# Patient Record
Sex: MALE | Race: OTHER | NOT HISPANIC OR LATINO
[De-identification: names, ages, dates, MRNs, and addresses within clinical notes are randomized per-mention and may not be internally consistent; named-entity substitution may affect disease eponyms.]

---

## 2017-01-06 ENCOUNTER — MEDICATION RENEWAL (OUTPATIENT)
Age: 77
End: 2017-01-06

## 2017-01-19 ENCOUNTER — MESSAGE (OUTPATIENT)
Age: 77
End: 2017-01-19

## 2017-01-24 ENCOUNTER — MESSAGE (OUTPATIENT)
Age: 77
End: 2017-01-24

## 2017-02-27 ENCOUNTER — OUTPATIENT (OUTPATIENT)
Dept: OUTPATIENT SERVICES | Facility: HOSPITAL | Age: 77
LOS: 1 days | End: 2017-02-27
Payer: COMMERCIAL

## 2017-02-27 ENCOUNTER — APPOINTMENT (OUTPATIENT)
Dept: UROLOGY | Facility: CLINIC | Age: 77
End: 2017-02-27

## 2017-02-27 DIAGNOSIS — R35.0 FREQUENCY OF MICTURITION: ICD-10-CM

## 2017-02-27 PROCEDURE — 96402 CHEMO HORMON ANTINEOPL SQ/IM: CPT

## 2017-02-27 RX ORDER — LEUPROLIDE ACETATE 45 MG
45 KIT INTRAMUSCULAR
Qty: 1 | Refills: 0 | Status: COMPLETED | OUTPATIENT
Start: 2017-02-27

## 2017-02-27 RX ORDER — LEUPROLIDE ACETATE 45 MG
45 KIT INTRAMUSCULAR
Qty: 1 | Refills: 0 | Status: COMPLETED | OUTPATIENT
Start: 2017-02-27 | End: 2017-02-27

## 2017-02-27 RX ADMIN — LEUPROLIDE ACETATE 0 MG: KIT at 00:00

## 2017-03-01 LAB
ALBUMIN SERPL ELPH-MCNC: 4.3 G/DL
ALP BLD-CCNC: 74 U/L
ALT SERPL-CCNC: 26 U/L
ANION GAP SERPL CALC-SCNC: 14 MMOL/L
AST SERPL-CCNC: 30 U/L
BASOPHILS # BLD AUTO: 0.03 K/UL
BASOPHILS NFR BLD AUTO: 0.4 %
BILIRUB SERPL-MCNC: 0.4 MG/DL
BUN SERPL-MCNC: 33 MG/DL
CALCIUM SERPL-MCNC: 10.4 MG/DL
CHLORIDE SERPL-SCNC: 103 MMOL/L
CO2 SERPL-SCNC: 24 MMOL/L
CREAT SERPL-MCNC: 1.27 MG/DL
EOSINOPHIL # BLD AUTO: 0.92 K/UL
EOSINOPHIL NFR BLD AUTO: 11.7 %
GLUCOSE SERPL-MCNC: 89 MG/DL
HCT VFR BLD CALC: 44.4 %
HGB BLD-MCNC: 14.1 G/DL
IMM GRANULOCYTES NFR BLD AUTO: 0.3 %
LYMPHOCYTES # BLD AUTO: 2.57 K/UL
LYMPHOCYTES NFR BLD AUTO: 32.8 %
MAN DIFF?: NORMAL
MCHC RBC-ENTMCNC: 28.3 PG
MCHC RBC-ENTMCNC: 31.8 GM/DL
MCV RBC AUTO: 89.2 FL
MONOCYTES # BLD AUTO: 0.39 K/UL
MONOCYTES NFR BLD AUTO: 5 %
NEUTROPHILS # BLD AUTO: 3.9 K/UL
NEUTROPHILS NFR BLD AUTO: 49.8 %
PLATELET # BLD AUTO: 218 K/UL
POTASSIUM SERPL-SCNC: 5.3 MMOL/L
PROT SERPL-MCNC: 8.2 G/DL
PSA SERPL-MCNC: <0.01 NG/ML
RBC # BLD: 4.98 M/UL
RBC # FLD: 13.9 %
SODIUM SERPL-SCNC: 141 MMOL/L
TESTOST SERPL-MCNC: <2.5 NG/DL
WBC # FLD AUTO: 7.83 K/UL

## 2017-03-07 DIAGNOSIS — E11.9 TYPE 2 DIABETES MELLITUS WITHOUT COMPLICATIONS: ICD-10-CM

## 2017-03-07 DIAGNOSIS — C61 MALIGNANT NEOPLASM OF PROSTATE: ICD-10-CM

## 2017-08-28 ENCOUNTER — OUTPATIENT (OUTPATIENT)
Dept: OUTPATIENT SERVICES | Facility: HOSPITAL | Age: 77
LOS: 1 days | End: 2017-08-28
Payer: COMMERCIAL

## 2017-08-28 ENCOUNTER — APPOINTMENT (OUTPATIENT)
Dept: UROLOGY | Facility: CLINIC | Age: 77
End: 2017-08-28
Payer: COMMERCIAL

## 2017-08-28 VITALS
HEART RATE: 66 BPM | RESPIRATION RATE: 16 BRPM | SYSTOLIC BLOOD PRESSURE: 198 MMHG | BODY MASS INDEX: 23.57 KG/M2 | TEMPERATURE: 97.8 F | HEIGHT: 63 IN | WEIGHT: 133 LBS | DIASTOLIC BLOOD PRESSURE: 83 MMHG

## 2017-08-28 DIAGNOSIS — R35.0 FREQUENCY OF MICTURITION: ICD-10-CM

## 2017-08-28 DIAGNOSIS — C61 MALIGNANT NEOPLASM OF PROSTATE: ICD-10-CM

## 2017-08-28 DIAGNOSIS — K59.00 CONSTIPATION, UNSPECIFIED: ICD-10-CM

## 2017-08-28 DIAGNOSIS — R31.29 OTHER MICROSCOPIC HEMATURIA: ICD-10-CM

## 2017-08-28 DIAGNOSIS — E11.9 TYPE 2 DIABETES MELLITUS WITHOUT COMPLICATIONS: ICD-10-CM

## 2017-08-28 PROCEDURE — 99214 OFFICE O/P EST MOD 30 MIN: CPT

## 2017-08-28 PROCEDURE — 96402 CHEMO HORMON ANTINEOPL SQ/IM: CPT

## 2017-08-28 RX ORDER — LEUPROLIDE ACETATE 45 MG
45 KIT INTRAMUSCULAR
Qty: 1 | Refills: 0 | Status: COMPLETED | OUTPATIENT
Start: 2017-08-28

## 2017-08-28 RX ORDER — LEUPROLIDE ACETATE 45 MG
45 KIT INTRAMUSCULAR
Qty: 1 | Refills: 0 | Status: COMPLETED | OUTPATIENT
Start: 2017-08-28 | End: 2017-08-28

## 2017-08-28 RX ADMIN — LEUPROLIDE ACETATE 0 MG: KIT at 00:00

## 2017-08-29 LAB
ALBUMIN SERPL ELPH-MCNC: 4.5 G/DL
ALP BLD-CCNC: 76 U/L
ALT SERPL-CCNC: 25 U/L
ANION GAP SERPL CALC-SCNC: 13 MMOL/L
APPEARANCE: CLEAR
AST SERPL-CCNC: 27 U/L
BACTERIA: NEGATIVE
BASOPHILS # BLD AUTO: 0.03 K/UL
BASOPHILS NFR BLD AUTO: 0.4 %
BILIRUB SERPL-MCNC: 0.4 MG/DL
BILIRUBIN URINE: NEGATIVE
BLOOD URINE: NEGATIVE
BUN SERPL-MCNC: 17 MG/DL
CALCIUM SERPL-MCNC: 9.9 MG/DL
CHLORIDE SERPL-SCNC: 99 MMOL/L
CO2 SERPL-SCNC: 26 MMOL/L
COLOR: YELLOW
CORE LAB FLUID CYTOLOGY: NORMAL
CREAT SERPL-MCNC: 1.1 MG/DL
EOSINOPHIL # BLD AUTO: 0.56 K/UL
EOSINOPHIL NFR BLD AUTO: 7.8 %
GLUCOSE QUALITATIVE U: NORMAL MG/DL
GLUCOSE SERPL-MCNC: 158 MG/DL
HCT VFR BLD CALC: 44.2 %
HGB BLD-MCNC: 13.9 G/DL
HYALINE CASTS: 0 /LPF
IMM GRANULOCYTES NFR BLD AUTO: 0.1 %
KETONES URINE: NEGATIVE
LEUKOCYTE ESTERASE URINE: NEGATIVE
LYMPHOCYTES # BLD AUTO: 2.58 K/UL
LYMPHOCYTES NFR BLD AUTO: 35.7 %
MAN DIFF?: NORMAL
MCHC RBC-ENTMCNC: 27.4 PG
MCHC RBC-ENTMCNC: 31.4 GM/DL
MCV RBC AUTO: 87 FL
MICROSCOPIC-UA: NORMAL
MONOCYTES # BLD AUTO: 0.36 K/UL
MONOCYTES NFR BLD AUTO: 5 %
NEUTROPHILS # BLD AUTO: 3.68 K/UL
NEUTROPHILS NFR BLD AUTO: 51 %
NITRITE URINE: NEGATIVE
PH URINE: 7
PLATELET # BLD AUTO: 188 K/UL
POTASSIUM SERPL-SCNC: 5.4 MMOL/L
PROT SERPL-MCNC: 8.3 G/DL
PROTEIN URINE: 30 MG/DL
PSA SERPL-MCNC: <0.01 NG/ML
RBC # BLD: 5.08 M/UL
RBC # FLD: 13.3 %
RED BLOOD CELLS URINE: 1 /HPF
SODIUM SERPL-SCNC: 138 MMOL/L
SPECIFIC GRAVITY URINE: 1.02
SQUAMOUS EPITHELIAL CELLS: 0 /HPF
TESTOST SERPL-MCNC: <2.5 NG/DL
UROBILINOGEN URINE: NORMAL MG/DL
WBC # FLD AUTO: 7.22 K/UL
WHITE BLOOD CELLS URINE: 0 /HPF

## 2017-08-30 ENCOUNTER — MESSAGE (OUTPATIENT)
Age: 77
End: 2017-08-30

## 2017-08-30 LAB — BACTERIA UR CULT: NORMAL

## 2018-01-03 VITALS
RESPIRATION RATE: 16 BRPM | DIASTOLIC BLOOD PRESSURE: 70 MMHG | SYSTOLIC BLOOD PRESSURE: 158 MMHG | OXYGEN SATURATION: 100 % | HEART RATE: 82 BPM | TEMPERATURE: 96 F

## 2018-01-03 RX ORDER — CHLORHEXIDINE GLUCONATE 213 G/1000ML
1 SOLUTION TOPICAL ONCE
Qty: 0 | Refills: 0 | Status: DISCONTINUED | OUTPATIENT
Start: 2018-01-05 | End: 2018-01-05

## 2018-01-03 NOTE — H&P ADULT - NSHPSOCIALHISTORY_GEN_ALL_CORE
Denies ETOH, tobacco, and drug use Denies ETOH and drug use  Former smoker- quit 40 years ago, prev 1ppd x 10 yrs

## 2018-01-03 NOTE — H&P ADULT - PMH
Atherosclerosis of coronary artery  Coronary artery disease  Essential hypertension  Hypertension  Hyperlipidemia  Dyslipidemia  Type 2 diabetes mellitus  Diabetes

## 2018-01-03 NOTE — H&P ADULT - RS GEN PE MLT RESP DETAILS PC
no chest wall tenderness/respirations non-labored/normal/no rhonchi/no wheezes/no subcutaneous emphysema/no intercostal retractions/no rales/good air movement/airway patent/breath sounds equal

## 2018-01-03 NOTE — H&P ADULT - HISTORY OF PRESENT ILLNESS
*****SKELETON*****    76 y/o M pt FHx of CAD, PMHX known CAD w/ prior 3vCABG @ [facility] 2015 LIMA to LAD, SVG to OM, and SVG to rPDA, DM II, + murmur, HLD, HTN, prostate cancer presents to cardiologist Dr. Santacruz c/o intermittent episodes of CP    Denies SOB, JARQUIN, decrease in exercise tolerance, palpitations, dizziness, syncope, PND/orthopnea, or LE edema    NST 12/21/2017: EF: 64%, LVSF is normal, moderate sized, severe, reversible myocardial perfusion defect of the lateral and inferolateral wall. Cath 04/10/2015: LM: distal 50% discrete stenosis, LAD: proximal 60% diffuse stenosis, mid 50% diffuse stenosis, calcified vessel, D1 small diffuse disease, LCx mild luminal irregularities, large size vessel, OM1 proximal 95% stenosis, OM2 mild diffuse disease, small vessel disease, RCA: proximal/mid stents, widely patent, distal 30% diffuse disease, rPLS: medium to large vessel, distal 50% tubular stenosis, rPDA small w/ severe 90%. LIMA to LAD, SVG to OM, and SVG to rPDA, moderately elevated LVEDP 22mmHg.    In light of pt's risk factors, known CAD, CCS III anginal symptoms and abnormal stress test pt is referred for cardiac catheterization w/ possible intervention if clinically indicated to r/o CAD 78 y/o M  FHx of CAD, PMHx known CAD w/ prior 3VCABG @ [facility] 2015 LIMA to LAD, SVG to OM, and SVG to rPDA s/p Diagnostic Cardiac Cath 4/10/2015 (results below), DM II, + murmur, HLD, HTN, prostate cancer presents to cardiologist Dr. Santacruz c/o intermittent chest pain at rest, which lasts seconds-to-minutes before resolving on its own. Denies SOB, JARQUIN, decrease in exercise tolerance, palpitations, dizziness, syncope, PND/orthopnea, or LE edema. Subsequent Nuclear Stress test 12/1/2017 revealed moderate sized, severe, reversible myocardial perfusion defect of the lateral and inferolateral wall,  EF 64%.    In light of pt's risk factors, known CAD, CCS III anginal symptoms and abnormal stress test pt is referred for cardiac catheterization w/ possible intervention if clinically indicated to r/o CAD    Cardiac Cath 04/10/2015: LM: distal 50% discrete stenosis, LAD: proximal 60% diffuse stenosis, mid 50% diffuse stenosis, calcified vessel, D1 small diffuse disease, LCx mild luminal irregularities, large size vessel, OM1 proximal 95% stenosis, OM2 mild diffuse disease, small vessel disease, RCA: proximal/mid stents, widely patent, distal 30% diffuse disease, rPLS: medium to large vessel, distal 50% tubular stenosis, rPDA small w/ severe 90%. LIMA to LAD, SVG to OM, and SVG to rPDA, moderately elevated LVEDP 22mmHg.

## 2018-01-03 NOTE — H&P ADULT - ASSESSMENT
78 y/o M  FHx of CAD, PMHx known CAD w/ prior 3VCABG @ [facility] 2015 LIMA to LAD, SVG to OM, and SVG to rPDA s/p Diagnostic Cardiac Cath 4/10/2015, DM II, + murmur, HLD, HTN, prostate cancer  presents for cardiac catheterization w/ possible intervention if clinically indicated to r/o CAD secondary to pt's risk factors, known CAD, CCS III anginal symptoms and abnormal stress test.    - Pt. states that he is compliant with DAPT. Aspirin 81 mg X 1 and Plavix 75 mg X 1 given pre-cath.  - NS @ 75 cc/hr given.    Risks & benefits of procedure and alternative therapy have been explained to the patient including but not limited to: allergic reaction, bleeding w/possible need for blood transfusion, infection, renal and vascular compromise, limb damage, arrhythmia, stroke, vessel dissection/perforation, Myocardial infarction, emergent CABG. Informed consent obtained and in chart 78 y/o M  FHx of CAD, PMHx known CAD w/ prior 3VCABG @ [facility] 2015 LIMA to LAD, SVG to OM, and SVG to rPDA s/p Diagnostic Cardiac Cath 4/10/2015, CKD stage 3 (Cr today 1.49, unknown baseline),  DM II, + murmur, HLD, HTN, prostate cancer  presents for cardiac catheterization w/ possible intervention if clinically indicated to r/o CAD secondary to pt's risk factors, known CAD, CCS III anginal symptoms and abnormal stress test.    - Pt. states that he is compliant with DAPT. Aspirin 81 mg X 1 and Plavix 75 mg X 1 given pre-cath.  - CKD (Stage 3) Cr. 1.49 today (baseline unjnown). NS @ 100 cc/hr given as discussed with Dr. Santacruz  - Pt. on metoprolol succinate 100 mg PO daily at home. last dose was 4-5 mnths ago. As discussed with Dr. Santacruz Metoprolol 25 mg X 1 given pre-cath.    Risks & benefits of procedure and alternative therapy have been explained to the patient including but not limited to: allergic reaction, bleeding w/possible need for blood transfusion, infection, renal and vascular compromise, limb damage, arrhythmia, stroke, vessel dissection/perforation, Myocardial infarction, emergent CABG. Informed consent obtained and in chart

## 2018-01-03 NOTE — H&P ADULT - NSHPLABSRESULTS_GEN_ALL_CORE
13.4   7.2   )-----------( 193      ( 05 Jan 2018 08:59 )             40.5 13.4   7.2   )-----------( 193      ( 05 Jan 2018 08:59 )             40.5  01-05    132<L>  |  95<L>  |  41<H>  ----------------------------<  257<H>  4.8   |  22  |  1.49<H>    Ca    10.0      05 Jan 2018 08:59    TPro  8.7<H>  /  Alb  4.3  /  TBili  0.8  /  DBili  x   /  AST  30  /  ALT  27  /  AlkPhos  69  01-05    PT/INR - ( 05 Jan 2018 08:59 )   PT: 10.7 sec;   INR: 0.96          PTT - ( 05 Jan 2018 08:59 )  PTT:29.4 sec

## 2018-01-03 NOTE — H&P ADULT - PSH
Other postprocedural status  S/P colonoscopy  Other postprocedural status  secondary to prostate cancer  Status post aorto-coronary artery bypass graft  SVG-->LAD, SVG-->OMB, SVG-->PLS/PDA

## 2018-01-04 RX ORDER — INSULIN LISPRO 100/ML
1 VIAL (ML) SUBCUTANEOUS
Qty: 0 | Refills: 0 | COMMUNITY

## 2018-01-04 RX ORDER — PSYLLIUM SEED (WITH DEXTROSE)
1 POWDER (GRAM) ORAL
Qty: 0 | Refills: 0 | COMMUNITY

## 2018-01-04 RX ORDER — INSULIN GLARGINE 100 [IU]/ML
1 INJECTION, SOLUTION SUBCUTANEOUS
Qty: 0 | Refills: 0 | COMMUNITY

## 2018-01-04 RX ORDER — SODIUM POLYSTYRENE SULFONATE 4.1 MEQ/G
0 POWDER, FOR SUSPENSION ORAL
Qty: 0 | Refills: 0 | COMMUNITY

## 2018-01-05 ENCOUNTER — OUTPATIENT (OUTPATIENT)
Dept: OUTPATIENT SERVICES | Facility: HOSPITAL | Age: 78
LOS: 1 days | Discharge: NURSING, REHAB OR LONG-TERM CARE | End: 2018-01-05
Payer: COMMERCIAL

## 2018-01-05 PROBLEM — C61 MALIGNANT NEOPLASM OF PROSTATE: Chronic | Status: ACTIVE | Noted: 2018-01-03

## 2018-01-05 LAB
ALBUMIN SERPL ELPH-MCNC: 4.3 G/DL — SIGNIFICANT CHANGE UP (ref 3.3–5)
ALP SERPL-CCNC: 69 U/L — SIGNIFICANT CHANGE UP (ref 40–120)
ALT FLD-CCNC: 27 U/L — SIGNIFICANT CHANGE UP (ref 10–45)
ANION GAP SERPL CALC-SCNC: 15 MMOL/L — SIGNIFICANT CHANGE UP (ref 5–17)
APTT BLD: 29.4 SEC — SIGNIFICANT CHANGE UP (ref 27.5–37.4)
AST SERPL-CCNC: 30 U/L — SIGNIFICANT CHANGE UP (ref 10–40)
BASOPHILS NFR BLD AUTO: 0.7 % — SIGNIFICANT CHANGE UP (ref 0–2)
BILIRUB SERPL-MCNC: 0.8 MG/DL — SIGNIFICANT CHANGE UP (ref 0.2–1.2)
BUN SERPL-MCNC: 41 MG/DL — HIGH (ref 7–23)
CALCIUM SERPL-MCNC: 10 MG/DL — SIGNIFICANT CHANGE UP (ref 8.4–10.5)
CHLORIDE SERPL-SCNC: 95 MMOL/L — LOW (ref 96–108)
CHOLEST SERPL-MCNC: 246 MG/DL — HIGH (ref 10–199)
CK MB CFR SERPL CALC: 6.2 NG/ML — SIGNIFICANT CHANGE UP (ref 0–6.7)
CO2 SERPL-SCNC: 22 MMOL/L — SIGNIFICANT CHANGE UP (ref 22–31)
CREAT SERPL-MCNC: 1.49 MG/DL — HIGH (ref 0.5–1.3)
EOSINOPHIL NFR BLD AUTO: 5.9 % — SIGNIFICANT CHANGE UP (ref 0–6)
GLUCOSE BLDC GLUCOMTR-MCNC: 129 MG/DL — HIGH (ref 70–99)
GLUCOSE SERPL-MCNC: 257 MG/DL — HIGH (ref 70–99)
HBA1C BLD-MCNC: 7.9 % — HIGH (ref 4–5.6)
HCT VFR BLD CALC: 40.5 % — SIGNIFICANT CHANGE UP (ref 39–50)
HDLC SERPL-MCNC: 79 MG/DL — SIGNIFICANT CHANGE UP (ref 40–125)
HGB BLD-MCNC: 13.4 G/DL — SIGNIFICANT CHANGE UP (ref 13–17)
INR BLD: 0.96 — SIGNIFICANT CHANGE UP (ref 0.88–1.16)
LIPID PNL WITH DIRECT LDL SERPL: 149 MG/DL — HIGH
LYMPHOCYTES # BLD AUTO: 32.7 % — SIGNIFICANT CHANGE UP (ref 13–44)
MCHC RBC-ENTMCNC: 27.8 PG — SIGNIFICANT CHANGE UP (ref 27–34)
MCHC RBC-ENTMCNC: 33.1 G/DL — SIGNIFICANT CHANGE UP (ref 32–36)
MCV RBC AUTO: 84 FL — SIGNIFICANT CHANGE UP (ref 80–100)
MONOCYTES NFR BLD AUTO: 5.6 % — SIGNIFICANT CHANGE UP (ref 2–14)
NEUTROPHILS NFR BLD AUTO: 55.1 % — SIGNIFICANT CHANGE UP (ref 43–77)
PLATELET # BLD AUTO: 193 K/UL — SIGNIFICANT CHANGE UP (ref 150–400)
POTASSIUM SERPL-MCNC: 4.8 MMOL/L — SIGNIFICANT CHANGE UP (ref 3.5–5.3)
POTASSIUM SERPL-SCNC: 4.8 MMOL/L — SIGNIFICANT CHANGE UP (ref 3.5–5.3)
PROT SERPL-MCNC: 8.7 G/DL — HIGH (ref 6–8.3)
PROTHROM AB SERPL-ACNC: 10.7 SEC — SIGNIFICANT CHANGE UP (ref 9.8–12.7)
RBC # BLD: 4.82 M/UL — SIGNIFICANT CHANGE UP (ref 4.2–5.8)
RBC # FLD: 13.4 % — SIGNIFICANT CHANGE UP (ref 10.3–16.9)
SODIUM SERPL-SCNC: 132 MMOL/L — LOW (ref 135–145)
TOTAL CHOLESTEROL/HDL RATIO MEASUREMENT: 3.1 RATIO — LOW (ref 3.4–9.6)
TRIGL SERPL-MCNC: 88 MG/DL — SIGNIFICANT CHANGE UP (ref 10–149)
WBC # BLD: 7.2 K/UL — SIGNIFICANT CHANGE UP (ref 3.8–10.5)
WBC # FLD AUTO: 7.2 K/UL — SIGNIFICANT CHANGE UP (ref 3.8–10.5)

## 2018-01-05 PROCEDURE — 82553 CREATINE MB FRACTION: CPT

## 2018-01-05 PROCEDURE — C1889: CPT

## 2018-01-05 PROCEDURE — 85025 COMPLETE CBC W/AUTO DIFF WBC: CPT

## 2018-01-05 PROCEDURE — 93005 ELECTROCARDIOGRAM TRACING: CPT

## 2018-01-05 PROCEDURE — C1887: CPT

## 2018-01-05 PROCEDURE — 82962 GLUCOSE BLOOD TEST: CPT

## 2018-01-05 PROCEDURE — 93459 L HRT ART/GRFT ANGIO: CPT

## 2018-01-05 PROCEDURE — C1894: CPT

## 2018-01-05 PROCEDURE — 80061 LIPID PANEL: CPT

## 2018-01-05 PROCEDURE — 83036 HEMOGLOBIN GLYCOSYLATED A1C: CPT

## 2018-01-05 PROCEDURE — 85730 THROMBOPLASTIN TIME PARTIAL: CPT

## 2018-01-05 PROCEDURE — 85610 PROTHROMBIN TIME: CPT

## 2018-01-05 PROCEDURE — 82550 ASSAY OF CK (CPK): CPT

## 2018-01-05 PROCEDURE — 93010 ELECTROCARDIOGRAM REPORT: CPT

## 2018-01-05 PROCEDURE — C1769: CPT

## 2018-01-05 PROCEDURE — 80053 COMPREHEN METABOLIC PANEL: CPT

## 2018-01-05 PROCEDURE — 93455 CORONARY ART/GRFT ANGIO S&I: CPT | Mod: 26

## 2018-01-05 RX ORDER — INSULIN LISPRO 100/ML
VIAL (ML) SUBCUTANEOUS ONCE
Qty: 0 | Refills: 0 | Status: DISCONTINUED | OUTPATIENT
Start: 2018-01-05 | End: 2018-01-05

## 2018-01-05 RX ORDER — SODIUM CHLORIDE 9 MG/ML
1000 INJECTION, SOLUTION INTRAVENOUS
Qty: 0 | Refills: 0 | Status: DISCONTINUED | OUTPATIENT
Start: 2018-01-05 | End: 2018-01-05

## 2018-01-05 RX ORDER — GLUCAGON INJECTION, SOLUTION 0.5 MG/.1ML
1 INJECTION, SOLUTION SUBCUTANEOUS ONCE
Qty: 0 | Refills: 0 | Status: DISCONTINUED | OUTPATIENT
Start: 2018-01-05 | End: 2018-01-05

## 2018-01-05 RX ORDER — METOPROLOL TARTRATE 50 MG
25 TABLET ORAL ONCE
Qty: 0 | Refills: 0 | Status: COMPLETED | OUTPATIENT
Start: 2018-01-05 | End: 2018-01-05

## 2018-01-05 RX ORDER — DEXTROSE 50 % IN WATER 50 %
25 SYRINGE (ML) INTRAVENOUS ONCE
Qty: 0 | Refills: 0 | Status: DISCONTINUED | OUTPATIENT
Start: 2018-01-05 | End: 2018-01-05

## 2018-01-05 RX ORDER — ASPIRIN/CALCIUM CARB/MAGNESIUM 324 MG
81 TABLET ORAL ONCE
Qty: 0 | Refills: 0 | Status: DISCONTINUED | OUTPATIENT
Start: 2018-01-05 | End: 2018-01-05

## 2018-01-05 RX ORDER — SODIUM CHLORIDE 9 MG/ML
1000 INJECTION INTRAMUSCULAR; INTRAVENOUS; SUBCUTANEOUS
Qty: 0 | Refills: 0 | Status: DISCONTINUED | OUTPATIENT
Start: 2018-01-05 | End: 2018-01-05

## 2018-01-05 RX ORDER — SODIUM CHLORIDE 9 MG/ML
500 INJECTION INTRAMUSCULAR; INTRAVENOUS; SUBCUTANEOUS
Qty: 0 | Refills: 0 | Status: DISCONTINUED | OUTPATIENT
Start: 2018-01-05 | End: 2018-01-05

## 2018-01-05 RX ORDER — CLOPIDOGREL BISULFATE 75 MG/1
75 TABLET, FILM COATED ORAL ONCE
Qty: 0 | Refills: 0 | Status: COMPLETED | OUTPATIENT
Start: 2018-01-05 | End: 2018-01-05

## 2018-01-05 RX ORDER — NITROGLYCERIN 6.5 MG
1 CAPSULE, EXTENDED RELEASE ORAL
Qty: 30 | Refills: 1
Start: 2018-01-05 | End: 2018-03-05

## 2018-01-05 RX ORDER — DEXTROSE 50 % IN WATER 50 %
1 SYRINGE (ML) INTRAVENOUS ONCE
Qty: 0 | Refills: 0 | Status: DISCONTINUED | OUTPATIENT
Start: 2018-01-05 | End: 2018-01-05

## 2018-01-05 RX ORDER — METOPROLOL TARTRATE 50 MG
1 TABLET ORAL
Qty: 0 | Refills: 0 | COMMUNITY

## 2018-01-05 RX ORDER — DEXTROSE 50 % IN WATER 50 %
12.5 SYRINGE (ML) INTRAVENOUS ONCE
Qty: 0 | Refills: 0 | Status: DISCONTINUED | OUTPATIENT
Start: 2018-01-05 | End: 2018-01-05

## 2018-01-05 RX ORDER — ASPIRIN/CALCIUM CARB/MAGNESIUM 324 MG
81 TABLET ORAL ONCE
Qty: 0 | Refills: 0 | Status: COMPLETED | OUTPATIENT
Start: 2018-01-05 | End: 2018-01-05

## 2018-01-05 RX ORDER — CLOPIDOGREL BISULFATE 75 MG/1
75 TABLET, FILM COATED ORAL ONCE
Qty: 0 | Refills: 0 | Status: DISCONTINUED | OUTPATIENT
Start: 2018-01-05 | End: 2018-01-05

## 2018-01-05 RX ORDER — METOPROLOL TARTRATE 50 MG
1 TABLET ORAL
Qty: 30 | Refills: 5
Start: 2018-01-05 | End: 2018-07-03

## 2018-01-05 RX ADMIN — Medication 81 MILLIGRAM(S): at 09:56

## 2018-01-05 RX ADMIN — Medication 25 MILLIGRAM(S): at 10:11

## 2018-01-05 RX ADMIN — CLOPIDOGREL BISULFATE 75 MILLIGRAM(S): 75 TABLET, FILM COATED ORAL at 09:54

## 2018-01-05 RX ADMIN — SODIUM CHLORIDE 100 MILLILITER(S): 9 INJECTION INTRAMUSCULAR; INTRAVENOUS; SUBCUTANEOUS at 09:54

## 2018-01-05 NOTE — PROGRESS NOTE ADULT - SUBJECTIVE AND OBJECTIVE BOX
Interventional Cardiology PA SDA Discharge Note    Patient without complaints. Ambulated and voided without difficulties    Afebrile, VSS    Ext:    		Right             Groin:    no   hematoma,  no   bruit, dressing; C/D/I  		      Pulses:    intact DP/PT to baseline     A/P:  78 y/o M CAD prior 3VCABG, DMII, HLD, HTN, Prostate CA presented to Dr. Santacruz with CCS Class III anginal equivalent symptoms in setting of abnormal stress test referred to [facility] for recommended Cath to assess for CAD progression found to have patent LIMA-LAD, SVG-OM and known occlusion to SVG rPDA graft, EDP 11. R groin sheath manually removed in room. Pt to c/w medical therapy with inclusion of SL NTG PRN rx sent to pharmacy as well as ToprolXL 100mg daily. Pt has all medications at home. Pt given work letter upon d/c.                1.	Stable for discharge as per attending Dr. Santacruz after bed rest, pt voids, groin stable and 30 minutes of ambulation.  2.	Follow-up with PMD/Cardiologist Dr. Santacruz in 1-2 weeks  3.	Discharged forms signed and copies in chart

## 2018-02-05 ENCOUNTER — MEDICATION RENEWAL (OUTPATIENT)
Age: 78
End: 2018-02-05

## 2018-02-28 ENCOUNTER — APPOINTMENT (OUTPATIENT)
Dept: UROLOGY | Facility: CLINIC | Age: 78
End: 2018-02-28
Payer: COMMERCIAL

## 2018-02-28 ENCOUNTER — OUTPATIENT (OUTPATIENT)
Dept: OUTPATIENT SERVICES | Facility: HOSPITAL | Age: 78
LOS: 1 days | End: 2018-02-28
Payer: COMMERCIAL

## 2018-02-28 VITALS
TEMPERATURE: 97.5 F | HEART RATE: 83 BPM | DIASTOLIC BLOOD PRESSURE: 73 MMHG | SYSTOLIC BLOOD PRESSURE: 169 MMHG | RESPIRATION RATE: 17 BRPM

## 2018-02-28 DIAGNOSIS — R35.0 FREQUENCY OF MICTURITION: ICD-10-CM

## 2018-02-28 PROCEDURE — 76775 US EXAM ABDO BACK WALL LIM: CPT | Mod: 26

## 2018-02-28 PROCEDURE — 76775 US EXAM ABDO BACK WALL LIM: CPT

## 2018-02-28 PROCEDURE — 99214 OFFICE O/P EST MOD 30 MIN: CPT | Mod: 25

## 2018-03-01 DIAGNOSIS — R31.29 OTHER MICROSCOPIC HEMATURIA: ICD-10-CM

## 2018-03-01 DIAGNOSIS — C61 MALIGNANT NEOPLASM OF PROSTATE: ICD-10-CM

## 2018-03-01 DIAGNOSIS — E11.9 TYPE 2 DIABETES MELLITUS WITHOUT COMPLICATIONS: ICD-10-CM

## 2018-03-04 LAB
BACTERIA UR CULT: NORMAL
BASOPHILS # BLD AUTO: 0.03 K/UL
BASOPHILS NFR BLD AUTO: 0.4 %
CORE LAB FLUID CYTOLOGY: NORMAL
EOSINOPHIL # BLD AUTO: 0.84 K/UL
EOSINOPHIL NFR BLD AUTO: 10.5 %
HBA1C MFR BLD HPLC: 8 %
HCT VFR BLD CALC: 40.3 %
HGB BLD-MCNC: 12.4 G/DL
IMM GRANULOCYTES NFR BLD AUTO: 0.1 %
LYMPHOCYTES # BLD AUTO: 2.76 K/UL
LYMPHOCYTES NFR BLD AUTO: 34.5 %
MAN DIFF?: NORMAL
MCHC RBC-ENTMCNC: 26.9 PG
MCHC RBC-ENTMCNC: 30.8 GM/DL
MCV RBC AUTO: 87.4 FL
MONOCYTES # BLD AUTO: 0.42 K/UL
MONOCYTES NFR BLD AUTO: 5.2 %
NEUTROPHILS # BLD AUTO: 3.95 K/UL
NEUTROPHILS NFR BLD AUTO: 49.3 %
PLATELET # BLD AUTO: 203 K/UL
PSA SERPL-MCNC: <0.01 NG/ML
RBC # BLD: 4.61 M/UL
RBC # FLD: 13.7 %
TESTOST SERPL-MCNC: <2.5 NG/DL
WBC # FLD AUTO: 8.01 K/UL

## 2018-03-06 LAB
ALBUMIN SERPL ELPH-MCNC: 4.1 G/DL
ALP BLD-CCNC: 64 U/L
ALT SERPL-CCNC: 20 U/L
ANION GAP SERPL CALC-SCNC: 16 MMOL/L
APPEARANCE: CLEAR
AST SERPL-CCNC: 26 U/L
BACTERIA: NEGATIVE
BILIRUB SERPL-MCNC: 0.5 MG/DL
BILIRUBIN URINE: NEGATIVE
BLOOD URINE: NEGATIVE
BUN SERPL-MCNC: 32 MG/DL
CALCIUM SERPL-MCNC: 10.5 MG/DL
CHLORIDE SERPL-SCNC: 99 MMOL/L
CO2 SERPL-SCNC: 23 MMOL/L
COLOR: YELLOW
CREAT SERPL-MCNC: 1.25 MG/DL
GLUCOSE QUALITATIVE U: 1000 MG/DL
GLUCOSE SERPL-MCNC: 224 MG/DL
KETONES URINE: NEGATIVE
LEUKOCYTE ESTERASE URINE: NEGATIVE
MICROSCOPIC-UA: NORMAL
NITRITE URINE: NEGATIVE
PH URINE: 5.5
POTASSIUM SERPL-SCNC: 5.6 MMOL/L
PROT SERPL-MCNC: 8.2 G/DL
PROTEIN URINE: NEGATIVE MG/DL
RED BLOOD CELLS URINE: 0 /HPF
SODIUM SERPL-SCNC: 138 MMOL/L
SPECIFIC GRAVITY URINE: 1.02
SQUAMOUS EPITHELIAL CELLS: 0 /HPF
UROBILINOGEN URINE: NEGATIVE MG/DL
WHITE BLOOD CELLS URINE: 0 /HPF

## 2018-08-02 ENCOUNTER — MEDICATION RENEWAL (OUTPATIENT)
Age: 78
End: 2018-08-02

## 2018-08-29 ENCOUNTER — APPOINTMENT (OUTPATIENT)
Dept: UROLOGY | Facility: CLINIC | Age: 78
End: 2018-08-29
Payer: COMMERCIAL

## 2018-08-29 ENCOUNTER — OUTPATIENT (OUTPATIENT)
Dept: OUTPATIENT SERVICES | Facility: HOSPITAL | Age: 78
LOS: 1 days | End: 2018-08-29
Payer: COMMERCIAL

## 2018-08-29 VITALS
HEART RATE: 57 BPM | SYSTOLIC BLOOD PRESSURE: 212 MMHG | TEMPERATURE: 98 F | RESPIRATION RATE: 16 BRPM | DIASTOLIC BLOOD PRESSURE: 96 MMHG

## 2018-08-29 DIAGNOSIS — R35.0 FREQUENCY OF MICTURITION: ICD-10-CM

## 2018-08-29 PROCEDURE — 96402 CHEMO HORMON ANTINEOPL SQ/IM: CPT

## 2018-08-29 PROCEDURE — 99214 OFFICE O/P EST MOD 30 MIN: CPT

## 2018-08-29 RX ORDER — LEUPROLIDE ACETATE 45 MG
45 KIT INTRAMUSCULAR
Qty: 1 | Refills: 0 | Status: COMPLETED | OUTPATIENT
Start: 2018-08-29

## 2018-08-29 RX ORDER — LEUPROLIDE ACETATE 45 MG
45 KIT INTRAMUSCULAR
Qty: 1 | Refills: 0 | Status: COMPLETED | OUTPATIENT
Start: 2018-08-29 | End: 2018-08-29

## 2018-08-29 RX ADMIN — LEUPROLIDE ACETATE 0 MG: KIT at 00:00

## 2018-09-04 DIAGNOSIS — C61 MALIGNANT NEOPLASM OF PROSTATE: ICD-10-CM

## 2018-09-04 DIAGNOSIS — R31.29 OTHER MICROSCOPIC HEMATURIA: ICD-10-CM

## 2018-09-04 DIAGNOSIS — E11.9 TYPE 2 DIABETES MELLITUS WITHOUT COMPLICATIONS: ICD-10-CM

## 2018-09-05 LAB
ALBUMIN SERPL ELPH-MCNC: 4.2 G/DL
ALP BLD-CCNC: 72 U/L
ALT SERPL-CCNC: 20 U/L
ANION GAP SERPL CALC-SCNC: 14 MMOL/L
APPEARANCE: CLEAR
AST SERPL-CCNC: 25 U/L
BACTERIA UR CULT: NORMAL
BACTERIA: NEGATIVE
BASOPHILS # BLD AUTO: 0.05 K/UL
BASOPHILS NFR BLD AUTO: 0.5 %
BILIRUB SERPL-MCNC: 0.3 MG/DL
BILIRUBIN URINE: NEGATIVE
BLOOD URINE: NEGATIVE
BUN SERPL-MCNC: 31 MG/DL
CALCIUM SERPL-MCNC: 10.1 MG/DL
CHLORIDE SERPL-SCNC: 101 MMOL/L
CO2 SERPL-SCNC: 24 MMOL/L
COLOR: YELLOW
CORE LAB FLUID CYTOLOGY: NORMAL
CREAT SERPL-MCNC: 1.13 MG/DL
EOSINOPHIL # BLD AUTO: 0.77 K/UL
EOSINOPHIL NFR BLD AUTO: 8.2 %
GLUCOSE QUALITATIVE U: NEGATIVE MG/DL
GLUCOSE SERPL-MCNC: 142 MG/DL
HBA1C MFR BLD HPLC: 7.6 %
HCT VFR BLD CALC: 42.9 %
HGB BLD-MCNC: 13.4 G/DL
HYALINE CASTS: 1 /LPF
IMM GRANULOCYTES NFR BLD AUTO: 0.2 %
KETONES URINE: NEGATIVE
LEUKOCYTE ESTERASE URINE: NEGATIVE
LYMPHOCYTES # BLD AUTO: 3.98 K/UL
LYMPHOCYTES NFR BLD AUTO: 42.5 %
MAN DIFF?: NORMAL
MCHC RBC-ENTMCNC: 28.1 PG
MCHC RBC-ENTMCNC: 31.2 GM/DL
MCV RBC AUTO: 89.9 FL
MICROSCOPIC-UA: NORMAL
MONOCYTES # BLD AUTO: 0.5 K/UL
MONOCYTES NFR BLD AUTO: 5.3 %
NEUTROPHILS # BLD AUTO: 4.04 K/UL
NEUTROPHILS NFR BLD AUTO: 43.3 %
NITRITE URINE: NEGATIVE
PH URINE: 6
PLATELET # BLD AUTO: 176 K/UL
POTASSIUM SERPL-SCNC: 5.3 MMOL/L
PROT SERPL-MCNC: 8.1 G/DL
PROTEIN URINE: 100 MG/DL
PSA SERPL-MCNC: <0.01 NG/ML
RBC # BLD: 4.77 M/UL
RBC # FLD: 14.3 %
RED BLOOD CELLS URINE: 0 /HPF
SODIUM SERPL-SCNC: 139 MMOL/L
SPECIFIC GRAVITY URINE: 1.02
SQUAMOUS EPITHELIAL CELLS: 0 /HPF
TESTOST SERPL-MCNC: <2.5 NG/DL
UROBILINOGEN URINE: NEGATIVE MG/DL
WBC # FLD AUTO: 9.36 K/UL
WHITE BLOOD CELLS URINE: 0 /HPF

## 2019-01-17 ENCOUNTER — RX RENEWAL (OUTPATIENT)
Age: 79
End: 2019-01-17

## 2019-02-26 ENCOUNTER — OUTPATIENT (OUTPATIENT)
Dept: OUTPATIENT SERVICES | Facility: HOSPITAL | Age: 79
LOS: 1 days | End: 2019-02-26
Payer: COMMERCIAL

## 2019-02-26 ENCOUNTER — APPOINTMENT (OUTPATIENT)
Dept: UROLOGY | Facility: CLINIC | Age: 79
End: 2019-02-26
Payer: COMMERCIAL

## 2019-02-26 VITALS
BODY MASS INDEX: 23.57 KG/M2 | WEIGHT: 133 LBS | SYSTOLIC BLOOD PRESSURE: 114 MMHG | DIASTOLIC BLOOD PRESSURE: 71 MMHG | RESPIRATION RATE: 16 BRPM | HEART RATE: 64 BPM | TEMPERATURE: 97.5 F | HEIGHT: 63 IN

## 2019-02-26 DIAGNOSIS — N40.1 BENIGN PROSTATIC HYPERPLASIA WITH LOWER URINARY TRACT SYMPTOMS: ICD-10-CM

## 2019-02-26 DIAGNOSIS — N13.8 OTHER OBSTRUCTIVE AND REFLUX UROPATHY: ICD-10-CM

## 2019-02-26 DIAGNOSIS — E11.9 TYPE 2 DIABETES MELLITUS WITHOUT COMPLICATIONS: ICD-10-CM

## 2019-02-26 DIAGNOSIS — R31.0 GROSS HEMATURIA: ICD-10-CM

## 2019-02-26 DIAGNOSIS — C61 MALIGNANT NEOPLASM OF PROSTATE: ICD-10-CM

## 2019-02-26 DIAGNOSIS — R35.0 FREQUENCY OF MICTURITION: ICD-10-CM

## 2019-02-26 LAB
APPEARANCE: CLEAR
BACTERIA: NEGATIVE
BASOPHILS # BLD AUTO: 0.04 K/UL
BASOPHILS NFR BLD AUTO: 0.5 %
BILIRUBIN URINE: NEGATIVE
BLOOD URINE: NEGATIVE
COLOR: YELLOW
EOSINOPHIL # BLD AUTO: 0.33 K/UL
EOSINOPHIL NFR BLD AUTO: 4.1 %
GLUCOSE QUALITATIVE U: NEGATIVE
HBA1C MFR BLD HPLC: 8.5 %
HCT VFR BLD CALC: 42.1 %
HGB BLD-MCNC: 13.1 G/DL
HYALINE CASTS: 1 /LPF
IMM GRANULOCYTES NFR BLD AUTO: 0.2 %
KETONES URINE: NEGATIVE
LEUKOCYTE ESTERASE URINE: NEGATIVE
LYMPHOCYTES # BLD AUTO: 3.22 K/UL
LYMPHOCYTES NFR BLD AUTO: 40 %
MAN DIFF?: NORMAL
MCHC RBC-ENTMCNC: 27.8 PG
MCHC RBC-ENTMCNC: 31.1 GM/DL
MCV RBC AUTO: 89.4 FL
MICROSCOPIC-UA: NORMAL
MONOCYTES # BLD AUTO: 0.39 K/UL
MONOCYTES NFR BLD AUTO: 4.8 %
NEUTROPHILS # BLD AUTO: 4.06 K/UL
NEUTROPHILS NFR BLD AUTO: 50.4 %
NITRITE URINE: NEGATIVE
PH URINE: 6
PLATELET # BLD AUTO: 181 K/UL
PROTEIN URINE: NORMAL
RBC # BLD: 4.71 M/UL
RBC # FLD: 13.2 %
RED BLOOD CELLS URINE: 3 /HPF
SPECIFIC GRAVITY URINE: 1.03
SQUAMOUS EPITHELIAL CELLS: 1 /HPF
UROBILINOGEN URINE: NORMAL
WBC # FLD AUTO: 8.06 K/UL
WHITE BLOOD CELLS URINE: 2 /HPF

## 2019-02-26 PROCEDURE — 96402 CHEMO HORMON ANTINEOPL SQ/IM: CPT

## 2019-02-26 PROCEDURE — 99214 OFFICE O/P EST MOD 30 MIN: CPT

## 2019-02-26 RX ORDER — LEUPROLIDE ACETATE 45 MG
45 KIT INTRAMUSCULAR
Qty: 1 | Refills: 0 | Status: COMPLETED | OUTPATIENT
Start: 2019-02-26

## 2019-02-26 RX ORDER — LEUPROLIDE ACETATE 45 MG
45 KIT INTRAMUSCULAR
Qty: 1 | Refills: 0 | Status: COMPLETED | OUTPATIENT
Start: 2019-02-26 | End: 2019-02-26

## 2019-02-26 RX ADMIN — LEUPROLIDE ACETATE 0 MG: KIT at 00:00

## 2019-02-26 NOTE — REVIEW OF SYSTEMS
[Eyesight Problems] : eyesight problems [Easy Bleeding] : a tendency for easy bleeding [Easy Bruising] : a tendency for easy bruising [Feeling Poorly] : not feeling poorly [Feeling Tired] : not feeling tired [Chest Pain] : no chest pain [Constipation] : no constipation [FreeTextEntry2] : Neck pain

## 2019-02-26 NOTE — PHYSICAL EXAM
[General Appearance - Well Developed] : well developed [General Appearance - Well Nourished] : well nourished [Normal Appearance] : normal appearance [Well Groomed] : well groomed [General Appearance - In No Acute Distress] : no acute distress [Abdomen Soft] : soft [Abdomen Tenderness] : non-tender [Costovertebral Angle Tenderness] : no ~M costovertebral angle tenderness [Skin Color & Pigmentation] : normal skin color and pigmentation [] : no respiratory distress [Exaggerated Use Of Accessory Muscles For Inspiration] : no accessory muscle use [Oriented To Time, Place, And Person] : oriented to person, place, and time [Affect] : the affect was normal [Mood] : the mood was normal [Not Anxious] : not anxious [Normal Station and Gait] : the gait and station were normal for the patient's age [No Focal Deficits] : no focal deficits [No Palpable Adenopathy] : no palpable adenopathy [Cervical Lymph Nodes Enlarged Posterior Bilaterally] : posterior cervical [Cervical Lymph Nodes Enlarged Anterior Bilaterally] : anterior cervical [Supraclavicular Lymph Nodes Enlarged Bilaterally] : supraclavicular [FreeTextEntry1] : No submandibular gland tenderness.\par

## 2019-02-26 NOTE — HISTORY OF PRESENT ILLNESS
[FreeTextEntry1] : Mr Edmond is a 78 year old man presented for follow up of prostate cancer. The patient underwent a pelvic lymph node dissection in 2001. Metastatic prostate cancer was found in the lymph node. He was subsequently started androgen deprivation. The patient was maintained on  Lupron Depot 45 mg  for control of his prostate cancer which remains sensitive to hormonal manipulation. PSA undetectable. Last injection was 02/27/17.  The patient was feeling good. No pain other than some neck mild neck pain. Urination satisfactory. The patient cited that he has not been taking his blood pressure medication consistently. He often skips his medication at night. The patient also reported occasional chest pain. \par 2/28/18: The patient returned for a Lupron injection. Patient noted he has not been emptying well. He noted he drinks one bottle of water a day. He noted he has on and off diarrhea. He noted his GI physician informed him that he has a high creatinine levels of 1.5 mg/dL. His previous creatinine on 08/28/18 was 1.10 mg/dL. \par 8/29/18: The patient returned for a Lupron injection. Noted his urination is satisfactory. Wakes up twice during the night to urinate. Patient denied dysuria, gross hematuria, urgency, or incontinence. PSA from 2/28/18 was undetectable. A1c from 2/28/18 was 8.0%. \par \par 2/26/19: The patient returned today for a Lupron injection. Complains that his urination has become uncontrollable since his last visit. PSA from 8/29/18 was undetectable. Notes that he was advised to discontinue use of Plavix and to start a trial of Eliquis. Diabetes is under control.

## 2019-02-26 NOTE — ADDENDUM
[FreeTextEntry1] : This note was authored by Marita Stokes working as a scribe for Dr. Steve Reilly.\par I, Dr. Steve Reilly, have reviewed the content of this note and confirm it is true and accurate. I personally performed the history and physical examination and made all the decisions.\par 2/26/19\par

## 2019-02-27 LAB
ALBUMIN SERPL ELPH-MCNC: 4.2 G/DL
ALP BLD-CCNC: 64 U/L
ALT SERPL-CCNC: 19 U/L
ANION GAP SERPL CALC-SCNC: 12 MMOL/L
AST SERPL-CCNC: 24 U/L
BACTERIA UR CULT: NORMAL
BILIRUB SERPL-MCNC: 0.8 MG/DL
BUN SERPL-MCNC: 47 MG/DL
CALCIUM SERPL-MCNC: 9.7 MG/DL
CHLORIDE SERPL-SCNC: 99 MMOL/L
CO2 SERPL-SCNC: 23 MMOL/L
CREAT SERPL-MCNC: 1.32 MG/DL
GLUCOSE SERPL-MCNC: 249 MG/DL
POTASSIUM SERPL-SCNC: 5.7 MMOL/L
PROT SERPL-MCNC: 7.7 G/DL
PSA SERPL-MCNC: <0.01 NG/ML
SODIUM SERPL-SCNC: 134 MMOL/L
TESTOST SERPL-MCNC: <2.5 NG/DL

## 2019-03-03 LAB — URINE CYTOLOGY: NORMAL

## 2019-03-11 ENCOUNTER — APPOINTMENT (OUTPATIENT)
Dept: UROLOGY | Facility: CLINIC | Age: 79
End: 2019-03-11

## 2019-03-26 LAB — OTHER FLUID CYTOLOGY: NORMAL

## 2019-08-13 ENCOUNTER — APPOINTMENT (OUTPATIENT)
Dept: UROLOGY | Facility: CLINIC | Age: 79
End: 2019-08-13
Payer: COMMERCIAL

## 2019-08-13 ENCOUNTER — OUTPATIENT (OUTPATIENT)
Dept: OUTPATIENT SERVICES | Facility: HOSPITAL | Age: 79
LOS: 1 days | End: 2019-08-13
Payer: COMMERCIAL

## 2019-08-13 VITALS
HEART RATE: 56 BPM | HEIGHT: 63 IN | WEIGHT: 133 LBS | DIASTOLIC BLOOD PRESSURE: 76 MMHG | SYSTOLIC BLOOD PRESSURE: 210 MMHG | BODY MASS INDEX: 23.57 KG/M2

## 2019-08-13 DIAGNOSIS — R35.0 FREQUENCY OF MICTURITION: ICD-10-CM

## 2019-08-13 DIAGNOSIS — I10 ESSENTIAL (PRIMARY) HYPERTENSION: ICD-10-CM

## 2019-08-13 PROCEDURE — 99214 OFFICE O/P EST MOD 30 MIN: CPT

## 2019-08-13 PROCEDURE — 96402 CHEMO HORMON ANTINEOPL SQ/IM: CPT

## 2019-08-13 RX ORDER — LEUPROLIDE ACETATE 45 MG
45 KIT INTRAMUSCULAR
Qty: 1 | Refills: 0 | Status: COMPLETED | OUTPATIENT
Start: 2019-08-13 | End: 2019-08-13

## 2019-08-13 NOTE — ASSESSMENT
[FreeTextEntry1] : Mr Edmond is a 79 year old man presented for follow up of prostate cancer. The patient underwent a pelvic lymph node dissection in 2001. Metastatic prostate cancer was found in the lymph node. He was subsequently started androgen deprivation. The patient was maintained on  Lupron Depot 45 mg  for control of his prostate cancer which remains sensitive to hormonal manipulation. PSA undetectable. Last injection was 02/27/17.  The patient was feeling good. No pain other than some neck mild neck pain. Urination satisfactory. The patient cited that he has not been taking his blood pressure medication consistently. He often skips his medication at night. The patient also reported occasional chest pain. \par 2/28/18: The patient returned for a Lupron injection. Patient noted he has not been emptying well. He noted he drinks one bottle of water a day. He noted he has on and off diarrhea. He noted his GI physician informed him that he has a high creatinine levels of 1.5 mg/dL. His previous creatinine on 08/28/18 was 1.10 mg/dL. \par 8/29/18: The patient returned for a Lupron injection. Noted his urination is satisfactory. Wakes up twice during the night to urinate. Patient denied dysuria, gross hematuria, urgency, or incontinence. PSA from 2/28/18 was undetectable. A1c from 2/28/18 was 8.0%. \par 2/26/19: The patient returned today for a Lupron injection. Complains that his urination has become uncontrollable since his last visit. PSA from 8/29/18 was undetectable. Notes that he was advised to discontinue use of Plavix and to start a trial of Eliquis. Diabetes is under control.\par \par 8/13/19: Patient presents today for a Lupron injection. Today he states that his urination is satisfactory. Patient denies dysuria, gross hematuria, urgency or incontinence. Nocturia x 2 is reported. He denies any bone pain. During his visit today it was advised by the medical assistant that his blood pressure was elevated. He was instructed by me to contact his primary care physician about his blood pressure.\par \par The patient produced a urine sample which will be sent for urinalysis, urine cytology and urine culture. \par Blood work today included comprehensive metabolic panel, CBC, PSA and testosterone. \par \par Lupron injection was given today. The injection was tolerated well and he reports no immediate AE. \par \par He is to follow up in 6 months for another Lupron injection or sooner if clinically indicated.

## 2019-08-13 NOTE — HISTORY OF PRESENT ILLNESS
[FreeTextEntry1] : Mr Edmond is a 79 year old man presented for follow up of prostate cancer. The patient underwent a pelvic lymph node dissection in 2001. Metastatic prostate cancer was found in the lymph node. He was subsequently started androgen deprivation. The patient was maintained on  Lupron Depot 45 mg  for control of his prostate cancer which remains sensitive to hormonal manipulation. PSA undetectable. Last injection was 02/27/17.  The patient was feeling good. No pain other than some neck mild neck pain. Urination satisfactory. The patient cited that he has not been taking his blood pressure medication consistently. He often skips his medication at night. The patient also reported occasional chest pain. \par 2/28/18: The patient returned for a Lupron injection. Patient noted he has not been emptying well. He noted he drinks one bottle of water a day. He noted he has on and off diarrhea. He noted his GI physician informed him that he has a high creatinine levels of 1.5 mg/dL. His previous creatinine on 08/28/18 was 1.10 mg/dL. \par 8/29/18: The patient returned for a Lupron injection. Noted his urination is satisfactory. Wakes up twice during the night to urinate. Patient denied dysuria, gross hematuria, urgency, or incontinence. PSA from 2/28/18 was undetectable. A1c from 2/28/18 was 8.0%. \par 2/26/19: The patient returned today for a Lupron injection. Complains that his urination has become uncontrollable since his last visit. PSA from 8/29/18 was undetectable. Notes that he was advised to discontinue use of Plavix and to start a trial of Eliquis. Diabetes is under control.\par \par 8/13/19: Patient presents today for a Lupron injection. Today he states that his urination is satisfactory. Patient denies dysuria, gross hematuria, urgency or incontinence. Nocturia x 2 is reported. He denies any bone pain. During his visit today it was advised by the medical assistant that his blood pressure was elevated.

## 2019-08-13 NOTE — ADDENDUM
[FreeTextEntry1] : This note was authored by Claudio Barrow working as scribe for Dr. Steve Reilly. I, Dr. Steve Reilly, have reviewed the content of this note and confirm it is true and accurate. I personally performed the history and physical examination and made all the decisions.\par 8/13/19

## 2019-08-13 NOTE — REVIEW OF SYSTEMS
[Eyesight Problems] : eyesight problems [Easy Bleeding] : a tendency for easy bleeding [Easy Bruising] : a tendency for easy bruising [Feeling Poorly] : not feeling poorly [Chest Pain] : no chest pain [Feeling Tired] : not feeling tired [Constipation] : no constipation [FreeTextEntry2] : Neck pain

## 2019-08-13 NOTE — PHYSICAL EXAM
[General Appearance - Well Developed] : well developed [Normal Appearance] : normal appearance [General Appearance - Well Nourished] : well nourished [Well Groomed] : well groomed [General Appearance - In No Acute Distress] : no acute distress [Abdomen Soft] : soft [Abdomen Tenderness] : non-tender [Costovertebral Angle Tenderness] : no ~M costovertebral angle tenderness [Skin Color & Pigmentation] : normal skin color and pigmentation [] : no respiratory distress [Exaggerated Use Of Accessory Muscles For Inspiration] : no accessory muscle use [Oriented To Time, Place, And Person] : oriented to person, place, and time [Affect] : the affect was normal [Mood] : the mood was normal [Not Anxious] : not anxious [Normal Station and Gait] : the gait and station were normal for the patient's age [No Focal Deficits] : no focal deficits [Cervical Lymph Nodes Enlarged Posterior Bilaterally] : posterior cervical [No Palpable Adenopathy] : no palpable adenopathy [Cervical Lymph Nodes Enlarged Anterior Bilaterally] : anterior cervical [Supraclavicular Lymph Nodes Enlarged Bilaterally] : supraclavicular [FreeTextEntry1] : No submandibular gland tenderness.\par

## 2019-08-22 DIAGNOSIS — R31.29 OTHER MICROSCOPIC HEMATURIA: ICD-10-CM

## 2019-08-22 DIAGNOSIS — C61 MALIGNANT NEOPLASM OF PROSTATE: ICD-10-CM

## 2019-08-22 DIAGNOSIS — I10 ESSENTIAL (PRIMARY) HYPERTENSION: ICD-10-CM

## 2019-09-03 LAB
ALBUMIN SERPL ELPH-MCNC: 4.3 G/DL
ALP BLD-CCNC: 57 U/L
ALT SERPL-CCNC: 19 U/L
ANION GAP SERPL CALC-SCNC: 14 MMOL/L
APPEARANCE: CLEAR
AST SERPL-CCNC: 25 U/L
BACTERIA UR CULT: NORMAL
BACTERIA: NEGATIVE
BASOPHILS # BLD AUTO: 0.04 K/UL
BASOPHILS NFR BLD AUTO: 0.4 %
BILIRUB SERPL-MCNC: 0.7 MG/DL
BILIRUBIN URINE: NEGATIVE
BLOOD URINE: NEGATIVE
BUN SERPL-MCNC: 33 MG/DL
CALCIUM SERPL-MCNC: 10.5 MG/DL
CHLORIDE SERPL-SCNC: 103 MMOL/L
CO2 SERPL-SCNC: 22 MMOL/L
COLOR: NORMAL
CREAT SERPL-MCNC: 1.26 MG/DL
EOSINOPHIL # BLD AUTO: 0.36 K/UL
EOSINOPHIL NFR BLD AUTO: 3.9 %
GLUCOSE QUALITATIVE U: ABNORMAL
GLUCOSE SERPL-MCNC: 191 MG/DL
HCT VFR BLD CALC: 42.8 %
HGB BLD-MCNC: 13.5 G/DL
HYALINE CASTS: 0 /LPF
IMM GRANULOCYTES NFR BLD AUTO: 0.3 %
KETONES URINE: NEGATIVE
LEUKOCYTE ESTERASE URINE: NEGATIVE
LYMPHOCYTES # BLD AUTO: 3.29 K/UL
LYMPHOCYTES NFR BLD AUTO: 35.2 %
MAN DIFF?: NORMAL
MCHC RBC-ENTMCNC: 29.2 PG
MCHC RBC-ENTMCNC: 31.5 GM/DL
MCV RBC AUTO: 92.4 FL
MICROSCOPIC-UA: NORMAL
MONOCYTES # BLD AUTO: 0.57 K/UL
MONOCYTES NFR BLD AUTO: 6.1 %
NEUTROPHILS # BLD AUTO: 5.05 K/UL
NEUTROPHILS NFR BLD AUTO: 54.1 %
NITRITE URINE: NEGATIVE
PH URINE: 6
PLATELET # BLD AUTO: 134 K/UL
POTASSIUM SERPL-SCNC: 4.9 MMOL/L
PROT SERPL-MCNC: 7.8 G/DL
PROTEIN URINE: NORMAL
PSA SERPL-MCNC: 0.01 NG/ML
RBC # BLD: 4.63 M/UL
RBC # FLD: 13.1 %
RED BLOOD CELLS URINE: 0 /HPF
SODIUM SERPL-SCNC: 139 MMOL/L
SPECIFIC GRAVITY URINE: 1.02
SQUAMOUS EPITHELIAL CELLS: 0 /HPF
TESTOST SERPL-MCNC: <2.5 NG/DL
URINE CYTOLOGY: NORMAL
UROBILINOGEN URINE: NORMAL
WBC # FLD AUTO: 9.34 K/UL
WHITE BLOOD CELLS URINE: 0 /HPF

## 2020-02-10 ENCOUNTER — APPOINTMENT (OUTPATIENT)
Dept: UROLOGY | Facility: CLINIC | Age: 80
End: 2020-02-10
Payer: COMMERCIAL

## 2020-02-10 ENCOUNTER — LABORATORY RESULT (OUTPATIENT)
Age: 80
End: 2020-02-10

## 2020-02-10 ENCOUNTER — OUTPATIENT (OUTPATIENT)
Dept: OUTPATIENT SERVICES | Facility: HOSPITAL | Age: 80
LOS: 1 days | End: 2020-02-10
Payer: COMMERCIAL

## 2020-02-10 VITALS
HEART RATE: 65 BPM | SYSTOLIC BLOOD PRESSURE: 161 MMHG | TEMPERATURE: 98 F | RESPIRATION RATE: 16 BRPM | DIASTOLIC BLOOD PRESSURE: 78 MMHG

## 2020-02-10 DIAGNOSIS — R35.0 FREQUENCY OF MICTURITION: ICD-10-CM

## 2020-02-10 PROCEDURE — 99213 OFFICE O/P EST LOW 20 MIN: CPT | Mod: 25

## 2020-02-10 PROCEDURE — 96402 CHEMO HORMON ANTINEOPL SQ/IM: CPT

## 2020-02-10 RX ORDER — LEUPROLIDE ACETATE 45 MG
45 KIT INTRAMUSCULAR
Qty: 1 | Refills: 0 | Status: COMPLETED | OUTPATIENT
Start: 2020-02-10

## 2020-02-10 RX ADMIN — LEUPROLIDE ACETATE 0 MG: KIT at 00:00

## 2020-02-10 NOTE — REVIEW OF SYSTEMS
[Eyesight Problems] : eyesight problems [Easy Bleeding] : a tendency for easy bleeding [Easy Bruising] : a tendency for easy bruising [Feeling Tired] : not feeling tired [Feeling Poorly] : not feeling poorly [Chest Pain] : no chest pain [Constipation] : no constipation [FreeTextEntry2] : Neck pain

## 2020-02-10 NOTE — ASSESSMENT
[FreeTextEntry1] : 2/10/20: Patient presents today for a lupron injection. He states his urination is satisfactory. Patient denies dysuria, hematuria, urgency or incontinence. Pt denies chest pain. He admits to feeling some fatigue and occasional right leg pain which feels okay today. He denies any significant bone pain. \par \par The patient produced a urine sample which will be sent for urinalysis, urine cytology and urine culture. \par Blood work today included comprehensive metabolic panel, CBC, PSA and testosterone. \par \par Lupron injection was given today. The injection was tolerated well and he reports no immediate AE. \par \par He is to follow up in 6 months for another Lupron injection or sooner if clinically indicated.

## 2020-02-10 NOTE — HISTORY OF PRESENT ILLNESS
[FreeTextEntry1] : Mr Edmond is a 79 year old man presented for follow up of prostate cancer. The patient underwent a pelvic lymph node dissection in 2001. Metastatic prostate cancer was found in the lymph node. He was subsequently started androgen deprivation. The patient was maintained on  Lupron Depot 45 mg  for control of his prostate cancer which remains sensitive to hormonal manipulation. PSA undetectable. Last injection was 02/27/17.  The patient was feeling good. No pain other than some neck mild neck pain. Urination satisfactory. The patient cited that he has not been taking his blood pressure medication consistently. He often skips his medication at night. The patient also reported occasional chest pain. \par 2/28/18: The patient returned for a Lupron injection. Patient noted he has not been emptying well. He noted he drinks one bottle of water a day. He noted he has on and off diarrhea. He noted his GI physician informed him that he has a high creatinine levels of 1.5 mg/dL. His previous creatinine on 08/28/18 was 1.10 mg/dL. \par 8/29/18: The patient returned for a Lupron injection. Noted his urination is satisfactory. Wakes up twice during the night to urinate. Patient denied dysuria, gross hematuria, urgency, or incontinence. PSA from 2/28/18 was undetectable. A1c from 2/28/18 was 8.0%. \par 2/26/19: The patient returned today for a Lupron injection. Complains that his urination has become uncontrollable since his last visit. PSA from 8/29/18 was undetectable. Notes that he was advised to discontinue use of Plavix and to start a trial of Eliquis. Diabetes is under control.\par 8/13/19: Patient presents today for a Lupron injection. Today he states that his urination is satisfactory. Patient denies dysuria, gross hematuria, urgency or incontinence. Nocturia x 2 is reported. He denies any bone pain. During his visit today it was advised by the medical assistant that his blood pressure was elevated. \par \par 2/10/20: Patient presents today for a Lupron injection. He states his urination is satisfactory. Patient denies dysuria, hematuria, urgency or incontinence. Pt denies chest pain. He admits to feeling some fatigue and occasional mild right posterior thigh pain which feels okay today. He denies any significant bone pain.

## 2020-02-10 NOTE — ADDENDUM
[FreeTextEntry1] : This note was authored by Skye Fields working as a scribe for Dr. Steve Reilly.\par \par I, Dr. Steve Reilly, have reviewed the content of this note and confirm it is true and accurate. I personally performed the history and physical examination and made all the decisions.\par

## 2020-02-10 NOTE — PHYSICAL EXAM
[General Appearance - Well Nourished] : well nourished [General Appearance - Well Developed] : well developed [Normal Appearance] : normal appearance [Well Groomed] : well groomed [Abdomen Soft] : soft [General Appearance - In No Acute Distress] : no acute distress [Abdomen Tenderness] : non-tender [Costovertebral Angle Tenderness] : no ~M costovertebral angle tenderness [Skin Color & Pigmentation] : normal skin color and pigmentation [] : no respiratory distress [Exaggerated Use Of Accessory Muscles For Inspiration] : no accessory muscle use [Oriented To Time, Place, And Person] : oriented to person, place, and time [Affect] : the affect was normal [Mood] : the mood was normal [Not Anxious] : not anxious [Normal Station and Gait] : the gait and station were normal for the patient's age [No Focal Deficits] : no focal deficits [No Palpable Adenopathy] : no palpable adenopathy [Supraclavicular Lymph Nodes Enlarged Bilaterally] : supraclavicular [Cervical Lymph Nodes Enlarged Anterior Bilaterally] : anterior cervical [Cervical Lymph Nodes Enlarged Posterior Bilaterally] : posterior cervical [FreeTextEntry1] : hand  5/5 bilaterally. Smile Symmetric. Tongue is Midline.\par

## 2020-02-11 ENCOUNTER — APPOINTMENT (OUTPATIENT)
Dept: UROLOGY | Facility: CLINIC | Age: 80
End: 2020-02-11

## 2020-02-11 LAB
ALBUMIN SERPL ELPH-MCNC: 4.1 G/DL
ALP BLD-CCNC: 58 U/L
ALT SERPL-CCNC: 15 U/L
ANION GAP SERPL CALC-SCNC: 14 MMOL/L
APPEARANCE: CLEAR
AST SERPL-CCNC: 22 U/L
BACTERIA: NEGATIVE
BASOPHILS # BLD AUTO: 0.03 K/UL
BASOPHILS NFR BLD AUTO: 0.4 %
BILIRUB SERPL-MCNC: 0.5 MG/DL
BILIRUBIN URINE: NEGATIVE
BLOOD URINE: NEGATIVE
BUN SERPL-MCNC: 29 MG/DL
CALCIUM SERPL-MCNC: 9.8 MG/DL
CHLORIDE SERPL-SCNC: 100 MMOL/L
CO2 SERPL-SCNC: 23 MMOL/L
COLOR: NORMAL
CREAT SERPL-MCNC: 1.17 MG/DL
EOSINOPHIL # BLD AUTO: 0.39 K/UL
EOSINOPHIL NFR BLD AUTO: 5.3 %
GLUCOSE QUALITATIVE U: ABNORMAL
GLUCOSE SERPL-MCNC: 250 MG/DL
HCT VFR BLD CALC: 39.9 %
HGB BLD-MCNC: 12.4 G/DL
HYALINE CASTS: 0 /LPF
IMM GRANULOCYTES NFR BLD AUTO: 0.3 %
KETONES URINE: NEGATIVE
LEUKOCYTE ESTERASE URINE: NEGATIVE
LYMPHOCYTES # BLD AUTO: 2.9 K/UL
LYMPHOCYTES NFR BLD AUTO: 39.2 %
MAN DIFF?: NORMAL
MCHC RBC-ENTMCNC: 28.4 PG
MCHC RBC-ENTMCNC: 31.1 GM/DL
MCV RBC AUTO: 91.3 FL
MICROSCOPIC-UA: NORMAL
MONOCYTES # BLD AUTO: 0.52 K/UL
MONOCYTES NFR BLD AUTO: 7 %
NEUTROPHILS # BLD AUTO: 3.54 K/UL
NEUTROPHILS NFR BLD AUTO: 47.8 %
NITRITE URINE: NEGATIVE
PH URINE: 6.5
PLATELET # BLD AUTO: NORMAL K/UL
POTASSIUM SERPL-SCNC: 5.8 MMOL/L
PROT SERPL-MCNC: 7.5 G/DL
PROTEIN URINE: ABNORMAL
PSA SERPL-MCNC: 0.02 NG/ML
RBC # BLD: 4.37 M/UL
RBC # FLD: 13.7 %
RED BLOOD CELLS URINE: 0 /HPF
SODIUM SERPL-SCNC: 137 MMOL/L
SPECIFIC GRAVITY URINE: 1.02
SQUAMOUS EPITHELIAL CELLS: 0 /HPF
TESTOST SERPL-MCNC: <2.5 NG/DL
UROBILINOGEN URINE: NORMAL
WBC # FLD AUTO: 7.4 K/UL
WHITE BLOOD CELLS URINE: 0 /HPF

## 2020-02-16 LAB
BACTERIA UR CULT: NORMAL
URINE CYTOLOGY: NORMAL

## 2020-02-23 DIAGNOSIS — N40.1 BENIGN PROSTATIC HYPERPLASIA WITH LOWER URINARY TRACT SYMPTOMS: ICD-10-CM

## 2020-02-23 DIAGNOSIS — C61 MALIGNANT NEOPLASM OF PROSTATE: ICD-10-CM

## 2020-02-23 DIAGNOSIS — E11.9 TYPE 2 DIABETES MELLITUS WITHOUT COMPLICATIONS: ICD-10-CM

## 2020-06-17 ENCOUNTER — RECORD ABSTRACTING (OUTPATIENT)
Age: 80
End: 2020-06-17

## 2020-08-11 ENCOUNTER — APPOINTMENT (OUTPATIENT)
Dept: UROLOGY | Facility: CLINIC | Age: 80
End: 2020-08-11
Payer: COMMERCIAL

## 2020-08-11 ENCOUNTER — OUTPATIENT (OUTPATIENT)
Dept: OUTPATIENT SERVICES | Facility: HOSPITAL | Age: 80
LOS: 1 days | End: 2020-08-11
Payer: COMMERCIAL

## 2020-08-11 ENCOUNTER — LABORATORY RESULT (OUTPATIENT)
Age: 80
End: 2020-08-11

## 2020-08-11 VITALS — TEMPERATURE: 97.3 F

## 2020-08-11 DIAGNOSIS — R31.29 OTHER MICROSCOPIC HEMATURIA: ICD-10-CM

## 2020-08-11 DIAGNOSIS — R35.0 FREQUENCY OF MICTURITION: ICD-10-CM

## 2020-08-11 DIAGNOSIS — R82.89 OTHER ABNORMAL FINDINGS ON CYTOLOGICAL AND HISTOLOGICAL EXAMINATION OF URINE: ICD-10-CM

## 2020-08-11 DIAGNOSIS — C61 MALIGNANT NEOPLASM OF PROSTATE: ICD-10-CM

## 2020-08-11 PROCEDURE — 96402 CHEMO HORMON ANTINEOPL SQ/IM: CPT

## 2020-08-11 PROCEDURE — 99214 OFFICE O/P EST MOD 30 MIN: CPT

## 2020-08-11 PROCEDURE — 88112 CYTOPATH CELL ENHANCE TECH: CPT | Mod: 26

## 2020-08-11 RX ORDER — LEUPROLIDE ACETATE 45 MG
45 KIT INTRAMUSCULAR
Qty: 1 | Refills: 1 | Status: COMPLETED | OUTPATIENT
Start: 2018-02-28 | End: 2020-08-11

## 2020-08-11 RX ORDER — LEUPROLIDE ACETATE 45 MG
45 KIT INTRAMUSCULAR
Qty: 1 | Refills: 0 | Status: COMPLETED | OUTPATIENT
Start: 2020-08-11

## 2020-08-11 RX ADMIN — LEUPROLIDE ACETATE 0 MG: KIT at 00:00

## 2020-08-25 LAB — BACTERIA UR CULT: NORMAL

## 2020-08-30 LAB
24R-OH-CALCIDIOL SERPL-MCNC: 55.3 PG/ML
25(OH)D3 SERPL-MCNC: 43.1 NG/ML
ALBUMIN SERPL ELPH-MCNC: 4.5 G/DL
ALP BLD-CCNC: 61 U/L
ALT SERPL-CCNC: 21 U/L
ANION GAP SERPL CALC-SCNC: 17 MMOL/L
APPEARANCE: CLEAR
AST SERPL-CCNC: 28 U/L
BACTERIA: NEGATIVE
BASOPHILS # BLD AUTO: 0.06 K/UL
BASOPHILS NFR BLD AUTO: 0.7 %
BILIRUB SERPL-MCNC: 0.3 MG/DL
BILIRUBIN URINE: NEGATIVE
BLOOD URINE: NEGATIVE
BUN SERPL-MCNC: 33 MG/DL
CALCIUM OXALATE CRYSTALS: ABNORMAL
CALCIUM SERPL-MCNC: 10.3 MG/DL
CHLORIDE SERPL-SCNC: 104 MMOL/L
CO2 SERPL-SCNC: 19 MMOL/L
COLOR: YELLOW
CREAT SERPL-MCNC: 1.3 MG/DL
EOSINOPHIL # BLD AUTO: 0.43 K/UL
EOSINOPHIL NFR BLD AUTO: 4.8 %
GLUCOSE QUALITATIVE U: NORMAL
GLUCOSE SERPL-MCNC: 190 MG/DL
HCT VFR BLD CALC: 39.1 %
HGB BLD-MCNC: 12.5 G/DL
HYALINE CASTS: 1 /LPF
IMM GRANULOCYTES NFR BLD AUTO: 0.2 %
KETONES URINE: NEGATIVE
LEUKOCYTE ESTERASE URINE: NEGATIVE
LYMPHOCYTES # BLD AUTO: 3.59 K/UL
LYMPHOCYTES NFR BLD AUTO: 40.2 %
MAN DIFF?: NORMAL
MCHC RBC-ENTMCNC: 28.7 PG
MCHC RBC-ENTMCNC: 32 GM/DL
MCV RBC AUTO: 89.7 FL
MICROSCOPIC-UA: NORMAL
MONOCYTES # BLD AUTO: 0.61 K/UL
MONOCYTES NFR BLD AUTO: 6.8 %
NEUTROPHILS # BLD AUTO: 4.21 K/UL
NEUTROPHILS NFR BLD AUTO: 47.3 %
NITRITE URINE: NEGATIVE
PH URINE: 5.5
PLATELET # BLD AUTO: NORMAL K/UL
POTASSIUM SERPL-SCNC: 5.2 MMOL/L
PROT SERPL-MCNC: 7.9 G/DL
PROTEIN URINE: ABNORMAL
PSA SERPL-MCNC: <0.01 NG/ML
RBC # BLD: 4.36 M/UL
RBC # FLD: 13.7 %
RED BLOOD CELLS URINE: 0 /HPF
SODIUM SERPL-SCNC: 139 MMOL/L
SPECIFIC GRAVITY URINE: 1.02
SQUAMOUS EPITHELIAL CELLS: 0 /HPF
TESTOST SERPL-MCNC: <2.5 NG/DL
URINE CYTOLOGY: NORMAL
UROBILINOGEN URINE: NORMAL
WBC # FLD AUTO: 8.92 K/UL
WHITE BLOOD CELLS URINE: 0 /HPF

## 2020-09-09 NOTE — HISTORY OF PRESENT ILLNESS
[FreeTextEntry1] : 08/11/2020: Patient has history of metastatic prostate cancer controlled by androgen deprivation. patient has had some abnormal urine cytologies. Pt is maintained on Tamsulosin 0.4mg one capsule daily. With this, his urination is good. Does not wake excessive at night to urinate. No urgency or frequency. Does not strain to urinate or blood in urine. Hx of diabetes and takes insulin. He is maintained on aspirin 81mg daily and Eliquis 5mg daily. He gets Lupron Depot 45mg once every 6 months. He is here today for next injection. Has no new aches or pains. His appetite remains good. No back or flank pain. His PSA on 2/10/2020 was 0.02.

## 2020-09-09 NOTE — ADDENDUM
[FreeTextEntry1] : I, Argelia Hopein, acted solely as a scribe for Dr. Steve Reilly on this date 09/09/2020.\par \par All medical record entries made by the Scribe were at my, Dr. Steve Reilly, direction and personally dictated by me on 09/09/2020. I have reviewed the chart and agree that the record accurately reflects my personal performance of the history, physical exam, assessment and plan.  I have also personally directed, reviewed and agreed with the chart.

## 2020-09-09 NOTE — PHYSICAL EXAM
[General Appearance - Well Developed] : well developed [Normal Appearance] : normal appearance [Well Groomed] : well groomed [General Appearance - Well Nourished] : well nourished [General Appearance - In No Acute Distress] : no acute distress [Abdomen Soft] : soft [Abdomen Tenderness] : non-tender [Edema] : no peripheral edema [Affect] : the affect was normal [Oriented To Time, Place, And Person] : oriented to person, place, and time [Not Anxious] : not anxious [Normal Station and Gait] : the gait and station were normal for the patient's age [No Focal Deficits] : no focal deficits [Skin Color & Pigmentation] : normal skin color and pigmentation [] : no respiratory distress [Respiration, Rhythm And Depth] : normal respiratory rhythm and effort [Exaggerated Use Of Accessory Muscles For Inspiration] : no accessory muscle use

## 2020-09-09 NOTE — REVIEW OF SYSTEMS
[Feeling Tired] : not feeling tired [Feeling Poorly] : not feeling poorly [Chest Pain] : no chest pain

## 2021-02-10 ENCOUNTER — APPOINTMENT (OUTPATIENT)
Dept: UROLOGY | Facility: CLINIC | Age: 81
End: 2021-02-10

## 2021-02-11 ENCOUNTER — APPOINTMENT (OUTPATIENT)
Dept: UROLOGY | Facility: CLINIC | Age: 81
End: 2021-02-11
Payer: COMMERCIAL

## 2021-02-11 ENCOUNTER — OUTPATIENT (OUTPATIENT)
Dept: OUTPATIENT SERVICES | Facility: HOSPITAL | Age: 81
LOS: 1 days | End: 2021-02-11
Payer: COMMERCIAL

## 2021-02-11 DIAGNOSIS — R35.0 FREQUENCY OF MICTURITION: ICD-10-CM

## 2021-02-11 PROCEDURE — 88112 CYTOPATH CELL ENHANCE TECH: CPT | Mod: 26

## 2021-02-11 PROCEDURE — 99072 ADDL SUPL MATRL&STAF TM PHE: CPT

## 2021-02-11 PROCEDURE — 99214 OFFICE O/P EST MOD 30 MIN: CPT | Mod: 25

## 2021-02-11 PROCEDURE — 96402 CHEMO HORMON ANTINEOPL SQ/IM: CPT

## 2021-02-11 RX ORDER — LEUPROLIDE ACETATE 45 MG/.375ML
45 INJECTION, SUSPENSION, EXTENDED RELEASE SUBCUTANEOUS
Qty: 1 | Refills: 0 | Status: COMPLETED | OUTPATIENT
Start: 2021-02-11 | End: 2021-02-11

## 2021-02-11 RX ORDER — LEUPROLIDE ACETATE 45 MG
45 KIT INTRAMUSCULAR
Qty: 1 | Refills: 3 | Status: COMPLETED | COMMUNITY
Start: 2021-02-11 | End: 2021-02-11

## 2021-02-11 RX ADMIN — LEUPROLIDE ACETATE 0 MG: KIT at 00:00

## 2021-02-11 NOTE — PHYSICAL EXAM
[General Appearance - Well Developed] : well developed [Normal Appearance] : normal appearance [General Appearance - In No Acute Distress] : no acute distress [Abdomen Soft] : soft [Abdomen Tenderness] : non-tender [Edema] : no peripheral edema [] : no respiratory distress [Respiration, Rhythm And Depth] : normal respiratory rhythm and effort [Exaggerated Use Of Accessory Muscles For Inspiration] : no accessory muscle use [Oriented To Time, Place, And Person] : oriented to person, place, and time [Affect] : the affect was normal [Mood] : the mood was normal [Not Anxious] : not anxious [No Focal Deficits] : no focal deficits

## 2021-02-15 LAB
ALP BLD-CCNC: 68 U/L
ANION GAP SERPL CALC-SCNC: 14 MMOL/L
APPEARANCE: CLEAR
BACTERIA UR CULT: NORMAL
BACTERIA: NEGATIVE
BILIRUBIN URINE: NEGATIVE
BLOOD URINE: NEGATIVE
BUN SERPL-MCNC: 35 MG/DL
CALCIUM SERPL-MCNC: 9.9 MG/DL
CHLORIDE SERPL-SCNC: 100 MMOL/L
CO2 SERPL-SCNC: 24 MMOL/L
COLOR: NORMAL
CREAT SERPL-MCNC: 1.27 MG/DL
GLUCOSE QUALITATIVE U: ABNORMAL
GLUCOSE SERPL-MCNC: 277 MG/DL
HYALINE CASTS: 0 /LPF
KETONES URINE: NEGATIVE
LEUKOCYTE ESTERASE URINE: NEGATIVE
MICROSCOPIC-UA: NORMAL
NITRITE URINE: NEGATIVE
PH URINE: 6
POTASSIUM SERPL-SCNC: 5 MMOL/L
PROTEIN URINE: NORMAL
PSA SERPL-MCNC: <0.01 NG/ML
RED BLOOD CELLS URINE: 0 /HPF
SODIUM SERPL-SCNC: 137 MMOL/L
SPECIFIC GRAVITY URINE: 1.02
SQUAMOUS EPITHELIAL CELLS: 0 /HPF
TESTOST SERPL-MCNC: <2.5 NG/DL
URINE CYTOLOGY: NORMAL
UROBILINOGEN URINE: NORMAL
WHITE BLOOD CELLS URINE: 0 /HPF

## 2021-02-15 NOTE — REVIEW OF SYSTEMS
[Feeling Poorly] : not feeling poorly [Feeling Tired] : not feeling tired [Chest Pain] : no chest pain

## 2021-02-15 NOTE — ASSESSMENT
[FreeTextEntry1] : 08/11/2020: Pt is maintained on Tamsulosin 0.4mg one capsule daily. With this, his urination is good. Does not wake excessive at night to urinate. No urgency or frequency. Does not strain to urinate or blood in urine. Hx of diabetes and takes insulin. He is maintained on aspirin 81mg daily and Eliquis 5mg daily. He gets Lupron Depot 45mg once every 6 months. He is here today for next injection. Has no new aches or pains. His appetite remains good. No back or flank pain. His PSA on 2/10/2020 was 0.02.\par \par 02/11/2021: Patient presents today for Lupron injection. Urination is good. Appetite is good. Wakes 2x at night to urinate. Denies urinary urgency, gross hematuria, dysuria, pushing or straining. Denies hot flashes with hormone treatment. No constipation or chest pain. Had stress test which was normal. Takes Eliquis. PSA on 8/11/20 was undetectable at <0.01. Feels well today and offers no new complaints. \par \par Lupron injection administered today. \par \par The patient produced a urine sample which will be sent for urinalysis, urine cytology, and urine culture. \par \par RTO in 6 months for Lupron injection. \par \par Preparation, in-person office visit, and coordination of care took: 30 minutes

## 2021-02-15 NOTE — ADDENDUM
[FreeTextEntry1] : I, Argelia Hopein, acted solely as a scribe for Dr. Steve Reilly on this date 02/11/2021.\par \par All medical record entries made by the Scribe were at my, Dr. Steve Reilly, direction and personally dictated by me on 02/11/2021. I have reviewed the chart and agree that the record accurately reflects my personal performance of the history, physical exam, assessment and plan.  I have also personally directed, reviewed and agreed with the chart.

## 2021-02-16 DIAGNOSIS — C61 MALIGNANT NEOPLASM OF PROSTATE: ICD-10-CM

## 2021-02-16 DIAGNOSIS — E11.9 TYPE 2 DIABETES MELLITUS WITHOUT COMPLICATIONS: ICD-10-CM

## 2021-02-16 DIAGNOSIS — N40.1 BENIGN PROSTATIC HYPERPLASIA WITH LOWER URINARY TRACT SYMPTOMS: ICD-10-CM

## 2021-02-16 DIAGNOSIS — N28.9 DISORDER OF KIDNEY AND URETER, UNSPECIFIED: ICD-10-CM

## 2021-02-16 DIAGNOSIS — R31.29 OTHER MICROSCOPIC HEMATURIA: ICD-10-CM

## 2021-02-16 DIAGNOSIS — R82.89 OTHER ABNORMAL FINDINGS ON CYTOLOGICAL AND HISTOLOGICAL EXAMINATION OF URINE: ICD-10-CM

## 2021-02-17 ENCOUNTER — NON-APPOINTMENT (OUTPATIENT)
Age: 81
End: 2021-02-17

## 2021-08-05 ENCOUNTER — APPOINTMENT (OUTPATIENT)
Dept: UROLOGY | Facility: CLINIC | Age: 81
End: 2021-08-05

## 2021-08-05 ENCOUNTER — APPOINTMENT (OUTPATIENT)
Dept: UROLOGY | Facility: CLINIC | Age: 81
End: 2021-08-05
Payer: COMMERCIAL

## 2021-08-05 PROCEDURE — 99214 OFFICE O/P EST MOD 30 MIN: CPT

## 2021-08-05 RX ORDER — LEUPROLIDE ACETATE 45 MG
45 KIT INTRAMUSCULAR
Qty: 1 | Refills: 0 | Status: COMPLETED | OUTPATIENT
Start: 2021-08-05

## 2021-08-05 RX ADMIN — LEUPROLIDE ACETATE 45 MG: KIT at 00:00

## 2021-08-08 NOTE — ADDENDUM
[FreeTextEntry1] : I, Argelia Hopein, acted solely as a scribe for Dr. Steve Reilly on this date 08/05/2021.\par \par All medical record entries made by the Scribe were at my, Dr. Steve Reilly, direction and personally dictated by me on 08/05/2021. I have reviewed the chart and agree that the record accurately reflects my personal performance of the history, physical exam, assessment and plan.  I have also personally directed, reviewed and agreed with the chart.

## 2021-08-08 NOTE — HISTORY OF PRESENT ILLNESS
[FreeTextEntry1] : Mr Edmond is a 81 year old man presented for follow up of prostate cancer. The patient underwent a pelvic lymph node dissection in 2001. Metastatic prostate cancer was found in the lymph node. He was subsequently started androgen deprivation. The patient was maintained on Lupron Depot 45 mg for control of his prostate cancer which remains sensitive to hormonal manipulation. PSA undetectable. Last injection was 02/27/17. The patient was feeling good. No pain other than some neck mild neck pain. Urination satisfactory. The patient cited that he has not been taking his blood pressure medication consistently. He often skips his medication at night. The patient also reported occasional chest pain. \par 2/28/18: The patient returned for a Lupron injection. Patient noted he has not been emptying well. He noted he drinks one bottle of water a day. He noted he has on and off diarrhea. He noted his GI physician informed him that he has a high creatinine levels of 1.5 mg/dL. His previous creatinine on 08/28/18 was 1.10 mg/dL. \par 8/29/18: The patient returned for a Lupron injection. Noted his urination is satisfactory. Wakes up twice during the night to urinate. Patient denied dysuria, gross hematuria, urgency, or incontinence. PSA from 2/28/18 was undetectable. A1c from 2/28/18 was 8.0%. \par 2/26/19: The patient returned today for a Lupron injection. Complains that his urination has become uncontrollable since his last visit. PSA from 8/29/18 was undetectable. Notes that he was advised to discontinue use of Plavix and to start a trial of Eliquis. Diabetes is under control.\par 8/13/19: Patient presents today for a Lupron injection. Today he states that his urination is satisfactory. Patient denies dysuria, gross hematuria, urgency or incontinence. Nocturia x 2 is reported. He denies any bone pain. During his visit today it was advised by the medical assistant that his blood pressure was elevated. \par \par 2/10/20: Patient presents today for a Lupron injection. He states his urination is satisfactory. Patient denies dysuria, hematuria, urgency or incontinence. Pt denies chest pain. He admits to feeling some fatigue and occasional mild right posterior thigh pain which feels okay today. He denies any significant bone pain. \par \par 08/11/2020: Patient has history of metastatic prostate cancer controlled by androgen deprivation. patient has had some abnormal urine cytologies. Pt is maintained on Tamsulosin 0.4mg one capsule daily. With this, his urination is good. Does not wake excessive at night to urinate. No urgency or frequency. Does not strain to urinate or blood in urine. Hx of diabetes and takes insulin. He is maintained on aspirin 81mg daily and Eliquis 5mg daily. He gets Lupron Depot 45mg once every 6 months. He is here today for next injection. Has no new aches or pains. His appetite remains good. No back or flank pain. His PSA on 2/10/2020 was 0.02.\par \par 02/11/2021: Patient presents today for Lupron injection. Urination is good. Appetite is good. Wakes 2x at night to urinate. Denies urinary urgency, gross hematuria, dysuria, pushing or straining. Denies hot flashes with hormone treatment. No constipation or chest pain. Had stress test which was normal. Takes Eliquis. PSA on 8/11/20 was undetectable at <0.01. Feels well today and offers no new complaints. \par \par 08/05/2021: Patient presents today for Eligard injection. States he has been having difficulty with urination. States he has gained some weight despite exercising by walking one mile four days a week. Admits constipation but not taking any medications for relief.

## 2021-08-08 NOTE — ASSESSMENT
[FreeTextEntry1] : Mr Edmond is a 81 year old man presented for follow up of prostate cancer. The patient underwent a pelvic lymph node dissection in 2001. Metastatic prostate cancer was found in the lymph node. He was subsequently started androgen deprivation. The patient was maintained on Lupron Depot 45 mg for control of his prostate cancer which remains sensitive to hormonal manipulation. PSA undetectable. Last injection was 02/27/17. The patient was feeling good. No pain other than some neck mild neck pain. Urination satisfactory. The patient cited that he has not been taking his blood pressure medication consistently. He often skips his medication at night. The patient also reported occasional chest pain. \par 2/28/18: The patient returned for a Lupron injection. Patient noted he has not been emptying well. He noted he drinks one bottle of water a day. He noted he has on and off diarrhea. He noted his GI physician informed him that he has a high creatinine levels of 1.5 mg/dL. His previous creatinine on 08/28/18 was 1.10 mg/dL. \par 8/29/18: The patient returned for a Lupron injection. Noted his urination is satisfactory. Wakes up twice during the night to urinate. Patient denied dysuria, gross hematuria, urgency, or incontinence. PSA from 2/28/18 was undetectable. A1c from 2/28/18 was 8.0%. \par 2/26/19: The patient returned today for a Lupron injection. Complains that his urination has become uncontrollable since his last visit. PSA from 8/29/18 was undetectable. Notes that he was advised to discontinue use of Plavix and to start a trial of Eliquis. Diabetes is under control.\par 8/13/19: Patient presents today for a Lupron injection. Today he states that his urination is satisfactory. Patient denies dysuria, gross hematuria, urgency or incontinence. Nocturia x 2 is reported. He denies any bone pain. During his visit today it was advised by the medical assistant that his blood pressure was elevated. \par \par 2/10/20: Patient presents today for a Lupron injection. He states his urination is satisfactory. Patient denies dysuria, hematuria, urgency or incontinence. Pt denies chest pain. He admits to feeling some fatigue and occasional mild right posterior thigh pain which feels okay today. He denies any significant bone pain. \par \par 08/11/2020: Patient has history of metastatic prostate cancer controlled by androgen deprivation. patient has had some abnormal urine cytologies. Pt is maintained on Tamsulosin 0.4mg one capsule daily. With this, his urination is good. Does not wake excessive at night to urinate. No urgency or frequency. Does not strain to urinate or blood in urine. Hx of diabetes and takes insulin. He is maintained on aspirin 81mg daily and Eliquis 5mg daily. He gets Lupron Depot 45mg once every 6 months. He is here today for next injection. Has no new aches or pains. His appetite remains good. No back or flank pain. His PSA on 2/10/2020 was 0.02.\par \par 02/11/2021: Patient presents today for Lupron injection. Urination is good. Appetite is good. Wakes 2x at night to urinate. Denies urinary urgency, gross hematuria, dysuria, pushing or straining. Denies hot flashes with hormone treatment. No constipation or chest pain. Had stress test which was normal. Takes Eliquis. PSA on 8/11/20 was undetectable at <0.01. Feels well today and offers no new complaints. \par \par 08/05/2021: Patient presents today for Eligard injection. States he has been having difficulty with urination. States he has gained some weight despite exercising by walking one mile four days a week. Admits constipation but not taking any medications for relief. \par \par I prescribed the patient Tamsulosin 0.4 mg once daily half hour after a meal for his urinary symptoms. I advised the patient the side effects of nasal congestion, light-headedness, and lack of seminal emission. \par \par Recommend taking MiraLAX for constipation. \par \par Eligard injection administered today. \par \par Blood work today includes alkaline phosphatase, BMP, dihydrotestosterone, estradiol, estrogen, LH, PSA, and testosterone. \par \par RTO in 1 month for reassessment of Tamsulosin. \par \par Preparation, in-person office visit, and coordination of care took: 30 minutes

## 2021-08-17 LAB
ALP BLD-CCNC: 78 U/L
ANDROSTANOLONE SERPL-MCNC: <1 NG/DL
ANION GAP SERPL CALC-SCNC: 20 MMOL/L
APPEARANCE: CLEAR
BACTERIA UR CULT: NORMAL
BACTERIA: NEGATIVE
BILIRUBIN URINE: NEGATIVE
BLOOD URINE: NEGATIVE
BUN SERPL-MCNC: 31 MG/DL
CALCIUM SERPL-MCNC: 9.8 MG/DL
CHLORIDE SERPL-SCNC: 101 MMOL/L
CO2 SERPL-SCNC: 18 MMOL/L
COLOR: NORMAL
CREAT SERPL-MCNC: 1.49 MG/DL
ESTRADIOL SERPL-MCNC: 7 PG/ML
ESTROGEN SERPL-MCNC: 34 PG/ML
GLUCOSE QUALITATIVE U: NEGATIVE
GLUCOSE SERPL-MCNC: 114 MG/DL
HYALINE CASTS: 0 /LPF
KETONES URINE: NEGATIVE
LEUKOCYTE ESTERASE URINE: NEGATIVE
LH SERPL-ACNC: <0.3 IU/L
MICROSCOPIC-UA: NORMAL
NITRITE URINE: NEGATIVE
PH URINE: 6.5
POTASSIUM SERPL-SCNC: 6 MMOL/L
PROTEIN URINE: NORMAL
PSA SERPL-MCNC: 0.02 NG/ML
RED BLOOD CELLS URINE: 0 /HPF
SODIUM SERPL-SCNC: 139 MMOL/L
SPECIFIC GRAVITY URINE: 1.01
SQUAMOUS EPITHELIAL CELLS: 0 /HPF
TESTOST BND SERPL-MCNC: 0.5 PG/ML
TESTOSTERONE TOTAL S: <3 NG/DL
URINE CYTOLOGY: NORMAL
UROBILINOGEN URINE: NORMAL
WHITE BLOOD CELLS URINE: 0 /HPF

## 2021-08-27 ENCOUNTER — APPOINTMENT (OUTPATIENT)
Dept: ULTRASOUND IMAGING | Facility: IMAGING CENTER | Age: 81
End: 2021-08-27

## 2021-09-09 ENCOUNTER — APPOINTMENT (OUTPATIENT)
Dept: UROLOGY | Facility: CLINIC | Age: 81
End: 2021-09-09

## 2022-02-03 ENCOUNTER — APPOINTMENT (OUTPATIENT)
Dept: UROLOGY | Facility: CLINIC | Age: 82
End: 2022-02-03
Payer: COMMERCIAL

## 2022-02-03 VITALS — DIASTOLIC BLOOD PRESSURE: 78 MMHG | SYSTOLIC BLOOD PRESSURE: 142 MMHG

## 2022-02-03 VITALS
TEMPERATURE: 98 F | DIASTOLIC BLOOD PRESSURE: 124 MMHG | OXYGEN SATURATION: 100 % | SYSTOLIC BLOOD PRESSURE: 150 MMHG | HEART RATE: 92 BPM

## 2022-02-03 PROCEDURE — 99215 OFFICE O/P EST HI 40 MIN: CPT

## 2022-02-05 NOTE — ADDENDUM
[FreeTextEntry1] : I, Brandy Gilmore, acted solely as a scribe for Dr. Steve Reilly on this date 02/03/2022.\par \par All medical record entries made by the Scribe were at my, Dr. Steve Reilly, direction and personally dictated by me on 02/03/2022. I have reviewed the chart and agree that the record accurately reflects my personal performance of the history, physical exam, assessment and plan. I have also personally directed, reviewed and agreed with the chart.

## 2022-02-05 NOTE — ASSESSMENT
[FreeTextEntry1] : Mr Edmond is a 81 year old man presented for follow up of prostate cancer. The patient underwent a pelvic lymph node dissection in 2001. Metastatic prostate cancer was found in the lymph node. He was subsequently started androgen deprivation. The patient was maintained on Lupron Depot 45 mg for control of his prostate cancer which remains sensitive to hormonal manipulation. PSA undetectable. Last injection was 02/27/17. The patient was feeling good. No pain other than some neck mild neck pain. Urination satisfactory. The patient cited that he has not been taking his blood pressure medication consistently. He often skips his medication at night. The patient also reported occasional chest pain. \par 2/28/18: The patient returned for a Lupron injection. Patient noted he has not been emptying well. He noted he drinks one bottle of water a day. He noted he has on and off diarrhea. He noted his GI physician informed him that he has a high creatinine levels of 1.5 mg/dL. His previous creatinine on 08/28/18 was 1.10 mg/dL. \par 8/29/18: The patient returned for a Lupron injection. Noted his urination is satisfactory. Wakes up twice during the night to urinate. Patient denied dysuria, gross hematuria, urgency, or incontinence. PSA from 2/28/18 was undetectable. A1c from 2/28/18 was 8.0%. \par 2/26/19: The patient returned today for a Lupron injection. Complains that his urination has become uncontrollable since his last visit. PSA from 8/29/18 was undetectable. Notes that he was advised to discontinue use of Plavix and to start a trial of Eliquis. Diabetes is under control.\par 8/13/19: Patient presents today for a Lupron injection. Today he states that his urination is satisfactory. Patient denies dysuria, gross hematuria, urgency or incontinence. Nocturia x 2 is reported. He denies any bone pain. During his visit today it was advised by the medical assistant that his blood pressure was elevated. \par \par 2/10/20: Patient presents today for a Lupron injection. He states his urination is satisfactory. Patient denies dysuria, hematuria, urgency or incontinence. Pt denies chest pain. He admits to feeling some fatigue and occasional mild right posterior thigh pain which feels okay today. He denies any significant bone pain. \par \par 08/11/2020: Patient has history of metastatic prostate cancer controlled by androgen deprivation. patient has had some abnormal urine cytologies. Pt is maintained on Tamsulosin 0.4mg one capsule daily. With this, his urination is good. Does not wake excessive at night to urinate. No urgency or frequency. Does not strain to urinate or blood in urine. Hx of diabetes and takes insulin. He is maintained on aspirin 81mg daily and Eliquis 5mg daily. He gets Lupron Depot 45mg once every 6 months. He is here today for next injection. Has no new aches or pains. His appetite remains good. No back or flank pain. His PSA on 2/10/2020 was 0.02.\par \par 02/11/2021: Patient presents today for Lupron injection. Urination is good. Appetite is good. Wakes 2x at night to urinate. Denies urinary urgency, gross hematuria, dysuria, pushing or straining. Denies hot flashes with hormone treatment. No constipation or chest pain. Had stress test which was normal. Takes Eliquis. PSA on 8/11/20 was undetectable at <0.01. Feels well today and offers no new complaints. \par \par 08/05/2021: Patient presents today for Eligard injection. States he has been having difficulty with urination. States he has gained some weight despite exercising by walking one mile four days a week. Admits constipation but not taking any medications for relief. \par \par 02/03/2022: Patient presents today for Lupron injection for hx of Ca P. Denies gross hematuria, pushing/straining, urinary urgency. Nocturia 2 times per night.  The patient complains about constipation, but admits he does not drink adequate water, only about a bottle of water per day. His diabetes is managed , his recently HbA1C was 6.6.He complains for bilateral flank pain when he stands up sometimes. He walks on a treadmill 3-4 times per week for exercise. Patient has 3 children. \par \par The patient produced a urine sample which will be sent for urinalysis, urine cytology, and urine culture. \par Blood work today included BMP, CBC, alkaline phosphatase, PSA, and testosterone. \par \par I recommend the patient take stool softeners, ex: Colace, 2 tablets in the morning and 2 in the afternoon, each dose taken with large glasses of water. I encouraged him to increase his fluid intake to about 4 bottles of water per day.\par \par Lupron injection given today. Prescription sent for next Lupron dose. \par \par RTO in 6 months for follow up and Lupron injection. \par \par Preparation, in-person office visit, and coordination of care took: 40 minutes

## 2022-02-05 NOTE — LETTER BODY
[Dear  ___] : Dear  [unfilled], [Courtesy Letter:] : I had the pleasure of seeing your patient, [unfilled], in my office today. [Please see my note below.] : Please see my note below. [Consult Closing:] : Thank you very much for allowing me to participate in the care of this patient.  If you have any questions, please do not hesitate to contact me. [Sincerely,] : Sincerely, [FreeTextEntry2] : Dr. Anthony Vera\par [address]\[place], [place] \par [address] [FreeTextEntry3] : Dr. Steve Reilly MD, PhD

## 2022-02-05 NOTE — HISTORY OF PRESENT ILLNESS
[FreeTextEntry1] : Mr Edmond is a 81 year old man presented for follow up of prostate cancer. The patient underwent a pelvic lymph node dissection in 2001. Metastatic prostate cancer was found in the lymph node. He was subsequently started androgen deprivation. The patient was maintained on Lupron Depot 45 mg for control of his prostate cancer which remains sensitive to hormonal manipulation. PSA undetectable. Last injection was 02/27/17. The patient was feeling good. No pain other than some neck mild neck pain. Urination satisfactory. The patient cited that he has not been taking his blood pressure medication consistently. He often skips his medication at night. The patient also reported occasional chest pain. \par 2/28/18: The patient returned for a Lupron injection. Patient noted he has not been emptying well. He noted he drinks one bottle of water a day. He noted he has on and off diarrhea. He noted his GI physician informed him that he has a high creatinine levels of 1.5 mg/dL. His previous creatinine on 08/28/18 was 1.10 mg/dL. \par 8/29/18: The patient returned for a Lupron injection. Noted his urination is satisfactory. Wakes up twice during the night to urinate. Patient denied dysuria, gross hematuria, urgency, or incontinence. PSA from 2/28/18 was undetectable. A1c from 2/28/18 was 8.0%. \par 2/26/19: The patient returned today for a Lupron injection. Complains that his urination has become uncontrollable since his last visit. PSA from 8/29/18 was undetectable. Notes that he was advised to discontinue use of Plavix and to start a trial of Eliquis. Diabetes is under control.\par 8/13/19: Patient presents today for a Lupron injection. Today he states that his urination is satisfactory. Patient denies dysuria, gross hematuria, urgency or incontinence. Nocturia x 2 is reported. He denies any bone pain. During his visit today it was advised by the medical assistant that his blood pressure was elevated. \par \par 2/10/20: Patient presents today for a Lupron injection. He states his urination is satisfactory. Patient denies dysuria, hematuria, urgency or incontinence. Pt denies chest pain. He admits to feeling some fatigue and occasional mild right posterior thigh pain which feels okay today. He denies any significant bone pain. \par \par 08/11/2020: Patient has history of metastatic prostate cancer controlled by androgen deprivation. patient has had some abnormal urine cytologies. Pt is maintained on Tamsulosin 0.4mg one capsule daily. With this, his urination is good. Does not wake excessive at night to urinate. No urgency or frequency. Does not strain to urinate or blood in urine. Hx of diabetes and takes insulin. He is maintained on aspirin 81mg daily and Eliquis 5mg daily. He gets Lupron Depot 45mg once every 6 months. He is here today for next injection. Has no new aches or pains. His appetite remains good. No back or flank pain. His PSA on 2/10/2020 was 0.02.\par \par 02/11/2021: Patient presents today for Lupron injection. Urination is good. Appetite is good. Wakes 2x at night to urinate. Denies urinary urgency, gross hematuria, dysuria, pushing or straining. Denies hot flashes with hormone treatment. No constipation or chest pain. Had stress test which was normal. Takes Eliquis. PSA on 8/11/20 was undetectable at <0.01. Feels well today and offers no new complaints. \par \par 08/05/2021: Patient presents today for Eligard injection. States he has been having difficulty with urination. States he has gained some weight despite exercising by walking one mile four days a week. Admits constipation but not taking any medications for relief. \par \par 02/03/2022: Patient presents today for Lupron injection for hx of Ca P. Denies gross hematuria, pushing/straining, urinary urgency. Nocturia 2 times per night.  The patient complains about constipation, but admits he does not drink adequate water, only about a bottle of water per day. His diabetes is managed , his recently HbA1C was 6.6.He complains for bilateral flank pain when he stands up sometimes. He walks on a treadmill 3-4 times per week for exercise. Patient has 3 children

## 2022-02-09 VITALS
HEIGHT: 60 IN | SYSTOLIC BLOOD PRESSURE: 145 MMHG | WEIGHT: 139.99 LBS | RESPIRATION RATE: 16 BRPM | HEART RATE: 90 BPM | OXYGEN SATURATION: 100 % | TEMPERATURE: 98 F | DIASTOLIC BLOOD PRESSURE: 67 MMHG

## 2022-02-09 NOTE — H&P ADULT - WEIGHT IN LBS
Patient called and stated she would not be coming to her appointment today at 9:40am because she is sick. Stated she was not aware she could call answering service yesterday to cancel. Advised of no show policy. She rescheduled for 8/14/19. No call back needed unless questions. [phone]

## 2022-02-09 NOTE — H&P ADULT - NSICDXPASTMEDICALHX_GEN_ALL_CORE_FT
PAST MEDICAL HISTORY:  Atherosclerosis of coronary artery Coronary artery disease    Essential hypertension Hypertension    Hyperlipidemia Dyslipidemia    Malignant neoplasm of prostate     Type 2 diabetes mellitus Diabetes

## 2022-02-09 NOTE — H&P ADULT - ASSESSMENT
82 y/o male, former smoker, w/ FHx CAD and PMHx HTN, HLD, type II DM, CKD (baseline CR 1.3), CAD s/p CABG (LIMA-LAD, SVG-OM, SVG-RPDA, last cath in 01/2018 diagnostic), paroxysmal A-fib (on Eliquis, last dose 02/09/2022 PM), prostate cancer (s/p chemo/radiation), and recent admission to [facility] on 02/07/2022 for chest pain who presents for cardiac catheterization w/ possible intervention if clinically indicated in light of patient's risk factors, CCS Class IV anginal symptoms, and recent admission w/ abnormal NST results.     EKG showed SR at 73 bpm w/o acute ischemic changes. Labs significant for H/H 11.4/35.9, BUN/Cr 38/1.54, otherwise within normal limits. Patient denied taking any home medications of either Aspirin or Plavix. Patient was ordered for Aspirin 325 mg PO once, Plavix 600 mg PO once, and  cc/hour x 2 hours as per hydration protocol and due to history of CKD.   				  ASA: III		Mallampati class: III            Anginal Class: IV    Sedation Plan: Moderate   Patient Is Suitable Candidate For Sedation: Yes      Risks & benefits of procedure and sedation and risks and benefits for the alternative therapy have been explained to the patient in layman’s terms including but not limited to: allergic reaction, bleeding, infection, arrhythmia, respiratory compromise, renal and vascular compromise, limb damage, MI, CVA, emergent CABG/Vascular Surgery and death. Informed consent obtained and in chart.

## 2022-02-09 NOTE — H&P ADULT - NSICDXPASTSURGICALHX_GEN_ALL_CORE_FT
PAST SURGICAL HISTORY:  Other postprocedural status secondary to prostate cancer    Other postprocedural status S/P colonoscopy    Status post aorto-coronary artery bypass graft SVG-->LAD, SVG-->OMB, SVG-->PLS/PDA

## 2022-02-09 NOTE — H&P ADULT - NSHPSOCIALHISTORY_GEN_ALL_CORE
Denies ETOH and drug use  Former smoker- quit 40 years ago, prev 1ppd x 10 yrs Patient is a former smoker, quit 40 years ago. Patient denied any ETOH use or illicit drug use.

## 2022-02-09 NOTE — H&P ADULT - NSHPLABSRESULTS_GEN_ALL_CORE
11.4   7.39  )-----------( 220      ( 11 Feb 2022 08:08 )             35.9       02-11    139  |  101  |  38<H>  ----------------------------<  153<H>  4.8   |  25  |  1.54<H>    Ca    10.1      11 Feb 2022 08:07    TPro  7.9  /  Alb  4.3  /  TBili  0.7  /  DBili  x   /  AST  25  /  ALT  15  /  AlkPhos  69  02-11

## 2022-02-09 NOTE — H&P ADULT - HISTORY OF PRESENT ILLNESS
Cardiologist: Dr. Santacruz  Escort: Wife  Pharmacy: [facility] [phone]  COVID: Negative 2/7 in [place] packet    *Verify Meds*    82 y/o M, FHx of CAD, former smoker, PMHx known CAD w/ prior 3VCABG (LIMA - LAD, SVG - OM, SVG - rPDA s/p diagnostic Cardiac Cath 1/5/2018, pAF (on Eliquis, last dose 2/9 AM), DM II, HTN,  HLD, CKD (baseline Cr 1.3), prostate cancer s/p chemo/radiation, recent admission to [facility] 2/7/22 for CP who presented to outpatient cardiologist Dr. Santacruz after hospital admission. Pt initially presented to hospital with complaints of an intermittent L sided, non radiating, sharp chest pain on rest/exertion worsening over the past 2 months Denies SOB, dizziness, diaphoresis, palpitations, orthopnea/PND, abdominal pain, N/V/D, urinary sx, BRBPR, hematuria, melena, LE swelling, recent travel/sick contacts/illness. NST 2/8/22: moderate sized, moderately severe reversible myocardial perfusion defect of the lateral and inferolateral wall, EF 65%. Pt was discharged and instructed to go to [facility] for LHC on Friday. In light of patient's risk factors, CCS angina class IV sx and abnormal NST, patient is referred for cardiac catheterization with possible intervention if clinically indicated.     Cardiac Catheterization 1/5/2018: patent LIMA-LAD, SVG-OM1, occluded SVG-RCA, R groin PC. oLAD 70%, pLAD 50%, mLAD competitive flow dLAD, oLCx 50%, pLCx 90%, OM1 small with competitive flow, RCA large/dominant, dRCA 40%, RPLS 50%, oRDA 70%. COVID: Negative 02/07/2022, printed and placed in chart   Cardiologist: Dr. Santacruz   Pharmacy: [facility], [phone]  Escort: Wife     80 y/o male, former smoker, w/ FHx CAD and PMHx HTN, HLD, type II DM, CKD (baseline CR 1.3), CAD s/p CABG (LIMA-LAD, SVG-OM, SVG-RPDA, last cath in 01/2018 diagnostic), paroxysmal A-fib (on Eliquis, last dose 02/09/2022 PM), prostate cancer (s/p chemo/radiation), and recent admission to [facility] on 02/07/2022 for chest pain who presented to outpatient cardiologist, Dr. Santacruz, for follow up after recent hospital admission. Patient reported that he initially presented to the hospital due to intermittent, non-radiating left sided chest pain both w/ exertion and while at rest that had occurred for over the past 2 months. Patient denied any diaphoresis, dizziness, syncope, palpitations, SOB/JARQUIN, abdominal pain, nausea/vomiting, melena, LE edema, fever, chills, cough. NST (02/08/2022) showed moderate sized, moderately severe reversible myocardial perfusion defect of the lateral and inferolateral wall, and EF 65%.    In light of patient's risk factors, CCS Class IV anginal symptoms, and recent admission w/ abnormal NST results, patient now presents for cardiac catheterization w/ possible intervention if clinically indicated.     Cardiac Catheterization 1/5/2018: patent LIMA-LAD, SVG-OM1, occluded SVG-RCA, R groin PC. oLAD 70%, pLAD 50%, mLAD competitive flow dLAD, oLCx 50%, pLCx 90%, OM1 small with competitive flow, RCA large/dominant, dRCA 40%, RPLS 50%, oRDA 70%.

## 2022-02-11 ENCOUNTER — TRANSCRIPTION ENCOUNTER (OUTPATIENT)
Age: 82
End: 2022-02-11

## 2022-02-11 ENCOUNTER — INPATIENT (INPATIENT)
Facility: HOSPITAL | Age: 82
LOS: 0 days | Discharge: HOME | DRG: 247 | End: 2022-02-12
Attending: INTERNAL MEDICINE | Admitting: INTERNAL MEDICINE
Payer: COMMERCIAL

## 2022-02-11 LAB
A1C WITH ESTIMATED AVERAGE GLUCOSE RESULT: 7.1 % — HIGH (ref 4–5.6)
ALBUMIN SERPL ELPH-MCNC: 4.3 G/DL — SIGNIFICANT CHANGE UP (ref 3.3–5)
ALP SERPL-CCNC: 69 U/L — SIGNIFICANT CHANGE UP (ref 40–120)
ALT FLD-CCNC: 15 U/L — SIGNIFICANT CHANGE UP (ref 10–45)
ANION GAP SERPL CALC-SCNC: 13 MMOL/L — SIGNIFICANT CHANGE UP (ref 5–17)
APTT BLD: 32.9 SEC — SIGNIFICANT CHANGE UP (ref 27.5–35.5)
AST SERPL-CCNC: 25 U/L — SIGNIFICANT CHANGE UP (ref 10–40)
BASOPHILS # BLD AUTO: 0.04 K/UL — SIGNIFICANT CHANGE UP (ref 0–0.2)
BASOPHILS NFR BLD AUTO: 0.5 % — SIGNIFICANT CHANGE UP (ref 0–2)
BILIRUB SERPL-MCNC: 0.7 MG/DL — SIGNIFICANT CHANGE UP (ref 0.2–1.2)
BUN SERPL-MCNC: 38 MG/DL — HIGH (ref 7–23)
CALCIUM SERPL-MCNC: 10.1 MG/DL — SIGNIFICANT CHANGE UP (ref 8.4–10.5)
CHLORIDE SERPL-SCNC: 101 MMOL/L — SIGNIFICANT CHANGE UP (ref 96–108)
CHOLEST SERPL-MCNC: 168 MG/DL — SIGNIFICANT CHANGE UP
CK MB CFR SERPL CALC: 3.5 NG/ML — SIGNIFICANT CHANGE UP (ref 0–6.7)
CK SERPL-CCNC: 179 U/L — SIGNIFICANT CHANGE UP (ref 30–200)
CO2 SERPL-SCNC: 25 MMOL/L — SIGNIFICANT CHANGE UP (ref 22–31)
CREAT SERPL-MCNC: 1.54 MG/DL — HIGH (ref 0.5–1.3)
EOSINOPHIL # BLD AUTO: 0.4 K/UL — SIGNIFICANT CHANGE UP (ref 0–0.5)
EOSINOPHIL NFR BLD AUTO: 5.4 % — SIGNIFICANT CHANGE UP (ref 0–6)
ESTIMATED AVERAGE GLUCOSE: 157 MG/DL — HIGH (ref 68–114)
GLUCOSE BLDC GLUCOMTR-MCNC: 111 MG/DL — HIGH (ref 70–99)
GLUCOSE BLDC GLUCOMTR-MCNC: 178 MG/DL — HIGH (ref 70–99)
GLUCOSE BLDC GLUCOMTR-MCNC: 95 MG/DL — SIGNIFICANT CHANGE UP (ref 70–99)
GLUCOSE SERPL-MCNC: 153 MG/DL — HIGH (ref 70–99)
HCT VFR BLD CALC: 35.9 % — LOW (ref 39–50)
HDLC SERPL-MCNC: 62 MG/DL — SIGNIFICANT CHANGE UP
HGB BLD-MCNC: 11.4 G/DL — LOW (ref 13–17)
IMM GRANULOCYTES NFR BLD AUTO: 0.3 % — SIGNIFICANT CHANGE UP (ref 0–1.5)
INR BLD: 1 — SIGNIFICANT CHANGE UP (ref 0.88–1.16)
LIPID PNL WITH DIRECT LDL SERPL: 86 MG/DL — SIGNIFICANT CHANGE UP
LYMPHOCYTES # BLD AUTO: 2.9 K/UL — SIGNIFICANT CHANGE UP (ref 1–3.3)
LYMPHOCYTES # BLD AUTO: 39.2 % — SIGNIFICANT CHANGE UP (ref 13–44)
MCHC RBC-ENTMCNC: 27.6 PG — SIGNIFICANT CHANGE UP (ref 27–34)
MCHC RBC-ENTMCNC: 31.8 GM/DL — LOW (ref 32–36)
MCV RBC AUTO: 86.9 FL — SIGNIFICANT CHANGE UP (ref 80–100)
MONOCYTES # BLD AUTO: 0.54 K/UL — SIGNIFICANT CHANGE UP (ref 0–0.9)
MONOCYTES NFR BLD AUTO: 7.3 % — SIGNIFICANT CHANGE UP (ref 2–14)
NEUTROPHILS # BLD AUTO: 3.49 K/UL — SIGNIFICANT CHANGE UP (ref 1.8–7.4)
NEUTROPHILS NFR BLD AUTO: 47.3 % — SIGNIFICANT CHANGE UP (ref 43–77)
NON HDL CHOLESTEROL: 106 MG/DL — SIGNIFICANT CHANGE UP
NRBC # BLD: 0 /100 WBCS — SIGNIFICANT CHANGE UP (ref 0–0)
PLATELET # BLD AUTO: 220 K/UL — SIGNIFICANT CHANGE UP (ref 150–400)
POTASSIUM SERPL-MCNC: 4.8 MMOL/L — SIGNIFICANT CHANGE UP (ref 3.5–5.3)
POTASSIUM SERPL-SCNC: 4.8 MMOL/L — SIGNIFICANT CHANGE UP (ref 3.5–5.3)
PROT SERPL-MCNC: 7.9 G/DL — SIGNIFICANT CHANGE UP (ref 6–8.3)
PROTHROM AB SERPL-ACNC: 12 SEC — SIGNIFICANT CHANGE UP (ref 10.6–13.6)
RBC # BLD: 4.13 M/UL — LOW (ref 4.2–5.8)
RBC # FLD: 14 % — SIGNIFICANT CHANGE UP (ref 10.3–14.5)
SODIUM SERPL-SCNC: 139 MMOL/L — SIGNIFICANT CHANGE UP (ref 135–145)
TRIGL SERPL-MCNC: 98 MG/DL — SIGNIFICANT CHANGE UP
WBC # BLD: 7.39 K/UL — SIGNIFICANT CHANGE UP (ref 3.8–10.5)
WBC # FLD AUTO: 7.39 K/UL — SIGNIFICANT CHANGE UP (ref 3.8–10.5)

## 2022-02-11 PROCEDURE — 99152 MOD SED SAME PHYS/QHP 5/>YRS: CPT

## 2022-02-11 PROCEDURE — 92937 PRQ TRLUML REVSC CAB GRF 1: CPT | Mod: LC

## 2022-02-11 PROCEDURE — 93459 L HRT ART/GRFT ANGIO: CPT | Mod: 26,59

## 2022-02-11 RX ORDER — INSULIN LISPRO 100/ML
VIAL (ML) SUBCUTANEOUS
Refills: 0 | Status: DISCONTINUED | OUTPATIENT
Start: 2022-02-11 | End: 2022-02-12

## 2022-02-11 RX ORDER — NIFEDIPINE 30 MG
1 TABLET, EXTENDED RELEASE 24 HR ORAL
Qty: 0 | Refills: 0 | DISCHARGE

## 2022-02-11 RX ORDER — SODIUM CHLORIDE 9 MG/ML
1000 INJECTION, SOLUTION INTRAVENOUS
Refills: 0 | Status: DISCONTINUED | OUTPATIENT
Start: 2022-02-11 | End: 2022-02-12

## 2022-02-11 RX ORDER — SITAGLIPTIN 50 MG/1
1 TABLET, FILM COATED ORAL
Qty: 0 | Refills: 0 | DISCHARGE

## 2022-02-11 RX ORDER — LOSARTAN/HYDROCHLOROTHIAZIDE 100MG-25MG
1 TABLET ORAL
Qty: 0 | Refills: 0 | DISCHARGE

## 2022-02-11 RX ORDER — GLUCAGON INJECTION, SOLUTION 0.5 MG/.1ML
1 INJECTION, SOLUTION SUBCUTANEOUS ONCE
Refills: 0 | Status: DISCONTINUED | OUTPATIENT
Start: 2022-02-11 | End: 2022-02-12

## 2022-02-11 RX ORDER — NIFEDIPINE 30 MG
60 TABLET, EXTENDED RELEASE 24 HR ORAL DAILY
Refills: 0 | Status: DISCONTINUED | OUTPATIENT
Start: 2022-02-11 | End: 2022-02-12

## 2022-02-11 RX ORDER — CLOPIDOGREL BISULFATE 75 MG/1
75 TABLET, FILM COATED ORAL DAILY
Refills: 0 | Status: DISCONTINUED | OUTPATIENT
Start: 2022-02-12 | End: 2022-02-12

## 2022-02-11 RX ORDER — ROSUVASTATIN CALCIUM 5 MG/1
1 TABLET ORAL
Qty: 0 | Refills: 0 | DISCHARGE

## 2022-02-11 RX ORDER — INSULIN LISPRO 100/ML
12 VIAL (ML) SUBCUTANEOUS
Qty: 0 | Refills: 0 | DISCHARGE

## 2022-02-11 RX ORDER — ASPIRIN/CALCIUM CARB/MAGNESIUM 324 MG
325 TABLET ORAL ONCE
Refills: 0 | Status: COMPLETED | OUTPATIENT
Start: 2022-02-11 | End: 2022-02-11

## 2022-02-11 RX ORDER — OMEPRAZOLE 10 MG/1
1 CAPSULE, DELAYED RELEASE ORAL
Qty: 0 | Refills: 0 | DISCHARGE

## 2022-02-11 RX ORDER — METOPROLOL TARTRATE 50 MG
25 TABLET ORAL DAILY
Refills: 0 | Status: DISCONTINUED | OUTPATIENT
Start: 2022-02-11 | End: 2022-02-12

## 2022-02-11 RX ORDER — ATORVASTATIN CALCIUM 80 MG/1
1 TABLET, FILM COATED ORAL
Qty: 0 | Refills: 0 | DISCHARGE

## 2022-02-11 RX ORDER — APIXABAN 2.5 MG/1
5 TABLET, FILM COATED ORAL EVERY 12 HOURS
Refills: 0 | Status: DISCONTINUED | OUTPATIENT
Start: 2022-02-11 | End: 2022-02-12

## 2022-02-11 RX ORDER — APIXABAN 2.5 MG/1
1 TABLET, FILM COATED ORAL
Qty: 0 | Refills: 0 | DISCHARGE

## 2022-02-11 RX ORDER — DEXTROSE 50 % IN WATER 50 %
25 SYRINGE (ML) INTRAVENOUS ONCE
Refills: 0 | Status: DISCONTINUED | OUTPATIENT
Start: 2022-02-11 | End: 2022-02-12

## 2022-02-11 RX ORDER — ASPIRIN/CALCIUM CARB/MAGNESIUM 324 MG
1 TABLET ORAL
Qty: 0 | Refills: 0 | DISCHARGE

## 2022-02-11 RX ORDER — INSULIN LISPRO 100/ML
10 VIAL (ML) SUBCUTANEOUS
Qty: 0 | Refills: 0 | DISCHARGE

## 2022-02-11 RX ORDER — LOSARTAN POTASSIUM 100 MG/1
100 TABLET, FILM COATED ORAL DAILY
Refills: 0 | Status: DISCONTINUED | OUTPATIENT
Start: 2022-02-12 | End: 2022-02-12

## 2022-02-11 RX ORDER — INSULIN GLARGINE 100 [IU]/ML
20 INJECTION, SOLUTION SUBCUTANEOUS
Qty: 0 | Refills: 0 | DISCHARGE

## 2022-02-11 RX ORDER — CHLORHEXIDINE GLUCONATE 213 G/1000ML
1 SOLUTION TOPICAL ONCE
Refills: 0 | Status: DISCONTINUED | OUTPATIENT
Start: 2022-02-11 | End: 2022-02-12

## 2022-02-11 RX ORDER — ASPIRIN/CALCIUM CARB/MAGNESIUM 324 MG
81 TABLET ORAL DAILY
Refills: 0 | Status: DISCONTINUED | OUTPATIENT
Start: 2022-02-12 | End: 2022-02-12

## 2022-02-11 RX ORDER — HYDROCHLOROTHIAZIDE 25 MG
25 TABLET ORAL DAILY
Refills: 0 | Status: DISCONTINUED | OUTPATIENT
Start: 2022-02-12 | End: 2022-02-12

## 2022-02-11 RX ORDER — METOPROLOL TARTRATE 50 MG
1 TABLET ORAL
Qty: 0 | Refills: 0 | DISCHARGE

## 2022-02-11 RX ORDER — INSULIN LISPRO 100/ML
10 VIAL (ML) SUBCUTANEOUS
Refills: 0 | Status: DISCONTINUED | OUTPATIENT
Start: 2022-02-11 | End: 2022-02-12

## 2022-02-11 RX ORDER — PANTOPRAZOLE SODIUM 20 MG/1
40 TABLET, DELAYED RELEASE ORAL
Refills: 0 | Status: DISCONTINUED | OUTPATIENT
Start: 2022-02-11 | End: 2022-02-12

## 2022-02-11 RX ORDER — CLOPIDOGREL BISULFATE 75 MG/1
600 TABLET, FILM COATED ORAL ONCE
Refills: 0 | Status: COMPLETED | OUTPATIENT
Start: 2022-02-11 | End: 2022-02-11

## 2022-02-11 RX ORDER — SODIUM CHLORIDE 9 MG/ML
500 INJECTION INTRAMUSCULAR; INTRAVENOUS; SUBCUTANEOUS
Refills: 0 | Status: DISCONTINUED | OUTPATIENT
Start: 2022-02-11 | End: 2022-02-11

## 2022-02-11 RX ORDER — INSULIN GLARGINE 100 [IU]/ML
16 INJECTION, SOLUTION SUBCUTANEOUS AT BEDTIME
Refills: 0 | Status: DISCONTINUED | OUTPATIENT
Start: 2022-02-11 | End: 2022-02-12

## 2022-02-11 RX ORDER — ERGOCALCIFEROL 1.25 MG/1
1 CAPSULE ORAL
Qty: 0 | Refills: 0 | DISCHARGE

## 2022-02-11 RX ORDER — CLOPIDOGREL BISULFATE 75 MG/1
1 TABLET, FILM COATED ORAL
Qty: 0 | Refills: 0 | DISCHARGE

## 2022-02-11 RX ORDER — INSULIN GLARGINE 100 [IU]/ML
10 INJECTION, SOLUTION SUBCUTANEOUS
Qty: 0 | Refills: 0 | DISCHARGE

## 2022-02-11 RX ORDER — DEXTROSE 50 % IN WATER 50 %
15 SYRINGE (ML) INTRAVENOUS ONCE
Refills: 0 | Status: DISCONTINUED | OUTPATIENT
Start: 2022-02-11 | End: 2022-02-12

## 2022-02-11 RX ORDER — SODIUM CHLORIDE 9 MG/ML
500 INJECTION INTRAMUSCULAR; INTRAVENOUS; SUBCUTANEOUS
Refills: 0 | Status: DISCONTINUED | OUTPATIENT
Start: 2022-02-11 | End: 2022-02-12

## 2022-02-11 RX ORDER — ATORVASTATIN CALCIUM 80 MG/1
80 TABLET, FILM COATED ORAL AT BEDTIME
Refills: 0 | Status: DISCONTINUED | OUTPATIENT
Start: 2022-02-11 | End: 2022-02-12

## 2022-02-11 RX ORDER — DEXTROSE 50 % IN WATER 50 %
12.5 SYRINGE (ML) INTRAVENOUS ONCE
Refills: 0 | Status: DISCONTINUED | OUTPATIENT
Start: 2022-02-11 | End: 2022-02-12

## 2022-02-11 RX ADMIN — SODIUM CHLORIDE 200 MILLILITER(S): 9 INJECTION INTRAMUSCULAR; INTRAVENOUS; SUBCUTANEOUS at 10:23

## 2022-02-11 RX ADMIN — APIXABAN 5 MILLIGRAM(S): 2.5 TABLET, FILM COATED ORAL at 19:46

## 2022-02-11 RX ADMIN — Medication 325 MILLIGRAM(S): at 10:23

## 2022-02-11 RX ADMIN — Medication 10 UNIT(S): at 17:20

## 2022-02-11 RX ADMIN — Medication 2: at 22:50

## 2022-02-11 RX ADMIN — CLOPIDOGREL BISULFATE 600 MILLIGRAM(S): 75 TABLET, FILM COATED ORAL at 10:23

## 2022-02-11 RX ADMIN — SODIUM CHLORIDE 180 MILLILITER(S): 9 INJECTION INTRAMUSCULAR; INTRAVENOUS; SUBCUTANEOUS at 14:17

## 2022-02-11 NOTE — PATIENT PROFILE ADULT - NUMBER OF YRS
Patient spouse called for a refill of medication. He says that she only has 2 pills left. Please call when the medication has been sent to the pharmacy. 20

## 2022-02-11 NOTE — PROGRESS NOTE ADULT - SUBJECTIVE AND OBJECTIVE BOX
Interventional Cardiology [place] Sheath Pull Note    Pt without complaints. VSS      Pre-Sheath Removal:    [ ] Right     [X] Left          [X] Groin    [ ] Brachial    [ ] 5Fr      [ ] 6Fr    [ ] 7Fr     [ ] 8Fr    [ ] Arterial  [ ] Venous sheath in place    [ ] Hematoma        [ ] Bleed    Pulses:    [ ] Right     [X] Left       DP:  [ ]  Doppler   [ ]  Faint    [ ]   1+    [X] 2+       Hemostasis Achieved with:  18               minutes manual pressure    Vasovagal Reaction: No    Meds Given: None      Post-Sheath Removal:    [ ] Right     [X] Left          [ ] Groin    [ ] Brachial    [ ] Hematoma        [ ] Bleed       [ ] Bruit    Pulses:    [ ] Right     [X] Left       DP:  [ ]  Doppler   [ ]  Faint    [ ]   1+    [X] 2+     A/P:  s/p [ ] Dx Cath      [ ] IVUS/FFR     [ ] PTA/STENT       [X] PTCA/STENT    -Continue bedrest (pt given instructions)  -Continue to monitor

## 2022-02-11 NOTE — DISCHARGE NOTE PROVIDER - NSDCCPCAREPLAN_GEN_ALL_CORE_FT
PRINCIPAL DISCHARGE DIAGNOSIS  Diagnosis: CAD (coronary artery disease)  Assessment and Plan of Treatment: You underwent successful percutaneous coronary intervention with drug eluding stent placement in your SVG to OM1 bypass graft.  --You were also noted to have a blockage in your right coronary artery which may also require stent placement if you continue to experience symptoms.  --Continue Aspirin 81mg by mouth daily and Plavix 75mg by mouth daily. PLEASE DISCONTINUE ASPIRIN AFTER 2 WEEKS, ON 2/26/22, AFTER THIS DATE ONLY CONTINUE PLAVIX IN ADDITION TO YOUR ELIQUIS.   --DO NOT STOP TAKING PLAVIX UNLESS INSTRUCTED BY YOUR CARDIOLOGIST TO PREVENT STENT CLOSURE/HEART ATTACK.   ***We have provided you with a prescription for cardiac rehab which is medically supervised exercise program for your heart and has been shown to improve the quantity and quality of life of people with heart disease like yours.   ***You should attend cardiac rehab 3 times per week for 12 weeks. We have provided you with a list of nearby facilities.   ***Please call your insurance carrier to determine which of these facilities are covered under your plan.   ***Please bring this prescription with you to your follow up appointment with your cardiologist who can then further assist you to enroll into a cardiac rehab program.      SECONDARY DISCHARGE DIAGNOSES  Diagnosis: Atrial fibrillation  Assessment and Plan of Treatment: Continue home medications: Toprol XL 25mg by mouth daily and Eliquis 5mg by mouth every 12hrs.    Diagnosis: Hypertension  Assessment and Plan of Treatment: Continue home medications: Nidefipine XL 60mg by mouth daily, Toprol XL 25mg by mouth daily and Losartan HCTZ 100/25mg by mouth daily    Diagnosis: Hyperlipidemia  Assessment and Plan of Treatment: Continue home medication: Crestor 20mg by mouth daily    Diagnosis: Diabetes  Assessment and Plan of Treatment: Continue home medications: Januvia 100mg by mouth daily, Lispro 12units subcut 3 times daily with meals and Lantus 20units subcut at bedtime.

## 2022-02-11 NOTE — DISCHARGE NOTE PROVIDER - HOSPITAL COURSE
82 y/o male, former smoker, w/ FHx CAD and PMHx HTN, HLD, type II DM, CKD (baseline CR 1.3), CAD s/p CABG (LIMA-LAD, SVG-OM, SVG-RPDA), paroxysmal A-fib (on Eliquis), prostate cancer (s/p chemo/radiation), and recent admission to [facility] on 02/07/2022 for chest pain who presented to outpatient cardiologist, Dr. Santacruz, for follow up after recent hospital admission. Patient reported that he initially presented to the hospital due to intermittent, non-radiating left sided chest pain both w/ exertion and while at rest that had occurred for over the past 2 months. Patient denied any diaphoresis, dizziness, syncope, palpitations, SOB/JARQUIN, abdominal pain, nausea/vomiting, melena, LE edema, fever, chills, cough. NST (02/08/2022) showed moderate sized, moderately severe reversible myocardial perfusion defect of the lateral and inferolateral wall, and EF 65%. Patient subsequently referred to [facility] and underwent cardiac catheterization 2/11/22 with successful MABEL SVG to OM, patent LIMA-LAD, SVG to RCA occluded, 70% dRCA lesion with plan for staged PCI if patient remains symptomatic, EDP 7mmHg. Procedure perfused via left groin, 6F sheath manually pulled post procedure and hemostasis was achieved. Patient to continue triple therapy with ASA/plavix/Eliquis X 2 weeks; then drop ASA.    Patient admitted to [facility] for monitoring post procedure. Patient currently asymptomatic, VSS, left groin access site soft, NT, no hematoma, distal DP/PT pulses unchanged from baseline. Labs/telemetry reviewed. Patient deemed stable for discharge as per Dr. Rodriguez and to follow-up with Dr. Santacruz in 1-2 weeks. All needed prescriptions have been e-prescribed to patients preferred outpatient pharmacy.       Cardiac Rehab (STEMI/NSTEMI/ACS/Unstable Angina/CHF/Chronic Stable Angina/Heart Surgery (CABG, Valve)/Post PCI):            *Education on benefits of Cardiac Rehab provided to patient: Yes/No         *Referral and Prescription Given for Cardiac Rehab : Yes/No.  If No, Why Not?         *Pt given list of locations & instructed to contact their insurance company to review list of participating providers

## 2022-02-11 NOTE — PATIENT PROFILE ADULT - FALL HARM RISK - HARM RISK INTERVENTIONS
Assistance with ambulation/Assistance OOB with selected safe patient handling equipment/Communicate Risk of Fall with Harm to all staff/Discuss with provider need for PT consult/Monitor gait and stability/Reinforce activity limits and safety measures with patient and family/Sit up slowly, dangle for a short time, stand at bedside before walking/Tailored Fall Risk Interventions/Use of alarms - bed, chair and/or voice tab/Visual Cue: Yellow wristband and red socks/Bed in lowest position, wheels locked, appropriate side rails in place/Call bell, personal items and telephone in reach/Instruct patient to call for assistance before getting out of bed or chair/Non-slip footwear when patient is out of bed/[place] to call system/Physically safe environment - no spills, clutter or unnecessary equipment/Purposeful Proactive Rounding/Room/bathroom lighting operational, light cord in reach

## 2022-02-11 NOTE — DISCHARGE NOTE PROVIDER - CARE PROVIDER_API CALL
Gigi Santacruz (MD)  Cardiovascular Disease; Interventional Cardiology  [address], [address] [place] [address]  Phone: [phone]  Fax: [phone]  Follow Up Time: 2 weeks

## 2022-02-11 NOTE — DISCHARGE NOTE PROVIDER - NSDCMRMEDTOKEN_GEN_ALL_CORE_FT
Eliquis 5 mg oral tablet: 1 tab(s) orally 2 times a day  insulin lispro 100 units/mL subcutaneous solution: 12 unit(s) subcutaneous 3 times a day (with meals)  Januvia 100 mg oral tablet: 1 tab(s) orally once a day  Lantus 100 units/mL subcutaneous solution: 20 unit(s) subcutaneous once a day (at bedtime)  losartan-hydrochlorothiazide 100 mg-25 mg oral tablet: 1 tab(s) orally once a day  Lupron Depot 45 mg/6 months intramuscular kit: 45 milligram(s) intramuscular every 6 months  NIFEdipine 60 mg oral tablet, extended release: 1 tab(s) orally once a day  omeprazole 40 mg oral delayed release capsule: 1 cap(s) orally once a day  rosuvastatin 20 mg oral tablet: 1 tab(s) orally once a day  Toprol-XL 25 mg oral tablet, extended release: 1 tab(s) orally once a day  Vitamin D2 1.25 mg (50,000 intl units) oral capsule: 1 cap(s) orally once a week   aspirin 81 mg oral delayed release tablet: 1 tab(s) orally once a day  Cardiac Rehab  3x/week x 12 weeks for Diagnosis of CAD s/p PCI. Cardologist Dr. Santacruz [phone]: 3 times a week   clopidogrel 75 mg oral tablet: 1 tab(s) orally once a day  Eliquis 5 mg oral tablet: 1 tab(s) orally 2 times a day  insulin lispro 100 units/mL subcutaneous solution: 12 unit(s) subcutaneous 3 times a day (with meals)  Januvia 100 mg oral tablet: 1 tab(s) orally once a day  Lantus 100 units/mL subcutaneous solution: 20 unit(s) subcutaneous once a day (at bedtime)  losartan-hydrochlorothiazide 100 mg-25 mg oral tablet: 1 tab(s) orally once a day  Lupron Depot 45 mg/6 months intramuscular kit: 45 milligram(s) intramuscular every 6 months  NIFEdipine 60 mg oral tablet, extended release: 1 tab(s) orally once a day  omeprazole 40 mg oral delayed release capsule: 1 cap(s) orally once a day  rosuvastatin 20 mg oral tablet: 1 tab(s) orally once a day  Toprol-XL 25 mg oral tablet, extended release: 1 tab(s) orally once a day  Vitamin D2 1.25 mg (50,000 intl units) oral capsule: 1 cap(s) orally once a week

## 2022-02-12 ENCOUNTER — TRANSCRIPTION ENCOUNTER (OUTPATIENT)
Age: 82
End: 2022-02-12

## 2022-02-12 VITALS
OXYGEN SATURATION: 95 % | HEART RATE: 90 BPM | RESPIRATION RATE: 18 BRPM | DIASTOLIC BLOOD PRESSURE: 68 MMHG | SYSTOLIC BLOOD PRESSURE: 147 MMHG

## 2022-02-12 LAB
ALP BLD-CCNC: 78 U/L
ANION GAP SERPL CALC-SCNC: 12 MMOL/L — SIGNIFICANT CHANGE UP (ref 5–17)
ANION GAP SERPL CALC-SCNC: 14 MMOL/L
APPEARANCE: CLEAR
BACTERIA UR CULT: NORMAL
BACTERIA: NEGATIVE
BASOPHILS # BLD AUTO: 0.04 K/UL
BASOPHILS NFR BLD AUTO: 0.4 %
BILIRUBIN URINE: NEGATIVE
BLOOD URINE: NEGATIVE
BUN SERPL-MCNC: 27 MG/DL — HIGH (ref 7–23)
BUN SERPL-MCNC: 39 MG/DL
CALCIUM SERPL-MCNC: 10.3 MG/DL
CALCIUM SERPL-MCNC: 9.6 MG/DL — SIGNIFICANT CHANGE UP (ref 8.4–10.5)
CHLORIDE SERPL-SCNC: 102 MMOL/L — SIGNIFICANT CHANGE UP (ref 96–108)
CHLORIDE SERPL-SCNC: 103 MMOL/L
CO2 SERPL-SCNC: 21 MMOL/L — LOW (ref 22–31)
CO2 SERPL-SCNC: 24 MMOL/L
COLOR: NORMAL
CREAT SERPL-MCNC: 1.49 MG/DL — HIGH (ref 0.5–1.3)
CREAT SERPL-MCNC: 1.64 MG/DL
EOSINOPHIL # BLD AUTO: 0.4 K/UL
EOSINOPHIL NFR BLD AUTO: 4.5 %
GLUCOSE BLDC GLUCOMTR-MCNC: 162 MG/DL — HIGH (ref 70–99)
GLUCOSE BLDC GLUCOMTR-MCNC: 241 MG/DL — HIGH (ref 70–99)
GLUCOSE QUALITATIVE U: NEGATIVE
GLUCOSE SERPL-MCNC: 207 MG/DL
GLUCOSE SERPL-MCNC: 223 MG/DL — HIGH (ref 70–99)
HCT VFR BLD CALC: 36.4 % — LOW (ref 39–50)
HCT VFR BLD CALC: 40.3 %
HGB BLD-MCNC: 11.1 G/DL — LOW (ref 13–17)
HGB BLD-MCNC: 12.2 G/DL
HYALINE CASTS: 0 /LPF
IMM GRANULOCYTES NFR BLD AUTO: 0.3 %
KETONES URINE: NEGATIVE
LEUKOCYTE ESTERASE URINE: NEGATIVE
LYMPHOCYTES # BLD AUTO: 3.57 K/UL
LYMPHOCYTES NFR BLD AUTO: 40.1 %
MAGNESIUM SERPL-MCNC: 1.8 MG/DL — SIGNIFICANT CHANGE UP (ref 1.6–2.6)
MAN DIFF?: NORMAL
MCHC RBC-ENTMCNC: 26.3 PG — LOW (ref 27–34)
MCHC RBC-ENTMCNC: 26.6 PG
MCHC RBC-ENTMCNC: 30.3 GM/DL
MCHC RBC-ENTMCNC: 30.5 GM/DL — LOW (ref 32–36)
MCV RBC AUTO: 86.3 FL — SIGNIFICANT CHANGE UP (ref 80–100)
MCV RBC AUTO: 88 FL
MICROSCOPIC-UA: NORMAL
MONOCYTES # BLD AUTO: 0.54 K/UL
MONOCYTES NFR BLD AUTO: 6.1 %
NEUTROPHILS # BLD AUTO: 4.33 K/UL
NEUTROPHILS NFR BLD AUTO: 48.6 %
NITRITE URINE: NEGATIVE
NRBC # BLD: 0 /100 WBCS — SIGNIFICANT CHANGE UP (ref 0–0)
PH URINE: 5
PLATELET # BLD AUTO: 212 K/UL — SIGNIFICANT CHANGE UP (ref 150–400)
PLATELET # BLD AUTO: 254 K/UL
POTASSIUM SERPL-MCNC: 4.8 MMOL/L — SIGNIFICANT CHANGE UP (ref 3.5–5.3)
POTASSIUM SERPL-SCNC: 4.8 MMOL/L — SIGNIFICANT CHANGE UP (ref 3.5–5.3)
POTASSIUM SERPL-SCNC: 5.2 MMOL/L
PROTEIN URINE: NEGATIVE
PSA SERPL-MCNC: 0.04 NG/ML
RBC # BLD: 4.22 M/UL — SIGNIFICANT CHANGE UP (ref 4.2–5.8)
RBC # BLD: 4.58 M/UL
RBC # FLD: 13.9 % — SIGNIFICANT CHANGE UP (ref 10.3–14.5)
RBC # FLD: 14.3 %
RED BLOOD CELLS URINE: 1 /HPF
SODIUM SERPL-SCNC: 135 MMOL/L — SIGNIFICANT CHANGE UP (ref 135–145)
SODIUM SERPL-SCNC: 140 MMOL/L
SPECIFIC GRAVITY URINE: 1.01
SQUAMOUS EPITHELIAL CELLS: 0 /HPF
TESTOST SERPL-MCNC: <2.5 NG/DL
URINE CYTOLOGY: NORMAL
UROBILINOGEN URINE: NORMAL
WBC # BLD: 7.37 K/UL — SIGNIFICANT CHANGE UP (ref 3.8–10.5)
WBC # FLD AUTO: 7.37 K/UL — SIGNIFICANT CHANGE UP (ref 3.8–10.5)
WBC # FLD AUTO: 8.91 K/UL
WHITE BLOOD CELLS URINE: 0 /HPF

## 2022-02-12 PROCEDURE — 85025 COMPLETE CBC W/AUTO DIFF WBC: CPT

## 2022-02-12 PROCEDURE — 82553 CREATINE MB FRACTION: CPT

## 2022-02-12 PROCEDURE — 85027 COMPLETE CBC AUTOMATED: CPT

## 2022-02-12 PROCEDURE — 80053 COMPREHEN METABOLIC PANEL: CPT

## 2022-02-12 PROCEDURE — C1725: CPT

## 2022-02-12 PROCEDURE — 82550 ASSAY OF CK (CPK): CPT

## 2022-02-12 PROCEDURE — C1887: CPT

## 2022-02-12 PROCEDURE — 82962 GLUCOSE BLOOD TEST: CPT

## 2022-02-12 PROCEDURE — 80061 LIPID PANEL: CPT

## 2022-02-12 PROCEDURE — 80048 BASIC METABOLIC PNL TOTAL CA: CPT

## 2022-02-12 PROCEDURE — C1874: CPT

## 2022-02-12 PROCEDURE — 85730 THROMBOPLASTIN TIME PARTIAL: CPT

## 2022-02-12 PROCEDURE — 85610 PROTHROMBIN TIME: CPT

## 2022-02-12 PROCEDURE — C1894: CPT

## 2022-02-12 PROCEDURE — 83735 ASSAY OF MAGNESIUM: CPT

## 2022-02-12 PROCEDURE — C1769: CPT

## 2022-02-12 PROCEDURE — 83036 HEMOGLOBIN GLYCOSYLATED A1C: CPT

## 2022-02-12 PROCEDURE — 36415 COLL VENOUS BLD VENIPUNCTURE: CPT

## 2022-02-12 PROCEDURE — 99232 SBSQ HOSP IP/OBS MODERATE 35: CPT

## 2022-02-12 PROCEDURE — C1884: CPT

## 2022-02-12 RX ORDER — CLOPIDOGREL BISULFATE 75 MG/1
1 TABLET, FILM COATED ORAL
Qty: 30 | Refills: 11
Start: 2022-02-12 | End: 2023-02-06

## 2022-02-12 RX ORDER — ASPIRIN/CALCIUM CARB/MAGNESIUM 324 MG
1 TABLET ORAL
Qty: 14 | Refills: 0
Start: 2022-02-12 | End: 2022-02-25

## 2022-02-12 RX ADMIN — PANTOPRAZOLE SODIUM 40 MILLIGRAM(S): 20 TABLET, DELAYED RELEASE ORAL at 07:47

## 2022-02-12 RX ADMIN — INSULIN GLARGINE 16 UNIT(S): 100 INJECTION, SOLUTION SUBCUTANEOUS at 00:27

## 2022-02-12 RX ADMIN — Medication 60 MILLIGRAM(S): at 05:49

## 2022-02-12 RX ADMIN — Medication 25 MILLIGRAM(S): at 00:29

## 2022-02-12 RX ADMIN — Medication 10 UNIT(S): at 11:57

## 2022-02-12 RX ADMIN — Medication 4: at 07:48

## 2022-02-12 RX ADMIN — CLOPIDOGREL BISULFATE 75 MILLIGRAM(S): 75 TABLET, FILM COATED ORAL at 11:54

## 2022-02-12 RX ADMIN — Medication 81 MILLIGRAM(S): at 11:54

## 2022-02-12 RX ADMIN — Medication 2: at 11:57

## 2022-02-12 RX ADMIN — Medication 10 UNIT(S): at 07:48

## 2022-02-12 RX ADMIN — ATORVASTATIN CALCIUM 80 MILLIGRAM(S): 80 TABLET, FILM COATED ORAL at 00:27

## 2022-02-12 RX ADMIN — APIXABAN 5 MILLIGRAM(S): 2.5 TABLET, FILM COATED ORAL at 07:47

## 2022-02-12 RX ADMIN — Medication 25 MILLIGRAM(S): at 05:50

## 2022-02-12 NOTE — DISCHARGE NOTE NURSING/CASE MANAGEMENT/SOCIAL WORK - PATIENT PORTAL LINK FT
You can access the FollowMyHealth Patient Portal offered by [facility] by registering at the following website: [email or web address]. By joining [employer]’s FollowMyHealth portal, you will also be able to view your health information using other applications (apps) compatible with our system.

## 2022-02-12 NOTE — DISCHARGE NOTE NURSING/CASE MANAGEMENT/SOCIAL WORK - NSDCPEFALRISK_GEN_ALL_CORE
For information on Fall & Injury Prevention, visit: [email or web address] OR  [email or web address] OR  https://www.cdc.gov/steadi/patient.html

## 2022-02-17 DIAGNOSIS — N18.9 CHRONIC KIDNEY DISEASE, UNSPECIFIED: ICD-10-CM

## 2022-02-17 DIAGNOSIS — I12.9 HYPERTENSIVE CHRONIC KIDNEY DISEASE WITH STAGE 1 THROUGH STAGE 4 CHRONIC KIDNEY DISEASE, OR UNSPECIFIED CHRONIC KIDNEY DISEASE: ICD-10-CM

## 2022-02-17 DIAGNOSIS — I25.84 CORONARY ATHEROSCLEROSIS DUE TO CALCIFIED CORONARY LESION: ICD-10-CM

## 2022-02-17 DIAGNOSIS — Z79.01 LONG TERM (CURRENT) USE OF ANTICOAGULANTS: ICD-10-CM

## 2022-02-17 DIAGNOSIS — I25.2 OLD MYOCARDIAL INFARCTION: ICD-10-CM

## 2022-02-17 DIAGNOSIS — Z92.21 PERSONAL HISTORY OF ANTINEOPLASTIC CHEMOTHERAPY: ICD-10-CM

## 2022-02-17 DIAGNOSIS — Z95.1 PRESENCE OF AORTOCORONARY BYPASS GRAFT: ICD-10-CM

## 2022-02-17 DIAGNOSIS — Z92.3 PERSONAL HISTORY OF IRRADIATION: ICD-10-CM

## 2022-02-17 DIAGNOSIS — I25.10 ATHEROSCLEROTIC HEART DISEASE OF NATIVE CORONARY ARTERY WITHOUT ANGINA PECTORIS: ICD-10-CM

## 2022-02-17 DIAGNOSIS — I25.110 ATHEROSCLEROTIC HEART DISEASE OF NATIVE CORONARY ARTERY WITH UNSTABLE ANGINA PECTORIS: ICD-10-CM

## 2022-02-17 DIAGNOSIS — I25.710 ATHEROSCLEROSIS OF AUTOLOGOUS VEIN CORONARY ARTERY BYPASS GRAFT(S) WITH UNSTABLE ANGINA PECTORIS: ICD-10-CM

## 2022-02-17 DIAGNOSIS — Z82.49 FAMILY HISTORY OF ISCHEMIC HEART DISEASE AND OTHER DISEASES OF THE CIRCULATORY SYSTEM: ICD-10-CM

## 2022-02-17 DIAGNOSIS — Z87.891 PERSONAL HISTORY OF NICOTINE DEPENDENCE: ICD-10-CM

## 2022-02-17 DIAGNOSIS — E11.22 TYPE 2 DIABETES MELLITUS WITH DIABETIC CHRONIC KIDNEY DISEASE: ICD-10-CM

## 2022-02-17 DIAGNOSIS — I48.0 PAROXYSMAL ATRIAL FIBRILLATION: ICD-10-CM

## 2022-02-17 DIAGNOSIS — Z85.46 PERSONAL HISTORY OF MALIGNANT NEOPLASM OF PROSTATE: ICD-10-CM

## 2022-02-17 DIAGNOSIS — E78.5 HYPERLIPIDEMIA, UNSPECIFIED: ICD-10-CM

## 2022-03-06 ENCOUNTER — OUTPATIENT (OUTPATIENT)
Dept: OUTPATIENT SERVICES | Facility: HOSPITAL | Age: 82
LOS: 1 days | End: 2022-03-06
Payer: COMMERCIAL

## 2022-03-06 ENCOUNTER — APPOINTMENT (OUTPATIENT)
Dept: ULTRASOUND IMAGING | Facility: IMAGING CENTER | Age: 82
End: 2022-03-06
Payer: COMMERCIAL

## 2022-03-06 ENCOUNTER — RESULT REVIEW (OUTPATIENT)
Age: 82
End: 2022-03-06

## 2022-03-06 DIAGNOSIS — N40.1 BENIGN PROSTATIC HYPERPLASIA WITH LOWER URINARY TRACT SYMPTOMS: ICD-10-CM

## 2022-03-06 DIAGNOSIS — E87.5 HYPERKALEMIA: ICD-10-CM

## 2022-03-06 DIAGNOSIS — N28.9 DISORDER OF KIDNEY AND URETER, UNSPECIFIED: ICD-10-CM

## 2022-03-06 DIAGNOSIS — R82.89 OTHER ABNORMAL FINDINGS ON CYTOLOGICAL AND HISTOLOGICAL EXAMINATION OF URINE: ICD-10-CM

## 2022-03-06 PROCEDURE — 76775 US EXAM ABDO BACK WALL LIM: CPT | Mod: 26

## 2022-03-06 PROCEDURE — 76775 US EXAM ABDO BACK WALL LIM: CPT

## 2022-05-17 ENCOUNTER — NON-APPOINTMENT (OUTPATIENT)
Age: 82
End: 2022-05-17

## 2022-06-13 ENCOUNTER — NON-APPOINTMENT (OUTPATIENT)
Age: 82
End: 2022-06-13

## 2022-08-10 ENCOUNTER — APPOINTMENT (OUTPATIENT)
Dept: UROLOGY | Facility: CLINIC | Age: 82
End: 2022-08-10

## 2022-08-10 VITALS
DIASTOLIC BLOOD PRESSURE: 69 MMHG | SYSTOLIC BLOOD PRESSURE: 178 MMHG | OXYGEN SATURATION: 100 % | RESPIRATION RATE: 16 BRPM | HEART RATE: 65 BPM | TEMPERATURE: 97.3 F | HEIGHT: 64 IN

## 2022-08-10 PROCEDURE — 99214 OFFICE O/P EST MOD 30 MIN: CPT

## 2022-08-10 RX ORDER — TAMSULOSIN HYDROCHLORIDE 0.4 MG/1
0.4 CAPSULE ORAL
Qty: 30 | Refills: 11 | Status: COMPLETED | COMMUNITY
Start: 2021-08-05 | End: 2022-08-10

## 2022-08-10 RX ORDER — LEUPROLIDE ACETATE 45 MG
45 KIT INTRAMUSCULAR
Qty: 1 | Refills: 0 | Status: COMPLETED | OUTPATIENT
Start: 2022-08-10

## 2022-08-10 RX ORDER — TAMSULOSIN HYDROCHLORIDE 0.4 MG/1
0.4 CAPSULE ORAL
Qty: 32 | Refills: 11 | Status: COMPLETED | COMMUNITY
Start: 2019-02-26 | End: 2022-08-10

## 2022-08-10 RX ORDER — APIXABAN 5 MG/1
5 TABLET, FILM COATED ORAL
Refills: 0 | Status: ACTIVE | COMMUNITY
Start: 2019-02-26

## 2022-08-12 LAB — BACTERIA UR CULT: NORMAL

## 2022-08-16 NOTE — REVIEW OF SYSTEMS
[Nocturia] : nocturia [Negative] : Heme/Lymph [Chest Pain] : no chest pain [Constipation] : no constipation [Burning Sensation] : no burning sensation during urination [Initiating Urination Req. Strain] : initiating urination does not require straining

## 2022-08-16 NOTE — ADDENDUM
[FreeTextEntry1] : This note was authored by Deanna Byers working as a scribe for Dr.Gary Reilly. I, Dr. Steve Reilly have reviewed the content of this note and confirm it is true and accurate. I personally performed the history and physical examination and made all the decisions 08/10/2022.

## 2022-08-16 NOTE — HISTORY OF PRESENT ILLNESS
[FreeTextEntry1] : Mr Edmond is a 81 year old man presented for follow up of prostate cancer. The patient underwent a pelvic lymph node dissection in 2001. Metastatic prostate cancer was found in the lymph node. He was subsequently started androgen deprivation. The patient was maintained on Lupron Depot 45 mg for control of his prostate cancer which remains sensitive to hormonal manipulation. PSA undetectable. Last injection was 02/27/17. The patient was feeling good. No pain other than some neck mild neck pain. Urination satisfactory. The patient cited that he has not been taking his blood pressure medication consistently. He often skips his medication at night. The patient also reported occasional chest pain. \par 2/28/18: The patient returned for a Lupron injection. Patient noted he has not been emptying well. He noted he drinks one bottle of water a day. He noted he has on and off diarrhea. He noted his GI physician informed him that he has a high creatinine levels of 1.5 mg/dL. His previous creatinine on 08/28/18 was 1.10 mg/dL. \par 8/29/18: The patient returned for a Lupron injection. Noted his urination is satisfactory. Wakes up twice during the night to urinate. Patient denied dysuria, gross hematuria, urgency, or incontinence. PSA from 2/28/18 was undetectable. A1c from 2/28/18 was 8.0%. \par 2/26/19: The patient returned today for a Lupron injection. Complains that his urination has become uncontrollable since his last visit. PSA from 8/29/18 was undetectable. Notes that he was advised to discontinue use of Plavix and to start a trial of Eliquis. Diabetes is under control.\par 8/13/19: Patient presents today for a Lupron injection. Today he states that his urination is satisfactory. Patient denies dysuria, gross hematuria, urgency or incontinence. Nocturia x 2 is reported. He denies any bone pain. During his visit today it was advised by the medical assistant that his blood pressure was elevated. \par \par 2/10/20: Patient presents today for a Lupron injection. He states his urination is satisfactory. Patient denies dysuria, hematuria, urgency or incontinence. Pt denies chest pain. He admits to feeling some fatigue and occasional mild right posterior thigh pain which feels okay today. He denies any significant bone pain. \par \par 08/11/2020: Patient has history of metastatic prostate cancer controlled by androgen deprivation. patient has had some abnormal urine cytologies. Pt is maintained on Tamsulosin 0.4mg one capsule daily. With this, his urination is good. Does not wake excessive at night to urinate. No urgency or frequency. Does not strain to urinate or blood in urine. Hx of diabetes and takes insulin. He is maintained on aspirin 81mg daily and Eliquis 5mg daily. He gets Lupron Depot 45mg once every 6 months. He is here today for next injection. Has no new aches or pains. His appetite remains good. No back or flank pain. His PSA on 2/10/2020 was 0.02.\par \par 02/11/2021: Patient presents today for Lupron injection. Urination is good. Appetite is good. Wakes 2x at night to urinate. Denies urinary urgency, gross hematuria, dysuria, pushing or straining. Denies hot flashes with hormone treatment. No constipation or chest pain. Had stress test which was normal. Takes Eliquis. PSA on 8/11/20 was undetectable at <0.01. Feels well today and offers no new complaints. \par \par 08/05/2021: Patient presents today for Eligard injection. States he has been having difficulty with urination. States he has gained some weight despite exercising by walking one mile four days a week. Admits constipation but not taking any medications for relief. \par \par 02/03/2022: Patient presents today for Lupron injection for hx of Ca P. Denies gross hematuria, pushing/straining, urinary urgency. Nocturia 2 times per night.  The patient complains about constipation, but admits he does not drink adequate water, only about a bottle of water per day. His diabetes is managed , his recently HbA1C was 6.6.He complains for bilateral flank pain when he stands up sometimes. He walks on a treadmill 3-4 times per week for exercise. Patient has 3 children and 5 grandchildren. \par \par 08/10/2022:  presents today for a Lupron injection. He recently returned from vacation in [facility] for two weeks. He is well relaxed and looks good. His urination remains good. No longer takes tamsulosin. Nocturia x2. Denies urinary urgency, pushing, straining, gross hematuria, and burning. Does not eat spicy food that often. Denies constipation and chest pains. Has 3 stents. Is on Eliquis 5 mg BID. Pt had covid in May. Was fully vaccinated and boosted.

## 2022-08-16 NOTE — PHYSICAL EXAM
[General Appearance - Well Developed] : well developed [General Appearance - Well Nourished] : well nourished [Normal Appearance] : normal appearance [Well Groomed] : well groomed [General Appearance - In No Acute Distress] : no acute distress [Abdomen Soft] : soft [Abdomen Tenderness] : non-tender [Costovertebral Angle Tenderness] : no ~M costovertebral angle tenderness [] : no respiratory distress [Respiration, Rhythm And Depth] : normal respiratory rhythm and effort [Exaggerated Use Of Accessory Muscles For Inspiration] : no accessory muscle use [Oriented To Time, Place, And Person] : oriented to person, place, and time [Affect] : the affect was normal [Mood] : the mood was normal [Not Anxious] : not anxious [Normal Station and Gait] : the gait and station were normal for the patient's age [No Focal Deficits] : no focal deficits [FreeTextEntry1] : 1+ ankle edema bilaterally.

## 2022-08-16 NOTE — ASSESSMENT
[FreeTextEntry1] : Mr Edmond is an 82 year old man presented for follow up of prostate cancer. The patient underwent a pelvic lymph node dissection in 2001. Metastatic prostate cancer was found in the lymph node. He was subsequently started androgen deprivation. The patient was maintained on Lupron Depot 45 mg for control of his prostate cancer which remains sensitive to hormonal manipulation. PSA undetectable. Last injection was 02/27/17. The patient was feeling good. No pain other than some neck mild neck pain. Urination satisfactory. The patient cited that he has not been taking his blood pressure medication consistently. He often skips his medication at night. The patient also reported occasional chest pain. \par 2/28/18: The patient returned for a Lupron injection. Patient noted he has not been emptying well. He noted he drinks one bottle of water a day. He noted he has on and off diarrhea. He noted his GI physician informed him that he has a high creatinine levels of 1.5 mg/dL. His previous creatinine on 08/28/18 was 1.10 mg/dL. \par 8/29/18: The patient returned for a Lupron injection. Noted his urination is satisfactory. Wakes up twice during the night to urinate. Patient denied dysuria, gross hematuria, urgency, or incontinence. PSA from 2/28/18 was undetectable. A1c from 2/28/18 was 8.0%. \par 2/26/19: The patient returned today for a Lupron injection. Complains that his urination has become uncontrollable since his last visit. PSA from 8/29/18 was undetectable. Notes that he was advised to discontinue use of Plavix and to start a trial of Eliquis. Diabetes is under control.\par 8/13/19: Patient presents today for a Lupron injection. Today he states that his urination is satisfactory. Patient denies dysuria, gross hematuria, urgency or incontinence. Nocturia x 2 is reported. He denies any bone pain. During his visit today it was advised by the medical assistant that his blood pressure was elevated. \par \par 2/10/20: Patient presents today for a Lupron injection. He states his urination is satisfactory. Patient denies dysuria, hematuria, urgency or incontinence. Pt denies chest pain. He admits to feeling some fatigue and occasional mild right posterior thigh pain which feels okay today. He denies any significant bone pain. \par \par 08/11/2020: Patient has history of metastatic prostate cancer controlled by androgen deprivation. patient has had some abnormal urine cytologies. Pt is maintained on Tamsulosin 0.4mg one capsule daily. With this, his urination is good. Does not wake excessive at night to urinate. No urgency or frequency. Does not strain to urinate or blood in urine. Hx of diabetes and takes insulin. He is maintained on aspirin 81mg daily and Eliquis 5mg daily. He gets Lupron Depot 45mg once every 6 months. He is here today for next injection. Has no new aches or pains. His appetite remains good. No back or flank pain. His PSA on 2/10/2020 was 0.02.\par \par 02/11/2021: Patient presents today for Lupron injection. Urination is good. Appetite is good. Wakes 2x at night to urinate. Denies urinary urgency, gross hematuria, dysuria, pushing or straining. Denies hot flashes with hormone treatment. No constipation or chest pain. Had stress test which was normal. Takes Eliquis. PSA on 8/11/20 was undetectable at <0.01. Feels well today and offers no new complaints. \par \par 08/05/2021: Patient presents today for Eligard injection. States he has been having difficulty with urination. States he has gained some weight despite exercising by walking one mile four days a week. Admits constipation but not taking any medications for relief. \par \par 02/03/2022: Patient presents today for Lupron injection for hx of Ca P. Denies gross hematuria, pushing/straining, urinary urgency. Nocturia 2 times per night.  The patient complains about constipation, but admits he does not drink adequate water, only about a bottle of water per day. His diabetes is managed , his recently HbA1C was 6.6.He complains for bilateral flank pain when he stands up sometimes. He walks on a treadmill 3-4 times per week for exercise. Patient has 3 children. \par \par The patient produced a urine sample which will be sent for urinalysis, urine cytology, and urine culture. \par Blood work today included BMP, CBC, alkaline phosphatase, PSA, and testosterone. \par I recommend the patient take stool softeners, ex: Colace, 2 tablets in the morning and 2 in the afternoon, each dose taken with large glasses of water. I encouraged him to increase his fluid intake to about 4 bottles of water per day.\par Lupron injection given today. Prescription sent for next Lupron dose. \par RTO in 6 months for follow up and Lupron injection. \par 08/10/2022:  presents today for a Lupron injection. He recently returned from vacation in [facility] for two weeks. He is well relaxed and looks good. His urination remains good. No longer takes tamsulosin. Nocturia x2. Denies urinary urgency, pushing, straining, gross hematuria, and burning. Does not eat spicy food that often. Denies constipation and chest pains. Has 3 stents. Is on Eliquis 5 mg BID. Pt had covid in May. Was fully vaccinated and boosted. \par \par Lupron 45 mg was administered intramuscularly by my nurse, Fransisca Baez RN. \par \par Reviewed laboratory work of Feb 2022 which was for the most part very good. Creatinine was 1.64. If it creeps up any higher, might consider renal US. Pt and his wife state that they have been decreasing his meat consumption. \par \par The patient produced a urine sample which will be sent for urinalysis, urine cytology, and urine culture. \par \par Blood work today included alkaline phosphatase, BMP, dihydrotestosterone, estradiol, estrogen total, LH, PSA, SHBG, and testosterone free and total. I requested that the patient have his blood work from  sent to my office. \par \par Advised the patient to speak with his PCP about his ankle edema. \par \par Patient will RTO in 6 months for his next injection. Will have a telehealth visit a few weeks prior and do his blood work at his nearest [facility] lab.Pt is good at using [employer] teams. \par \par Preparation, in person office visit, and coordination of care other than injection: 30  minutes.

## 2022-08-27 LAB
ALP BLD-CCNC: 73 U/L
ANDROSTANOLONE SERPL-MCNC: 1.3 NG/DL
ANION GAP SERPL CALC-SCNC: 11 MMOL/L
APPEARANCE: CLEAR
BACTERIA: NEGATIVE
BILIRUBIN URINE: NEGATIVE
BLOOD URINE: NEGATIVE
BUN SERPL-MCNC: 29 MG/DL
CALCIUM SERPL-MCNC: 9.5 MG/DL
CHLORIDE SERPL-SCNC: 101 MMOL/L
CO2 SERPL-SCNC: 23 MMOL/L
COLOR: YELLOW
CREAT SERPL-MCNC: 1.49 MG/DL
EGFR: 47 ML/MIN/1.73M2
ESTRADIOL SERPL-MCNC: 7 PG/ML
ESTROGEN SERPL-MCNC: 54 PG/ML
GLUCOSE QUALITATIVE U: NORMAL
GLUCOSE SERPL-MCNC: 239 MG/DL
HYALINE CASTS: 2 /LPF
KETONES URINE: NEGATIVE
LEUKOCYTE ESTERASE URINE: NEGATIVE
LH SERPL-ACNC: <0.3 IU/L
MICROSCOPIC-UA: NORMAL
NITRITE URINE: NEGATIVE
PH URINE: 6.5
POTASSIUM SERPL-SCNC: 5.1 MMOL/L
PROTEIN URINE: ABNORMAL
PSA SERPL-MCNC: 0.05 NG/ML
RED BLOOD CELLS URINE: 5 /HPF
SHBG SERPL-SCNC: 79.2 NMOL/L
SODIUM SERPL-SCNC: 136 MMOL/L
SPECIFIC GRAVITY URINE: 1.02
SQUAMOUS EPITHELIAL CELLS: 0 /HPF
TESTOST FREE SERPL-MCNC: 0.7 PG/ML
TESTOST SERPL-MCNC: <2.5 NG/DL
URINE CYTOLOGY: NORMAL
UROBILINOGEN URINE: NORMAL
WHITE BLOOD CELLS URINE: 0 /HPF

## 2022-10-24 ENCOUNTER — NON-APPOINTMENT (OUTPATIENT)
Age: 82
End: 2022-10-24

## 2022-12-23 ENCOUNTER — NON-APPOINTMENT (OUTPATIENT)
Age: 82
End: 2022-12-23

## 2023-01-04 LAB
APPEARANCE: CLEAR
BACTERIA: NEGATIVE
BILIRUBIN URINE: NEGATIVE
BLOOD URINE: NEGATIVE
COLOR: NORMAL
GLUCOSE QUALITATIVE U: ABNORMAL
HYALINE CASTS: 0 /LPF
KETONES URINE: NEGATIVE
LEUKOCYTE ESTERASE URINE: NEGATIVE
MICROSCOPIC-UA: NORMAL
NITRITE URINE: NEGATIVE
PH URINE: 6
PROTEIN URINE: ABNORMAL
RED BLOOD CELLS URINE: 1 /HPF
SPECIFIC GRAVITY URINE: 1.02
SQUAMOUS EPITHELIAL CELLS: 0 /HPF
URINE CYTOLOGY: NORMAL
UROBILINOGEN URINE: NORMAL
WHITE BLOOD CELLS URINE: 0 /HPF

## 2023-01-05 LAB — BACTERIA UR CULT: NORMAL

## 2023-01-06 ENCOUNTER — LABORATORY RESULT (OUTPATIENT)
Age: 83
End: 2023-01-06

## 2023-01-09 LAB
ALP BLD-CCNC: 69 U/L
ANION GAP SERPL CALC-SCNC: 15 MMOL/L
APPEARANCE: CLEAR
BACTERIA: NEGATIVE
BILIRUBIN URINE: NEGATIVE
BLOOD URINE: NEGATIVE
BUN SERPL-MCNC: 40 MG/DL
CALCIUM SERPL-MCNC: 9.9 MG/DL
CHLORIDE SERPL-SCNC: 99 MMOL/L
CO2 SERPL-SCNC: 24 MMOL/L
COLOR: NORMAL
CREAT SERPL-MCNC: 1.47 MG/DL
EGFR: 48 ML/MIN/1.73M2
ESTIMATED AVERAGE GLUCOSE: 180 MG/DL
GLUCOSE QUALITATIVE U: NEGATIVE
GLUCOSE SERPL-MCNC: 188 MG/DL
HBA1C MFR BLD HPLC: 7.9 %
HYALINE CASTS: 1 /LPF
KETONES URINE: NEGATIVE
LEUKOCYTE ESTERASE URINE: NEGATIVE
MICROSCOPIC-UA: NORMAL
NITRITE URINE: NEGATIVE
PH URINE: 6
POTASSIUM SERPL-SCNC: 4.6 MMOL/L
PROTEIN URINE: NORMAL
PSA SERPL-MCNC: 0.06 NG/ML
RED BLOOD CELLS URINE: 0 /HPF
SODIUM SERPL-SCNC: 138 MMOL/L
SPECIFIC GRAVITY URINE: 1.02
SQUAMOUS EPITHELIAL CELLS: 0 /HPF
TESTOST SERPL-MCNC: <2.5 NG/DL
URINE CYTOLOGY: NORMAL
UROBILINOGEN URINE: NORMAL
WHITE BLOOD CELLS URINE: 0 /HPF

## 2023-01-10 ENCOUNTER — APPOINTMENT (OUTPATIENT)
Dept: UROLOGY | Facility: CLINIC | Age: 83
End: 2023-01-10

## 2023-01-10 ENCOUNTER — APPOINTMENT (OUTPATIENT)
Dept: UROLOGY | Facility: CLINIC | Age: 83
End: 2023-01-10
Payer: COMMERCIAL

## 2023-01-10 PROCEDURE — 99443: CPT

## 2023-01-10 NOTE — ADDENDUM
[FreeTextEntry1] : This note was authored by Cecilia Crum working as a scribe for Dr.Gary Reilly. I, Dr. Steve Reilly have reviewed the content of this note and confirm it is true and accurate. I personally performed the history and physical examination and made all the decisions 01/10/2023\par

## 2023-01-10 NOTE — ASSESSMENT
[FreeTextEntry1] : Mr Stanley is an 82 year old man presented for follow up of prostate cancer. The patient underwent a pelvic lymph node dissection in 2001. Metastatic prostate cancer was found in the lymph node. He was subsequently started androgen deprivation. The patient was maintained on Lupron Depot 45 mg for control of his prostate cancer which remains sensitive to hormonal manipulation. PSA undetectable. Last injection was 02/27/17. The patient was feeling good. No pain other than some neck mild neck pain. Urination satisfactory. The patient cited that he has not been taking his blood pressure medication consistently. He often skips his medication at night. The patient also reported occasional chest pain. \par 2/28/18: The patient returned for a Lupron injection. Patient noted he has not been emptying well. He noted he drinks one bottle of water a day. He noted he has on and off diarrhea. He noted his GI physician informed him that he has a high creatinine levels of 1.5 mg/dL. His previous creatinine on 08/28/18 was 1.10 mg/dL. \par 8/29/18: The patient returned for a Lupron injection. Noted his urination is satisfactory. Wakes up twice during the night to urinate. Patient denied dysuria, gross hematuria, urgency, or incontinence. PSA from 2/28/18 was undetectable. A1c from 2/28/18 was 8.0%. \par 2/26/19: The patient returned today for a Lupron injection. Complains that his urination has become uncontrollable since his last visit. PSA from 8/29/18 was undetectable. Notes that he was advised to discontinue use of Plavix and to start a trial of Eliquis. Diabetes is under control.\par 8/13/19: Patient presents today for a Lupron injection. Today he states that his urination is satisfactory. Patient denies dysuria, gross hematuria, urgency or incontinence. Nocturia x 2 is reported. He denies any bone pain. During his visit today it was advised by the medical assistant that his blood pressure was elevated. \par \par 2/10/20: Patient presents today for a Lupron injection. He states his urination is satisfactory. Patient denies dysuria, hematuria, urgency or incontinence. Pt denies chest pain. He admits to feeling some fatigue and occasional mild right posterior thigh pain which feels okay today. He denies any significant bone pain. \par \par 08/11/2020: Patient has history of metastatic prostate cancer controlled by androgen deprivation. patient has had some abnormal urine cytologies. Pt is maintained on Tamsulosin 0.4mg one capsule daily. With this, his urination is good. Does not wake excessive at night to urinate. No urgency or frequency. Does not strain to urinate or blood in urine. Hx of diabetes and takes insulin. He is maintained on aspirin 81mg daily and Eliquis 5mg daily. He gets Lupron Depot 45mg once every 6 months. He is here today for next injection. Has no new aches or pains. His appetite remains good. No back or flank pain. His PSA on 2/10/2020 was 0.02.\par \par 02/11/2021: Patient presents today for Lupron injection. Urination is good. Appetite is good. Wakes 2x at night to urinate. Denies urinary urgency, gross hematuria, dysuria, pushing or straining. Denies hot flashes with hormone treatment. No constipation or chest pain. Had stress test which was normal. Takes Eliquis. PSA on 8/11/20 was undetectable at <0.01. Feels well today and offers no new complaints. \par \par 08/05/2021: Patient presents today for Eligard injection. States he has been having difficulty with urination. States he has gained some weight despite exercising by walking one mile four days a week. Admits constipation but not taking any medications for relief. \par \par 02/03/2022: Patient presents today for Lupron injection for hx of Ca P. Denies gross hematuria, pushing/straining, urinary urgency. Nocturia 2 times per night.  The patient complains about constipation, but admits he does not drink adequate water, only about a bottle of water per day. His diabetes is managed , his recently HbA1C was 6.6.He complains for bilateral flank pain when he stands up sometimes. He walks on a treadmill 3-4 times per week for exercise. Patient has 3 children. \par \par The patient produced a urine sample which will be sent for urinalysis, urine cytology, and urine culture. \par Blood work today included BMP, CBC, alkaline phosphatase, PSA, and testosterone. \par I recommend the patient take stool softeners, ex: Colace, 2 tablets in the morning and 2 in the afternoon, each dose taken with large glasses of water. I encouraged him to increase his fluid intake to about 4 bottles of water per day.\par Lupron injection given today. Prescription sent for next Lupron dose. \par RTO in 6 months for follow up and Lupron injection. \par 08/10/2022:  presents today for a Lupron injection. He recently returned from vacation in [facility] for two weeks. He is well relaxed and looks good. His urination remains good. No longer takes tamsulosin. Nocturia x2. Denies urinary urgency, pushing, straining, gross hematuria, and burning. Does not eat spicy food that often. Denies constipation and chest pains. Has 3 stents. Is on Eliquis 5 mg BID. Pt had covid in May. Was fully vaccinated and boosted. \par \par Lupron 45 mg was administered intramuscularly by my nurse, Fransisca Baez RN. \par \par Reviewed laboratory work of Feb 2022 which was for the most part very good. Creatinine was 1.64. If it creeps up any higher, might consider renal US. Pt and his wife state that they have been decreasing his meat consumption.\par The patient produced a urine sample which will be sent for urinalysis, urine cytology, and urine culture. \par Blood work today included alkaline phosphatase, BMP, dihydrotestosterone, estradiol, estrogen total, LH, PSA, SHBG, and testosterone free and total. I requested that the patient have his blood work from  sent to my office. \par Advised the patient to speak with his PCP about his ankle edema. \par Patient will RTO in 6 months for his next injection. Will have a telehealth visit a few weeks prior and do his blood work at his nearest [facility] lab.Pt is good at using [employer] teams. \par \par 01/10/2023: Mr. STANLEY presents today for a follow up audio only telehealth visit for which they gave permission for. The patient was located at home [address] [place] [address] and I was located in my office in [place], [place].\par I reviewed lab work of 1/6/23 which revealed the creatinine being stable since 8/5/2021 and the renal U/S showed no hydronephrosis. Glucose was elevated at 188, BUN was 40, Creatinine was 1.47 and eGFR was 48. The ALK PHOS was normal at 69, PSA was 0.06 and seems to be slowly rising. However te rate of rising is not alarming but I will continue current therapy. The pt A1C was elevated at 7.9% and the eAG was 180. I would advise him to continue to work on improving diabetes management and consult with his PCP and if he ha sone endocrinologist. The UA revealed no UTI, RBC being 0 /HPF and Urine culture showed no growth. Te urine cytology was negative for high grade urothelial carcinoma and the specimen consists mainly of mature squamous epithelial cells and benign urothelial cells. However, glycosuria was present, glucose being 200 and Protein being 30 His testosterone was <2.5 with good testosterone suppression. \par The US renal of 3/6/22 showed normal kidney US, despite androgen deprivation prostate remains large. \par Patient reports today feeling fairly well with no bone pain. He denies straining, pushing, gross hematuria, or dysuria but has minimal urinal frequency and urgency, triggered by stepping into the shower and brushing his teeth. I suggested he urinate first wait a while then proceed to urinate again.\par \par We will continue with his Eligard 45 mg. \par \par Clinical findings and Lab results were reviewed at length with the patient.\par \par Pt will RTO February 1, 2023 as he is scheduled for a repeat injection. \par \par Duration of call: 22 minuets

## 2023-01-10 NOTE — HISTORY OF PRESENT ILLNESS
[FreeTextEntry1] : Mr Stanley is a 81 year old man presented for follow up of prostate cancer. The patient underwent a pelvic lymph node dissection in 2001. Metastatic prostate cancer was found in the lymph node. He was subsequently started androgen deprivation. The patient was maintained on Lupron Depot 45 mg for control of his prostate cancer which remains sensitive to hormonal manipulation. PSA undetectable. Last injection was 02/27/17. The patient was feeling good. No pain other than some neck mild neck pain. Urination satisfactory. The patient cited that he has not been taking his blood pressure medication consistently. He often skips his medication at night. The patient also reported occasional chest pain. \par 2/28/18: The patient returned for a Lupron injection. Patient noted he has not been emptying well. He noted he drinks one bottle of water a day. He noted he has on and off diarrhea. He noted his GI physician informed him that he has a high creatinine levels of 1.5 mg/dL. His previous creatinine on 08/28/18 was 1.10 mg/dL. \par 8/29/18: The patient returned for a Lupron injection. Noted his urination is satisfactory. Wakes up twice during the night to urinate. Patient denied dysuria, gross hematuria, urgency, or incontinence. PSA from 2/28/18 was undetectable. A1c from 2/28/18 was 8.0%. \par 2/26/19: The patient returned today for a Lupron injection. Complains that his urination has become uncontrollable since his last visit. PSA from 8/29/18 was undetectable. Notes that he was advised to discontinue use of Plavix and to start a trial of Eliquis. Diabetes is under control.\par 8/13/19: Patient presents today for a Lupron injection. Today he states that his urination is satisfactory. Patient denies dysuria, gross hematuria, urgency or incontinence. Nocturia x 2 is reported. He denies any bone pain. During his visit today it was advised by the medical assistant that his blood pressure was elevated. \par \par 2/10/20: Patient presents today for a Lupron injection. He states his urination is satisfactory. Patient denies dysuria, hematuria, urgency or incontinence. Pt denies chest pain. He admits to feeling some fatigue and occasional mild right posterior thigh pain which feels okay today. He denies any significant bone pain. \par \par 08/11/2020: Patient has history of metastatic prostate cancer controlled by androgen deprivation. patient has had some abnormal urine cytologies. Pt is maintained on Tamsulosin 0.4mg one capsule daily. With this, his urination is good. Does not wake excessive at night to urinate. No urgency or frequency. Does not strain to urinate or blood in urine. Hx of diabetes and takes insulin. He is maintained on aspirin 81mg daily and Eliquis 5mg daily. He gets Lupron Depot 45mg once every 6 months. He is here today for next injection. Has no new aches or pains. His appetite remains good. No back or flank pain. His PSA on 2/10/2020 was 0.02.\par \par 02/11/2021: Patient presents today for Lupron injection. Urination is good. Appetite is good. Wakes 2x at night to urinate. Denies urinary urgency, gross hematuria, dysuria, pushing or straining. Denies hot flashes with hormone treatment. No constipation or chest pain. Had stress test which was normal. Takes Eliquis. PSA on 8/11/20 was undetectable at <0.01. Feels well today and offers no new complaints. \par \par 08/05/2021: Patient presents today for Eligard injection. States he has been having difficulty with urination. States he has gained some weight despite exercising by walking one mile four days a week. Admits constipation but not taking any medications for relief. \par \par 02/03/2022: Patient presents today for Lupron injection for hx of Ca P. Denies gross hematuria, pushing/straining, urinary urgency. Nocturia 2 times per night.  The patient complains about constipation, but admits he does not drink adequate water, only about a bottle of water per day. His diabetes is managed , his recently HbA1C was 6.6.He complains for bilateral flank pain when he stands up sometimes. He walks on a treadmill 3-4 times per week for exercise. Patient has 3 children and 5 grandchildren. \par \par 08/10/2022:  presents today for a Lupron injection. He recently returned from vacation in [facility] for two weeks. He is well relaxed and looks good. His urination remains good. No longer takes tamsulosin. Nocturia x2. Denies urinary urgency, pushing, straining, gross hematuria, and burning. Does not eat spicy food that often. Denies constipation and chest pains. Has 3 stents. Is on Eliquis 5 mg BID. Pt had covid in May. Was fully vaccinated and boosted. \par \par 01/10/2023: Mr. STANLEY presents today for a follow up audio only telehealth visit for which they gave permission for. The patient was located at home [address] [place] [address] and I was located in my office in [place], [place].\par I reviewed lab work of 1/6/23 which revealed the creatinine being stable since 8/5/2021 and the renal U/S showed no hydronephrosis. Glucose was elevated at 188, BUN was 40, Creatinine was 1.47 and eGFR was 48. The ALK PHOS was normal at 69, PSA was 0.06 and seems to be slowly rising. However te rate of rising is not alarming but I will continue current therapy. The pt A1C was elevated at 7.9% and the eAG was 180. I would advise him to continue to work on improving diabetes management and consult with his PCP and if he ha sone endocrinologist. The UA revealed no UTI, RBC being 0/HPF and Urine culture showed no growth. The urine cytology was negative for high grade urothelial carcinoma and the specimen consists mainly of mature squamous epithelial cells and benign urothelial cells. However, glycosuria was present, glucose being 200 and Protein being 30 His testosterone was <2.5 with good testosterone suppression. \par The US renal of 3/6/22 showed normal kidney US, despite androgen deprivation prostate remains enlarged. \par Patient reports today feeling fairly well with no bone pain. He denies straining, pushing, gross hematuria, or dysuria but has minimal urinal frequency and urgency, triggered by stepping into the shower and brushing his teeth. I suggested he urinate first wait a while then proceed to urinate again.\par We will  continue with his Eligard 45 mg. \par

## 2023-01-10 NOTE — HISTORY OF PRESENT ILLNESS
[Home] : at home, [unfilled] , at the time of the visit. [Medical Office: ([facility])___] : at the medical office located in

## 2023-01-26 ENCOUNTER — NON-APPOINTMENT (OUTPATIENT)
Age: 83
End: 2023-01-26

## 2023-01-31 ENCOUNTER — NON-APPOINTMENT (OUTPATIENT)
Age: 83
End: 2023-01-31

## 2023-02-03 ENCOUNTER — APPOINTMENT (OUTPATIENT)
Dept: UROLOGY | Facility: CLINIC | Age: 83
End: 2023-02-03
Payer: COMMERCIAL

## 2023-02-03 VITALS
HEART RATE: 98 BPM | TEMPERATURE: 97.6 F | HEIGHT: 64 IN | SYSTOLIC BLOOD PRESSURE: 148 MMHG | RESPIRATION RATE: 16 BRPM | DIASTOLIC BLOOD PRESSURE: 81 MMHG | WEIGHT: 130 LBS | BODY MASS INDEX: 22.2 KG/M2 | OXYGEN SATURATION: 100 %

## 2023-02-03 DIAGNOSIS — R10.9 UNSPECIFIED ABDOMINAL PAIN: ICD-10-CM

## 2023-02-03 PROCEDURE — 99215 OFFICE O/P EST HI 40 MIN: CPT

## 2023-02-03 RX ORDER — LEUPROLIDE ACETATE 45 MG/.375ML
45 INJECTION, SUSPENSION, EXTENDED RELEASE SUBCUTANEOUS
Refills: 0 | Status: COMPLETED | OUTPATIENT
Start: 2023-02-03

## 2023-02-03 RX ADMIN — LEUPROLIDE ACETATE 0 MG: KIT SUBCUTANEOUS at 00:00

## 2023-02-03 NOTE — HISTORY OF PRESENT ILLNESS
[FreeTextEntry1] : Mr Stanley is a 81 year old man presented for follow up of prostate cancer. The patient underwent a pelvic lymph node dissection in . Metastatic prostate cancer was found in the lymph node. He was subsequently started androgen deprivation. The patient was maintained on Lupron Depot 45 mg for control of his prostate cancer which remains sensitive to hormonal manipulation. PSA undetectable. Last injection was 17. The patient was feeling good. No pain other than some neck mild neck pain. Urination satisfactory. The patient cited that he has not been taking his blood pressure medication consistently. He often skips his medication at night. The patient also reported occasional chest pain. \par 18: The patient returned for a Lupron injection. Patient noted he has not been emptying well. He noted he drinks one bottle of water a day. He noted he has on and off diarrhea. He noted his GI physician informed him that he has a high creatinine levels of 1.5 mg/dL. His previous creatinine on 18 was 1.10 mg/dL. \par 18: The patient returned for a Lupron injection. Noted his urination is satisfactory. Wakes up twice during the night to urinate. Patient denied dysuria, gross hematuria, urgency, or incontinence. PSA from 18 was undetectable. A1c from 18 was 8.0%. \par 19: The patient returned today for a Lupron injection. Complains that his urination has become uncontrollable since his last visit. PSA from 18 was undetectable. Notes that he was advised to discontinue use of Plavix and to start a trial of Eliquis. Diabetes is under control.\par 19: Patient presents today for a Lupron injection. Today he states that his urination is satisfactory. Patient denies dysuria, gross hematuria, urgency or incontinence. Nocturia x 2 is reported. He denies any bone pain. During his visit today it was advised by the medical assistant that his blood pressure was elevated. \par \par 2/10/20: Patient presents today for a Lupron injection. He states his urination is satisfactory. Patient denies dysuria, hematuria, urgency or incontinence. Pt denies chest pain. He admits to feeling some fatigue and occasional mild right posterior thigh pain which feels okay today. He denies any significant bone pain. \par \par 2020: Patient has history of metastatic prostate cancer controlled by androgen deprivation. patient has had some abnormal urine cytologies. Pt is maintained on Tamsulosin 0.4mg one capsule daily. With this, his urination is good. Does not wake excessive at night to urinate. No urgency or frequency. Does not strain to urinate or blood in urine. Hx of diabetes and takes insulin. He is maintained on aspirin 81mg daily and Eliquis 5mg daily. He gets Lupron Depot 45mg once every 6 months. He is here today for next injection. Has no new aches or pains. His appetite remains good. No back or flank pain. His PSA on 2/10/2020 was 0.02.\par \par 2021: Patient presents today for Lupron injection. Urination is good. Appetite is good. Wakes 2x at night to urinate. Denies urinary urgency, gross hematuria, dysuria, pushing or straining. Denies hot flashes with hormone treatment. No constipation or chest pain. Had stress test which was normal. Takes Eliquis. PSA on 20 was undetectable at <0.01. Feels well today and offers no new complaints. \par \par 2021: Patient presents today for Eligard injection. States he has been having difficulty with urination. States he has gained some weight despite exercising by walking one mile four days a week. Admits constipation but not taking any medications for relief. \par \par 2022: Patient presents today for Lupron injection for hx of Ca P. Denies gross hematuria, pushing/straining, urinary urgency. Nocturia 2 times per night.  The patient complains about constipation, but admits he does not drink adequate water, only about a bottle of water per day. His diabetes is managed , his recently HbA1C was 6.6.He complains for bilateral flank pain when he stands up sometimes. He walks on a treadmill 3-4 times per week for exercise. Patient has 3 children and 5 grandchildren. \par \par 08/10/2022:  presents today for a Lupron injection. He recently returned from vacation in [facility] for two weeks. He is well relaxed and looks good. His urination remains good. No longer takes tamsulosin. Nocturia x2. Denies urinary urgency, pushing, straining, gross hematuria, and burning. Does not eat spicy food that often. Denies constipation and chest pains. Has 3 stents. Is on Eliquis 5 mg BID. Pt had covid in May. Was fully vaccinated and boosted. \par \par 01/10/2023: Mr. STANLEY presents today for a follow up audio only telehealth visit for which they gave permission for. The patient was located at home [address] [place] [address] and I was located in my office in [place], [place].\par I reviewed lab work of 23 which revealed the creatinine being stable since 2021 and the renal U/S showed no hydronephrosis. Glucose was elevated at 188, BUN was 40, Creatinine was 1.47 and eGFR was 48. The ALK PHOS was normal at 69, PSA was 0.06 and seems to be slowly rising. However te rate of rising is not alarming but I will continue current therapy. The pt A1C was elevated at 7.9% and the eAG was 180. I would advise him to continue to work on improving diabetes management and consult with his PCP and if he ha sone endocrinologist. The UA revealed no UTI, RBC being 0/HPF and Urine culture showed no growth. The urine cytology was negative for high grade urothelial carcinoma and the specimen consists mainly of mature squamous epithelial cells and benign urothelial cells. However, glycosuria was present, glucose being 200 and Protein being 30 His testosterone was <2.5 with good testosterone suppression. \par The US renal of 3/6/22 showed normal kidney US, despite androgen deprivation prostate remains enlarged. \par Patient reports today feeling fairly well with no bone pain. He denies straining, pushing, gross hematuria, or dysuria but has minimal urinal frequency and urgency, triggered by stepping into the shower and brushing his teeth. I suggested he urinate first wait a while then proceed to urinate again.\par We will  continue with his Eligard 45 mg. \par \par 2023: Mr. SHERWIN STANLEY presents today for a Lupron Injection or hx of Ca P.\par Insurance company had the wrong  which has caused some complications. Today we will give him Eligard (luprolide)45 mg instead of Lupron explaining to him they work the same, delivery system is indifferent, but have different names. Hi wife reports urination is well. Nocturia 1-2 x and 3 - 4 x during the day. Pt denies urinary urgency, straining, pushing, gross hematuria, or dysuria.I informed him to tell his doctor that he no longer needs tamsulosin as the pt had prostate cancer, but his urination was fine without it and is still fine. He reports not to have taken any yet. I am not quite sure why his PCP has prescribed this but it  is not necessary at the moment. \par He reports no new pain since last visit. He has some complications but nothing too unbearable. \par He showed me his lab work which revealed some glucose in his urine, where as normally he does not. Back in  his a1C was 6.6 which crept up to 8.3 in 2019 as of January it  was 7.9 but I will like to see it  under 7. I informed him to take his medication to get this under control and to consult his PCP Mary Anne Vera.\par In addition the pt had a concern about his slight raise in PSA, but I assured him he does not need to worry as I will continue to monitor it. In the future we may have to change his medication but in the meantime we will continue his therapy.

## 2023-02-03 NOTE — ADDENDUM
[FreeTextEntry1] : This note was authored by Cecilia Crum working as a scribe for Dr.Gary Reilly. I, Dr. Steve Reilly have reviewed the content of this note and confirm it is true and accurate. I personally performed the history and physical examination and made all the decisions 02/03/2023\par

## 2023-02-03 NOTE — ASSESSMENT
[FreeTextEntry1] : Mr Stanley is an 82 year old man presented for follow up of prostate cancer. The patient underwent a pelvic lymph node dissection in . Metastatic prostate cancer was found in the lymph node. He was subsequently started androgen deprivation. The patient was maintained on Lupron Depot 45 mg for control of his prostate cancer which remains sensitive to hormonal manipulation. PSA undetectable. Last injection was 17. The patient was feeling good. No pain other than some neck mild neck pain. Urination satisfactory. The patient cited that he has not been taking his blood pressure medication consistently. He often skips his medication at night. The patient also reported occasional chest pain. \par 18: The patient returned for a Lupron injection. Patient noted he has not been emptying well. He noted he drinks one bottle of water a day. He noted he has on and off diarrhea. He noted his GI physician informed him that he has a high creatinine levels of 1.5 mg/dL. His previous creatinine on 18 was 1.10 mg/dL. \par 18: The patient returned for a Lupron injection. Noted his urination is satisfactory. Wakes up twice during the night to urinate. Patient denied dysuria, gross hematuria, urgency, or incontinence. PSA from 18 was undetectable. A1c from 18 was 8.0%. \par 19: The patient returned today for a Lupron injection. Complains that his urination has become uncontrollable since his last visit. PSA from 18 was undetectable. Notes that he was advised to discontinue use of Plavix and to start a trial of Eliquis. Diabetes is under control.\par 19: Patient presents today for a Lupron injection. Today he states that his urination is satisfactory. Patient denies dysuria, gross hematuria, urgency or incontinence. Nocturia x 2 is reported. He denies any bone pain. During his visit today it was advised by the medical assistant that his blood pressure was elevated. \par \par 2/10/20: Patient presents today for a Lupron injection. He states his urination is satisfactory. Patient denies dysuria, hematuria, urgency or incontinence. Pt denies chest pain. He admits to feeling some fatigue and occasional mild right posterior thigh pain which feels okay today. He denies any significant bone pain. \par \par 2020: Patient has history of metastatic prostate cancer controlled by androgen deprivation. patient has had some abnormal urine cytologies. Pt is maintained on Tamsulosin 0.4mg one capsule daily. With this, his urination is good. Does not wake excessive at night to urinate. No urgency or frequency. Does not strain to urinate or blood in urine. Hx of diabetes and takes insulin. He is maintained on aspirin 81mg daily and Eliquis 5mg daily. He gets Lupron Depot 45mg once every 6 months. He is here today for next injection. Has no new aches or pains. His appetite remains good. No back or flank pain. His PSA on 2/10/2020 was 0.02.\par \par 2021: Patient presents today for Lupron injection. Urination is good. Appetite is good. Wakes 2x at night to urinate. Denies urinary urgency, gross hematuria, dysuria, pushing or straining. Denies hot flashes with hormone treatment. No constipation or chest pain. Had stress test which was normal. Takes Eliquis. PSA on 20 was undetectable at <0.01. Feels well today and offers no new complaints. \par \par 2021: Patient presents today for Eligard injection. States he has been having difficulty with urination. States he has gained some weight despite exercising by walking one mile four days a week. Admits constipation but not taking any medications for relief. \par \par 2022: Patient presents today for Lupron injection for hx of Ca P. Denies gross hematuria, pushing/straining, urinary urgency. Nocturia 2 times per night.  The patient complains about constipation, but admits he does not drink adequate water, only about a bottle of water per day. His diabetes is managed , his recently HbA1C was 6.6.He complains for bilateral flank pain when he stands up sometimes. He walks on a treadmill 3-4 times per week for exercise. Patient has 3 children. \par \par The patient produced a urine sample which will be sent for urinalysis, urine cytology, and urine culture. \par Blood work today included BMP, CBC, alkaline phosphatase, PSA, and testosterone. \par I recommend the patient take stool softeners, ex: Colace, 2 tablets in the morning and 2 in the afternoon, each dose taken with large glasses of water. I encouraged him to increase his fluid intake to about 4 bottles of water per day.\par Lupron injection given today. Prescription sent for next Lupron dose. \par RTO in 6 months for follow up and Lupron injection. \par \par 08/10/2022:  presents today for a Lupron injection. He recently returned from vacation in [facility] for two weeks. He is well relaxed and looks good. His urination remains good. No longer takes tamsulosin. Nocturia x2. Denies urinary urgency, pushing, straining, gross hematuria, and burning. Does not eat spicy food that often. Denies constipation and chest pains. Has 3 stents. Is on Eliquis 5 mg BID. Pt had covid in May. Was fully vaccinated and boosted. \par \par Lupron 45 mg was administered intramuscularly by my nurse, Fransisca Baez RN. \par \par Reviewed laboratory work of 2022 which was for the most part very good. Creatinine was 1.64. If it creeps up any higher, might consider renal US. Pt and his wife state that they have been decreasing his meat consumption.\par The patient produced a urine sample which will be sent for urinalysis, urine cytology, and urine culture. \par Blood work today included alkaline phosphatase, BMP, dihydrotestosterone, estradiol, estrogen total, LH, PSA, SHBG, and testosterone free and total. I requested that the patient have his blood work from  sent to my office. \par Advised the patient to speak with his PCP about his ankle edema. \par Patient will RTO in 6 months for his next injection. Will have a telehealth visit a few weeks prior and do his blood work at his nearest [facility] lab.Pt is good at using [employer]. \par \par 01/10/2023: Mr. STANLEY presents today for a follow up audio only telehealth visit for which they gave permission for. The patient was located at home [address] [place] [address] and I was located in my office in [place], [place].\par I reviewed lab work of 23 which revealed the creatinine being stable since 2021 and the renal U/S showed no hydronephrosis. Glucose was elevated at 188, BUN was 40, Creatinine was 1.47 and eGFR was 48. The ALK PHOS was normal at 69, PSA was 0.06 and seems to be slowly rising. However te rate of rising is not alarming but I will continue current therapy. The pt A1C was elevated at 7.9% and the eAG was 180. I would advise him to continue to work on improving diabetes management and consult with his PCP and if he ha sone endocrinologist. The UA revealed no UTI, RBC being 0 /HPF and Urine culture showed no growth. Te urine cytology was negative for high grade urothelial carcinoma and the specimen consists mainly of mature squamous epithelial cells and benign urothelial cells. However, glycosuria was present, glucose being 200 and Protein being 30 His testosterone was <2.5 with good testosterone suppression. \par The US renal of 3/6/22 showed normal kidney US, despite androgen deprivation prostate remains large. \par Patient reports today feeling fairly well with no bone pain. He denies straining, pushing, gross hematuria, or dysuria but has minimal urinal frequency and urgency, triggered by stepping into the shower and brushing his teeth. I suggested he urinate first wait a while then proceed to urinate again.\par We will continue with his Eligard 45 mg. \par Clinical findings and Lab results were reviewed at length with the patient.\par Pt will RTO 2023 as he is scheduled for a repeat injection. \par \[facility] 2023: Mr. SHERWIN STANLEY presents today for a Lupron Injection or hx of Ca P.\[facility] Insurance company had the wrong  which has caused some complications. Today we will give him Eligard (luprolide)45 mg instead of Lupron explaining to him they work the same, delivery system is indifferent, but have different names. Hi wife reports urination is well. Nocturia 1-2 x and 3 - 4 x during the day. Pt denies urinary urgency, straining, pushing, gross hematuria, or dysuria.I informed him to tell his doctor that he no longer needs tamsulosin as the pt had prostate cancer, but his urination was fine without it and is still fine. He reports not to have taken any yet. I am not quite sure why his PCP has prescribed this but it  is not necessary at the moment. \par He reports no new pain since last visit. He has some complications but nothing too unbearable. \par He showed me his lab work which revealed some glucose in his urine, where as normally he does not. Back in  his a1C was 6.6 which crept up to 8.3 in 2019 as of January it  was 7.9 but I will like to see it  under 7. I informed him to take his medication to get this under control and to consult his PCP Anthony Vera.\par In addition the pt had a concern about his slight raise in PSA, but I assured him he does not need to worry as I will continue to monitor it. In the future we may have to change his medication but in the meantime we will continue his therapy.\par \par Eligard 45 mg was administered intramuscularly by my nurse, Fransisca Baez RN. \par \par Patient produced a urine sample today which will be sent for urinalysis, urine culture, and urine cytology.\par Blood work today included BMP, CBC, alkaline phosphatase, PSA, and testosterone. \par \par We will continue with his Eligard 45 mg. \par \par RTO in 6 months for follow up and Eligard injection. \par \par Preparation, in person office visit, and coordination of care: 45 mins

## 2023-02-04 LAB
ALP BLD-CCNC: 73 U/L
ANION GAP SERPL CALC-SCNC: 15 MMOL/L
APPEARANCE: CLEAR
BACTERIA: NEGATIVE
BASOPHILS # BLD AUTO: 0.05 K/UL
BASOPHILS NFR BLD AUTO: 0.6 %
BILIRUBIN URINE: NEGATIVE
BLOOD URINE: NEGATIVE
BUN SERPL-MCNC: 47 MG/DL
CALCIUM SERPL-MCNC: 11 MG/DL
CHLORIDE SERPL-SCNC: 101 MMOL/L
CO2 SERPL-SCNC: 23 MMOL/L
COLOR: YELLOW
CREAT SERPL-MCNC: 1.58 MG/DL
EGFR: 44 ML/MIN/1.73M2
EOSINOPHIL # BLD AUTO: 0.18 K/UL
EOSINOPHIL NFR BLD AUTO: 2.1 %
GLUCOSE QUALITATIVE U: NEGATIVE
GLUCOSE SERPL-MCNC: 239 MG/DL
HCT VFR BLD CALC: 39.6 %
HGB BLD-MCNC: 12.2 G/DL
HYALINE CASTS: 4 /LPF
IMM GRANULOCYTES NFR BLD AUTO: 0.2 %
KETONES URINE: NEGATIVE
LEUKOCYTE ESTERASE URINE: NEGATIVE
LYMPHOCYTES # BLD AUTO: 3.39 K/UL
LYMPHOCYTES NFR BLD AUTO: 40.5 %
MAN DIFF?: NORMAL
MCHC RBC-ENTMCNC: 28.2 PG
MCHC RBC-ENTMCNC: 30.8 GM/DL
MCV RBC AUTO: 91.5 FL
MICROSCOPIC-UA: NORMAL
MONOCYTES # BLD AUTO: 0.54 K/UL
MONOCYTES NFR BLD AUTO: 6.4 %
NEUTROPHILS # BLD AUTO: 4.2 K/UL
NEUTROPHILS NFR BLD AUTO: 50.2 %
NITRITE URINE: NEGATIVE
PH URINE: 6
PLATELET # BLD AUTO: 233 K/UL
POTASSIUM SERPL-SCNC: 5.4 MMOL/L
PROTEIN URINE: NORMAL
PSA SERPL-MCNC: 0.07 NG/ML
RBC # BLD: 4.33 M/UL
RBC # FLD: 14.1 %
RED BLOOD CELLS URINE: 2 /HPF
SODIUM SERPL-SCNC: 140 MMOL/L
SPECIFIC GRAVITY URINE: 1.02
SQUAMOUS EPITHELIAL CELLS: 0 /HPF
UROBILINOGEN URINE: NORMAL
WBC # FLD AUTO: 8.38 K/UL
WHITE BLOOD CELLS URINE: 1 /HPF

## 2023-02-05 LAB — BACTERIA UR CULT: NORMAL

## 2023-02-13 LAB
TESTOST FREE SERPL-MCNC: 0.4 PG/ML
TESTOST SERPL-MCNC: <2.5 NG/DL

## 2023-02-16 LAB — URINE CYTOLOGY: NORMAL

## 2023-07-31 ENCOUNTER — NON-APPOINTMENT (OUTPATIENT)
Age: 83
End: 2023-07-31

## 2023-08-04 ENCOUNTER — NON-APPOINTMENT (OUTPATIENT)
Age: 83
End: 2023-08-04

## 2023-08-09 ENCOUNTER — LABORATORY RESULT (OUTPATIENT)
Age: 83
End: 2023-08-09

## 2023-08-09 ENCOUNTER — APPOINTMENT (OUTPATIENT)
Dept: UROLOGY | Facility: CLINIC | Age: 83
End: 2023-08-09
Payer: COMMERCIAL

## 2023-08-09 VITALS
HEIGHT: 64 IN | SYSTOLIC BLOOD PRESSURE: 154 MMHG | HEART RATE: 75 BPM | OXYGEN SATURATION: 96 % | RESPIRATION RATE: 16 BRPM | WEIGHT: 135 LBS | DIASTOLIC BLOOD PRESSURE: 67 MMHG | BODY MASS INDEX: 23.05 KG/M2 | TEMPERATURE: 97.2 F

## 2023-08-09 DIAGNOSIS — R53.83 OTHER MALAISE: ICD-10-CM

## 2023-08-09 DIAGNOSIS — E88.09 OTHER DISORDERS OF PLASMA-PROTEIN METABOLISM, NOT ELSEWHERE CLASSIFIED: ICD-10-CM

## 2023-08-09 DIAGNOSIS — R53.81 OTHER MALAISE: ICD-10-CM

## 2023-08-09 DIAGNOSIS — R35.1 NOCTURIA: ICD-10-CM

## 2023-08-09 PROCEDURE — 96402 CHEMO HORMON ANTINEOPL SQ/IM: CPT

## 2023-08-09 PROCEDURE — 99214 OFFICE O/P EST MOD 30 MIN: CPT | Mod: 25

## 2023-08-11 NOTE — HISTORY OF PRESENT ILLNESS
[FreeTextEntry1] : Mr Stanley is an 83 year old man presented for follow up of prostate cancer. The patient underwent a pelvic lymph node dissection in . Metastatic prostate cancer was found in the lymph node. He was subsequently started androgen deprivation. The patient was maintained on Lupron Depot 45 mg for control of his prostate cancer which remains sensitive to hormonal manipulation. PSA undetectable. Last injection was 17. The patient was feeling good. No pain other than some neck mild neck pain. Urination satisfactory. The patient cited that he has not been taking his blood pressure medication consistently. He often skips his medication at night. The patient also reported occasional chest pain.  18: The patient returned for a Lupron injection. Patient noted he has not been emptying well. He noted he drinks one bottle of water a day. He noted he has on and off diarrhea. He noted his GI physician informed him that he has a high creatinine levels of 1.5 mg/dL. His previous creatinine on 18 was 1.10 mg/dL.  18: The patient returned for a Lupron injection. Noted his urination is satisfactory. Wakes up twice during the night to urinate. Patient denied dysuria, gross hematuria, urgency, or incontinence. PSA from 18 was undetectable. A1c from 18 was 8.0%.  19: The patient returned today for a Lupron injection. Complains that his urination has become uncontrollable since his last visit. PSA from 18 was undetectable. Notes that he was advised to discontinue use of Plavix and to start a trial of Eliquis. Diabetes is under control. 19: Patient presents today for a Lupron injection. Today he states that his urination is satisfactory. Patient denies dysuria, gross hematuria, urgency or incontinence. Nocturia x 2 is reported. He denies any bone pain. During his visit today it was advised by the medical assistant that his blood pressure was elevated.   2/10/20: Patient presents today for a Lupron injection. He states his urination is satisfactory. Patient denies dysuria, hematuria, urgency or incontinence. Pt denies chest pain. He admits to feeling some fatigue and occasional mild right posterior thigh pain which feels okay today. He denies any significant bone pain.   2020: Patient has history of metastatic prostate cancer controlled by androgen deprivation. patient has had some abnormal urine cytologies. Pt is maintained on Tamsulosin 0.4mg one capsule daily. With this, his urination is good. Does not wake excessive at night to urinate. No urgency or frequency. Does not strain to urinate or blood in urine. Hx of diabetes and takes insulin. He is maintained on aspirin 81mg daily and Eliquis 5mg daily. He gets Lupron Depot 45mg once every 6 months. He is here today for next injection. Has no new aches or pains. His appetite remains good. No back or flank pain. His PSA on 2/10/2020 was 0.02.  2021: Patient presents today for Lupron injection. Urination is good. Appetite is good. Wakes 2x at night to urinate. Denies urinary urgency, gross hematuria, dysuria, pushing or straining. Denies hot flashes with hormone treatment. No constipation or chest pain. Had stress test which was normal. Takes Eliquis. PSA on 20 was undetectable at <0.01. Feels well today and offers no new complaints.   2021: Patient presents today for Eligard injection. States he has been having difficulty with urination. States he has gained some weight despite exercising by walking one mile four days a week. Admits constipation but not taking any medications for relief.   2022: Patient presents today for Lupron injection for hx of Ca P. Denies gross hematuria, pushing/straining, urinary urgency. Nocturia 2 times per night.  The patient complains about constipation, but admits he does not drink adequate water, only about a bottle of water per day. His diabetes is managed , his recently HbA1C was 6.6.He complains for bilateral flank pain when he stands up sometimes. He walks on a treadmill 3-4 times per week for exercise. Patient has 3 children and 5 grandchildren.   08/10/2022:  presents today for a Lupron injection. He recently returned from vacation in [facility] for two weeks. He is well relaxed and looks good. His urination remains good. No longer takes tamsulosin. Nocturia x2. Denies urinary urgency, pushing, straining, gross hematuria, and burning. Does not eat spicy food that often. Denies constipation and chest pains. Has 3 stents. Is on Eliquis 5 mg BID. Pt had covid in May. Was fully vaccinated and boosted.   01/10/2023: Mr. STANLEY presents today for a follow up audio only telehealth visit for which they gave permission for. The patient was located at home [address] [place] [address] and I was located in my office in [place], [place]. I reviewed lab work of 23 which revealed the creatinine being stable since 2021 and the renal U/S showed no hydronephrosis. Glucose was elevated at 188, BUN was 40, Creatinine was 1.47 and eGFR was 48. The ALK PHOS was normal at 69, PSA was 0.06 and seems to be slowly rising. However te rate of rising is not alarming but I will continue current therapy. The pt A1C was elevated at 7.9% and the eAG was 180. I would advise him to continue to work on improving diabetes management and consult with his PCP and if he ha sone endocrinologist. The UA revealed no UTI, RBC being 0/HPF and Urine culture showed no growth. The urine cytology was negative for high grade urothelial carcinoma and the specimen consists mainly of mature squamous epithelial cells and benign urothelial cells. However, glycosuria was present, glucose being 200 and Protein being 30 His testosterone was <2.5 with good testosterone suppression.  The US renal of 3/6/22 showed normal kidney US, despite androgen deprivation prostate remains enlarged.  Patient reports today feeling fairly well with no bone pain. He denies straining, pushing, gross hematuria, or dysuria but has minimal urinal frequency and urgency, triggered by stepping into the shower and brushing his teeth. I suggested he urinate first wait a while then proceed to urinate again. We will  continue with his Eligard 45 mg.   2023: Mr. SHERWIN STANLEY presents today for a Lupron Injection or hx of Ca P. Insurance company had the wrong  which has caused some complications. Today we will give him Eligard (luprolide)45 mg instead of Lupron explaining to him they work the same, delivery system is indifferent, but have different names. Hi wife reports urination is well. Nocturia 1-2 x and 3 - 4 x during the day. Pt denies urinary urgency, straining, pushing, gross hematuria, or dysuria.I informed him to tell his doctor that he no longer needs tamsulosin as the pt had prostate cancer, but his urination was fine without it and is still fine. He reports not to have taken any yet. I am not quite sure why his PCP has prescribed this but it  is not necessary at the moment.  He reports no new pain since last visit. He has some complications but nothing too unbearable.  He showed me his lab work which revealed some glucose in his urine, where as normally he does not. Back in  his a1C was 6.6 which crept up to 8.3 in 2019 as of January it  was 7.9 but I will like to see it  under 7. I informed him to take his medication to get this under control and to consult his PCP Mary Anne Vera. In addition the pt had a concern about his slight raise in PSA, but I assured him he does not need to worry as I will continue to monitor it. In the future we may have to change his medication but in the meantime we will continue his therapy.  2023: Mr. SHERWIN STANLEY presents today for an Eligard injection. Accompanied by his wife. Pt denies hot flashes. His wife reports that 1-2 months ago the patient had a rash that was on the skin of his arm and his eyelid. Went to [facility] and was given a cortisone shot. It went away and came back a few weeks later. Went to a dermatologist who did not see anything but provided him with some topical cream. When the rash returned, he went back to [facility] and was given another cortisone shot. Was advised to see an allergist. The pt reports feeling weak and tired often. He is anemic. Is taking some iron supplementation. Denies constipation. Has uncontrolled diabetes. Glucose from lab work ordered by the allergist revealed a glucose of 370. Creatinine was 1.6.

## 2023-08-11 NOTE — ADDENDUM
[FreeTextEntry1] : This note was authored by Deanna Byers working as a scribe for Dr.Gary Reilly. I, Dr. Steve Reilly have reviewed the content of this note and confirm it is true and accurate. I personally performed the history and physical examination and made all the decisions 08/09/2023

## 2023-08-11 NOTE — ASSESSMENT
[FreeTextEntry1] : Mr Edmond is an 83 year old man presented for follow up of prostate cancer. The patient underwent a pelvic lymph node dissection in . Metastatic prostate cancer was found in the lymph node. He was subsequently started androgen deprivation. The patient was maintained on Lupron Depot 45 mg for control of his prostate cancer which remains sensitive to hormonal manipulation. PSA undetectable. Last injection was 17. The patient was feeling good. No pain other than some neck mild neck pain. Urination satisfactory. The patient cited that he has not been taking his blood pressure medication consistently. He often skips his medication at night. The patient also reported occasional chest pain.  18: The patient returned for a Lupron injection. Patient noted he has not been emptying well. He noted he drinks one bottle of water a day. He noted he has on and off diarrhea. He noted his GI physician informed him that he has a high creatinine levels of 1.5 mg/dL. His previous creatinine on 18 was 1.10 mg/dL.  18: The patient returned for a Lupron injection. Noted his urination is satisfactory. Wakes up twice during the night to urinate. Patient denied dysuria, gross hematuria, urgency, or incontinence. PSA from 18 was undetectable. A1c from 18 was 8.0%.  19: The patient returned today for a Lupron injection. Complains that his urination has become uncontrollable since his last visit. PSA from 18 was undetectable. Notes that he was advised to discontinue use of Plavix and to start a trial of Eliquis. Diabetes is under control. 19: Patient presents today for a Lupron injection. Today he states that his urination is satisfactory. Patient denies dysuria, gross hematuria, urgency or incontinence. Nocturia x 2 is reported. He denies any bone pain. During his visit today it was advised by the medical assistant that his blood pressure was elevated.   2/10/20: Patient presents today for a Lupron injection. He states his urination is satisfactory. Patient denies dysuria, hematuria, urgency or incontinence. Pt denies chest pain. He admits to feeling some fatigue and occasional mild right posterior thigh pain which feels okay today. He denies any significant bone pain.   2020: Patient has history of metastatic prostate cancer controlled by androgen deprivation. patient has had some abnormal urine cytologies. Pt is maintained on Tamsulosin 0.4mg one capsule daily. With this, his urination is good. Does not wake excessive at night to urinate. No urgency or frequency. Does not strain to urinate or blood in urine. Hx of diabetes and takes insulin. He is maintained on aspirin 81mg daily and Eliquis 5mg daily. He gets Lupron Depot 45mg once every 6 months. He is here today for next injection. Has no new aches or pains. His appetite remains good. No back or flank pain. His PSA on 2/10/2020 was 0.02.  2021: Patient presents today for Lupron injection. Urination is good. Appetite is good. Wakes 2x at night to urinate. Denies urinary urgency, gross hematuria, dysuria, pushing or straining. Denies hot flashes with hormone treatment. No constipation or chest pain. Had stress test which was normal. Takes Eliquis. PSA on 20 was undetectable at <0.01. Feels well today and offers no new complaints.   2021: Patient presents today for Eligard injection. States he has been having difficulty with urination. States he has gained some weight despite exercising by walking one mile four days a week. Admits constipation but not taking any medications for relief.   2022: Patient presents today for Lupron injection for hx of Ca P. Denies gross hematuria, pushing/straining, urinary urgency. Nocturia 2 times per night.  The patient complains about constipation, but admits he does not drink adequate water, only about a bottle of water per day. His diabetes is managed , his recently HbA1C was 6.6.He complains for bilateral flank pain when he stands up sometimes. He walks on a treadmill 3-4 times per week for exercise. Patient has 3 children.   The patient produced a urine sample which will be sent for urinalysis, urine cytology, and urine culture.  Blood work today included BMP, CBC, alkaline phosphatase, PSA, and testosterone.  I recommend the patient take stool softeners, ex: Colace, 2 tablets in the morning and 2 in the afternoon, each dose taken with large glasses of water. I encouraged him to increase his fluid intake to about 4 bottles of water per day. Lupron injection given today. Prescription sent for next Lupron dose.  RTO in 6 months for follow up and Lupron injection.   08/10/2022:  presents today for a Lupron injection. He recently returned from vacation in [facility] for two weeks. He is well relaxed and looks good. His urination remains good. No longer takes tamsulosin. Nocturia x2. Denies urinary urgency, pushing, straining, gross hematuria, and burning. Does not eat spicy food that often. Denies constipation and chest pains. Has 3 stents. Is on Eliquis 5 mg BID. Pt had covid in May. Was fully vaccinated and boosted.   Lupron 45 mg was administered intramuscularly by my nurse, Fransisca Baez RN.   Reviewed laboratory work of 2022 which was for the most part very good. Creatinine was 1.64. If it creeps up any higher, might consider renal US. Pt and his wife state that they have been decreasing his meat consumption. The patient produced a urine sample which will be sent for urinalysis, urine cytology, and urine culture.  Blood work today included alkaline phosphatase, BMP, dihydrotestosterone, estradiol, estrogen total, LH, PSA, SHBG, and testosterone free and total. I requested that the patient have his blood work from  sent to my office.  Advised the patient to speak with his PCP about his ankle edema.  Patient will RTO in 6 months for his next injection. Will have a telehealth visit a few weeks prior and do his blood work at his nearest [facility] lab.Pt is good at using [employer].   01/10/2023: Mr. EDMOND presents today for a follow up audio only telehealth visit for which they gave permission for. The patient was located at home [address] [place] [address] and I was located in my office in [place], [place]. I reviewed lab work of 23 which revealed the creatinine being stable since 2021 and the renal U/S showed no hydronephrosis. Glucose was elevated at 188, BUN was 40, Creatinine was 1.47 and eGFR was 48. The ALK PHOS was normal at 69, PSA was 0.06 and seems to be slowly rising. However te rate of rising is not alarming but I will continue current therapy. The pt A1C was elevated at 7.9% and the eAG was 180. I would advise him to continue to work on improving diabetes management and consult with his PCP and if he ha sone endocrinologist. The UA revealed no UTI, RBC being 0 /HPF and Urine culture showed no growth. Te urine cytology was negative for high grade urothelial carcinoma and the specimen consists mainly of mature squamous epithelial cells and benign urothelial cells. However, glycosuria was present, glucose being 200 and Protein being 30 His testosterone was <2.5 with good testosterone suppression.  The US renal of 3/6/22 showed normal kidney US, despite androgen deprivation prostate remains large.  Patient reports today feeling fairly well with no bone pain. He denies straining, pushing, gross hematuria, or dysuria but has minimal urinal frequency and urgency, triggered by stepping into the shower and brushing his teeth. I suggested he urinate first wait a while then proceed to urinate again. We will continue with his Eligard 45 mg.  Clinical findings and Lab results were reviewed at length with the patient. Pt will RTO 2023 as he is scheduled for a repeat injection.   2023: Mr. SHERWIN EDMOND presents today for a Lupron Injection or hx of Ca P. Insurance company had the wrong  which has caused some complications. Today we will give him Eligard (luprolide)45 mg instead of Lupron explaining to him they work the same, delivery system is indifferent, but have different names. Hi wife reports urination is well. Nocturia 1-2 x and 3 - 4 x during the day. Pt denies urinary urgency, straining, pushing, gross hematuria, or dysuria.I informed him to tell his doctor that he no longer needs tamsulosin as the pt had prostate cancer, but his urination was fine without it and is still fine. He reports not to have taken any yet. I am not quite sure why his PCP has prescribed this but it  is not necessary at the moment.  He reports no new pain since last visit. He has some complications but nothing too unbearable.  He showed me his lab work which revealed some glucose in his urine, where as normally he does not. Back in  his a1C was 6.6 which crept up to 8.3 in 2019 as of January it  was 7.9 but I will like to see it  under 7. I informed him to take his medication to get this under control and to consult his PCP Anthony Vera. In addition the pt had a concern about his slight raise in PSA, but I assured him he does not need to worry as I will continue to monitor it. In the future we may have to change his medication but in the meantime we will continue his therapy.  Eligard 45 mg was administered intramuscularly by my nurse, Fransisca Baez RN.  Patient produced a urine sample today which will be sent for urinalysis, urine culture, and urine cytology. Blood work today included BMP, CBC, alkaline phosphatase, PSA, and testosterone.  We will continue with his Eligard 45 mg.  RTO in 6 months for follow up and Eligard injection.   2023: Mr. SHERWIN EDMOND presents today for an Eligard injection. Accompanied by his wife. Pt denies hot flashes. His wife reports that 1-2 months ago the patient had a rash that was on the skin of his arm and his eyelid. Went to [facility] and was given a cortisone shot. It went away and came back a few weeks later. Went to a dermatologist who did not see anything but provided him with some topical cream. When the rash returned, he went back to [facility] and was given another cortisone shot. Was advised to see an allergist. The pt reports feeling weak and tired often. He is anemic. Is taking some iron supplementation. Denies constipation. Has uncontrolled diabetes. Glucose from lab work ordered by the allergist revealed a glucose of 370. Creatinine was 1.6.   Eligard 45 mg was administered subcutaneously by me, Dr.Gary Reilly.   Reviewed and discussed lab work of 2023 ordered by his allergist. Counseled the pt on diabetes management. Pt strongly advised to see his PCP and/or endocrinologist to discuss further.   The patient produced a urine sample which will be sent for urinalysis, urine cytology, and urine culture.   Blood work today included BMP, alkaline phosphatase, serum protein electrophoresis, PSA, and testosterone.   Patient will RTO in 6 months for next Eligard injection, sooner if clinically indicated.   Preparation, in person office visit, and coordination of care: 30 minutes

## 2023-08-11 NOTE — REVIEW OF SYSTEMS
[Feeling Tired] : feeling tired [Nocturia] : nocturia [Negative] : Heme/Lymph [Chest Pain] : no chest pain [Constipation] : no constipation [Burning Sensation] : no burning sensation during urination [Initiating Urination Req. Strain] : initiating urination does not require straining [Hot Flashes] : no hot flashes

## 2023-08-31 LAB
24R-OH-CALCIDIOL SERPL-MCNC: 56.3 PG/ML
ALBUMIN MFR SERPL ELPH: 52.7 %
ALBUMIN SERPL-MCNC: 3.7 G/DL
ALBUMIN/GLOB SERPL: 1.1 RATIO
ALPHA1 GLOB MFR SERPL ELPH: 3.9 %
ALPHA1 GLOB SERPL ELPH-MCNC: 0.3 G/DL
ALPHA2 GLOB MFR SERPL ELPH: 13.3 %
ALPHA2 GLOB SERPL ELPH-MCNC: 0.9 G/DL
APPEARANCE: CLEAR
B-GLOBULIN MFR SERPL ELPH: 12 %
B-GLOBULIN SERPL ELPH-MCNC: 0.9 G/DL
BACTERIA UR CULT: NORMAL
BACTERIA: NEGATIVE /HPF
BILIRUBIN URINE: NEGATIVE
BLOOD URINE: NEGATIVE
CAST: 5 /LPF
COLOR: NORMAL
EPITHELIAL CELLS: 0 /HPF
ESTIMATED AVERAGE GLUCOSE: 186 MG/DL
GAMMA GLOB FLD ELPH-MCNC: 1.3 G/DL
GAMMA GLOB MFR SERPL ELPH: 18.1 %
GLUCOSE QUALITATIVE U: NEGATIVE MG/DL
HBA1C MFR BLD HPLC: 8.1 %
INTERPRETATION SERPL IEP-IMP: NORMAL
KETONES URINE: ABNORMAL MG/DL
LEUKOCYTE ESTERASE URINE: ABNORMAL
M PROTEIN MFR SERPL ELPH: 11.7 %
MICROSCOPIC-UA: NORMAL
MONOCLON BAND OBS SERPL: 0.8 G/DL
NITRITE URINE: NEGATIVE
PH URINE: 5.5
PROT SERPL-MCNC: 7.1 G/DL
PROT SERPL-MCNC: 7.1 G/DL
PROTEIN URINE: NORMAL MG/DL
PSA SERPL-MCNC: 0.06 NG/ML
RED BLOOD CELLS URINE: 0 /HPF
SPECIFIC GRAVITY URINE: 1.02
TESTOST FREE SERPL-MCNC: 0.3 PG/ML
TESTOST SERPL-MCNC: <2.5 NG/DL
URINE CYTOLOGY: NORMAL
UROBILINOGEN URINE: 0.2 MG/DL
WHITE BLOOD CELLS URINE: 0 /HPF

## 2023-09-03 LAB
25(OH)D3 SERPL-MCNC: 97.5 NG/ML
ALBUMIN SERPL ELPH-MCNC: 4.1 G/DL
ANION GAP SERPL CALC-SCNC: 14 MMOL/L
BUN SERPL-MCNC: 61 MG/DL
CALCIUM SERPL-MCNC: 10.1 MG/DL
CHLORIDE SERPL-SCNC: 103 MMOL/L
CO2 SERPL-SCNC: 20 MMOL/L
CREAT SERPL-MCNC: 1.76 MG/DL
CYSTATIN C SERPL-MCNC: 1.93 MG/L
EGFR: 38 ML/MIN/1.73M2
FT4I SERPL CALC-MCNC: 6.9 INDEX
GFR/BSA.PRED SERPLBLD CYS-BASED-ARV: 30 ML/MIN/1.73M2
GLUCOSE SERPL-MCNC: 163 MG/DL
PHOSPHATE SERPL-MCNC: 3.8 MG/DL
POTASSIUM SERPL-SCNC: 4.8 MMOL/L
SODIUM SERPL-SCNC: 138 MMOL/L
T3FREE SERPL-MCNC: 2.2 PG/ML
T3RU NFR SERPL: 0.9 TBI
T4 FREE SERPL-MCNC: 1.3 NG/DL
T4 SERPL-MCNC: 6.5 UG/DL
TSH SERPL-ACNC: 1 UIU/ML

## 2023-09-10 ENCOUNTER — OUTPATIENT (OUTPATIENT)
Dept: OUTPATIENT SERVICES | Facility: HOSPITAL | Age: 83
LOS: 1 days | End: 2023-09-10
Payer: COMMERCIAL

## 2023-09-10 ENCOUNTER — APPOINTMENT (OUTPATIENT)
Dept: ULTRASOUND IMAGING | Facility: IMAGING CENTER | Age: 83
End: 2023-09-10
Payer: COMMERCIAL

## 2023-09-10 DIAGNOSIS — Z00.8 ENCOUNTER FOR OTHER GENERAL EXAMINATION: ICD-10-CM

## 2023-09-10 DIAGNOSIS — R82.89 OTHER ABNORMAL FINDINGS ON CYTOLOGICAL AND HISTOLOGICAL EXAMINATION OF URINE: ICD-10-CM

## 2023-09-10 PROCEDURE — 76770 US EXAM ABDO BACK WALL COMP: CPT | Mod: 26

## 2023-09-10 PROCEDURE — 76770 US EXAM ABDO BACK WALL COMP: CPT

## 2024-02-11 DIAGNOSIS — R81 GLYCOSURIA: ICD-10-CM

## 2024-02-11 DIAGNOSIS — N28.9 DISORDER OF KIDNEY AND URETER, UNSPECIFIED: ICD-10-CM

## 2024-02-11 DIAGNOSIS — R31.29 OTHER MICROSCOPIC HEMATURIA: ICD-10-CM

## 2024-02-11 RX ORDER — LEUPROLIDE ACETATE 45 MG
45 KIT INTRAMUSCULAR
Qty: 1 | Refills: 3 | Status: COMPLETED | COMMUNITY
Start: 2022-05-04 | End: 2024-02-11

## 2024-02-11 RX ORDER — LEUPROLIDE ACETATE 45 MG/.375ML
45 INJECTION, SUSPENSION, EXTENDED RELEASE SUBCUTANEOUS
Qty: 1 | Refills: 0 | Status: COMPLETED | OUTPATIENT
Start: 2023-02-03 | End: 2024-02-11

## 2024-02-11 RX ORDER — LEUPROLIDE ACETATE 30 MG/.5ML
30 INJECTION, SUSPENSION, EXTENDED RELEASE SUBCUTANEOUS
Qty: 1 | Refills: 0 | Status: COMPLETED | COMMUNITY
Start: 2023-01-29 | End: 2024-02-11

## 2024-02-11 RX ORDER — LEUPROLIDE ACETATE 45 MG/.375ML
45 INJECTION, SUSPENSION, EXTENDED RELEASE SUBCUTANEOUS
Qty: 1 | Refills: 0 | Status: ACTIVE | OUTPATIENT
Start: 2021-08-05

## 2024-02-11 RX ORDER — LEUPROLIDE ACETATE 45 MG
45 KIT INTRAMUSCULAR
Qty: 1 | Refills: 1 | Status: COMPLETED | COMMUNITY
Start: 2022-08-10 | End: 2024-02-11

## 2024-02-11 RX ORDER — LEUPROLIDE ACETATE 45 MG/.375ML
45 INJECTION, SUSPENSION, EXTENDED RELEASE SUBCUTANEOUS
Qty: 1 | Refills: 0 | Status: COMPLETED | COMMUNITY
Start: 2022-08-10 | End: 2024-02-11

## 2024-02-14 ENCOUNTER — APPOINTMENT (OUTPATIENT)
Dept: UROLOGY | Facility: CLINIC | Age: 84
End: 2024-02-14
Payer: COMMERCIAL

## 2024-02-14 VITALS
OXYGEN SATURATION: 99 % | BODY MASS INDEX: 23.05 KG/M2 | TEMPERATURE: 97.6 F | HEART RATE: 80 BPM | SYSTOLIC BLOOD PRESSURE: 136 MMHG | HEIGHT: 64 IN | RESPIRATION RATE: 16 BRPM | DIASTOLIC BLOOD PRESSURE: 78 MMHG | WEIGHT: 135 LBS

## 2024-02-14 DIAGNOSIS — Z95.5 PRESENCE OF CORONARY ANGIOPLASTY IMPLANT AND GRAFT: ICD-10-CM

## 2024-02-14 DIAGNOSIS — K59.00 CONSTIPATION, UNSPECIFIED: ICD-10-CM

## 2024-02-14 DIAGNOSIS — R82.89 OTHER ABNRM FNDNGS ON CYTOLOGICAL: ICD-10-CM

## 2024-02-14 DIAGNOSIS — E55.9 VITAMIN D DEFICIENCY, UNSPECIFIED: ICD-10-CM

## 2024-02-14 PROCEDURE — 99215 OFFICE O/P EST HI 40 MIN: CPT | Mod: 25

## 2024-02-14 PROCEDURE — 96402 CHEMO HORMON ANTINEOPL SQ/IM: CPT

## 2024-02-14 RX ORDER — LEUPROLIDE ACETATE 45 MG
45 KIT INTRAMUSCULAR
Qty: 1 | Refills: 0 | Status: ACTIVE | OUTPATIENT
Start: 2024-02-14

## 2024-02-14 RX ORDER — LEUPROLIDE ACETATE 45 MG
45 KIT INTRAMUSCULAR
Qty: 1 | Refills: 0 | Status: COMPLETED | OUTPATIENT
Start: 2024-02-14

## 2024-02-14 RX ADMIN — LEUPROLIDE ACETATE 1 MG: KIT at 00:00

## 2024-02-18 PROBLEM — Z95.5 CORONARY STENT PATENT: Status: ACTIVE | Noted: 2022-08-10

## 2024-02-18 PROBLEM — R82.89 ABNORMAL URINE CYTOLOGY: Status: ACTIVE | Noted: 2019-03-15

## 2024-02-18 LAB
24R-OH-CALCIDIOL SERPL-MCNC: 86.3 PG/ML
25(OH)D3 SERPL-MCNC: 101 NG/ML
ALBUMIN SERPL ELPH-MCNC: 4.3 G/DL
ALP BLD-CCNC: 65 U/L
ANION GAP SERPL CALC-SCNC: 15 MMOL/L
APPEARANCE: CLEAR
BACTERIA UR CULT: NORMAL
BACTERIA: NEGATIVE /HPF
BASOPHILS # BLD AUTO: 0.04 K/UL
BASOPHILS NFR BLD AUTO: 0.5 %
BILIRUBIN URINE: NEGATIVE
BLOOD URINE: NEGATIVE
BUN SERPL-MCNC: 49 MG/DL
CALCIUM SERPL-MCNC: 10.3 MG/DL
CAST: 0 /LPF
CHLORIDE SERPL-SCNC: 104 MMOL/L
CO2 SERPL-SCNC: 22 MMOL/L
COLOR: YELLOW
CREAT SERPL-MCNC: 1.52 MG/DL
CYSTATIN C SERPL-MCNC: 1.99 MG/L
EGFR: 46 ML/MIN/1.73M2
EOSINOPHIL # BLD AUTO: 0.31 K/UL
EOSINOPHIL NFR BLD AUTO: 3.9 %
EPITHELIAL CELLS: 0 /HPF
ESTIMATED AVERAGE GLUCOSE: 151 MG/DL
GFR/BSA.PRED SERPLBLD CYS-BASED-ARV: 29 ML/MIN/1.73M2
GLUCOSE QUALITATIVE U: NEGATIVE MG/DL
GLUCOSE SERPL-MCNC: 138 MG/DL
HBA1C MFR BLD HPLC: 6.9 %
HCT VFR BLD CALC: 41.4 %
HGB BLD-MCNC: 12.7 G/DL
IMM GRANULOCYTES NFR BLD AUTO: 0.3 %
KETONES URINE: NEGATIVE MG/DL
LEUKOCYTE ESTERASE URINE: NEGATIVE
LYMPHOCYTES # BLD AUTO: 3.39 K/UL
LYMPHOCYTES NFR BLD AUTO: 43 %
MAN DIFF?: NORMAL
MCHC RBC-ENTMCNC: 27 PG
MCHC RBC-ENTMCNC: 30.7 GM/DL
MCV RBC AUTO: 87.9 FL
MICROSCOPIC-UA: NORMAL
MONOCYTES # BLD AUTO: 0.53 K/UL
MONOCYTES NFR BLD AUTO: 6.7 %
NEUTROPHILS # BLD AUTO: 3.59 K/UL
NEUTROPHILS NFR BLD AUTO: 45.6 %
NITRITE URINE: NEGATIVE
PH URINE: 6.5
PHOSPHATE SERPL-MCNC: 3.5 MG/DL
PLATELET # BLD AUTO: 235 K/UL
POTASSIUM SERPL-SCNC: 4.9 MMOL/L
PROTEIN URINE: NEGATIVE MG/DL
PSA SERPL-MCNC: 0.08 NG/ML
RBC # BLD: 4.71 M/UL
RBC # FLD: 15.3 %
RED BLOOD CELLS URINE: 0 /HPF
SODIUM SERPL-SCNC: 141 MMOL/L
SPECIFIC GRAVITY URINE: 1.01
TESTOST SERPL-MCNC: <2.5 NG/DL
URINE CYTOLOGY: NORMAL
UROBILINOGEN URINE: 0.2 MG/DL
WBC # FLD AUTO: 7.88 K/UL
WHITE BLOOD CELLS URINE: 0 /HPF

## 2024-02-18 NOTE — ASSESSMENT
[FreeTextEntry1] : Mr Edmond is an 83 year old man presented for follow up of prostate cancer. The patient underwent a pelvic lymph node dissection in . Metastatic prostate cancer was found in the lymph node. He was subsequently started androgen deprivation. The patient was maintained on Lupron Depot 45 mg for control of his prostate cancer which remains sensitive to hormonal manipulation. PSA undetectable. Last injection was 17. The patient was feeling good. No pain other than some neck mild neck pain. Urination satisfactory. The patient cited that he has not been taking his blood pressure medication consistently. He often skips his medication at night. The patient also reported occasional chest pain.  18: The patient returned for a Lupron injection. Patient noted he has not been emptying well. He noted he drinks one bottle of water a day. He noted he has on and off diarrhea. He noted his GI physician informed him that he has a high creatinine levels of 1.5 mg/dL. His previous creatinine on 18 was 1.10 mg/dL.  18: The patient returned for a Lupron injection. Noted his urination is satisfactory. Wakes up twice during the night to urinate. Patient denied dysuria, gross hematuria, urgency, or incontinence. PSA from 18 was undetectable. A1c from 18 was 8.0%.  19: The patient returned today for a Lupron injection. Complains that his urination has become uncontrollable since his last visit. PSA from 18 was undetectable. Notes that he was advised to discontinue use of Plavix and to start a trial of Eliquis. Diabetes is under control. 19: Patient presents today for a Lupron injection. Today he states that his urination is satisfactory. Patient denies dysuria, gross hematuria, urgency or incontinence. Nocturia x 2 is reported. He denies any bone pain. During his visit today it was advised by the medical assistant that his blood pressure was elevated.   2/10/20: Patient presents today for a Lupron injection. He states his urination is satisfactory. Patient denies dysuria, hematuria, urgency or incontinence. Pt denies chest pain. He admits to feeling some fatigue and occasional mild right posterior thigh pain which feels okay today. He denies any significant bone pain.   2020: Patient has history of metastatic prostate cancer controlled by androgen deprivation. patient has had some abnormal urine cytologies. Pt is maintained on Tamsulosin 0.4mg one capsule daily. With this, his urination is good. Does not wake excessive at night to urinate. No urgency or frequency. Does not strain to urinate or blood in urine. Hx of diabetes and takes insulin. He is maintained on aspirin 81mg daily and Eliquis 5mg daily. He gets Lupron Depot 45mg once every 6 months. He is here today for next injection. Has no new aches or pains. His appetite remains good. No back or flank pain. His PSA on 2/10/2020 was 0.02.  2021: Patient presents today for Lupron injection. Urination is good. Appetite is good. Wakes 2x at night to urinate. Denies urinary urgency, gross hematuria, dysuria, pushing or straining. Denies hot flashes with hormone treatment. No constipation or chest pain. Had stress test which was normal. Takes Eliquis. PSA on 20 was undetectable at <0.01. Feels well today and offers no new complaints.   2021: Patient presents today for Eligard injection. States he has been having difficulty with urination. States he has gained some weight despite exercising by walking one mile four days a week. Admits constipation but not taking any medications for relief.   2022: Patient presents today for Lupron injection for hx of Ca P. Denies gross hematuria, pushing/straining, urinary urgency. Nocturia 2 times per night.  The patient complains about constipation, but admits he does not drink adequate water, only about a bottle of water per day. His diabetes is managed , his recently HbA1C was 6.6.He complains for bilateral flank pain when he stands up sometimes. He walks on a treadmill 3-4 times per week for exercise. Patient has 3 children.   The patient produced a urine sample which will be sent for urinalysis, urine cytology, and urine culture.  Blood work today included BMP, CBC, alkaline phosphatase, PSA, and testosterone.  I recommend the patient take stool softeners, ex: Colace, 2 tablets in the morning and 2 in the afternoon, each dose taken with large glasses of water. I encouraged him to increase his fluid intake to about 4 bottles of water per day. Lupron injection given today. Prescription sent for next Lupron dose.  RTO in 6 months for follow up and Lupron injection.   08/10/2022:  presents today for a Lupron injection. He recently returned from vacation in [facility] for two weeks. He is well relaxed and looks good. His urination remains good. No longer takes tamsulosin. Nocturia x2. Denies urinary urgency, pushing, straining, gross hematuria, and burning. Does not eat spicy food that often. Denies constipation and chest pains. Has 3 stents. Is on Eliquis 5 mg BID. Pt had covid in May. Was fully vaccinated and boosted.   Lupron 45 mg was administered intramuscularly by my nurse, Fransisca Baez RN.   Reviewed laboratory work of 2022 which was for the most part very good. Creatinine was 1.64. If it creeps up any higher, might consider renal US. Pt and his wife state that they have been decreasing his meat consumption. The patient produced a urine sample which will be sent for urinalysis, urine cytology, and urine culture.  Blood work today included alkaline phosphatase, BMP, dihydrotestosterone, estradiol, estrogen total, LH, PSA, SHBG, and testosterone free and total. I requested that the patient have his blood work from  sent to my office.  Advised the patient to speak with his PCP about his ankle edema.  Patient will RTO in 6 months for his next injection. Will have a telehealth visit a few weeks prior and do his blood work at his nearest [facility] lab.Pt is good at using [employer].   01/10/2023: Mr. EDMOND presents today for a follow up audio only telehealth visit for which they gave permission for. The patient was located at home [address] [place] [address] and I was located in my office in [place], [place]. I reviewed lab work of 23 which revealed the creatinine being stable since 2021 and the renal U/S showed no hydronephrosis. Glucose was elevated at 188, BUN was 40, Creatinine was 1.47 and eGFR was 48. The ALK PHOS was normal at 69, PSA was 0.06 and seems to be slowly rising. However te rate of rising is not alarming but I will continue current therapy. The pt A1C was elevated at 7.9% and the eAG was 180. I would advise him to continue to work on improving diabetes management and consult with his PCP and if he ha sone endocrinologist. The UA revealed no UTI, RBC being 0 /HPF and Urine culture showed no growth. Te urine cytology was negative for high grade urothelial carcinoma and the specimen consists mainly of mature squamous epithelial cells and benign urothelial cells. However, glycosuria was present, glucose being 200 and Protein being 30 His testosterone was <2.5 with good testosterone suppression.  The US renal of 3/6/22 showed normal kidney US, despite androgen deprivation prostate remains large.  Patient reports today feeling fairly well with no bone pain. He denies straining, pushing, gross hematuria, or dysuria but has minimal urinal frequency and urgency, triggered by stepping into the shower and brushing his teeth. I suggested he urinate first wait a while then proceed to urinate again. We will continue with his Eligard 45 mg.  Clinical findings and Lab results were reviewed at length with the patient. Pt will RTO 2023 as he is scheduled for a repeat injection.   2023: Mr. SHERWIN EDMOND presents today for a Lupron Injection or hx of Ca P. Insurance company had the wrong  which has caused some complications. Today we will give him Eligard (luprolide)45 mg instead of Lupron explaining to him they work the same, delivery system is indifferent, but have different names. Hi wife reports urination is well. Nocturia 1-2 x and 3 - 4 x during the day. Pt denies urinary urgency, straining, pushing, gross hematuria, or dysuria.I informed him to tell his doctor that he no longer needs tamsulosin as the pt had prostate cancer, but his urination was fine without it and is still fine. He reports not to have taken any yet. I am not quite sure why his PCP has prescribed this but it  is not necessary at the moment.  He reports no new pain since last visit. He has some complications but nothing too unbearable.  He showed me his lab work which revealed some glucose in his urine, where as normally he does not. Back in  his a1C was 6.6 which crept up to 8.3 in 2019 as of January it  was 7.9 but I will like to see it  under 7. I informed him to take his medication to get this under control and to consult his PCP Anthony Vera. In addition the pt had a concern about his slight raise in PSA, but I assured him he does not need to worry as I will continue to monitor it. In the future we may have to change his medication but in the meantime we will continue his therapy.  Eligard 45 mg was administered intramuscularly by my nurse, Fransisca Baez RN.  Patient produced a urine sample today which will be sent for urinalysis, urine culture, and urine cytology. Blood work today included BMP, CBC, alkaline phosphatase, PSA, and testosterone.  We will continue with his Eligard 45 mg.  RTO in 6 months for follow up and Eligard injection.   2023: Mr. SHERWIN EDMOND presents today for an Eligard injection. Accompanied by his wife. Pt denies hot flashes. His wife reports that 1-2 months ago the patient had a rash that was on the skin of his arm and his eyelid. Went to [facility] and was given a cortisone shot. It went away and came back a few weeks later. Went to a dermatologist who did not see anything but provided him with some topical cream. When the rash returned, he went back to [facility] and was given another cortisone shot. Was advised to see an allergist. The pt reports feeling weak and tired often. He is anemic. Is taking some iron supplementation. Denies constipation. Has uncontrolled diabetes. Glucose from lab work ordered by the allergist revealed a glucose of 370. Creatinine was 1.6.   Eligard 45 mg was administered subcutaneously by me, Dr.Gary Reilly.  Reviewed and discussed lab work of 2023 ordered by his allergist. Counseled the pt on diabetes management. Pt strongly advised to see his PCP and/or endocrinologist to discuss further.  The patient produced a urine sample which will be sent for urinalysis, urine cytology, and urine culture.  Blood work today included BMP, alkaline phosphatase, serum protein electrophoresis, PSA, and testosterone.  Patient will RTO in 6 months for next Eligard injection, sooner if clinically indicated.   2024: Mr. SHERWIN EDMOND presents today for an Eligard injection. He is accompanied by his wife. He reports that his urination is fairly good. Wakes up at night due to worrying about other things but not because of his urination. His wife feels he needs some medication for depression. No new aches or pains except the knees.    Eligard 45 mg was administered subcutaneously by my nurse, Fransisca Baez RN.   The patient produced a urine sample which will be sent for urinalysis, urine cytology, and urine culture.   I offered to recommend a psychologist for the patient to discuss his worries and stresses. Deya Craven's information was provided to the patient's wife.   I provided him with the name and contact information for Dr.Daniel Rodriguez of nephrology.   Patient will RTO in 6 months for next Eligard injection, sooner if clinically indicated.    Preparation, in person office visit, and coordination of care took 40 minutes.

## 2024-02-18 NOTE — HISTORY OF PRESENT ILLNESS
[FreeTextEntry1] : Mr Stanley is an 83 year old man presented for follow up of prostate cancer. The patient underwent a pelvic lymph node dissection in . Metastatic prostate cancer was found in the lymph node. He was subsequently started androgen deprivation. The patient was maintained on Lupron Depot 45 mg for control of his prostate cancer which remains sensitive to hormonal manipulation. PSA undetectable. Last injection was 17. The patient was feeling good. No pain other than some neck mild neck pain. Urination satisfactory. The patient cited that he has not been taking his blood pressure medication consistently. He often skips his medication at night. The patient also reported occasional chest pain.  18: The patient returned for a Lupron injection. Patient noted he has not been emptying well. He noted he drinks one bottle of water a day. He noted he has on and off diarrhea. He noted his GI physician informed him that he has a high creatinine levels of 1.5 mg/dL. His previous creatinine on 18 was 1.10 mg/dL.  18: The patient returned for a Lupron injection. Noted his urination is satisfactory. Wakes up twice during the night to urinate. Patient denied dysuria, gross hematuria, urgency, or incontinence. PSA from 18 was undetectable. A1c from 18 was 8.0%.  19: The patient returned today for a Lupron injection. Complains that his urination has become uncontrollable since his last visit. PSA from 18 was undetectable. Notes that he was advised to discontinue use of Plavix and to start a trial of Eliquis. Diabetes is under control. 19: Patient presents today for a Lupron injection. Today he states that his urination is satisfactory. Patient denies dysuria, gross hematuria, urgency or incontinence. Nocturia x 2 is reported. He denies any bone pain. During his visit today it was advised by the medical assistant that his blood pressure was elevated.   2/10/20: Patient presents today for a Lupron injection. He states his urination is satisfactory. Patient denies dysuria, hematuria, urgency or incontinence. Pt denies chest pain. He admits to feeling some fatigue and occasional mild right posterior thigh pain which feels okay today. He denies any significant bone pain.   2020: Patient has history of metastatic prostate cancer controlled by androgen deprivation. patient has had some abnormal urine cytologies. Pt is maintained on Tamsulosin 0.4mg one capsule daily. With this, his urination is good. Does not wake excessive at night to urinate. No urgency or frequency. Does not strain to urinate or blood in urine. Hx of diabetes and takes insulin. He is maintained on aspirin 81mg daily and Eliquis 5mg daily. He gets Lupron Depot 45mg once every 6 months. He is here today for next injection. Has no new aches or pains. His appetite remains good. No back or flank pain. His PSA on 2/10/2020 was 0.02.  2021: Patient presents today for Lupron injection. Urination is good. Appetite is good. Wakes 2x at night to urinate. Denies urinary urgency, gross hematuria, dysuria, pushing or straining. Denies hot flashes with hormone treatment. No constipation or chest pain. Had stress test which was normal. Takes Eliquis. PSA on 20 was undetectable at <0.01. Feels well today and offers no new complaints.   2021: Patient presents today for Eligard injection. States he has been having difficulty with urination. States he has gained some weight despite exercising by walking one mile four days a week. Admits constipation but not taking any medications for relief.   2022: Patient presents today for Lupron injection for hx of Ca P. Denies gross hematuria, pushing/straining, urinary urgency. Nocturia 2 times per night.  The patient complains about constipation, but admits he does not drink adequate water, only about a bottle of water per day. His diabetes is managed , his recently HbA1C was 6.6.He complains for bilateral flank pain when he stands up sometimes. He walks on a treadmill 3-4 times per week for exercise. Patient has 3 children and 5 grandchildren.   08/10/2022:  presents today for a Lupron injection. He recently returned from vacation in [facility] for two weeks. He is well relaxed and looks good. His urination remains good. No longer takes tamsulosin. Nocturia x2. Denies urinary urgency, pushing, straining, gross hematuria, and burning. Does not eat spicy food that often. Denies constipation and chest pains. Has 3 stents. Is on Eliquis 5 mg BID. Pt had covid in May. Was fully vaccinated and boosted.   01/10/2023: Mr. STANLEY presents today for a follow up audio only telehealth visit for which they gave permission for. The patient was located at home [address] [place] [address] and I was located in my office in [place], [place]. I reviewed lab work of 23 which revealed the creatinine being stable since 2021 and the renal U/S showed no hydronephrosis. Glucose was elevated at 188, BUN was 40, Creatinine was 1.47 and eGFR was 48. The ALK PHOS was normal at 69, PSA was 0.06 and seems to be slowly rising. However te rate of rising is not alarming but I will continue current therapy. The pt A1C was elevated at 7.9% and the eAG was 180. I would advise him to continue to work on improving diabetes management and consult with his PCP and if he ha sone endocrinologist. The UA revealed no UTI, RBC being 0/HPF and Urine culture showed no growth. The urine cytology was negative for high grade urothelial carcinoma and the specimen consists mainly of mature squamous epithelial cells and benign urothelial cells. However, glycosuria was present, glucose being 200 and Protein being 30 His testosterone was <2.5 with good testosterone suppression.  The US renal of 3/6/22 showed normal kidney US, despite androgen deprivation prostate remains enlarged.  Patient reports today feeling fairly well with no bone pain. He denies straining, pushing, gross hematuria, or dysuria but has minimal urinal frequency and urgency, triggered by stepping into the shower and brushing his teeth. I suggested he urinate first wait a while then proceed to urinate again. We will  continue with his Eligard 45 mg.   2023: Mr. SHERWIN STANLEY presents today for a Lupron Injection or hx of Ca P. Insurance company had the wrong  which has caused some complications. Today we will give him Eligard (luprolide)45 mg instead of Lupron explaining to him they work the same, delivery system is indifferent, but have different names. Hi wife reports urination is well. Nocturia 1-2 x and 3 - 4 x during the day. Pt denies urinary urgency, straining, pushing, gross hematuria, or dysuria.I informed him to tell his doctor that he no longer needs tamsulosin as the pt had prostate cancer, but his urination was fine without it and is still fine. He reports not to have taken any yet. I am not quite sure why his PCP has prescribed this but it  is not necessary at the moment.  He reports no new pain since last visit. He has some complications but nothing too unbearable.  He showed me his lab work which revealed some glucose in his urine, where as normally he does not. Back in  his a1C was 6.6 which crept up to 8.3 in 2019 as of January it  was 7.9 but I will like to see it  under 7. I informed him to take his medication to get this under control and to consult his PCP Mary Anne Vera. In addition the pt had a concern about his slight raise in PSA, but I assured him he does not need to worry as I will continue to monitor it. In the future we may have to change his medication but in the meantime we will continue his therapy.  2023: Mr. SHERWIN STANLEY presents today for an Eligard injection. Accompanied by his wife. Pt denies hot flashes. His wife reports that 1-2 months ago the patient had a rash that was on the skin of his arm and his eyelid. Went to [facility] and was given a cortisone shot. It went away and came back a few weeks later. Went to a dermatologist who did not see anything but provided him with some topical cream. When the rash returned, he went back to [facility] and was given another cortisone shot. Was advised to see an allergist. The pt reports feeling weak and tired often. He is anemic. Is taking some iron supplementation. Denies constipation. Has uncontrolled diabetes. Glucose from lab work ordered by the allergist revealed a glucose of 370. Creatinine was 1.6.   2024: Mr. SHERWIN STANLEY presents today for an Eligard injection. He is accompanied by his wife. He reports that his urination is fairly good. Wakes up at night due to worrying about other things but not because of his urination. His wife feels he needs some medication for depression. No new aches or pains except the knees.

## 2024-02-18 NOTE — ADDENDUM
[FreeTextEntry1] : This note was authored by Deanna Byers working as a scribe for Dr.Gary Reilly. I, Dr. Steve Reilly have reviewed the content of this note and confirm it is true and accurate. I personally performed the history and physical examination and made all the decisions 02/14/2024

## 2024-02-26 ENCOUNTER — APPOINTMENT (OUTPATIENT)
Dept: UROLOGY | Facility: CLINIC | Age: 84
End: 2024-02-26
Payer: COMMERCIAL

## 2024-02-26 DIAGNOSIS — R31.0 GROSS HEMATURIA: ICD-10-CM

## 2024-02-26 DIAGNOSIS — C61 MALIGNANT NEOPLASM OF PROSTATE: ICD-10-CM

## 2024-02-26 DIAGNOSIS — N40.1 BENIGN PROSTATIC HYPERPLASIA WITH LOWER URINARY TRACT SYMPMS: ICD-10-CM

## 2024-02-26 DIAGNOSIS — R79.89 OTHER SPECIFIED ABNORMAL FINDINGS OF BLOOD CHEMISTRY: ICD-10-CM

## 2024-02-26 DIAGNOSIS — N13.8 BENIGN PROSTATIC HYPERPLASIA WITH LOWER URINARY TRACT SYMPMS: ICD-10-CM

## 2024-02-26 DIAGNOSIS — E11.9 TYPE 2 DIABETES MELLITUS W/OUT COMPLICATIONS: ICD-10-CM

## 2024-02-26 DIAGNOSIS — E87.5 HYPERKALEMIA: ICD-10-CM

## 2024-02-26 PROCEDURE — 99443: CPT

## 2024-02-27 PROBLEM — N40.1 BENIGN LOCALIZED HYPERPLASIA OF PROSTATE WITH URINARY OBSTRUCTION: Status: ACTIVE | Noted: 2019-02-26

## 2024-02-27 NOTE — ASSESSMENT
[FreeTextEntry1] : Mr Edmond is an 83 year old man presented for follow up of prostate cancer. The patient underwent a pelvic lymph node dissection in . Metastatic prostate cancer was found in the lymph node. He was subsequently started androgen deprivation. The patient was maintained on Lupron Depot 45 mg for control of his prostate cancer which remains sensitive to hormonal manipulation. PSA undetectable. Last injection was 17. The patient was feeling good. No pain other than some neck mild neck pain. Urination satisfactory. The patient cited that he has not been taking his blood pressure medication consistently. He often skips his medication at night. The patient also reported occasional chest pain.  18: The patient returned for a Lupron injection. Patient noted he has not been emptying well. He noted he drinks one bottle of water a day. He noted he has on and off diarrhea. He noted his GI physician informed him that he has a high creatinine levels of 1.5 mg/dL. His previous creatinine on 18 was 1.10 mg/dL.  18: The patient returned for a Lupron injection. Noted his urination is satisfactory. Wakes up twice during the night to urinate. Patient denied dysuria, gross hematuria, urgency, or incontinence. PSA from 18 was undetectable. A1c from 18 was 8.0%.  19: The patient returned today for a Lupron injection. Complains that his urination has become uncontrollable since his last visit. PSA from 18 was undetectable. Notes that he was advised to discontinue use of Plavix and to start a trial of Eliquis. Diabetes is under control. 19: Patient presents today for a Lupron injection. Today he states that his urination is satisfactory. Patient denies dysuria, gross hematuria, urgency or incontinence. Nocturia x 2 is reported. He denies any bone pain. During his visit today it was advised by the medical assistant that his blood pressure was elevated.   2/10/20: Patient presents today for a Lupron injection. He states his urination is satisfactory. Patient denies dysuria, hematuria, urgency or incontinence. Pt denies chest pain. He admits to feeling some fatigue and occasional mild right posterior thigh pain which feels okay today. He denies any significant bone pain.   2020: Patient has history of metastatic prostate cancer controlled by androgen deprivation. patient has had some abnormal urine cytologies. Pt is maintained on Tamsulosin 0.4mg one capsule daily. With this, his urination is good. Does not wake excessive at night to urinate. No urgency or frequency. Does not strain to urinate or blood in urine. Hx of diabetes and takes insulin. He is maintained on aspirin 81mg daily and Eliquis 5mg daily. He gets Lupron Depot 45mg once every 6 months. He is here today for next injection. Has no new aches or pains. His appetite remains good. No back or flank pain. His PSA on 2/10/2020 was 0.02.  2021: Patient presents today for Lupron injection. Urination is good. Appetite is good. Wakes 2x at night to urinate. Denies urinary urgency, gross hematuria, dysuria, pushing or straining. Denies hot flashes with hormone treatment. No constipation or chest pain. Had stress test which was normal. Takes Eliquis. PSA on 20 was undetectable at <0.01. Feels well today and offers no new complaints.   2021: Patient presents today for Eligard injection. States he has been having difficulty with urination. States he has gained some weight despite exercising by walking one mile four days a week. Admits constipation but not taking any medications for relief.   2022: Patient presents today for Lupron injection for hx of Ca P. Denies gross hematuria, pushing/straining, urinary urgency. Nocturia 2 times per night.  The patient complains about constipation, but admits he does not drink adequate water, only about a bottle of water per day. His diabetes is managed , his recently HbA1C was 6.6.He complains for bilateral flank pain when he stands up sometimes. He walks on a treadmill 3-4 times per week for exercise. Patient has 3 children.   The patient produced a urine sample which will be sent for urinalysis, urine cytology, and urine culture.  Blood work today included BMP, CBC, alkaline phosphatase, PSA, and testosterone.  I recommend the patient take stool softeners, ex: Colace, 2 tablets in the morning and 2 in the afternoon, each dose taken with large glasses of water. I encouraged him to increase his fluid intake to about 4 bottles of water per day. Lupron injection given today. Prescription sent for next Lupron dose.  RTO in 6 months for follow up and Lupron injection.   08/10/2022:  presents today for a Lupron injection. He recently returned from vacation in [facility] for two weeks. He is well relaxed and looks good. His urination remains good. No longer takes tamsulosin. Nocturia x2. Denies urinary urgency, pushing, straining, gross hematuria, and burning. Does not eat spicy food that often. Denies constipation and chest pains. Has 3 stents. Is on Eliquis 5 mg BID. Pt had covid in May. Was fully vaccinated and boosted.   Lupron 45 mg was administered intramuscularly by my nurse, Fransisca Baez RN.   Reviewed laboratory work of 2022 which was for the most part very good. Creatinine was 1.64. If it creeps up any higher, might consider renal US. Pt and his wife state that they have been decreasing his meat consumption. The patient produced a urine sample which will be sent for urinalysis, urine cytology, and urine culture.  Blood work today included alkaline phosphatase, BMP, dihydrotestosterone, estradiol, estrogen total, LH, PSA, SHBG, and testosterone free and total. I requested that the patient have his blood work from  sent to my office.  Advised the patient to speak with his PCP about his ankle edema.  Patient will RTO in 6 months for his next injection. Will have a telehealth visit a few weeks prior and do his blood work at his nearest [facility] lab.Pt is good at using [employer].   01/10/2023: Mr. EDMOND presents today for a follow up audio only telehealth visit for which they gave permission for. The patient was located at home [address] [place] [address] and I was located in my office in [place], [place]. I reviewed lab work of 23 which revealed the creatinine being stable since 2021 and the renal U/S showed no hydronephrosis. Glucose was elevated at 188, BUN was 40, Creatinine was 1.47 and eGFR was 48. The ALK PHOS was normal at 69, PSA was 0.06 and seems to be slowly rising. However te rate of rising is not alarming but I will continue current therapy. The pt A1C was elevated at 7.9% and the eAG was 180. I would advise him to continue to work on improving diabetes management and consult with his PCP and if he ha sone endocrinologist. The UA revealed no UTI, RBC being 0 /HPF and Urine culture showed no growth. Te urine cytology was negative for high grade urothelial carcinoma and the specimen consists mainly of mature squamous epithelial cells and benign urothelial cells. However, glycosuria was present, glucose being 200 and Protein being 30 His testosterone was <2.5 with good testosterone suppression.  The US renal of 3/6/22 showed normal kidney US, despite androgen deprivation prostate remains large.  Patient reports today feeling fairly well with no bone pain. He denies straining, pushing, gross hematuria, or dysuria but has minimal urinal frequency and urgency, triggered by stepping into the shower and brushing his teeth. I suggested he urinate first wait a while then proceed to urinate again. We will continue with his Eligard 45 mg.  Clinical findings and Lab results were reviewed at length with the patient. Pt will RTO 2023 as he is scheduled for a repeat injection.   2023: Mr. SHERWIN EDMOND presents today for a Lupron Injection or hx of Ca P. Insurance company had the wrong  which has caused some complications. Today we will give him Eligard (luprolide)45 mg instead of Lupron explaining to him they work the same, delivery system is indifferent, but have different names. Hi wife reports urination is well. Nocturia 1-2 x and 3 - 4 x during the day. Pt denies urinary urgency, straining, pushing, gross hematuria, or dysuria.I informed him to tell his doctor that he no longer needs tamsulosin as the pt had prostate cancer, but his urination was fine without it and is still fine. He reports not to have taken any yet. I am not quite sure why his PCP has prescribed this but it  is not necessary at the moment.  He reports no new pain since last visit. He has some complications but nothing too unbearable.  He showed me his lab work which revealed some glucose in his urine, where as normally he does not. Back in  his a1C was 6.6 which crept up to 8.3 in 2019 as of January it  was 7.9 but I will like to see it  under 7. I informed him to take his medication to get this under control and to consult his PCP Anthony Vera. In addition the pt had a concern about his slight raise in PSA, but I assured him he does not need to worry as I will continue to monitor it. In the future we may have to change his medication but in the meantime we will continue his therapy.  Eligard 45 mg was administered intramuscularly by my nurse, Fransisca Baez RN.  Patient produced a urine sample today which will be sent for urinalysis, urine culture, and urine cytology. Blood work today included BMP, CBC, alkaline phosphatase, PSA, and testosterone.  We will continue with his Eligard 45 mg.  RTO in 6 months for follow up and Eligard injection.   2023: Mr. SHERWIN EDMOND presents today for an Eligard injection. Accompanied by his wife. Pt denies hot flashes. His wife reports that 1-2 months ago the patient had a rash that was on the skin of his arm and his eyelid. Went to [facility] and was given a cortisone shot. It went away and came back a few weeks later. Went to a dermatologist who did not see anything but provided him with some topical cream. When the rash returned, he went back to [facility] and was given another cortisone shot. Was advised to see an allergist. The pt reports feeling weak and tired often. He is anemic. Is taking some iron supplementation. Denies constipation. Has uncontrolled diabetes. Glucose from lab work ordered by the allergist revealed a glucose of 370. Creatinine was 1.6.   Eligard 45 mg was administered subcutaneously by me, Dr.Gary Reilly.  Reviewed and discussed lab work of 2023 ordered by his allergist. Counseled the pt on diabetes management. Pt strongly advised to see his PCP and/or endocrinologist to discuss further.  The patient produced a urine sample which will be sent for urinalysis, urine cytology, and urine culture.  Blood work today included BMP, alkaline phosphatase, serum protein electrophoresis, PSA, and testosterone.  Patient will RTO in 6 months for next Eligard injection, sooner if clinically indicated.   2024: Mr. SHERWIN EDMOND presents today for an Eligard injection. He is accompanied by his wife. He reports that his urination is fairly good. Wakes up at night due to worrying about other things but not because of his urination. His wife feels he needs some medication for depression. No new aches or pains except the knees.    Eligard 45 mg was administered subcutaneously by my nurse, Fransisca Baez RN.  The patient produced a urine sample which will be sent for urinalysis, urine cytology, and urine culture.  I offered to recommend a psychologist for the patient to discuss his worries and stresses. Deya Craven's information was provided to the patient's wife.  I provided him with the name and contact information for Dr.Daniel Rodriguez of nephrology.  Patient will RTO in 6 months for next Eligard injection, sooner if clinically indicated.    2024: Mr. SHERWIN EDMOND presents today for a follow up audio only telephone visit for which he gave permission for. The pt was located at home, [address] [place] [address], and I was located in my office in [place], [place]. Visit conducted mostly with the patient's wife. Patient had lab work done on 2024 at the time of the visit. Urinalysis was normal . Urine culture showed no significant growth. Urine cytology negative for high grade urothelial carcinoma. A1C was 6.9%. Cystatin C was normal at 1.99. eGFR by cystatin C was low at 29 ml/min. Creatinine was elevated at 1.52, eGFR low at 46 ml/min. Alk phos was normal. PSA is 0.08. Testosterone is well suppressed. Patient is a prior smoker, quit a very long time ago.   Reviewed and discussed lab work of 2024 in detail with the pt. 2023 eGFR as determined by Cystatin C was 30 mL/min per 1.73 m.  The estimate based on Cystatin C from 2024 is very close at 29 mL/min per 1.73 m.  I have spoken to patient and wife and they will see a nephrologist. Serum creatinine is 1.52 mg/dL on 2024.  This provides an estimated glomerular filtration rate of 46 mL/min per 1.73 m.  This is a bit better than 2023 when the serum creatinine was 1.76 with an EGFR of 38 mL/min per 1.73 m. Serum vitamin D 1, 25 dihydroxy level is elevated at 86.3 pg/mL.  Vitamin D 25-hydroxy is elevated at 31.0 ng/mL.  Patient should discontinue vitamin D supplementation and discuss with PCP next week. Hemoglobin is slightly decreased at 12.7 g/dL.  I would attribute this to his renal impairment. Hemoglobin A1c is 6.9% which is within the desired range. PSA has risen slightly since . I do not believe there is any cause for concern but we will continue to follow the PSA.   Patient's lab work will be sent to Dr.Kaushik Vera. A copy will also be sent to the patient.    Patient will RTO in 6 months from last Eligard injection.    Duration of telephone visit was 25 minutes.

## 2024-02-27 NOTE — ADDENDUM
[FreeTextEntry1] : This note was authored by Deanna Byers working as a scribe for Dr.Gary Reilly. I, Dr. Steve Reilly have reviewed the content of this note and confirm it is true and accurate. I personally performed the history and physical examination and made all the decisions 02/26/2024

## 2024-02-27 NOTE — HISTORY OF PRESENT ILLNESS
[FreeTextEntry1] : Mr Stanley is an 83 year old man presented for follow up of prostate cancer. The patient underwent a pelvic lymph node dissection in . Metastatic prostate cancer was found in the lymph node. He was subsequently started androgen deprivation. The patient was maintained on Lupron Depot 45 mg for control of his prostate cancer which remains sensitive to hormonal manipulation. PSA undetectable. Last injection was 17. The patient was feeling good. No pain other than some neck mild neck pain. Urination satisfactory. The patient cited that he has not been taking his blood pressure medication consistently. He often skips his medication at night. The patient also reported occasional chest pain.  18: The patient returned for a Lupron injection. Patient noted he has not been emptying well. He noted he drinks one bottle of water a day. He noted he has on and off diarrhea. He noted his GI physician informed him that he has a high creatinine levels of 1.5 mg/dL. His previous creatinine on 18 was 1.10 mg/dL.  18: The patient returned for a Lupron injection. Noted his urination is satisfactory. Wakes up twice during the night to urinate. Patient denied dysuria, gross hematuria, urgency, or incontinence. PSA from 18 was undetectable. A1c from 18 was 8.0%.  19: The patient returned today for a Lupron injection. Complains that his urination has become uncontrollable since his last visit. PSA from 18 was undetectable. Notes that he was advised to discontinue use of Plavix and to start a trial of Eliquis. Diabetes is under control. 19: Patient presents today for a Lupron injection. Today he states that his urination is satisfactory. Patient denies dysuria, gross hematuria, urgency or incontinence. Nocturia x 2 is reported. He denies any bone pain. During his visit today it was advised by the medical assistant that his blood pressure was elevated.   2/10/20: Patient presents today for a Lupron injection. He states his urination is satisfactory. Patient denies dysuria, hematuria, urgency or incontinence. Pt denies chest pain. He admits to feeling some fatigue and occasional mild right posterior thigh pain which feels okay today. He denies any significant bone pain.   2020: Patient has history of metastatic prostate cancer controlled by androgen deprivation. patient has had some abnormal urine cytologies. Pt is maintained on Tamsulosin 0.4mg one capsule daily. With this, his urination is good. Does not wake excessive at night to urinate. No urgency or frequency. Does not strain to urinate or blood in urine. Hx of diabetes and takes insulin. He is maintained on aspirin 81mg daily and Eliquis 5mg daily. He gets Lupron Depot 45mg once every 6 months. He is here today for next injection. Has no new aches or pains. His appetite remains good. No back or flank pain. His PSA on 2/10/2020 was 0.02.  2021: Patient presents today for Lupron injection. Urination is good. Appetite is good. Wakes 2x at night to urinate. Denies urinary urgency, gross hematuria, dysuria, pushing or straining. Denies hot flashes with hormone treatment. No constipation or chest pain. Had stress test which was normal. Takes Eliquis. PSA on 20 was undetectable at <0.01. Feels well today and offers no new complaints.   2021: Patient presents today for Eligard injection. States he has been having difficulty with urination. States he has gained some weight despite exercising by walking one mile four days a week. Admits constipation but not taking any medications for relief.   2022: Patient presents today for Lupron injection for hx of Ca P. Denies gross hematuria, pushing/straining, urinary urgency. Nocturia 2 times per night.  The patient complains about constipation, but admits he does not drink adequate water, only about a bottle of water per day. His diabetes is managed , his recently HbA1C was 6.6.He complains for bilateral flank pain when he stands up sometimes. He walks on a treadmill 3-4 times per week for exercise. Patient has 3 children and 5 grandchildren.   08/10/2022:  presents today for a Lupron injection. He recently returned from vacation in [facility] for two weeks. He is well relaxed and looks good. His urination remains good. No longer takes tamsulosin. Nocturia x2. Denies urinary urgency, pushing, straining, gross hematuria, and burning. Does not eat spicy food that often. Denies constipation and chest pains. Has 3 stents. Is on Eliquis 5 mg BID. Pt had covid in May. Was fully vaccinated and boosted.   01/10/2023: Mr. STANLEY presents today for a follow up audio only telehealth visit for which they gave permission for. The patient was located at home [address] [place] [address] and I was located in my office in [place], [place]. I reviewed lab work of 23 which revealed the creatinine being stable since 2021 and the renal U/S showed no hydronephrosis. Glucose was elevated at 188, BUN was 40, Creatinine was 1.47 and eGFR was 48. The ALK PHOS was normal at 69, PSA was 0.06 and seems to be slowly rising. However te rate of rising is not alarming but I will continue current therapy. The pt A1C was elevated at 7.9% and the eAG was 180. I would advise him to continue to work on improving diabetes management and consult with his PCP and if he ha sone endocrinologist. The UA revealed no UTI, RBC being 0/HPF and Urine culture showed no growth. The urine cytology was negative for high grade urothelial carcinoma and the specimen consists mainly of mature squamous epithelial cells and benign urothelial cells. However, glycosuria was present, glucose being 200 and Protein being 30 His testosterone was <2.5 with good testosterone suppression.  The US renal of 3/6/22 showed normal kidney US, despite androgen deprivation prostate remains enlarged.  Patient reports today feeling fairly well with no bone pain. He denies straining, pushing, gross hematuria, or dysuria but has minimal urinal frequency and urgency, triggered by stepping into the shower and brushing his teeth. I suggested he urinate first wait a while then proceed to urinate again. We will  continue with his Eligard 45 mg.   2023: Mr. SHERWIN STANLEY presents today for a Lupron Injection or hx of Ca P. Insurance company had the wrong  which has caused some complications. Today we will give him Eligard (luprolide)45 mg instead of Lupron explaining to him they work the same, delivery system is indifferent, but have different names. Hi wife reports urination is well. Nocturia 1-2 x and 3 - 4 x during the day. Pt denies urinary urgency, straining, pushing, gross hematuria, or dysuria.I informed him to tell his doctor that he no longer needs tamsulosin as the pt had prostate cancer, but his urination was fine without it and is still fine. He reports not to have taken any yet. I am not quite sure why his PCP has prescribed this but it  is not necessary at the moment.  He reports no new pain since last visit. He has some complications but nothing too unbearable.  He showed me his lab work which revealed some glucose in his urine, where as normally he does not. Back in  his a1C was 6.6 which crept up to 8.3 in 2019 as of January it  was 7.9 but I will like to see it  under 7. I informed him to take his medication to get this under control and to consult his PCP Mary Anne Vera. In addition the pt had a concern about his slight raise in PSA, but I assured him he does not need to worry as I will continue to monitor it. In the future we may have to change his medication but in the meantime we will continue his therapy.  2023: Mr. SHERWIN STANLEY presents today for an Eligard injection. Accompanied by his wife. Pt denies hot flashes. His wife reports that 1-2 months ago the patient had a rash that was on the skin of his arm and his eyelid. Went to [facility] and was given a cortisone shot. It went away and came back a few weeks later. Went to a dermatologist who did not see anything but provided him with some topical cream. When the rash returned, he went back to [facility] and was given another cortisone shot. Was advised to see an allergist. The pt reports feeling weak and tired often. He is anemic. Is taking some iron supplementation. Denies constipation. Has uncontrolled diabetes. Glucose from lab work ordered by the allergist revealed a glucose of 370. Creatinine was 1.6.   2024: Mr. SHERWIN STANLEY presents today for an Eligard injection. He is accompanied by his wife. He reports that his urination is fairly good. Wakes up at night due to worrying about other things but not because of his urination. His wife feels he needs some medication for depression. No new aches or pains except the knees.   2024: Mr. SHERWIN STANLEY presents today for a follow up audio only telephone visit for which he gave permission for. The pt was located at home, [address] [place] [address], and I was located in my office in [place], [place]. Visit conducted mostly with the patient's wife. Patient had lab work done on 2024 at the time of the visit. Urinalysis was normal . Urine culture showed no significant growth. Urine cytology negative for high grade urothelial carcinoma. A1C was 6.9%. Cystatin C was normal at 1.99. eGFR by cystatin C was low at 29 ml/min. Creatinine was elevated at 1.52, eGFR low at 46 ml/min. Alk phos was normal. PSA is 0.08. Testosterone is well suppressed. Patient is a prior smoker, quit a very long time ago.

## 2024-06-28 NOTE — ASSESSMENT
Neck , no lymphadenopathy [FreeTextEntry1] : Mr Edmond is a 78 year old man presented for follow up of prostate cancer. The patient underwent a pelvic lymph node dissection in 2001. Metastatic prostate cancer was found in the lymph node. He was subsequently started androgen deprivation. The patient was maintained on  Lupron Depot 45 mg  for control of his prostate cancer which remains sensitive to hormonal manipulation. PSA undetectable. Last injection was 02/27/17.  The patient was feeling good. No pain other than some neck mild neck pain. Urination satisfactory. The patient cited that he has not been taking his blood pressure medication consistently. He often skips his medication at night. The patient also reported occasional chest pain. \par 2/28/18: The patient returned for a Lupron injection. Patient noted he has not been emptying well. He noted he drinks one bottle of water a day. He noted he has on and off diarrhea. He noted his GI physician informed him that he has a high creatinine levels of 1.5 mg/dL. His previous creatinine on 08/28/18 was 1.10 mg/dL. \par 8/29/18: The patient returned for a Lupron injection. Noted his urination is satisfactory. Wakes up twice during the night to urinate. Patient denied dysuria, gross hematuria, urgency, or incontinence. PSA from 2/28/18 was undetectable. A1c from 2/28/18 was 8.0%. \par \par 2/26/19: The patient returned today for a Lupron injection. Complains that his urination has become uncontrollable since his last visit. PSA from 8/29/18 was undetectable. Notes that he was advised to discontinue use of Plavix and to start a trial of Eliquis. Diabetes is under control.\par \par I prescribed the patient Tamsulosin 0.4 mg, one tablet daily one half hour after a meal. I advised the patient the side effects of nasal congestion and light-headedness.\par \par The patient produced a urine sample which will be sent for urinalysis, urine cytology, and urine culture.\par Blood work today included hemoglobin A1c, comprehensive metabolic panel, CBC, PSA, and testosterone. \par \par The patient will return in 2 weeks for a follow up and 6 months for a Lupron injection.

## 2024-07-16 ENCOUNTER — RESULT REVIEW (OUTPATIENT)
Age: 84
End: 2024-07-16

## 2024-07-19 ENCOUNTER — RESULT REVIEW (OUTPATIENT)
Age: 84
End: 2024-07-19

## 2024-07-22 ENCOUNTER — TRANSCRIPTION ENCOUNTER (OUTPATIENT)
Age: 84
End: 2024-07-22

## 2024-07-25 ENCOUNTER — APPOINTMENT (OUTPATIENT)
Dept: ENDOCRINOLOGY | Facility: CLINIC | Age: 84
End: 2024-07-25
Payer: COMMERCIAL

## 2024-07-25 VITALS
BODY MASS INDEX: 24.92 KG/M2 | DIASTOLIC BLOOD PRESSURE: 70 MMHG | OXYGEN SATURATION: 97 % | TEMPERATURE: 97.8 F | HEART RATE: 92 BPM | WEIGHT: 146 LBS | RESPIRATION RATE: 18 BRPM | SYSTOLIC BLOOD PRESSURE: 130 MMHG | HEIGHT: 64 IN

## 2024-07-25 DIAGNOSIS — E11.9 TYPE 2 DIABETES MELLITUS W/OUT COMPLICATIONS: ICD-10-CM

## 2024-07-25 DIAGNOSIS — Z79.4 LONG TERM (CURRENT) USE OF INSULIN: ICD-10-CM

## 2024-07-25 LAB
GLUCOSE BLDC GLUCOMTR-MCNC: 145
HBA1C MFR BLD HPLC: 7.1

## 2024-07-25 PROCEDURE — 83036 HEMOGLOBIN GLYCOSYLATED A1C: CPT | Mod: QW

## 2024-07-25 PROCEDURE — 82962 GLUCOSE BLOOD TEST: CPT

## 2024-07-25 PROCEDURE — 99204 OFFICE O/P NEW MOD 45 MIN: CPT

## 2024-07-25 RX ORDER — BLOOD-GLUCOSE SENSOR
EACH MISCELLANEOUS
Qty: 6 | Refills: 2 | Status: ACTIVE | COMMUNITY
Start: 2024-07-25 | End: 1900-01-01

## 2024-07-25 RX ORDER — LANCETS 33 GAUGE
EACH MISCELLANEOUS
Qty: 4 | Refills: 3 | Status: ACTIVE | COMMUNITY
Start: 2024-07-25 | End: 1900-01-01

## 2024-07-25 RX ORDER — GLUCOSAM/CHON-MSM1/C/MANG/BOSW 500-416.6
TABLET ORAL
Qty: 1 | Refills: 0 | Status: ACTIVE | COMMUNITY
Start: 2024-07-25 | End: 1900-01-01

## 2024-07-25 RX ORDER — BLOOD-GLUCOSE METER
W/DEVICE KIT MISCELLANEOUS
Qty: 1 | Refills: 0 | Status: ACTIVE | COMMUNITY
Start: 2024-07-25 | End: 1900-01-01

## 2024-07-25 RX ORDER — BLOOD SUGAR DIAGNOSTIC
STRIP MISCELLANEOUS 4 TIMES DAILY
Qty: 2 | Refills: 3 | Status: ACTIVE | COMMUNITY
Start: 2024-07-25 | End: 1900-01-01

## 2024-07-25 RX ORDER — EMPAGLIFLOZIN 10 MG/1
10 TABLET, FILM COATED ORAL DAILY
Qty: 90 | Refills: 1 | Status: ACTIVE | COMMUNITY
Start: 2024-07-25 | End: 1900-01-01

## 2024-07-25 RX ORDER — INSULIN LISPRO 100 [IU]/ML
100 INJECTION, SOLUTION INTRAVENOUS; SUBCUTANEOUS
Qty: 9 | Refills: 1 | Status: ACTIVE | COMMUNITY
Start: 2024-07-25 | End: 1900-01-01

## 2024-07-25 RX ORDER — BLOOD-GLUCOSE METER
70 EACH MISCELLANEOUS
Qty: 3 | Refills: 3 | Status: ACTIVE | COMMUNITY
Start: 2024-07-25 | End: 1900-01-01

## 2024-07-25 RX ORDER — PEN NEEDLE, DIABETIC 29 G X1/2"
32G X 4 MM NEEDLE, DISPOSABLE MISCELLANEOUS
Qty: 4 | Refills: 2 | Status: ACTIVE | COMMUNITY
Start: 2024-07-25 | End: 1900-01-01

## 2024-07-25 RX ORDER — INSULIN GLARGINE 100 [IU]/ML
100 INJECTION, SOLUTION SUBCUTANEOUS
Qty: 10 | Refills: 2 | Status: ACTIVE | COMMUNITY
Start: 2024-07-25 | End: 1900-01-01

## 2024-07-25 NOTE — HISTORY OF PRESENT ILLNESS
[FreeTextEntry1] : 84 yearM here for assessment for Type 2 diabetes mellitus   last A1c:  7.4%   Patient with past medical history as below, remarkable for HTN, HLD, T2DM, CKD, CAD s/p CABG, A fib on eliquis, prostate ca s/p chemo/RT, CHF   recent hospitalization   Screening Ophthalmology: follows/ due for visit Podiatry: follows/ due for visit LDL: on statin Microalbumin: Cr: on ARB EGFR: 47     Current diabetic medication regimen (verified with patient):  Lantus 20 units Qhs humalog 12 units tid-ac (prior to hospitalization he reported omitting doses, which led to hyperglycemia, most recently has been taking after meals)  jardiance 10mg po daily      SMBG ranges (glucometer): reported limited data post hospitalization. POCT today at target      Hypoglycemia: level 1: x episode PP lunch after using 12 units after meal      Ambulatory glucose profile (AGP): insufficient data

## 2024-08-07 ENCOUNTER — APPOINTMENT (OUTPATIENT)
Dept: UROLOGY | Facility: CLINIC | Age: 84
End: 2024-08-07

## 2024-08-07 PROCEDURE — 96402 CHEMO HORMON ANTINEOPL SQ/IM: CPT

## 2024-08-07 PROCEDURE — 99214 OFFICE O/P EST MOD 30 MIN: CPT | Mod: 25

## 2024-08-07 NOTE — PHYSICAL EXAM
[Normal Appearance] : normal appearance [Well Groomed] : well groomed [General Appearance - In No Acute Distress] : no acute distress [Edema] : no peripheral edema [Respiration, Rhythm And Depth] : normal respiratory rhythm and effort [Exaggerated Use Of Accessory Muscles For Inspiration] : no accessory muscle use [Abdomen Soft] : soft [Abdomen Tenderness] : non-tender [Costovertebral Angle Tenderness] : no ~M costovertebral angle tenderness [Normal Station and Gait] : the gait and station were normal for the patient's age [] : no rash [No Focal Deficits] : no focal deficits [Oriented To Time, Place, And Person] : oriented to person, place, and time [Affect] : the affect was normal [Mood] : the mood was normal [Not Anxious] : not anxious

## 2024-08-07 NOTE — ADDENDUM
[FreeTextEntry1] : This note was authored by Deanna Byers working as a scribe for Dr.Gary Reilly. I, Dr. Steve Reilly have reviewed the content of this note and confirm it is true and accurate. I personally performed the history and physical examination and made all the decisions 08/07/2024

## 2024-08-07 NOTE — HISTORY OF PRESENT ILLNESS
[FreeTextEntry1] : Mr Stanley is an 84 year old man presented for follow up of prostate cancer. The patient underwent a pelvic lymph node dissection in . Metastatic prostate cancer was found in the lymph node. He was subsequently started androgen deprivation. The patient was maintained on Lupron Depot 45 mg for control of his prostate cancer which remains sensitive to hormonal manipulation. PSA undetectable. Last injection was 17. The patient was feeling good. No pain other than some neck mild neck pain. Urination satisfactory. The patient cited that he has not been taking his blood pressure medication consistently. He often skips his medication at night. The patient also reported occasional chest pain.  18: The patient returned for a Lupron injection. Patient noted he has not been emptying well. He noted he drinks one bottle of water a day. He noted he has on and off diarrhea. He noted his GI physician informed him that he has a high creatinine levels of 1.5 mg/dL. His previous creatinine on 18 was 1.10 mg/dL.  18: The patient returned for a Lupron injection. Noted his urination is satisfactory. Wakes up twice during the night to urinate. Patient denied dysuria, gross hematuria, urgency, or incontinence. PSA from 18 was undetectable. A1c from 18 was 8.0%.  19: The patient returned today for a Lupron injection. Complains that his urination has become uncontrollable since his last visit. PSA from 18 was undetectable. Notes that he was advised to discontinue use of Plavix and to start a trial of Eliquis. Diabetes is under control. 19: Patient presents today for a Lupron injection. Today he states that his urination is satisfactory. Patient denies dysuria, gross hematuria, urgency or incontinence. Nocturia x 2 is reported. He denies any bone pain. During his visit today it was advised by the medical assistant that his blood pressure was elevated.   2/10/20: Patient presents today for a Lupron injection. He states his urination is satisfactory. Patient denies dysuria, hematuria, urgency or incontinence. Pt denies chest pain. He admits to feeling some fatigue and occasional mild right posterior thigh pain which feels okay today. He denies any significant bone pain.   2020: Patient has history of metastatic prostate cancer controlled by androgen deprivation. patient has had some abnormal urine cytologies. Pt is maintained on Tamsulosin 0.4mg one capsule daily. With this, his urination is good. Does not wake excessive at night to urinate. No urgency or frequency. Does not strain to urinate or blood in urine. Hx of diabetes and takes insulin. He is maintained on aspirin 81mg daily and Eliquis 5mg daily. He gets Lupron Depot 45mg once every 6 months. He is here today for next injection. Has no new aches or pains. His appetite remains good. No back or flank pain. His PSA on 2/10/2020 was 0.02.  2021: Patient presents today for Lupron injection. Urination is good. Appetite is good. Wakes 2x at night to urinate. Denies urinary urgency, gross hematuria, dysuria, pushing or straining. Denies hot flashes with hormone treatment. No constipation or chest pain. Had stress test which was normal. Takes Eliquis. PSA on 20 was undetectable at <0.01. Feels well today and offers no new complaints.   2021: Patient presents today for Eligard injection. States he has been having difficulty with urination. States he has gained some weight despite exercising by walking one mile four days a week. Admits constipation but not taking any medications for relief.   2022: Patient presents today for Lupron injection for hx of Ca P. Denies gross hematuria, pushing/straining, urinary urgency. Nocturia 2 times per night.  The patient complains about constipation, but admits he does not drink adequate water, only about a bottle of water per day. His diabetes is managed , his recently HbA1C was 6.6.He complains for bilateral flank pain when he stands up sometimes. He walks on a treadmill 3-4 times per week for exercise. Patient has 3 children and 5 grandchildren.   08/10/2022:  presents today for a Lupron injection. He recently returned from vacation in [facility] for two weeks. He is well relaxed and looks good. His urination remains good. No longer takes tamsulosin. Nocturia x2. Denies urinary urgency, pushing, straining, gross hematuria, and burning. Does not eat spicy food that often. Denies constipation and chest pains. Has 3 stents. Is on Eliquis 5 mg BID. Pt had covid in May. Was fully vaccinated and boosted.   01/10/2023: Mr. STANLEY presents today for a follow up audio only telehealth visit for which they gave permission for. The patient was located at home [address] [place] [address] and I was located in my office in [place], [place]. I reviewed lab work of 23 which revealed the creatinine being stable since 2021 and the renal U/S showed no hydronephrosis. Glucose was elevated at 188, BUN was 40, Creatinine was 1.47 and eGFR was 48. The ALK PHOS was normal at 69, PSA was 0.06 and seems to be slowly rising. However te rate of rising is not alarming but I will continue current therapy. The pt A1C was elevated at 7.9% and the eAG was 180. I would advise him to continue to work on improving diabetes management and consult with his PCP and if he ha sone endocrinologist. The UA revealed no UTI, RBC being 0/HPF and Urine culture showed no growth. The urine cytology was negative for high grade urothelial carcinoma and the specimen consists mainly of mature squamous epithelial cells and benign urothelial cells. However, glycosuria was present, glucose being 200 and Protein being 30 His testosterone was <2.5 with good testosterone suppression.  The US renal of 3/6/22 showed normal kidney US, despite androgen deprivation prostate remains enlarged.  Patient reports today feeling fairly well with no bone pain. He denies straining, pushing, gross hematuria, or dysuria but has minimal urinal frequency and urgency, triggered by stepping into the shower and brushing his teeth. I suggested he urinate first wait a while then proceed to urinate again. We will  continue with his Eligard 45 mg.   2023: Mr. SHERWIN STANLEY presents today for a Lupron Injection or hx of Ca P. Insurance company had the wrong  which has caused some complications. Today we will give him Eligard (luprolide)45 mg instead of Lupron explaining to him they work the same, delivery system is indifferent, but have different names. Hi wife reports urination is well. Nocturia 1-2 x and 3 - 4 x during the day. Pt denies urinary urgency, straining, pushing, gross hematuria, or dysuria.I informed him to tell his doctor that he no longer needs tamsulosin as the pt had prostate cancer, but his urination was fine without it and is still fine. He reports not to have taken any yet. I am not quite sure why his PCP has prescribed this but it  is not necessary at the moment.  He reports no new pain since last visit. He has some complications but nothing too unbearable.  He showed me his lab work which revealed some glucose in his urine, where as normally he does not. Back in  his a1C was 6.6 which crept up to 8.3 in 2019 as of January it  was 7.9 but I will like to see it  under 7. I informed him to take his medication to get this under control and to consult his PCP Mary Anne Vera. In addition the pt had a concern about his slight raise in PSA, but I assured him he does not need to worry as I will continue to monitor it. In the future we may have to change his medication but in the meantime we will continue his therapy.  2023: Mr. SHERWIN STANLEY presents today for an Eligard injection. Accompanied by his wife. Pt denies hot flashes. His wife reports that 1-2 months ago the patient had a rash that was on the skin of his arm and his eyelid. Went to [facility] and was given a cortisone shot. It went away and came back a few weeks later. Went to a dermatologist who did not see anything but provided him with some topical cream. When the rash returned, he went back to [facility] and was given another cortisone shot. Was advised to see an allergist. The pt reports feeling weak and tired often. He is anemic. Is taking some iron supplementation. Denies constipation. Has uncontrolled diabetes. Glucose from lab work ordered by the allergist revealed a glucose of 370. Creatinine was 1.6.   2024: Mr. SHERWIN STANLEY presents today for an Eligard injection. He is accompanied by his wife. He reports that his urination is fairly good. Wakes up at night due to worrying about other things but not because of his urination. His wife feels he needs some medication for depression. No new aches or pains except the knees.   2024: Mr. SHERWIN STANLEY presents today for a follow up audio only telephone visit for which he gave permission for. The pt was located at home, [address] [place] [address], and I was located in my office in [place], [place]. Visit conducted mostly with the patient's wife. Patient had lab work done on 2024 at the time of the visit. Urinalysis was normal . Urine culture showed no significant growth. Urine cytology negative for high grade urothelial carcinoma. A1C was 6.9%. Cystatin C was normal at 1.99. eGFR by cystatin C was low at 29 ml/min. Creatinine was elevated at 1.52, eGFR low at 46 ml/min. Alk phos was normal. PSA is 0.08. Testosterone is well suppressed. Patient is a prior smoker, quit a very long time ago.   2024: Mr. SHERWIN STANLEY presents today for a Lupron injection which he brought with him today. He is accompanied by his daughter in law, Nunu Stanley. He denies any new aches or pain. Denies any problems with urination. Nocturia x1-2. Denies urinary urgency, pushing, straining, burning, and gross hematuria. Pt is diabetic and gets some numbness in the fingertips. Has some numbness in the left toes. Pt reports he recently was hospitalized at [facility] for 8 days for cardiac reasons. Went to the hospital mainly because of dizziness. Has had a number of stents placed in the past. Underwent an angiogram in the hospital but reports they did not find anything. Pt is on aspirin 81 mg daily and eliquis.

## 2024-08-07 NOTE — ASSESSMENT
[FreeTextEntry1] : Mr Edmond is an 83 year old man presented for follow up of prostate cancer. The patient underwent a pelvic lymph node dissection in . Metastatic prostate cancer was found in the lymph node. He was subsequently started androgen deprivation. The patient was maintained on Lupron Depot 45 mg for control of his prostate cancer which remains sensitive to hormonal manipulation. PSA undetectable. Last injection was 17. The patient was feeling good. No pain other than some neck mild neck pain. Urination satisfactory. The patient cited that he has not been taking his blood pressure medication consistently. He often skips his medication at night. The patient also reported occasional chest pain.  18: The patient returned for a Lupron injection. Patient noted he has not been emptying well. He noted he drinks one bottle of water a day. He noted he has on and off diarrhea. He noted his GI physician informed him that he has a high creatinine levels of 1.5 mg/dL. His previous creatinine on 18 was 1.10 mg/dL.  18: The patient returned for a Lupron injection. Noted his urination is satisfactory. Wakes up twice during the night to urinate. Patient denied dysuria, gross hematuria, urgency, or incontinence. PSA from 18 was undetectable. A1c from 18 was 8.0%.  19: The patient returned today for a Lupron injection. Complains that his urination has become uncontrollable since his last visit. PSA from 18 was undetectable. Notes that he was advised to discontinue use of Plavix and to start a trial of Eliquis. Diabetes is under control. 19: Patient presents today for a Lupron injection. Today he states that his urination is satisfactory. Patient denies dysuria, gross hematuria, urgency or incontinence. Nocturia x 2 is reported. He denies any bone pain. During his visit today it was advised by the medical assistant that his blood pressure was elevated.   2/10/20: Patient presents today for a Lupron injection. He states his urination is satisfactory. Patient denies dysuria, hematuria, urgency or incontinence. Pt denies chest pain. He admits to feeling some fatigue and occasional mild right posterior thigh pain which feels okay today. He denies any significant bone pain.   2020: Patient has history of metastatic prostate cancer controlled by androgen deprivation. patient has had some abnormal urine cytologies. Pt is maintained on Tamsulosin 0.4mg one capsule daily. With this, his urination is good. Does not wake excessive at night to urinate. No urgency or frequency. Does not strain to urinate or blood in urine. Hx of diabetes and takes insulin. He is maintained on aspirin 81mg daily and Eliquis 5mg daily. He gets Lupron Depot 45mg once every 6 months. He is here today for next injection. Has no new aches or pains. His appetite remains good. No back or flank pain. His PSA on 2/10/2020 was 0.02.  2021: Patient presents today for Lupron injection. Urination is good. Appetite is good. Wakes 2x at night to urinate. Denies urinary urgency, gross hematuria, dysuria, pushing or straining. Denies hot flashes with hormone treatment. No constipation or chest pain. Had stress test which was normal. Takes Eliquis. PSA on 20 was undetectable at <0.01. Feels well today and offers no new complaints.   2021: Patient presents today for Eligard injection. States he has been having difficulty with urination. States he has gained some weight despite exercising by walking one mile four days a week. Admits constipation but not taking any medications for relief.   2022: Patient presents today for Lupron injection for hx of Ca P. Denies gross hematuria, pushing/straining, urinary urgency. Nocturia 2 times per night.  The patient complains about constipation, but admits he does not drink adequate water, only about a bottle of water per day. His diabetes is managed , his recently HbA1C was 6.6.He complains for bilateral flank pain when he stands up sometimes. He walks on a treadmill 3-4 times per week for exercise. Patient has 3 children.   The patient produced a urine sample which will be sent for urinalysis, urine cytology, and urine culture.  Blood work today included BMP, CBC, alkaline phosphatase, PSA, and testosterone.  I recommend the patient take stool softeners, ex: Colace, 2 tablets in the morning and 2 in the afternoon, each dose taken with large glasses of water. I encouraged him to increase his fluid intake to about 4 bottles of water per day. Lupron injection given today. Prescription sent for next Lupron dose.  RTO in 6 months for follow up and Lupron injection.   08/10/2022:  presents today for a Lupron injection. He recently returned from vacation in [facility] for two weeks. He is well relaxed and looks good. His urination remains good. No longer takes tamsulosin. Nocturia x2. Denies urinary urgency, pushing, straining, gross hematuria, and burning. Does not eat spicy food that often. Denies constipation and chest pains. Has 3 stents. Is on Eliquis 5 mg BID. Pt had covid in May. Was fully vaccinated and boosted.   Lupron 45 mg was administered intramuscularly by my nurse, Fransisca Baez RN.   Reviewed laboratory work of 2022 which was for the most part very good. Creatinine was 1.64. If it creeps up any higher, might consider renal US. Pt and his wife state that they have been decreasing his meat consumption. The patient produced a urine sample which will be sent for urinalysis, urine cytology, and urine culture.  Blood work today included alkaline phosphatase, BMP, dihydrotestosterone, estradiol, estrogen total, LH, PSA, SHBG, and testosterone free and total. I requested that the patient have his blood work from  sent to my office.  Advised the patient to speak with his PCP about his ankle edema.  Patient will RTO in 6 months for his next injection. Will have a telehealth visit a few weeks prior and do his blood work at his nearest [facility] lab.Pt is good at using [employer].   01/10/2023: Mr. EDMOND presents today for a follow up audio only telehealth visit for which they gave permission for. The patient was located at home [address] [place] [address] and I was located in my office in [place], [place]. I reviewed lab work of 23 which revealed the creatinine being stable since 2021 and the renal U/S showed no hydronephrosis. Glucose was elevated at 188, BUN was 40, Creatinine was 1.47 and eGFR was 48. The ALK PHOS was normal at 69, PSA was 0.06 and seems to be slowly rising. However te rate of rising is not alarming but I will continue current therapy. The pt A1C was elevated at 7.9% and the eAG was 180. I would advise him to continue to work on improving diabetes management and consult with his PCP and if he ha sone endocrinologist. The UA revealed no UTI, RBC being 0 /HPF and Urine culture showed no growth. Te urine cytology was negative for high grade urothelial carcinoma and the specimen consists mainly of mature squamous epithelial cells and benign urothelial cells. However, glycosuria was present, glucose being 200 and Protein being 30 His testosterone was <2.5 with good testosterone suppression.  The US renal of 3/6/22 showed normal kidney US, despite androgen deprivation prostate remains large.  Patient reports today feeling fairly well with no bone pain. He denies straining, pushing, gross hematuria, or dysuria but has minimal urinal frequency and urgency, triggered by stepping into the shower and brushing his teeth. I suggested he urinate first wait a while then proceed to urinate again. We will continue with his Eligard 45 mg.  Clinical findings and Lab results were reviewed at length with the patient. Pt will RTO 2023 as he is scheduled for a repeat injection.   2023: Mr. SHERWIN EDMOND presents today for a Lupron Injection or hx of Ca P. Insurance company had the wrong  which has caused some complications. Today we will give him Eligard (luprolide)45 mg instead of Lupron explaining to him they work the same, delivery system is indifferent, but have different names. Hi wife reports urination is well. Nocturia 1-2 x and 3 - 4 x during the day. Pt denies urinary urgency, straining, pushing, gross hematuria, or dysuria.I informed him to tell his doctor that he no longer needs tamsulosin as the pt had prostate cancer, but his urination was fine without it and is still fine. He reports not to have taken any yet. I am not quite sure why his PCP has prescribed this but it  is not necessary at the moment.  He reports no new pain since last visit. He has some complications but nothing too unbearable.  He showed me his lab work which revealed some glucose in his urine, where as normally he does not. Back in  his a1C was 6.6 which crept up to 8.3 in 2019 as of January it  was 7.9 but I will like to see it  under 7. I informed him to take his medication to get this under control and to consult his PCP Anthony Vera. In addition the pt had a concern about his slight raise in PSA, but I assured him he does not need to worry as I will continue to monitor it. In the future we may have to change his medication but in the meantime we will continue his therapy.  Eligard 45 mg was administered intramuscularly by my nurse, Fransisca Baez RN.  Patient produced a urine sample today which will be sent for urinalysis, urine culture, and urine cytology. Blood work today included BMP, CBC, alkaline phosphatase, PSA, and testosterone.  We will continue with his Eligard 45 mg.  RTO in 6 months for follow up and Eligard injection.   2023: Mr. SHERWIN EDMOND presents today for an Eligard injection. Accompanied by his wife. Pt denies hot flashes. His wife reports that 1-2 months ago the patient had a rash that was on the skin of his arm and his eyelid. Went to [facility] and was given a cortisone shot. It went away and came back a few weeks later. Went to a dermatologist who did not see anything but provided him with some topical cream. When the rash returned, he went back to [facility] and was given another cortisone shot. Was advised to see an allergist. The pt reports feeling weak and tired often. He is anemic. Is taking some iron supplementation. Denies constipation. Has uncontrolled diabetes. Glucose from lab work ordered by the allergist revealed a glucose of 370. Creatinine was 1.6.   Eligard 45 mg was administered subcutaneously by me, Dr.Gary Reilly.  Reviewed and discussed lab work of 2023 ordered by his allergist. Counseled the pt on diabetes management. Pt strongly advised to see his PCP and/or endocrinologist to discuss further.  The patient produced a urine sample which will be sent for urinalysis, urine cytology, and urine culture.  Blood work today included BMP, alkaline phosphatase, serum protein electrophoresis, PSA, and testosterone.  Patient will RTO in 6 months for next Eligard injection, sooner if clinically indicated.   2024: Mr. SHERWIN EDMOND presents today for an Eligard injection. He is accompanied by his wife. He reports that his urination is fairly good. Wakes up at night due to worrying about other things but not because of his urination. His wife feels he needs some medication for depression. No new aches or pains except the knees.    Eligard 45 mg was administered subcutaneously by my nurse, Fransisca Baez RN.  The patient produced a urine sample which will be sent for urinalysis, urine cytology, and urine culture.  I offered to recommend a psychologist for the patient to discuss his worries and stresses. Deya Craven's information was provided to the patient's wife.  I provided him with the name and contact information for Dr.Daniel Rodriguez of nephrology.  Patient will RTO in 6 months for next Eligard injection, sooner if clinically indicated.    2024: Mr. SHERWIN EDMOND presents today for a follow up audio only telephone visit for which he gave permission for. The pt was located at home, [address] [place] [address], and I was located in my office in [place], [place]. Visit conducted mostly with the patient's wife. Patient had lab work done on 2024 at the time of the visit. Urinalysis was normal . Urine culture showed no significant growth. Urine cytology negative for high grade urothelial carcinoma. A1C was 6.9%. Cystatin C was normal at 1.99. eGFR by cystatin C was low at 29 ml/min. Creatinine was elevated at 1.52, eGFR low at 46 ml/min. Alk phos was normal. PSA is 0.08. Testosterone is well suppressed. Patient is a prior smoker, quit a very long time ago.   Reviewed and discussed lab work of 2024 in detail with the pt. 2023 eGFR as determined by Cystatin C was 30 mL/min per 1.73 m.  The estimate based on Cystatin C from 2024 is very close at 29 mL/min per 1.73 m.  I have spoken to patient and wife and they will see a nephrologist. Serum creatinine is 1.52 mg/dL on 2024.  This provides an estimated glomerular filtration rate of 46 mL/min per 1.73 m.  This is a bit better than 2023 when the serum creatinine was 1.76 with an EGFR of 38 mL/min per 1.73 m. Serum vitamin D 1, 25 dihydroxy level is elevated at 86.3 pg/mL.  Vitamin D 25-hydroxy is elevated at 31.0 ng/mL.  Patient should discontinue vitamin D supplementation and discuss with PCP next week. Hemoglobin is slightly decreased at 12.7 g/dL.  I would attribute this to his renal impairment. Hemoglobin A1c is 6.9% which is within the desired range. PSA has risen slightly since . I do not believe there is any cause for concern but we will continue to follow the PSA.   Patient's lab work will be sent to Dr.Kaushik Vera. A copy will also be sent to the patient.  Patient will RTO in 6 months from last Eligard injection.    2024: Mr. SHERWIN EDMOND presents today for a Lupron injection which he brought with him today. He is accompanied by his daughter in law, Nunu Edmond. He denies any new aches or pain. Denies any problems with urination. Nocturia x1-2. Denies urinary urgency, pushing, straining, burning, and gross hematuria. Pt is diabetic and gets some numbness in the fingertips. Has some numbness in the left toes. Pt reports he recently was hospitalized at [facility] for 8 days for cardiac reasons. Went to the hospital mainly because of dizziness. Has had a number of stents placed in the past. Underwent an angiogram in the hospital but reports they did not find anything. Pt is on aspirin 81 mg daily and eliquis.   Lupron 45 mg was administered today intramuscularly by my covering nurse, Jessica Denise RN.   The patient produced a urine sample which will be sent for urinalysis, urine cytology, and urine culture.   Blood work today included CMP, lactate dehydrogenase, PSA profile, testosterone free and total, Cystatin C with eGFR, SHBG, estrogen total, estrone, estradiol, A1CG, and alk phos,    Patient will RTO in 6 months for next Lupron injection.    Preparation, in person office visit, and coordination of care other than injection took 30 minutes.

## 2024-08-07 NOTE — REVIEW OF SYSTEMS
[Feeling Tired] : feeling tired [Chest Pain] : no chest pain [Constipation] : no constipation [Nocturia] : nocturia [Burning Sensation] : no burning sensation during urination [Initiating Urination Req. Strain] : initiating urination does not require straining [Hot Flashes] : no hot flashes [Negative] : Heme/Lymph

## 2024-08-07 NOTE — ASSESSMENT
[FreeTextEntry1] : Mr Edmond is an 83 year old man presented for follow up of prostate cancer. The patient underwent a pelvic lymph node dissection in . Metastatic prostate cancer was found in the lymph node. He was subsequently started androgen deprivation. The patient was maintained on Lupron Depot 45 mg for control of his prostate cancer which remains sensitive to hormonal manipulation. PSA undetectable. Last injection was 17. The patient was feeling good. No pain other than some neck mild neck pain. Urination satisfactory. The patient cited that he has not been taking his blood pressure medication consistently. He often skips his medication at night. The patient also reported occasional chest pain.  18: The patient returned for a Lupron injection. Patient noted he has not been emptying well. He noted he drinks one bottle of water a day. He noted he has on and off diarrhea. He noted his GI physician informed him that he has a high creatinine levels of 1.5 mg/dL. His previous creatinine on 18 was 1.10 mg/dL.  18: The patient returned for a Lupron injection. Noted his urination is satisfactory. Wakes up twice during the night to urinate. Patient denied dysuria, gross hematuria, urgency, or incontinence. PSA from 18 was undetectable. A1c from 18 was 8.0%.  19: The patient returned today for a Lupron injection. Complains that his urination has become uncontrollable since his last visit. PSA from 18 was undetectable. Notes that he was advised to discontinue use of Plavix and to start a trial of Eliquis. Diabetes is under control. 19: Patient presents today for a Lupron injection. Today he states that his urination is satisfactory. Patient denies dysuria, gross hematuria, urgency or incontinence. Nocturia x 2 is reported. He denies any bone pain. During his visit today it was advised by the medical assistant that his blood pressure was elevated.   2/10/20: Patient presents today for a Lupron injection. He states his urination is satisfactory. Patient denies dysuria, hematuria, urgency or incontinence. Pt denies chest pain. He admits to feeling some fatigue and occasional mild right posterior thigh pain which feels okay today. He denies any significant bone pain.   2020: Patient has history of metastatic prostate cancer controlled by androgen deprivation. patient has had some abnormal urine cytologies. Pt is maintained on Tamsulosin 0.4mg one capsule daily. With this, his urination is good. Does not wake excessive at night to urinate. No urgency or frequency. Does not strain to urinate or blood in urine. Hx of diabetes and takes insulin. He is maintained on aspirin 81mg daily and Eliquis 5mg daily. He gets Lupron Depot 45mg once every 6 months. He is here today for next injection. Has no new aches or pains. His appetite remains good. No back or flank pain. His PSA on 2/10/2020 was 0.02.  2021: Patient presents today for Lupron injection. Urination is good. Appetite is good. Wakes 2x at night to urinate. Denies urinary urgency, gross hematuria, dysuria, pushing or straining. Denies hot flashes with hormone treatment. No constipation or chest pain. Had stress test which was normal. Takes Eliquis. PSA on 20 was undetectable at <0.01. Feels well today and offers no new complaints.   2021: Patient presents today for Eligard injection. States he has been having difficulty with urination. States he has gained some weight despite exercising by walking one mile four days a week. Admits constipation but not taking any medications for relief.   2022: Patient presents today for Lupron injection for hx of Ca P. Denies gross hematuria, pushing/straining, urinary urgency. Nocturia 2 times per night.  The patient complains about constipation, but admits he does not drink adequate water, only about a bottle of water per day. His diabetes is managed , his recently HbA1C was 6.6.He complains for bilateral flank pain when he stands up sometimes. He walks on a treadmill 3-4 times per week for exercise. Patient has 3 children.   The patient produced a urine sample which will be sent for urinalysis, urine cytology, and urine culture.  Blood work today included BMP, CBC, alkaline phosphatase, PSA, and testosterone.  I recommend the patient take stool softeners, ex: Colace, 2 tablets in the morning and 2 in the afternoon, each dose taken with large glasses of water. I encouraged him to increase his fluid intake to about 4 bottles of water per day. Lupron injection given today. Prescription sent for next Lupron dose.  RTO in 6 months for follow up and Lupron injection.   08/10/2022:  presents today for a Lupron injection. He recently returned from vacation in [facility] for two weeks. He is well relaxed and looks good. His urination remains good. No longer takes tamsulosin. Nocturia x2. Denies urinary urgency, pushing, straining, gross hematuria, and burning. Does not eat spicy food that often. Denies constipation and chest pains. Has 3 stents. Is on Eliquis 5 mg BID. Pt had covid in May. Was fully vaccinated and boosted.   Lupron 45 mg was administered intramuscularly by my nurse, Fransisca Baez RN.   Reviewed laboratory work of 2022 which was for the most part very good. Creatinine was 1.64. If it creeps up any higher, might consider renal US. Pt and his wife state that they have been decreasing his meat consumption. The patient produced a urine sample which will be sent for urinalysis, urine cytology, and urine culture.  Blood work today included alkaline phosphatase, BMP, dihydrotestosterone, estradiol, estrogen total, LH, PSA, SHBG, and testosterone free and total. I requested that the patient have his blood work from  sent to my office.  Advised the patient to speak with his PCP about his ankle edema.  Patient will RTO in 6 months for his next injection. Will have a telehealth visit a few weeks prior and do his blood work at his nearest [facility] lab.Pt is good at using [employer].   01/10/2023: Mr. EDMOND presents today for a follow up audio only telehealth visit for which they gave permission for. The patient was located at home [address] [place] [address] and I was located in my office in [place], [place]. I reviewed lab work of 23 which revealed the creatinine being stable since 2021 and the renal U/S showed no hydronephrosis. Glucose was elevated at 188, BUN was 40, Creatinine was 1.47 and eGFR was 48. The ALK PHOS was normal at 69, PSA was 0.06 and seems to be slowly rising. However te rate of rising is not alarming but I will continue current therapy. The pt A1C was elevated at 7.9% and the eAG was 180. I would advise him to continue to work on improving diabetes management and consult with his PCP and if he ha sone endocrinologist. The UA revealed no UTI, RBC being 0 /HPF and Urine culture showed no growth. Te urine cytology was negative for high grade urothelial carcinoma and the specimen consists mainly of mature squamous epithelial cells and benign urothelial cells. However, glycosuria was present, glucose being 200 and Protein being 30 His testosterone was <2.5 with good testosterone suppression.  The US renal of 3/6/22 showed normal kidney US, despite androgen deprivation prostate remains large.  Patient reports today feeling fairly well with no bone pain. He denies straining, pushing, gross hematuria, or dysuria but has minimal urinal frequency and urgency, triggered by stepping into the shower and brushing his teeth. I suggested he urinate first wait a while then proceed to urinate again. We will continue with his Eligard 45 mg.  Clinical findings and Lab results were reviewed at length with the patient. Pt will RTO 2023 as he is scheduled for a repeat injection.   2023: Mr. SHERWIN EDMOND presents today for a Lupron Injection or hx of Ca P. Insurance company had the wrong  which has caused some complications. Today we will give him Eligard (luprolide)45 mg instead of Lupron explaining to him they work the same, delivery system is indifferent, but have different names. Hi wife reports urination is well. Nocturia 1-2 x and 3 - 4 x during the day. Pt denies urinary urgency, straining, pushing, gross hematuria, or dysuria.I informed him to tell his doctor that he no longer needs tamsulosin as the pt had prostate cancer, but his urination was fine without it and is still fine. He reports not to have taken any yet. I am not quite sure why his PCP has prescribed this but it  is not necessary at the moment.  He reports no new pain since last visit. He has some complications but nothing too unbearable.  He showed me his lab work which revealed some glucose in his urine, where as normally he does not. Back in  his a1C was 6.6 which crept up to 8.3 in 2019 as of January it  was 7.9 but I will like to see it  under 7. I informed him to take his medication to get this under control and to consult his PCP Anthony Vera. In addition the pt had a concern about his slight raise in PSA, but I assured him he does not need to worry as I will continue to monitor it. In the future we may have to change his medication but in the meantime we will continue his therapy.  Eligard 45 mg was administered intramuscularly by my nurse, Fransisca Baez RN.  Patient produced a urine sample today which will be sent for urinalysis, urine culture, and urine cytology. Blood work today included BMP, CBC, alkaline phosphatase, PSA, and testosterone.  We will continue with his Eligard 45 mg.  RTO in 6 months for follow up and Eligard injection.   2023: Mr. SHERWIN EDMOND presents today for an Eligard injection. Accompanied by his wife. Pt denies hot flashes. His wife reports that 1-2 months ago the patient had a rash that was on the skin of his arm and his eyelid. Went to [facility] and was given a cortisone shot. It went away and came back a few weeks later. Went to a dermatologist who did not see anything but provided him with some topical cream. When the rash returned, he went back to [facility] and was given another cortisone shot. Was advised to see an allergist. The pt reports feeling weak and tired often. He is anemic. Is taking some iron supplementation. Denies constipation. Has uncontrolled diabetes. Glucose from lab work ordered by the allergist revealed a glucose of 370. Creatinine was 1.6.   Eligard 45 mg was administered subcutaneously by me, Dr.Gary Reilly.  Reviewed and discussed lab work of 2023 ordered by his allergist. Counseled the pt on diabetes management. Pt strongly advised to see his PCP and/or endocrinologist to discuss further.  The patient produced a urine sample which will be sent for urinalysis, urine cytology, and urine culture.  Blood work today included BMP, alkaline phosphatase, serum protein electrophoresis, PSA, and testosterone.  Patient will RTO in 6 months for next Eligard injection, sooner if clinically indicated.   2024: Mr. SHERWIN EDMOND presents today for an Eligard injection. He is accompanied by his wife. He reports that his urination is fairly good. Wakes up at night due to worrying about other things but not because of his urination. His wife feels he needs some medication for depression. No new aches or pains except the knees.    Eligard 45 mg was administered subcutaneously by my nurse, Fransisca Baez RN.  The patient produced a urine sample which will be sent for urinalysis, urine cytology, and urine culture.  I offered to recommend a psychologist for the patient to discuss his worries and stresses. Deya Craven's information was provided to the patient's wife.  I provided him with the name and contact information for Dr.Daniel Rodriguez of nephrology.  Patient will RTO in 6 months for next Eligard injection, sooner if clinically indicated.    2024: Mr. SHERWIN EDMOND presents today for a follow up audio only telephone visit for which he gave permission for. The pt was located at home, [address] [place] [address], and I was located in my office in [place], [place]. Visit conducted mostly with the patient's wife. Patient had lab work done on 2024 at the time of the visit. Urinalysis was normal . Urine culture showed no significant growth. Urine cytology negative for high grade urothelial carcinoma. A1C was 6.9%. Cystatin C was normal at 1.99. eGFR by cystatin C was low at 29 ml/min. Creatinine was elevated at 1.52, eGFR low at 46 ml/min. Alk phos was normal. PSA is 0.08. Testosterone is well suppressed. Patient is a prior smoker, quit a very long time ago.   Reviewed and discussed lab work of 2024 in detail with the pt. 2023 eGFR as determined by Cystatin C was 30 mL/min per 1.73 m.  The estimate based on Cystatin C from 2024 is very close at 29 mL/min per 1.73 m.  I have spoken to patient and wife and they will see a nephrologist. Serum creatinine is 1.52 mg/dL on 2024.  This provides an estimated glomerular filtration rate of 46 mL/min per 1.73 m.  This is a bit better than 2023 when the serum creatinine was 1.76 with an EGFR of 38 mL/min per 1.73 m. Serum vitamin D 1, 25 dihydroxy level is elevated at 86.3 pg/mL.  Vitamin D 25-hydroxy is elevated at 31.0 ng/mL.  Patient should discontinue vitamin D supplementation and discuss with PCP next week. Hemoglobin is slightly decreased at 12.7 g/dL.  I would attribute this to his renal impairment. Hemoglobin A1c is 6.9% which is within the desired range. PSA has risen slightly since . I do not believe there is any cause for concern but we will continue to follow the PSA.   Patient's lab work will be sent to Dr.Kaushik Vera. A copy will also be sent to the patient.  Patient will RTO in 6 months from last Eligard injection.    2024: Mr. SHERWIN EDMOND presents today for a Lupron injection which he brought with him today. He is accompanied by his daughter in law, Nunu Edmond. He denies any new aches or pain. Denies any problems with urination. Nocturia x1-2. Denies urinary urgency, pushing, straining, burning, and gross hematuria. Pt is diabetic and gets some numbness in the fingertips. Has some numbness in the left toes. Pt reports he recently was hospitalized at [facility] for 8 days for cardiac reasons. Went to the hospital mainly because of dizziness. Has had a number of stents placed in the past. Underwent an angiogram in the hospital but reports they did not find anything. Pt is on aspirin 81 mg daily and eliquis.   Lupron 45 mg was administered today intramuscularly by my covering nurse, Jessica Denise RN.   The patient produced a urine sample which will be sent for urinalysis, urine cytology, and urine culture.   Blood work today included CMP, lactate dehydrogenase, PSA profile, testosterone free and total, Cystatin C with eGFR, SHBG, estrogen total, estrone, estradiol, A1CG, and alk phos,    Patient will RTO in 6 months for next Lupron injection.    Preparation, in person office visit, and coordination of care other than injection took 30 minutes.

## 2024-08-07 NOTE — HISTORY OF PRESENT ILLNESS
[FreeTextEntry1] : Mr Stanley is an 84 year old man presented for follow up of prostate cancer. The patient underwent a pelvic lymph node dissection in . Metastatic prostate cancer was found in the lymph node. He was subsequently started androgen deprivation. The patient was maintained on Lupron Depot 45 mg for control of his prostate cancer which remains sensitive to hormonal manipulation. PSA undetectable. Last injection was 17. The patient was feeling good. No pain other than some neck mild neck pain. Urination satisfactory. The patient cited that he has not been taking his blood pressure medication consistently. He often skips his medication at night. The patient also reported occasional chest pain.  18: The patient returned for a Lupron injection. Patient noted he has not been emptying well. He noted he drinks one bottle of water a day. He noted he has on and off diarrhea. He noted his GI physician informed him that he has a high creatinine levels of 1.5 mg/dL. His previous creatinine on 18 was 1.10 mg/dL.  18: The patient returned for a Lupron injection. Noted his urination is satisfactory. Wakes up twice during the night to urinate. Patient denied dysuria, gross hematuria, urgency, or incontinence. PSA from 18 was undetectable. A1c from 18 was 8.0%.  19: The patient returned today for a Lupron injection. Complains that his urination has become uncontrollable since his last visit. PSA from 18 was undetectable. Notes that he was advised to discontinue use of Plavix and to start a trial of Eliquis. Diabetes is under control. 19: Patient presents today for a Lupron injection. Today he states that his urination is satisfactory. Patient denies dysuria, gross hematuria, urgency or incontinence. Nocturia x 2 is reported. He denies any bone pain. During his visit today it was advised by the medical assistant that his blood pressure was elevated.   2/10/20: Patient presents today for a Lupron injection. He states his urination is satisfactory. Patient denies dysuria, hematuria, urgency or incontinence. Pt denies chest pain. He admits to feeling some fatigue and occasional mild right posterior thigh pain which feels okay today. He denies any significant bone pain.   2020: Patient has history of metastatic prostate cancer controlled by androgen deprivation. patient has had some abnormal urine cytologies. Pt is maintained on Tamsulosin 0.4mg one capsule daily. With this, his urination is good. Does not wake excessive at night to urinate. No urgency or frequency. Does not strain to urinate or blood in urine. Hx of diabetes and takes insulin. He is maintained on aspirin 81mg daily and Eliquis 5mg daily. He gets Lupron Depot 45mg once every 6 months. He is here today for next injection. Has no new aches or pains. His appetite remains good. No back or flank pain. His PSA on 2/10/2020 was 0.02.  2021: Patient presents today for Lupron injection. Urination is good. Appetite is good. Wakes 2x at night to urinate. Denies urinary urgency, gross hematuria, dysuria, pushing or straining. Denies hot flashes with hormone treatment. No constipation or chest pain. Had stress test which was normal. Takes Eliquis. PSA on 20 was undetectable at <0.01. Feels well today and offers no new complaints.   2021: Patient presents today for Eligard injection. States he has been having difficulty with urination. States he has gained some weight despite exercising by walking one mile four days a week. Admits constipation but not taking any medications for relief.   2022: Patient presents today for Lupron injection for hx of Ca P. Denies gross hematuria, pushing/straining, urinary urgency. Nocturia 2 times per night.  The patient complains about constipation, but admits he does not drink adequate water, only about a bottle of water per day. His diabetes is managed , his recently HbA1C was 6.6.He complains for bilateral flank pain when he stands up sometimes. He walks on a treadmill 3-4 times per week for exercise. Patient has 3 children and 5 grandchildren.   08/10/2022:  presents today for a Lupron injection. He recently returned from vacation in [facility] for two weeks. He is well relaxed and looks good. His urination remains good. No longer takes tamsulosin. Nocturia x2. Denies urinary urgency, pushing, straining, gross hematuria, and burning. Does not eat spicy food that often. Denies constipation and chest pains. Has 3 stents. Is on Eliquis 5 mg BID. Pt had covid in May. Was fully vaccinated and boosted.   01/10/2023: Mr. STANLEY presents today for a follow up audio only telehealth visit for which they gave permission for. The patient was located at home [address] [place] [address] and I was located in my office in [place], [place]. I reviewed lab work of 23 which revealed the creatinine being stable since 2021 and the renal U/S showed no hydronephrosis. Glucose was elevated at 188, BUN was 40, Creatinine was 1.47 and eGFR was 48. The ALK PHOS was normal at 69, PSA was 0.06 and seems to be slowly rising. However te rate of rising is not alarming but I will continue current therapy. The pt A1C was elevated at 7.9% and the eAG was 180. I would advise him to continue to work on improving diabetes management and consult with his PCP and if he ha sone endocrinologist. The UA revealed no UTI, RBC being 0/HPF and Urine culture showed no growth. The urine cytology was negative for high grade urothelial carcinoma and the specimen consists mainly of mature squamous epithelial cells and benign urothelial cells. However, glycosuria was present, glucose being 200 and Protein being 30 His testosterone was <2.5 with good testosterone suppression.  The US renal of 3/6/22 showed normal kidney US, despite androgen deprivation prostate remains enlarged.  Patient reports today feeling fairly well with no bone pain. He denies straining, pushing, gross hematuria, or dysuria but has minimal urinal frequency and urgency, triggered by stepping into the shower and brushing his teeth. I suggested he urinate first wait a while then proceed to urinate again. We will  continue with his Eligard 45 mg.   2023: Mr. SHERWIN STANLEY presents today for a Lupron Injection or hx of Ca P. Insurance company had the wrong  which has caused some complications. Today we will give him Eligard (luprolide)45 mg instead of Lupron explaining to him they work the same, delivery system is indifferent, but have different names. Hi wife reports urination is well. Nocturia 1-2 x and 3 - 4 x during the day. Pt denies urinary urgency, straining, pushing, gross hematuria, or dysuria.I informed him to tell his doctor that he no longer needs tamsulosin as the pt had prostate cancer, but his urination was fine without it and is still fine. He reports not to have taken any yet. I am not quite sure why his PCP has prescribed this but it  is not necessary at the moment.  He reports no new pain since last visit. He has some complications but nothing too unbearable.  He showed me his lab work which revealed some glucose in his urine, where as normally he does not. Back in  his a1C was 6.6 which crept up to 8.3 in 2019 as of January it  was 7.9 but I will like to see it  under 7. I informed him to take his medication to get this under control and to consult his PCP Mary Anne Vera. In addition the pt had a concern about his slight raise in PSA, but I assured him he does not need to worry as I will continue to monitor it. In the future we may have to change his medication but in the meantime we will continue his therapy.  2023: Mr. SHERWIN STANLEY presents today for an Eligard injection. Accompanied by his wife. Pt denies hot flashes. His wife reports that 1-2 months ago the patient had a rash that was on the skin of his arm and his eyelid. Went to [facility] and was given a cortisone shot. It went away and came back a few weeks later. Went to a dermatologist who did not see anything but provided him with some topical cream. When the rash returned, he went back to [facility] and was given another cortisone shot. Was advised to see an allergist. The pt reports feeling weak and tired often. He is anemic. Is taking some iron supplementation. Denies constipation. Has uncontrolled diabetes. Glucose from lab work ordered by the allergist revealed a glucose of 370. Creatinine was 1.6.   2024: Mr. SHERWIN STANLEY presents today for an Eligard injection. He is accompanied by his wife. He reports that his urination is fairly good. Wakes up at night due to worrying about other things but not because of his urination. His wife feels he needs some medication for depression. No new aches or pains except the knees.   2024: Mr. SHERWIN STANLEY presents today for a follow up audio only telephone visit for which he gave permission for. The pt was located at home, [address] [place] [address], and I was located in my office in [place], [place]. Visit conducted mostly with the patient's wife. Patient had lab work done on 2024 at the time of the visit. Urinalysis was normal . Urine culture showed no significant growth. Urine cytology negative for high grade urothelial carcinoma. A1C was 6.9%. Cystatin C was normal at 1.99. eGFR by cystatin C was low at 29 ml/min. Creatinine was elevated at 1.52, eGFR low at 46 ml/min. Alk phos was normal. PSA is 0.08. Testosterone is well suppressed. Patient is a prior smoker, quit a very long time ago.   2024: Mr. SHERWIN STANLEY presents today for a Lupron injection which he brought with him today. He is accompanied by his daughter in law, Nunu Stanley. He denies any new aches or pain. Denies any problems with urination. Nocturia x1-2. Denies urinary urgency, pushing, straining, burning, and gross hematuria. Pt is diabetic and gets some numbness in the fingertips. Has some numbness in the left toes. Pt reports he recently was hospitalized at [facility] for 8 days for cardiac reasons. Went to the hospital mainly because of dizziness. Has had a number of stents placed in the past. Underwent an angiogram in the hospital but reports they did not find anything. Pt is on aspirin 81 mg daily and eliquis.

## 2024-09-23 ENCOUNTER — INPATIENT (INPATIENT)
Facility: HOSPITAL | Age: 84
LOS: 7 days | Discharge: HOME | DRG: 948 | End: 2024-10-01
Attending: INTERNAL MEDICINE | Admitting: HOSPITALIST
Payer: COMMERCIAL

## 2024-09-23 VITALS
RESPIRATION RATE: 16 BRPM | WEIGHT: 134.92 LBS | HEIGHT: 66 IN | DIASTOLIC BLOOD PRESSURE: 70 MMHG | OXYGEN SATURATION: 98 % | SYSTOLIC BLOOD PRESSURE: 115 MMHG | HEART RATE: 63 BPM | TEMPERATURE: 98 F

## 2024-09-23 DIAGNOSIS — R53.1 WEAKNESS: ICD-10-CM

## 2024-09-23 LAB
ADD ON TEST-SPECIMEN IN LAB: SIGNIFICANT CHANGE UP
ALBUMIN SERPL ELPH-MCNC: 3.6 G/DL — SIGNIFICANT CHANGE UP (ref 3.3–5)
ALP SERPL-CCNC: 73 U/L — SIGNIFICANT CHANGE UP (ref 40–120)
ALT FLD-CCNC: 12 U/L — SIGNIFICANT CHANGE UP (ref 10–45)
ANION GAP SERPL CALC-SCNC: 10 MMOL/L — SIGNIFICANT CHANGE UP (ref 5–17)
AST SERPL-CCNC: 16 U/L — SIGNIFICANT CHANGE UP (ref 10–40)
BILIRUB SERPL-MCNC: 0.4 MG/DL — SIGNIFICANT CHANGE UP (ref 0.2–1.2)
BUN SERPL-MCNC: 36 MG/DL — HIGH (ref 7–23)
CALCIUM SERPL-MCNC: 9.7 MG/DL — SIGNIFICANT CHANGE UP (ref 8.4–10.5)
CHLORIDE SERPL-SCNC: 103 MMOL/L — SIGNIFICANT CHANGE UP (ref 96–108)
CO2 SERPL-SCNC: 21 MMOL/L — LOW (ref 22–31)
CREAT SERPL-MCNC: 1.58 MG/DL — HIGH (ref 0.5–1.3)
EGFR: 43 ML/MIN/1.73M2 — LOW
GLUCOSE SERPL-MCNC: 137 MG/DL — HIGH (ref 70–99)
HCT VFR BLD CALC: 35.6 % — LOW (ref 39–50)
HGB BLD-MCNC: 10.6 G/DL — LOW (ref 13–17)
MAGNESIUM SERPL-MCNC: 2.4 MG/DL — SIGNIFICANT CHANGE UP (ref 1.6–2.6)
MCHC RBC-ENTMCNC: 24.2 PG — LOW (ref 27–34)
MCHC RBC-ENTMCNC: 29.8 GM/DL — LOW (ref 32–36)
MCV RBC AUTO: 81.3 FL — SIGNIFICANT CHANGE UP (ref 80–100)
NRBC # BLD: 0 /100 WBCS — SIGNIFICANT CHANGE UP (ref 0–0)
PHOSPHATE SERPL-MCNC: 3.5 MG/DL — SIGNIFICANT CHANGE UP (ref 2.5–4.5)
PLATELET # BLD AUTO: 301 K/UL — SIGNIFICANT CHANGE UP (ref 150–400)
POTASSIUM SERPL-MCNC: 4.5 MMOL/L — SIGNIFICANT CHANGE UP (ref 3.5–5.3)
POTASSIUM SERPL-SCNC: 4.5 MMOL/L — SIGNIFICANT CHANGE UP (ref 3.5–5.3)
PROT SERPL-MCNC: 7.3 G/DL — SIGNIFICANT CHANGE UP (ref 6–8.3)
RBC # BLD: 4.38 M/UL — SIGNIFICANT CHANGE UP (ref 4.2–5.8)
RBC # FLD: 15.8 % — HIGH (ref 10.3–14.5)
SODIUM SERPL-SCNC: 134 MMOL/L — LOW (ref 135–145)
TROPONIN T, HIGH SENSITIVITY RESULT: 33 NG/L — SIGNIFICANT CHANGE UP (ref 0–51)
WBC # BLD: 8.14 K/UL — SIGNIFICANT CHANGE UP (ref 3.8–10.5)
WBC # FLD AUTO: 8.14 K/UL — SIGNIFICANT CHANGE UP (ref 3.8–10.5)

## 2024-09-23 PROCEDURE — 71045 X-RAY EXAM CHEST 1 VIEW: CPT | Mod: 26

## 2024-09-23 PROCEDURE — 99285 EMERGENCY DEPT VISIT HI MDM: CPT

## 2024-09-23 NOTE — ED ADULT NURSE NOTE - OBJECTIVE STATEMENT
83YO with pmhx of DM, HTN, HLD BIBEMS from [facility] with c/o general malaise. pt c/o multiple days of dyspnea on excretion, at rest pt denies SOB. Pt endorses chest pain/discomfort mid chest area. pt is A&Ox4, respirations are even and nonlabored. pt denies fevers, chills, n/v/d. Pt placed in position of comfort. Pt educated on call bell system and provided call bell. Bed in lowest position, wheels locked, appropriate side rails raised. Pt denies needs at this time.

## 2024-09-23 NOTE — ED PROVIDER NOTE - PHYSICAL EXAMINATION
T(C): 36.9 (09-23-24 @ 21:35), Max: 36.9 (09-23-24 @ 20:54)  HR: 89 (09-23-24 @ 21:35) (63 - 89)  BP: 103/56 (09-23-24 @ 21:35) (103/56 - 115/70)  RR: 13 (09-23-24 @ 21:35) (13 - 16)  SpO2: 98% (09-23-24 @ 21:35) (98% - 98%)    CONSTITUTIONAL: Well groomed, no apparent distress  EYES: PERRLA and symmetric, EOMI, No conjunctival or scleral injection, non-icteric  ENMT: Oral mucosa with moist membranes. Normal dentition; no pharyngeal injection or exudates  RESP: No respiratory distress, no use of accessory muscles; CTA b/l, no WRR  CV: RRR, +S1S2, no MRG; no JVD; no peripheral edema  MSK: Pulses intact bilaterally to lower extremities   GI: Soft, NT, ND, no rebound, no guarding; no palpable masses; no hepatosplenomegaly; no hernia palpated  SKIN: No rashes or ulcers noted; no subcutaneous nodules or induration palpable  NEURO: Moving all 4 extremities. Strength 5/5.   PSYCH: Appropriate insight/judgment; A+O x 3, mood and affect appropriate, recent/remote memory intact

## 2024-09-23 NOTE — ED PROVIDER NOTE - CLINICAL SUMMARY MEDICAL DECISION MAKING FREE TEXT BOX
Patient with recent admission for dizziness and abnormal stress testing. Now with generalized weakness, chest pain, difficulty performing ADLs and IADLs. Workup thusfar negative. Patient warrants further diagnostics for weakness, in consideration for further cardiac workup. Patient admitted to medicine

## 2024-09-23 NOTE — ED PROVIDER NOTE - NS ED ROS FT
REVIEW OF SYSTEMS:    CONSTITUTIONAL: No, fevers or chills. (+) weakness  EYES/ENT: No visual changes;  No vertigo or throat pain   NECK: No pain or stiffness  RESPIRATORY: No cough, wheezing, hemoptysis; No shortness of breath  CARDIOVASCULAR: (+) chest pain   GASTROINTESTINAL: No abdominal or epigastric pain. No nausea, vomiting, or hematemesis; No diarrhea or constipation. No melena or hematochezia.  GENITOURINARY: No dysuria, frequency or hematuria  NEUROLOGICAL: No numbness or weakness  SKIN: No itching, rashes  MSK: (+) LEG PAIN

## 2024-09-23 NOTE — ED PROVIDER NOTE - TOBACCO USE
Unknown if ever smoked Rhofade Pregnancy And Lactation Text: This medication has not been assigned a Pregnancy Risk Category. It is unknown if the medication is excreted in breast milk.

## 2024-09-23 NOTE — ED PROVIDER NOTE - OBJECTIVE STATEMENT
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 Followed up with his PCP (follows up with [facility]) and was recommended to see a surgeon. Patient states he had "surgery on both his legs." Unsure what kind of surgery, possibly stenting of blood vessels. Since surgery, notably more tired when walking. No apparent pain in legs. Mentions pain in his chest described as a tiredness when he walks that then travels through his whole body. This tiredness subsides when resting. No shortness of breath on lying down. Has a 1 story house he is able to traverse. States he can not walk a city block due to how tired he becomes. No syncope, dizziness, vision changes, fevers, abdominal pain, diarrhea, melena, hematochezia. Has been eating his meals completely. Recommended by son to be evaluated in the ED. 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 Followed up with his PCP (follows up with [facility]) and was recommended to see a surgeon. Patient states he had "surgery on both his legs." Unsure what kind of surgery, possibly stenting of blood vessels. Since surgery, notably more tired when walking. No apparent pain in legs. Mentions pain in his chest described as a tiredness when he walks that then travels through his whole body. This tiredness subsides when resting. No shortness of breath on lying down. Has a 1 story house he is able to traverse. States he can not walk a city block due to how tired he becomes. No syncope, dizziness, vision changes, fevers, abdominal pain, diarrhea, melena, hematochezia. Has been eating his meals completely. Recommended by son to be evaluated in the ED.    [place] male presents with shortness of breath with mild exertion worsening over the past 2 days.  no chest pain but feels very out of breath with ADLs like showering

## 2024-09-24 DIAGNOSIS — C61 MALIGNANT NEOPLASM OF PROSTATE: ICD-10-CM

## 2024-09-24 DIAGNOSIS — I48.0 PAROXYSMAL ATRIAL FIBRILLATION: ICD-10-CM

## 2024-09-24 DIAGNOSIS — N18.30 CHRONIC KIDNEY DISEASE, STAGE 3 UNSPECIFIED: ICD-10-CM

## 2024-09-24 DIAGNOSIS — I25.10 ATHEROSCLEROTIC HEART DISEASE OF NATIVE CORONARY ARTERY WITHOUT ANGINA PECTORIS: ICD-10-CM

## 2024-09-24 DIAGNOSIS — E78.5 HYPERLIPIDEMIA, UNSPECIFIED: ICD-10-CM

## 2024-09-24 DIAGNOSIS — I73.9 PERIPHERAL VASCULAR DISEASE, UNSPECIFIED: ICD-10-CM

## 2024-09-24 DIAGNOSIS — I10 ESSENTIAL (PRIMARY) HYPERTENSION: ICD-10-CM

## 2024-09-24 DIAGNOSIS — Z29.9 ENCOUNTER FOR PROPHYLACTIC MEASURES, UNSPECIFIED: ICD-10-CM

## 2024-09-24 DIAGNOSIS — I50.32 CHRONIC DIASTOLIC (CONGESTIVE) HEART FAILURE: ICD-10-CM

## 2024-09-24 DIAGNOSIS — E11.9 TYPE 2 DIABETES MELLITUS WITHOUT COMPLICATIONS: ICD-10-CM

## 2024-09-24 LAB
A1C WITH ESTIMATED AVERAGE GLUCOSE RESULT: 7.6 % — HIGH (ref 4–5.6)
ANION GAP SERPL CALC-SCNC: 9 MMOL/L — SIGNIFICANT CHANGE UP (ref 5–17)
APPEARANCE UR: CLEAR — SIGNIFICANT CHANGE UP
APTT BLD: 29 SEC — SIGNIFICANT CHANGE UP (ref 24.5–35.6)
BASOPHILS # BLD AUTO: 0.04 K/UL — SIGNIFICANT CHANGE UP (ref 0–0.2)
BASOPHILS NFR BLD AUTO: 0.6 % — SIGNIFICANT CHANGE UP (ref 0–2)
BILIRUB UR-MCNC: NEGATIVE — SIGNIFICANT CHANGE UP
BUN SERPL-MCNC: 35 MG/DL — HIGH (ref 7–23)
CALCIUM SERPL-MCNC: 9.8 MG/DL — SIGNIFICANT CHANGE UP (ref 8.4–10.5)
CHLORIDE SERPL-SCNC: 103 MMOL/L — SIGNIFICANT CHANGE UP (ref 96–108)
CK MB CFR SERPL CALC: 2.6 NG/ML — SIGNIFICANT CHANGE UP (ref 0–6.7)
CK SERPL-CCNC: 71 U/L — SIGNIFICANT CHANGE UP (ref 30–200)
CO2 SERPL-SCNC: 22 MMOL/L — SIGNIFICANT CHANGE UP (ref 22–31)
COLOR SPEC: YELLOW — SIGNIFICANT CHANGE UP
CREAT ?TM UR-MCNC: 47 MG/DL — SIGNIFICANT CHANGE UP
CREAT SERPL-MCNC: 1.67 MG/DL — HIGH (ref 0.5–1.3)
DIFF PNL FLD: NEGATIVE — SIGNIFICANT CHANGE UP
EGFR: 41 ML/MIN/1.73M2 — LOW
EOSINOPHIL # BLD AUTO: 0.28 K/UL — SIGNIFICANT CHANGE UP (ref 0–0.5)
EOSINOPHIL NFR BLD AUTO: 4 % — SIGNIFICANT CHANGE UP (ref 0–6)
ESTIMATED AVERAGE GLUCOSE: 171 MG/DL — HIGH (ref 68–114)
GLUCOSE BLDC GLUCOMTR-MCNC: 177 MG/DL — HIGH (ref 70–99)
GLUCOSE BLDC GLUCOMTR-MCNC: 204 MG/DL — HIGH (ref 70–99)
GLUCOSE BLDC GLUCOMTR-MCNC: 224 MG/DL — HIGH (ref 70–99)
GLUCOSE BLDC GLUCOMTR-MCNC: 225 MG/DL — HIGH (ref 70–99)
GLUCOSE SERPL-MCNC: 260 MG/DL — HIGH (ref 70–99)
GLUCOSE UR QL: >=1000 MG/DL
HCT VFR BLD CALC: 34.8 % — LOW (ref 39–50)
HGB BLD-MCNC: 10.5 G/DL — LOW (ref 13–17)
IMM GRANULOCYTES NFR BLD AUTO: 0.3 % — SIGNIFICANT CHANGE UP (ref 0–0.9)
INR BLD: 1.07 RATIO — SIGNIFICANT CHANGE UP (ref 0.85–1.18)
KETONES UR-MCNC: NEGATIVE MG/DL — SIGNIFICANT CHANGE UP
LEUKOCYTE ESTERASE UR-ACNC: NEGATIVE — SIGNIFICANT CHANGE UP
LYMPHOCYTES # BLD AUTO: 2.5 K/UL — SIGNIFICANT CHANGE UP (ref 1–3.3)
LYMPHOCYTES # BLD AUTO: 35.4 % — SIGNIFICANT CHANGE UP (ref 13–44)
MAGNESIUM SERPL-MCNC: 2.4 MG/DL — SIGNIFICANT CHANGE UP (ref 1.6–2.6)
MCHC RBC-ENTMCNC: 25.1 PG — LOW (ref 27–34)
MCHC RBC-ENTMCNC: 30.2 GM/DL — LOW (ref 32–36)
MCV RBC AUTO: 83.3 FL — SIGNIFICANT CHANGE UP (ref 80–100)
MONOCYTES # BLD AUTO: 0.56 K/UL — SIGNIFICANT CHANGE UP (ref 0–0.9)
MONOCYTES NFR BLD AUTO: 7.9 % — SIGNIFICANT CHANGE UP (ref 2–14)
NEUTROPHILS # BLD AUTO: 3.67 K/UL — SIGNIFICANT CHANGE UP (ref 1.8–7.4)
NEUTROPHILS NFR BLD AUTO: 51.8 % — SIGNIFICANT CHANGE UP (ref 43–77)
NITRITE UR-MCNC: NEGATIVE — SIGNIFICANT CHANGE UP
NRBC # BLD: 0 /100 WBCS — SIGNIFICANT CHANGE UP (ref 0–0)
OSMOLALITY UR: 536 MOS/KG — SIGNIFICANT CHANGE UP (ref 300–900)
PH UR: 5 — SIGNIFICANT CHANGE UP (ref 5–8)
PHOSPHATE SERPL-MCNC: 3.9 MG/DL — SIGNIFICANT CHANGE UP (ref 2.5–4.5)
PLATELET # BLD AUTO: 270 K/UL — SIGNIFICANT CHANGE UP (ref 150–400)
POTASSIUM SERPL-MCNC: 4.7 MMOL/L — SIGNIFICANT CHANGE UP (ref 3.5–5.3)
POTASSIUM SERPL-SCNC: 4.7 MMOL/L — SIGNIFICANT CHANGE UP (ref 3.5–5.3)
POTASSIUM UR-SCNC: 28 MMOL/L — SIGNIFICANT CHANGE UP
PROT ?TM UR-MCNC: 14 MG/DL — HIGH (ref 0–12)
PROT UR-MCNC: SIGNIFICANT CHANGE UP MG/DL
PROT/CREAT UR-RTO: 0.3 RATIO — HIGH (ref 0–0.2)
PROTHROM AB SERPL-ACNC: 11.8 SEC — SIGNIFICANT CHANGE UP (ref 9.5–13)
RBC # BLD: 4.18 M/UL — LOW (ref 4.2–5.8)
RBC # FLD: 15.7 % — HIGH (ref 10.3–14.5)
SODIUM SERPL-SCNC: 134 MMOL/L — LOW (ref 135–145)
SODIUM UR-SCNC: 84 MMOL/L — SIGNIFICANT CHANGE UP
SP GR SPEC: 1.02 — SIGNIFICANT CHANGE UP (ref 1–1.03)
TROPONIN T, HIGH SENSITIVITY RESULT: 29 NG/L — SIGNIFICANT CHANGE UP (ref 0–51)
TSH SERPL-MCNC: 0.84 UIU/ML — SIGNIFICANT CHANGE UP (ref 0.27–4.2)
UROBILINOGEN FLD QL: 0.2 MG/DL — SIGNIFICANT CHANGE UP (ref 0.2–1)
UUN UR-MCNC: 541 MG/DL — SIGNIFICANT CHANGE UP
WBC # BLD: 7.07 K/UL — SIGNIFICANT CHANGE UP (ref 3.8–10.5)
WBC # FLD AUTO: 7.07 K/UL — SIGNIFICANT CHANGE UP (ref 3.8–10.5)

## 2024-09-24 PROCEDURE — 99223 1ST HOSP IP/OBS HIGH 75: CPT

## 2024-09-24 RX ORDER — SODIUM CHLORIDE IRRIG SOLUTION 0.9 %
1000 SOLUTION, IRRIGATION IRRIGATION
Refills: 0 | Status: DISCONTINUED | OUTPATIENT
Start: 2024-09-24 | End: 2024-10-01

## 2024-09-24 RX ORDER — PANTOPRAZOLE SODIUM 40 MG/1
40 TABLET, DELAYED RELEASE ORAL
Refills: 0 | Status: DISCONTINUED | OUTPATIENT
Start: 2024-09-24 | End: 2024-10-01

## 2024-09-24 RX ORDER — ALCOHOL ANTISEPTIC PADS
15 PADS, MEDICATED (EA) TOPICAL ONCE
Refills: 0 | Status: DISCONTINUED | OUTPATIENT
Start: 2024-09-24 | End: 2024-10-01

## 2024-09-24 RX ORDER — ALCOHOL ANTISEPTIC PADS
25 PADS, MEDICATED (EA) TOPICAL ONCE
Refills: 0 | Status: DISCONTINUED | OUTPATIENT
Start: 2024-09-24 | End: 2024-10-01

## 2024-09-24 RX ORDER — ROSUVASTATIN CALCIUM 20 MG/1
1 TABLET, COATED ORAL
Refills: 0 | DISCHARGE

## 2024-09-24 RX ORDER — MULTI VITAMIN/MINERAL SUPPLEMENT WITH ASCORBIC ACID, NIACIN, PYRIDOXINE, PANTOTHENIC ACID, FOLIC ACID, RIBOFLAVIN, THIAMIN, BIOTIN, COBALAMIN AND ZINC. 60; 20; 12.5; 10; 10; 1.7; 1.5; 1; .3; .006 MG/1; MG/1; MG/1; MG/1; MG/1; MG/1; MG/1; MG/1; MG/1; MG/1
1 TABLET, COATED ORAL DAILY
Refills: 0 | Status: DISCONTINUED | OUTPATIENT
Start: 2024-09-24 | End: 2024-10-01

## 2024-09-24 RX ORDER — INSULIN LISPRO 100/ML
VIAL (ML) SUBCUTANEOUS
Refills: 0 | Status: DISCONTINUED | OUTPATIENT
Start: 2024-09-24 | End: 2024-10-01

## 2024-09-24 RX ORDER — METOPROLOL TARTRATE 50 MG
100 TABLET ORAL DAILY
Refills: 0 | Status: DISCONTINUED | OUTPATIENT
Start: 2024-09-24 | End: 2024-10-01

## 2024-09-24 RX ORDER — APIXABAN 5 MG/1
2.5 TABLET, FILM COATED ORAL EVERY 12 HOURS
Refills: 0 | Status: DISCONTINUED | OUTPATIENT
Start: 2024-09-24 | End: 2024-09-24

## 2024-09-24 RX ORDER — GLUCAGON INJECTION, SOLUTION 0.5 MG/.1ML
1 INJECTION, SOLUTION SUBCUTANEOUS ONCE
Refills: 0 | Status: DISCONTINUED | OUTPATIENT
Start: 2024-09-24 | End: 2024-10-01

## 2024-09-24 RX ORDER — INSULIN LISPRO 100/ML
3 VIAL (ML) SUBCUTANEOUS
Refills: 0 | Status: DISCONTINUED | OUTPATIENT
Start: 2024-09-24 | End: 2024-10-01

## 2024-09-24 RX ORDER — INSULIN GLARGINE 300 U/ML
15 INJECTION, SOLUTION SUBCUTANEOUS AT BEDTIME
Refills: 0 | Status: DISCONTINUED | OUTPATIENT
Start: 2024-09-24 | End: 2024-10-01

## 2024-09-24 RX ORDER — INSULIN LISPRO 100/ML
VIAL (ML) SUBCUTANEOUS AT BEDTIME
Refills: 0 | Status: DISCONTINUED | OUTPATIENT
Start: 2024-09-24 | End: 2024-10-01

## 2024-09-24 RX ORDER — ROSUVASTATIN CALCIUM 20 MG/1
40 TABLET, COATED ORAL AT BEDTIME
Refills: 0 | Status: DISCONTINUED | OUTPATIENT
Start: 2024-09-24 | End: 2024-10-01

## 2024-09-24 RX ORDER — INSULIN GLARGINE 300 U/ML
10 INJECTION, SOLUTION SUBCUTANEOUS AT BEDTIME
Refills: 0 | Status: DISCONTINUED | OUTPATIENT
Start: 2024-09-24 | End: 2024-09-24

## 2024-09-24 RX ORDER — METOPROLOL TARTRATE 50 MG
1 TABLET ORAL
Refills: 0 | DISCHARGE

## 2024-09-24 RX ORDER — ACETAMINOPHEN 325 MG
650 TABLET ORAL EVERY 6 HOURS
Refills: 0 | Status: DISCONTINUED | OUTPATIENT
Start: 2024-09-24 | End: 2024-10-01

## 2024-09-24 RX ORDER — ASPIRIN 325 MG
81 TABLET ORAL DAILY
Refills: 0 | Status: DISCONTINUED | OUTPATIENT
Start: 2024-09-24 | End: 2024-10-01

## 2024-09-24 RX ORDER — ALCOHOL ANTISEPTIC PADS
12.5 PADS, MEDICATED (EA) TOPICAL ONCE
Refills: 0 | Status: DISCONTINUED | OUTPATIENT
Start: 2024-09-24 | End: 2024-10-01

## 2024-09-24 RX ADMIN — Medication 3 UNIT(S): at 07:47

## 2024-09-24 RX ADMIN — MULTI VITAMIN/MINERAL SUPPLEMENT WITH ASCORBIC ACID, NIACIN, PYRIDOXINE, PANTOTHENIC ACID, FOLIC ACID, RIBOFLAVIN, THIAMIN, BIOTIN, COBALAMIN AND ZINC. 1 TABLET(S): 60; 20; 12.5; 10; 10; 1.7; 1.5; 1; .3; .006 TABLET, COATED ORAL at 11:42

## 2024-09-24 RX ADMIN — ROSUVASTATIN CALCIUM 40 MILLIGRAM(S): 20 TABLET, COATED ORAL at 22:04

## 2024-09-24 RX ADMIN — Medication 2: at 07:48

## 2024-09-24 RX ADMIN — APIXABAN 2.5 MILLIGRAM(S): 5 TABLET, FILM COATED ORAL at 05:38

## 2024-09-24 RX ADMIN — Medication 2: at 11:43

## 2024-09-24 RX ADMIN — Medication 3 UNIT(S): at 20:10

## 2024-09-24 RX ADMIN — Medication 81 MILLIGRAM(S): at 11:42

## 2024-09-24 RX ADMIN — PANTOPRAZOLE SODIUM 40 MILLIGRAM(S): 40 TABLET, DELAYED RELEASE ORAL at 07:47

## 2024-09-24 RX ADMIN — INSULIN GLARGINE 15 UNIT(S): 300 INJECTION, SOLUTION SUBCUTANEOUS at 22:04

## 2024-09-24 RX ADMIN — Medication 3 UNIT(S): at 11:43

## 2024-09-24 RX ADMIN — Medication 100 MILLIGRAM(S): at 05:37

## 2024-09-24 RX ADMIN — Medication 75 MILLIGRAM(S): at 11:42

## 2024-09-24 RX ADMIN — Medication 1: at 20:11

## 2024-09-24 NOTE — CONSULT NOTE ADULT - SUBJECTIVE AND OBJECTIVE BOX
DATE OF SERVICE: 09-24-24 @ 16:00    CHIEF COMPLAINT:Patient is a 84y old  Male who presents with a chief complaint of CP (24 Sep 2024 02:40)      HISTORY OF PRESENT ILLNESS:  Pt is an 84M w/ PMH of HTN, HLD, DM2, CAD s/p CABG, pAF on Eliquis, HFpEF, PAD, CKD, prostate CA w/ recent admission for dizziness found to have abnormal stress s/p cath w/ NO new stenting pw 3 weeks of dizziness, weakness and CP.    Reports doing well when laying down and not moving but upon standing and walking he feels pressure like non-radiating retrosternal 8/10 CP a/w SOB and fatigue that resolves upon sitting back down or laying down.     Otherwise denies any abd pain, N/V, constipation or diarrhea. No diaphoresis.    Of note, reports recent procedure outpt having "surgery on both logs" at [facility] now on ASA/Plavix and Eliquis.     In the ED upon entering patient's room he was noted to be in SVT to 180 as he was standing to urinate in urinal. Once seated in stretcher HR returned to 80s-90s. Symptoms did occur once before that as well when patient was up from his stretcher w/ events noted on tele.    (24 Sep 2024 02:40)      PAST MEDICAL & SURGICAL HISTORY:  Atherosclerosis of coronary artery  Coronary artery disease      Type 2 diabetes mellitus  Diabetes      Essential hypertension  Hypertension      Hyperlipidemia  Dyslipidemia      Malignant neoplasm of prostate      Other postprocedural status  secondary to prostate cancer      Status post aorto-coronary artery bypass graft  SVG-->LAD, SVG-->OMB, SVG-->PLS/PDA      Other postprocedural status  S/P colonoscopy              MEDICATIONS:  aspirin  chewable 81 milliGRAM(s) Oral daily  clopidogrel Tablet 75 milliGRAM(s) Oral daily  metoprolol succinate  milliGRAM(s) Oral daily        acetaminophen     Tablet .. 650 milliGRAM(s) Oral every 6 hours PRN    pantoprazole    Tablet 40 milliGRAM(s) Oral before breakfast    dextrose 50% Injectable 25 Gram(s) IV Push once  dextrose 50% Injectable 25 Gram(s) IV Push once  dextrose 50% Injectable 12.5 Gram(s) IV Push once  dextrose Oral Gel 15 Gram(s) Oral once PRN  glucagon  Injectable 1 milliGRAM(s) IntraMuscular once  insulin glargine Injectable (LANTUS) 15 Unit(s) SubCutaneous at bedtime  insulin lispro (ADMELOG) corrective regimen sliding scale   SubCutaneous at bedtime  insulin lispro (ADMELOG) corrective regimen sliding scale   SubCutaneous three times a day before meals  insulin lispro Injectable (ADMELOG) 3 Unit(s) SubCutaneous three times a day before meals  rosuvastatin 40 milliGRAM(s) Oral at bedtime    dextrose 5%. 1000 milliLiter(s) IV Continuous <Continuous>  dextrose 5%. 1000 milliLiter(s) IV Continuous <Continuous>  multivitamin 1 Tablet(s) Oral daily      FAMILY HISTORY:  Family history of coronary artery disease (Father)        Non-contributory    SOCIAL HISTORY:    [ ] Tobacco  [ ] Drugs  [ ] Alcohol    Allergies    No Known Allergies    Intolerances    	    REVIEW OF SYSTEMS:  CONSTITUTIONAL: No fever  EYES: No eye pain, visual disturbances, or discharge  ENMT:  No difficulty hearing, tinnitus  NECK: No pain or stiffness  RESPIRATORY: No cough, wheezing,  CARDIOVASCULAR: + chest pain, no palpitations, passing out, + dizziness, or leg swelling  GASTROINTESTINAL:  No nausea, vomiting, diarrhea or constipation. No melena.  GENITOURINARY: No dysuria, hematuria  NEUROLOGICAL: No stroke like symptoms  SKIN: No burning or lesions   ENDOCRINE: No heat or cold intolerance  MUSCULOSKELETAL: No joint pain or swelling  PSYCHIATRIC: No  anxiety, mood swings  HEME/LYMPH: No bleeding gums  ALLERGY AND IMMUNOLOGIC: No hives or eczema	    All other ROS negative    PHYSICAL EXAM:  T(C): 36.7 (09-24-24 @ 15:51), Max: 36.9 (09-23-24 @ 20:54)  HR: 88 (09-24-24 @ 15:51) (63 - 89)  BP: 115/76 (09-24-24 @ 15:51) (103/56 - 138/77)  RR: 16 (09-24-24 @ 15:51) (13 - 16)  SpO2: 99% (09-24-24 @ 15:51) (98% - 100%)  Wt(kg): --  I&O's Summary      Appearance: Normal	  HEENT:   Normal oral mucosa, EOMI	  Cardiovascular:  S1 S2, No JVD,    Respiratory: Lungs clear to auscultation	  Psychiatry: Alert  Gastrointestinal:  Soft, Non-tender, + BS	  Skin: No rashes   Neurologic: Non-focal  Extremities:  No edema  Vascular: Peripheral pulses palpable    	    	  	  CARDIAC MARKERS:  Labs personally reviewed by me                                  10.5   7.07  )-----------( 270      ( 24 Sep 2024 06:06 )             34.8     09-24    134[L]  |  103  |  35[H]  ----------------------------<  260[H]  4.7   |  22  |  1.67[H]    Ca    9.8      24 Sep 2024 06:06  Phos  3.9     09-24  Mg     2.4     09-24    TPro  7.3  /  Alb  3.6  /  TBili  0.4  /  DBili  x   /  AST  16  /  ALT  12  /  AlkPhos  73  09-23          EKG: Personally reviewed by me - SR with 1st AVB twi I, II, aVL  Radiology: Personally reviewed by me -   < from: Xray Chest 1 View AP/PA (09.23.24 @ 22:50) >  IMPRESSION:  Clear lungs.    < end of copied text >  < from: TTE W or WO Ultrasound Enhancing Agent (07.16.24 @ 14:31) >   1. Left ventricular cavity is small. Left ventricular wall thickness is normal. Left ventricular systolic function is normal with an ejection fraction visually estimated at 65 to 70 %. There are no regional wall motion abnormalities seen.   2. Small right ventricular cavity size, with normal wall thickness, and normal right ventricular systolic function.   3. No pericardial effusion seen.   4. There is no evidence of a left ventricular thrombus.    < end of copied text >  < from: Cardiac Catheterization (07.22.24 @ 10:46) >  Patent grafts to LAD and OM and patent stents to RCA.  Medical therapy        Assessment /Plan:     84M w/ PMH of HTN, HLD, DM2, CAD s/p CABG, pAF on Eliquis, HFpEF, PAD, CKD, prostate CA, w/ recent admission for dizziness found to have abnormal stress s/p cath w/ NO new stenting pw 3 weeks of dizziness, weakness and CP a/w SOB w/ exertion.     Problem/Plan - 1:  ·  Problem: Chest pain  ·  Plan: Described as exertional CP  - h/o CABG and multiple PCI  - Cath 2022 - 3v CAD, Patent LIMA to LAD, SVG to OM1, Known occluded SVG to RCA   ------ 80% lesion in SVG to OM1, Distal RCA 80% calcified lesion, PCI of SVG to OM1   - s/p Cath 7/22/24 with patent grafts to LAD/OM, patent stents to RCA  - c/w ASA, Plavix. rosuvastatin   - Trop 33--> 29  - Now with episodes of SVT--> will plan for cardiac cath likely 9/25    Problem/Plan - 2:  ·  Problem: Chronic diastolic congestive heart failure.   ·  Plan:   - CXR with clear lungs  - Prior TTE with preserved EF; Repeat pending    Problem/Plan - 3:  ·  Problem: HTN (hypertension).   ·  Plan: - Continue Toprol 100mg PO daily  - (Previously on nifedipine 60mg)    Problem/Plan - 4:  ·  Problem: Paroxysmal A-fib/SVT  ·  Plan: - Continue metoprolol and Eliquis  ------- Hold Eliquis in anticipation for cath   - Monitor on tele      Differential diagnosis and plan of care discussed with patient after the evaluation. Counseling on diet, nutritional counseling, weight management, exercise and medication compliance was done.   Advanced care planning/advanced directives discussed with patient/family. DNR status including forceful chest compressions to attempt to restart the heart, ventilator support/artificial breathing, electric shock, artificial nutrition, health care proxy, Molst form all discussed with pt. Pt wishes to consider. More than fifteen minutes spent on discussing advanced directives.       aJn Pressley DO [facility]  Cardiovascular Medicine  [address] [place] [address] Dr, [address]  Available for call or text via [employer]  Office [phone]   DATE OF SERVICE: 09-24-24 @ 16:00    CHIEF COMPLAINT:Patient is a 84y old  Male who presents with a chief complaint of CP (24 Sep 2024 02:40)      HISTORY OF PRESENT ILLNESS:  Pt is an 84M w/ PMH of HTN, HLD, DM2, CAD s/p CABG, pAF on Eliquis, HFpEF, PAD, CKD, prostate CA w/ recent admission for dizziness found to have abnormal stress s/p cath w/ NO new stenting pw 3 weeks of dizziness, weakness and CP.    Reports doing well when laying down and not moving but upon standing and walking he feels pressure like non-radiating retrosternal 8/10 CP a/w SOB and fatigue that resolves upon sitting back down or laying down.     Otherwise denies any abd pain, N/V, constipation or diarrhea. No diaphoresis.    Of note, reports recent procedure outpt having "surgery on both logs" at [facility] now on ASA/Plavix and Eliquis.     In the ED upon entering patient's room he was noted to be in SVT to 180 as he was standing to urinate in urinal. Once seated in stretcher HR returned to 80s-90s. Symptoms did occur once before that as well when patient was up from his stretcher w/ events noted on tele.    (24 Sep 2024 02:40)      PAST MEDICAL & SURGICAL HISTORY:  Atherosclerosis of coronary artery  Coronary artery disease      Type 2 diabetes mellitus  Diabetes      Essential hypertension  Hypertension      Hyperlipidemia  Dyslipidemia      Malignant neoplasm of prostate      Other postprocedural status  secondary to prostate cancer      Status post aorto-coronary artery bypass graft  SVG-->LAD, SVG-->OMB, SVG-->PLS/PDA      Other postprocedural status  S/P colonoscopy              MEDICATIONS:  aspirin  chewable 81 milliGRAM(s) Oral daily  clopidogrel Tablet 75 milliGRAM(s) Oral daily  metoprolol succinate  milliGRAM(s) Oral daily        acetaminophen     Tablet .. 650 milliGRAM(s) Oral every 6 hours PRN    pantoprazole    Tablet 40 milliGRAM(s) Oral before breakfast    dextrose 50% Injectable 25 Gram(s) IV Push once  dextrose 50% Injectable 25 Gram(s) IV Push once  dextrose 50% Injectable 12.5 Gram(s) IV Push once  dextrose Oral Gel 15 Gram(s) Oral once PRN  glucagon  Injectable 1 milliGRAM(s) IntraMuscular once  insulin glargine Injectable (LANTUS) 15 Unit(s) SubCutaneous at bedtime  insulin lispro (ADMELOG) corrective regimen sliding scale   SubCutaneous at bedtime  insulin lispro (ADMELOG) corrective regimen sliding scale   SubCutaneous three times a day before meals  insulin lispro Injectable (ADMELOG) 3 Unit(s) SubCutaneous three times a day before meals  rosuvastatin 40 milliGRAM(s) Oral at bedtime    dextrose 5%. 1000 milliLiter(s) IV Continuous <Continuous>  dextrose 5%. 1000 milliLiter(s) IV Continuous <Continuous>  multivitamin 1 Tablet(s) Oral daily      FAMILY HISTORY:  Family history of coronary artery disease (Father)        Non-contributory    SOCIAL HISTORY:    [ ] not a smoker    Allergies    No Known Allergies    Intolerances    	    REVIEW OF SYSTEMS:  CONSTITUTIONAL: No fever  EYES: No eye pain, visual disturbances, or discharge  ENMT:  No difficulty hearing, tinnitus  NECK: No pain or stiffness  RESPIRATORY: No cough, wheezing,  CARDIOVASCULAR: + chest pain, no palpitations, passing out, + dizziness, or leg swelling  GASTROINTESTINAL:  No nausea, vomiting, diarrhea or constipation. No melena.  GENITOURINARY: No dysuria, hematuria  NEUROLOGICAL: No stroke like symptoms  SKIN: No burning or lesions   ENDOCRINE: No heat or cold intolerance  MUSCULOSKELETAL: No joint pain or swelling  PSYCHIATRIC: No  anxiety, mood swings  HEME/LYMPH: No bleeding gums  ALLERGY AND IMMUNOLOGIC: No hives or eczema	    All other ROS negative    PHYSICAL EXAM:  T(C): 36.7 (09-24-24 @ 15:51), Max: 36.9 (09-23-24 @ 20:54)  HR: 88 (09-24-24 @ 15:51) (63 - 89)  BP: 115/76 (09-24-24 @ 15:51) (103/56 - 138/77)  RR: 16 (09-24-24 @ 15:51) (13 - 16)  SpO2: 99% (09-24-24 @ 15:51) (98% - 100%)  Wt(kg): --  I&O's Summary      Appearance: Normal	  HEENT:   Normal oral mucosa, EOMI	  Cardiovascular:  S1 S2, No JVD,    Respiratory: Lungs clear to auscultation	  Psychiatry: Alert  Gastrointestinal:  Soft, Non-tender, + BS	  Skin: No rashes   Neurologic: Non-focal  Extremities:  No edema  Vascular: Peripheral pulses palpable    	    	  	  CARDIAC MARKERS:  Labs personally reviewed by me                                  10.5   7.07  )-----------( 270      ( 24 Sep 2024 06:06 )             34.8     09-24    134[L]  |  103  |  35[H]  ----------------------------<  260[H]  4.7   |  22  |  1.67[H]    Ca    9.8      24 Sep 2024 06:06  Phos  3.9     09-24  Mg     2.4     09-24    TPro  7.3  /  Alb  3.6  /  TBili  0.4  /  DBili  x   /  AST  16  /  ALT  12  /  AlkPhos  73  09-23          EKG: Personally reviewed by me - SR with 1st AVB twi I, II, aVL  Radiology: Personally reviewed by me -   < from: Xray Chest 1 View AP/PA (09.23.24 @ 22:50) >  IMPRESSION:  Clear lungs.    < end of copied text >  < from: TTE W or WO Ultrasound Enhancing Agent (07.16.24 @ 14:31) >   1. Left ventricular cavity is small. Left ventricular wall thickness is normal. Left ventricular systolic function is normal with an ejection fraction visually estimated at 65 to 70 %. There are no regional wall motion abnormalities seen.   2. Small right ventricular cavity size, with normal wall thickness, and normal right ventricular systolic function.   3. No pericardial effusion seen.   4. There is no evidence of a left ventricular thrombus.    < end of copied text >  < from: Cardiac Catheterization (07.22.24 @ 10:46) >  Patent grafts to LAD and OM and patent stents to RCA.  Medical therapy            Assessment /Plan:     84M w/ PMH of HTN, HLD, DM2, CAD s/p CABG, pAF on Eliquis, HFpEF, PAD, CKD, prostate CA, w/ recent admission for dizziness found to have abnormal stress s/p cath w/ NO new stenting pw 3 weeks of dizziness, weakness and CP a/w SOB w/ exertion.     Problem/Plan - 1:  ·  Problem: Chest pain  ·  Plan: Described as exertional CP  - h/o CABG and multiple PCI  - s/p Cath 7/22/24 with patent grafts to LAD/OM, patent stents to RCA  - c/w ASA, Rosuvastatin   - Trop 33--> 29  - Although with recent cath with patent grafts/coronaries now with typical exertional angina will need to proceed with repeat cardiac cath likely 9/25    Problem/Plan - 2:  ·  Problem: Chronic diastolic congestive heart failure.   ·  Plan:   - CXR with clear lungs  - Prior TTE with preserved EF; Repeat pending    Problem/Plan - 3:  ·  Problem: HTN (hypertension).   ·  Plan: - Continue Toprol 100mg PO daily  - (Previously on nifedipine 60mg)    Problem/Plan - 4:  ·  Problem: Paroxysmal A-fib/SVT  ·  Plan: - Continue metoprolol and Eliquis  ------- Hold Eliquis in anticipation for cath   - Monitor on tele                     Differential diagnosis and plan of care discussed with patient after the evaluation. Counseling on diet, nutritional counseling, weight management, exercise and medication compliance was done.   Advanced care planning/advanced directives discussed with patient/family. DNR status including forceful chest compressions to attempt to restart the heart, ventilator support/artificial breathing, electric shock, artificial nutrition, health care proxy, Molst form all discussed with pt. Pt wishes to consider. More than fifteen minutes spent on discussing advanced directives.       Jan Pressley DO [facility]  Cardiovascular Medicine  [address], [address]  Available for call or text via [employer] TE[employer]  Office [phone]

## 2024-09-24 NOTE — H&P ADULT - NSHPLABSRESULTS_GEN_ALL_CORE
LABS:                      10.6   8.14  )-----------( 301      ( 23 Sep 2024 21:55 )             35.6     09-23    134[L]  |  103  |  36[H]  ----------------------------<  137[H]  4.5   |  21[L]  |  1.58[H]    Ca    9.7      23 Sep 2024 21:55  Phos  3.5     09-23  Mg     2.4     09-23    TPro  7.3  /  Alb  3.6  /  TBili  0.4  /  DBili  x   /  AST  16  /  ALT  12  /  AlkPhos  73  09-23    LIVER FUNCTIONS - ( 23 Sep 2024 21:55 )  Alb: 3.6 g/dL / Pro: 7.3 g/dL / ALK PHOS: 73 U/L / ALT: 12 U/L / AST: 16 U/L / GGT: x           Urinalysis Basic - ( 23 Sep 2024 21:55 )  Color: x / Appearance: x / SG: x / pH: x  Gluc: 137 mg/dL / Ketone: x  / Bili: x / Urobili: x   Blood: x / Protein: x / Nitrite: x   Leuk Esterase: x / RBC: x / WBC x   Sq Epi: x / Non Sq Epi: x / Bacteria: x    IMAGING:  Xray Chest 1 View AP/PA (09.23.24 @ 22:50)  IMPRESSION:  - Clear lungs.  ******PRELIMINARY REPORT******

## 2024-09-24 NOTE — H&P ADULT - NSHPPHYSICALEXAM_GEN_ALL_CORE
Vital Signs Last 24 Hrs  T(C): 36.9 (23 Sep 2024 21:35), Max: 36.9 (23 Sep 2024 20:54)  T(F): 98.5 (23 Sep 2024 21:35), Max: 98.5 (23 Sep 2024 21:35)  HR: 89 (23 Sep 2024 21:35) (63 - 89)  BP: 103/56 (23 Sep 2024 21:35) (103/56 - 115/70)  BP(mean): 68 (23 Sep 2024 21:35) (68 - 68)  RR: 13 (23 Sep 2024 21:35) (13 - 16)  SpO2: 98% (23 Sep 2024 21:35) (98% - 98%)    Parameters below as of 23 Sep 2024 21:35  Patient On (Oxygen Delivery Method): room air    CONSTITUTIONAL: Well-groomed, in no apparent distress  EYES: No conjunctival or scleral injection, non-icteric;   ENMT: No external nasal lesions; MMM  NECK: Trachea midline without palpable neck mass; thyroid not enlarged and non-tender  RESPIRATORY: Breathing comfortably; no dullness to percussion; lungs CTA without wheeze/rhonchi/rales  CARDIOVASCULAR: +S1S2, RRR, no M/G/R; pedal pulses full and symmetric; no lower extremity edema  GASTROINTESTINAL: No palpable masses or tenderness, +BS throughout, no rebound/guarding; no hepatosplenomegaly; no hernia palpated  LYMPHATIC: No cervical LAD or tenderness  SKIN: No rashes or ulcers noted  NEUROLOGIC: CN II-XII intact; sensation intact in LEs b/l to light touch  PSYCHIATRIC: A&Ox3; mood and affect appropriate; appropriate insight and judgment

## 2024-09-24 NOTE — PATIENT PROFILE ADULT - FALL HARM RISK - HARM RISK INTERVENTIONS
Assistance with ambulation/Communicate Risk of Fall with Harm to all staff/Monitor for mental status changes/Monitor gait and stability/Reinforce activity limits and safety measures with patient and family/Reorient to person, place and time as needed/Review medications for side effects contributing to fall risk/Sit up slowly, dangle for a short time, stand at bedside before walking/Tailored Fall Risk Interventions/Toileting schedule using arm’s reach rule for commode and bathroom/Use of alarms - bed, chair and/or voice tab/Visual Cue: Yellow wristband and red socks/Bed in lowest position, wheels locked, appropriate side rails in place/Call bell, personal items and telephone in reach/Instruct patient to call for assistance before getting out of bed or chair/Non-slip footwear when patient is out of bed/[place] to call system/Physically safe environment - no spills, clutter or unnecessary equipment/Purposeful Proactive Rounding/Room/bathroom lighting operational, light cord in reach

## 2024-09-24 NOTE — H&P ADULT - PROBLEM SELECTOR PLAN 1
- Given symptoms of CP, SOB, dizziness upon standing and exerting minimal effort w/ noted SVT on tele while in the ED, symptoms likely i/s/o tachycardia  - ?POTS  - TTE in July w/ EF 65-70%   - Cath w/ no new stenting   - f/u orthostatics   - c/w Toprol 100mg qd for now  - Consider Midodrine  - Monitor on tele  - Cards eval in AM (Wendie from prior admission)

## 2024-09-24 NOTE — ED ADULT NURSE REASSESSMENT NOTE - NS ED NURSE REASSESS COMMENT FT1
On re-assessment pt currently appears comfortable resting in stretcher in room with appropriate side rails up for safety. pt is awake and alert, vital signs stable, breathing even and unlabored, NAD noted at this time. Plan of care discussed with pt. Pt RTM tele, remains on CM
Report received from break coverage RN. Admitting MD at bedside, repeat EKG shown to MD. Pt denies any chest pain or palpitations at this time, states he feels fine. Pt A&Ox4, respirations even and unlabored on room air, moving all extremities easily, is ambulatory with steady gait observed. Appropriate safety measures in place, pt placed in position of comfort. RTM tele on CM
pt had episode on monitor HR was in 180's. pt denies chest pain. EKG repeated. contacted admitting provider, and sent copy of EKG via teams. pt returned to sinus. pt states he was upset just found out he was being admitted and wife left they live far and not sure were to put away his hearing aids.
pt meal provided no complaints pending bed assignment
pt pending for a bed assignment
vss pt meal provided no complaints
On re-assessment pt currently appears comfortable resting in stretcher in room with appropriate side rails up for safety. pt is awake and alert, vital signs stable, breathing even and unlabored, NAD noted at this time. Plan of care discussed with pt. Pt RTM tele and remains on CM in NSR.

## 2024-09-24 NOTE — H&P ADULT - HISTORY OF PRESENT ILLNESS
Pt is an 84M w/ PMH of HTN, HLD, DM2, CAD s/p CABG, pAF on Eliquis, HFpEF, PAD, CKD, prostate CA w/ recent admission for dizziness found to have abnormal stress s/p cath w/ NO new stenting pw 3 weeks of dizziness, weakness and CP.    Reports doing well when laying down and not moving but upon standing and walking he feels pressure like non-radiating retrosternal 8/10 CP a/w SOB and fatigue that resolves upon sitting back down or laying down.     Otherwise denies any abd pain, N/V, constipation or diarrhea. No diaphoresis.    Of note, reports recent procedure outpt having "surgery on both logs" at [facility] now on ASA/Plavix and Eliquis.     In the ED upon entering patient's room he was noted to be in SVT to 180 as he was standing to urinate in urinal. Once seated in stretcher HR returned to 80s-90s. Symptoms did occur once before that as well when patient was up from his stretcher w/ events noted on tele.    Pt is an 84M w/ PMH of HTN, HLD, DM2, CAD s/p CABG, pAF on Eliquis, HFpEF, PAD, CKD, prostate CA w/ recent admission for dizziness found to have abnormal stress s/p cath w/ NO new stenting pw 3 weeks of dizziness, weakness and CP.    Reports doing well when laying down and not moving but upon standing and walking he feels pressure like non-radiating retrosternal 8/10 CP a/w SOB and fatigue that resolves upon sitting back down or laying down.     Otherwise denies any abd pain, N/V, constipation or diarrhea. No diaphoresis.    Of note, reports recent procedure outpt having "surgery on both logs" at [facility] now on ASA/Plavix and Eliquis.     In the ED upon entering patient's room he was noted to be in SVT to 180 as he was standing to urinate in urinal. Once seated in stretcher HR returned to 80s-90s. Symptoms did occur once before that as well when patient was up from his stretcher w/ events noted on tele.

## 2024-09-24 NOTE — H&P ADULT - PROBLEM SELECTOR PLAN 9
- Cr fluctuates w/ prior ~1.4  - Admission Cr of 1.58  - Monitor BUN/Cr daily  - Avoid nephrotoxic medications  - Renally dose all medications

## 2024-09-24 NOTE — H&P ADULT - PROBLEM SELECTOR PLAN 5
- f/u AM A1c  - Hold home oral hypoglycemics  - On home Lantus 20U qhs and Humalog 12U TID  - Start weight based w/ Lantus 10U and Admelog 3U for now and uptitrate as needed based on ISS needs  - Start MONICA  - Diabetic education  - CC diet

## 2024-09-25 DIAGNOSIS — R07.9 CHEST PAIN, UNSPECIFIED: ICD-10-CM

## 2024-09-25 LAB
ANION GAP SERPL CALC-SCNC: 16 MMOL/L — SIGNIFICANT CHANGE UP (ref 5–17)
BUN SERPL-MCNC: 34 MG/DL — HIGH (ref 7–23)
CALCIUM SERPL-MCNC: 10.2 MG/DL — SIGNIFICANT CHANGE UP (ref 8.4–10.5)
CHLORIDE SERPL-SCNC: 101 MMOL/L — SIGNIFICANT CHANGE UP (ref 96–108)
CO2 SERPL-SCNC: 19 MMOL/L — LOW (ref 22–31)
CREAT SERPL-MCNC: 1.5 MG/DL — HIGH (ref 0.5–1.3)
EGFR: 46 ML/MIN/1.73M2 — LOW
GLUCOSE BLDC GLUCOMTR-MCNC: 194 MG/DL — HIGH (ref 70–99)
GLUCOSE BLDC GLUCOMTR-MCNC: 194 MG/DL — HIGH (ref 70–99)
GLUCOSE BLDC GLUCOMTR-MCNC: 224 MG/DL — HIGH (ref 70–99)
GLUCOSE BLDC GLUCOMTR-MCNC: 309 MG/DL — HIGH (ref 70–99)
GLUCOSE SERPL-MCNC: 198 MG/DL — HIGH (ref 70–99)
HCT VFR BLD CALC: 43.6 % — SIGNIFICANT CHANGE UP (ref 39–50)
HGB BLD-MCNC: 12.8 G/DL — LOW (ref 13–17)
MCHC RBC-ENTMCNC: 24.6 PG — LOW (ref 27–34)
MCHC RBC-ENTMCNC: 29.4 GM/DL — LOW (ref 32–36)
MCV RBC AUTO: 83.7 FL — SIGNIFICANT CHANGE UP (ref 80–100)
NRBC # BLD: 0 /100 WBCS — SIGNIFICANT CHANGE UP (ref 0–0)
PLATELET # BLD AUTO: 319 K/UL — SIGNIFICANT CHANGE UP (ref 150–400)
POTASSIUM SERPL-MCNC: 4.3 MMOL/L — SIGNIFICANT CHANGE UP (ref 3.5–5.3)
POTASSIUM SERPL-SCNC: 4.3 MMOL/L — SIGNIFICANT CHANGE UP (ref 3.5–5.3)
RBC # BLD: 5.21 M/UL — SIGNIFICANT CHANGE UP (ref 4.2–5.8)
RBC # FLD: 15.8 % — HIGH (ref 10.3–14.5)
SODIUM SERPL-SCNC: 136 MMOL/L — SIGNIFICANT CHANGE UP (ref 135–145)
WBC # BLD: 9.21 K/UL — SIGNIFICANT CHANGE UP (ref 3.8–10.5)
WBC # FLD AUTO: 9.21 K/UL — SIGNIFICANT CHANGE UP (ref 3.8–10.5)

## 2024-09-25 PROCEDURE — 99233 SBSQ HOSP IP/OBS HIGH 50: CPT

## 2024-09-25 RX ORDER — SODIUM CHLORIDE 0.9 % (FLUSH) 0.9 %
1000 SYRINGE (ML) INJECTION
Refills: 0 | Status: DISCONTINUED | OUTPATIENT
Start: 2024-09-26 | End: 2024-09-26

## 2024-09-25 RX ORDER — SODIUM CHLORIDE 0.9 % (FLUSH) 0.9 %
1000 SYRINGE (ML) INJECTION
Refills: 0 | Status: DISCONTINUED | OUTPATIENT
Start: 2024-09-25 | End: 2024-09-25

## 2024-09-25 RX ORDER — SODIUM CHLORIDE 0.9 % (FLUSH) 0.9 %
1000 SYRINGE (ML) INJECTION
Refills: 0 | Status: COMPLETED | OUTPATIENT
Start: 2024-09-26 | End: 2024-09-26

## 2024-09-25 RX ADMIN — Medication 1: at 17:11

## 2024-09-25 RX ADMIN — Medication 3 UNIT(S): at 12:10

## 2024-09-25 RX ADMIN — Medication 81 MILLIGRAM(S): at 12:45

## 2024-09-25 RX ADMIN — Medication 3 UNIT(S): at 08:01

## 2024-09-25 RX ADMIN — Medication 2: at 12:09

## 2024-09-25 RX ADMIN — INSULIN GLARGINE 15 UNIT(S): 300 INJECTION, SOLUTION SUBCUTANEOUS at 21:40

## 2024-09-25 RX ADMIN — Medication 100 MILLIGRAM(S): at 05:24

## 2024-09-25 RX ADMIN — Medication 1: at 08:01

## 2024-09-25 RX ADMIN — Medication 60 MILLILITER(S): at 12:44

## 2024-09-25 RX ADMIN — Medication 75 MILLIGRAM(S): at 12:44

## 2024-09-25 RX ADMIN — MULTI VITAMIN/MINERAL SUPPLEMENT WITH ASCORBIC ACID, NIACIN, PYRIDOXINE, PANTOTHENIC ACID, FOLIC ACID, RIBOFLAVIN, THIAMIN, BIOTIN, COBALAMIN AND ZINC. 1 TABLET(S): 60; 20; 12.5; 10; 10; 1.7; 1.5; 1; .3; .006 TABLET, COATED ORAL at 12:44

## 2024-09-25 RX ADMIN — PANTOPRAZOLE SODIUM 40 MILLIGRAM(S): 40 TABLET, DELAYED RELEASE ORAL at 05:24

## 2024-09-25 RX ADMIN — Medication 3 UNIT(S): at 17:10

## 2024-09-25 RX ADMIN — Medication 2: at 21:42

## 2024-09-25 RX ADMIN — ROSUVASTATIN CALCIUM 40 MILLIGRAM(S): 20 TABLET, COATED ORAL at 21:40

## 2024-09-25 NOTE — CONSULT NOTE ADULT - SUBJECTIVE AND OBJECTIVE BOX
[facility]    Dr. Shine Navarro     Office [phone] (9 am to 5 pm)  Service : [phone] ( 5pm to 9 am)          RENAL INITIAL CONSULT NOTE: DATE OF SERVICE 09-25-24 @ 11:06    HPI:  Pt is an 84M w/ PMH of HTN, HLD, DM2, CAD s/p CABG, pAF on Eliquis, HFpEF, PAD, CKD, prostate CA w/ recent admission for dizziness found to have abnormal stress s/p cath w/ NO new stenting pw 3 weeks of dizziness, weakness and CP.    Reports doing well when laying down and not moving but upon standing and walking he feels pressure like non-radiating retrosternal 8/10 CP a/w SOB and fatigue that resolves upon sitting back down or laying down.     Otherwise denies any abd pain, N/V, constipation or diarrhea. No diaphoresis.    Of note, reports recent procedure outpt having "surgery on both logs" at [facility] now on ASA/Plavix and Eliquis.     In the ED upon entering patient's room he was noted to be in SVT to 180 as he was standing to urinate in urinal. Once seated in stretcher HR returned to 80s-90s. Symptoms did occur once before that as well when patient was up from his stretcher w/ events noted on tele.    (24 Sep 2024 02:40)    Nephrology on board for CKD and cath optimization     Allergies:  No Known Allergies      PAST MEDICAL & SURGICAL HISTORY:  Atherosclerosis of coronary artery  Coronary artery disease      Type 2 diabetes mellitus  Diabetes      Essential hypertension  Hypertension      Hyperlipidemia  Dyslipidemia      Malignant neoplasm of prostate      Other postprocedural status  secondary to prostate cancer      Status post aorto-coronary artery bypass graft  SVG-->LAD, SVG-->OMB, SVG-->PLS/PDA      Other postprocedural status  S/P colonoscopy          Home Medications Reviewed    Hospital Medications:   MEDICATIONS  (STANDING):  aspirin  chewable 81 milliGRAM(s) Oral daily  clopidogrel Tablet 75 milliGRAM(s) Oral daily  dextrose 5%. 1000 milliLiter(s) (100 mL/Hr) IV Continuous <Continuous>  dextrose 5%. 1000 milliLiter(s) (50 mL/Hr) IV Continuous <Continuous>  dextrose 50% Injectable 25 Gram(s) IV Push once  dextrose 50% Injectable 25 Gram(s) IV Push once  dextrose 50% Injectable 12.5 Gram(s) IV Push once  glucagon  Injectable 1 milliGRAM(s) IntraMuscular once  insulin glargine Injectable (LANTUS) 15 Unit(s) SubCutaneous at bedtime  insulin lispro (ADMELOG) corrective regimen sliding scale   SubCutaneous three times a day before meals  insulin lispro (ADMELOG) corrective regimen sliding scale   SubCutaneous at bedtime  insulin lispro Injectable (ADMELOG) 3 Unit(s) SubCutaneous three times a day before meals  metoprolol succinate  milliGRAM(s) Oral daily  multivitamin 1 Tablet(s) Oral daily  pantoprazole    Tablet 40 milliGRAM(s) Oral before breakfast  rosuvastatin 40 milliGRAM(s) Oral at bedtime  sodium chloride 0.9%. 1000 milliLiter(s) (60 mL/Hr) IV Continuous <Continuous>  sodium chloride 0.9%. 1000 milliLiter(s) (60 mL/Hr) IV Continuous <Continuous>      SOCIAL HISTORY:  Denies ETOh, Smoking,     FAMILY HISTORY:  Family history of coronary artery disease (Father)        REVIEW OF SYSTEMS:  CONSTITUTIONAL: No weakness, fevers or chills  EYES/ENT: No visual changes;  No vertigo or throat pain   NECK: No pain or stiffness  RESPIRATORY: No cough, wheezing, hemoptysis; No shortness of breath  CARDIOVASCULAR: No chest pain or palpitations.  GASTROINTESTINAL: No abdominal or epigastric pain. No nausea, vomiting, or hematemesis; No diarrhea or constipation. No melena or hematochezia.  GENITOURINARY: No dysuria, frequency, foamy urine, urinary urgency, incontinence or hematuria  NEUROLOGICAL: No numbness or weakness  SKIN: No itching, burning, rashes, or lesions   VASCULAR: No bilateral lower extremity edema.   All other review of systems is negative unless indicated above.    VITALS:  T(F): 98 (09-25-24 @ 04:21), Max: 98.4 (09-24-24 @ 16:21)  HR: 83 (09-25-24 @ 04:21)  BP: 137/74 (09-25-24 @ 04:21)  RR: 18 (09-25-24 @ 04:21)  SpO2: 97% (09-25-24 @ 04:21)  Wt(kg): --    09-24 @ 07:01  -  09-25 @ 07:00  --------------------------------------------------------  IN: 200 mL / OUT: 0 mL / NET: 200 mL          PHYSICAL EXAM:  Constitutional: NAD  HEENT: anicteric sclera, oropharynx clear, MMM  Neck: No JVD  Respiratory: CTAB, no wheezes, rales or rhonchi  Cardiovascular: S1, S2, RRR  Gastrointestinal: BS+, soft, NT/ND  Extremities: No cyanosis or clubbing. No peripheral edema  Neurological: A/O x 3, no focal deficits  Psychiatric: Normal mood, normal affect  : No CVA tenderness. No whatley.   Skin: No rashes  Vascular Access:    LABS:  09-25    136  |  101  |  34[H]  ----------------------------<  198[H]  4.3   |  19[L]  |  1.50[H]    Ca    10.2      25 Sep 2024 06:24  Phos  3.9     09-24  Mg     2.4     09-24    TPro  7.3  /  Alb  3.6  /  TBili  0.4  /  DBili      /  AST  16  /  ALT  12  /  AlkPhos  73  09-23    Creatinine Trend: 1.50 <--, 1.67 <--, 1.58 <--                        12.8   9.21  )-----------( 319      ( 25 Sep 2024 06:24 )             43.6     Urine Studies:  Urinalysis Basic - ( 25 Sep 2024 06:24 )    Color:  / Appearance:  / SG:  / pH:   Gluc: 198 mg/dL / Ketone:   / Bili:  / Urobili:    Blood:  / Protein:  / Nitrite:    Leuk Esterase:  / RBC:  / WBC    Sq Epi:  / Non Sq Epi:  / Bacteria:       Sodium, Random Urine: 84 mmol/L (09-24 @ 02:28)  Creatinine, Random Urine: 47 mg/dL (09-24 @ 02:28)  Protein/Creatinine Ratio Calculation: 0.3 Ratio (09-24 @ 02:28)  Osmolality, Random Urine: 536 mos/kg (09-24 @ 02:28)  Potassium, Random Urine: 28 mmol/L (09-24 @ 02:28)      RADIOLOGY & ADDITIONAL STUDIES:

## 2024-09-25 NOTE — PROGRESS NOTE ADULT - SUBJECTIVE AND OBJECTIVE BOX
DATE OF SERVICE: 09-25-24 @ 16:51    Patient is a 84y old  Male who presents with a chief complaint of CP (25 Sep 2024 11:25)      INTERVAL HISTORY: no events    REVIEW OF SYSTEMS:  CONSTITUTIONAL: No weakness  EYES/ENT: No visual changes;  No throat pain   NECK: No pain or stiffness  RESPIRATORY: No cough, wheezing; No shortness of breath  CARDIOVASCULAR: No chest pain or palpitations  GASTROINTESTINAL: No abdominal  pain. No nausea, vomiting, or hematemesis  GENITOURINARY: No dysuria, frequency or hematuria  NEUROLOGICAL: No stroke like symptoms  SKIN: No rashes    TELEMETRY Personally reviewed:  	  MEDICATIONS:  metoprolol succinate  milliGRAM(s) Oral daily        PHYSICAL EXAM:  T(C): 36.6 (09-25-24 @ 13:45), Max: 36.7 (09-24-24 @ 17:45)  HR: 86 (09-25-24 @ 16:23) (83 - 89)  BP: 167/91 (09-25-24 @ 16:23) (137/74 - 167/91)  RR: 18 (09-25-24 @ 16:23) (18 - 18)  SpO2: 99% (09-25-24 @ 16:23) (97% - 100%)  Wt(kg): --  I&O's Summary    24 Sep 2024 07:01  -  25 Sep 2024 07:00  --------------------------------------------------------  IN: 200 mL / OUT: 0 mL / NET: 200 mL    25 Sep 2024 07:01  -  25 Sep 2024 16:51  --------------------------------------------------------  IN: 400 mL / OUT: 0 mL / NET: 400 mL          Appearance: In no distress	  HEENT:    PERRL, EOMI	  Cardiovascular:  S1 S2, No JVD  Respiratory: Lungs clear to auscultation	  Gastrointestinal:  Soft, Non-tender, + BS	  Vascularature:  No edema of LE  Psychiatric: Appropriate affect   Neuro: no acute focal deficits                               12.8   9.21  )-----------( 319      ( 25 Sep 2024 06:24 )             43.6     09-25    136  |  101  |  34[H]  ----------------------------<  198[H]  4.3   |  19[L]  |  1.50[H]    Ca    10.2      25 Sep 2024 06:24  Phos  3.9     09-24  Mg     2.4     09-24    TPro  7.3  /  Alb  3.6  /  TBili  0.4  /  DBili  x   /  AST  16  /  ALT  12  /  AlkPhos  73  09-23        Labs personally reviewed      ASSESSMENT/PLAN: 	      84M w/ PMH of HTN, HLD, DM2, CAD s/p CABG, pAF on Eliquis, HFpEF, PAD, CKD, prostate CA, w/ recent admission for dizziness found to have abnormal stress s/p cath w/ NO new stenting pw 3 weeks of dizziness, weakness and CP a/w SOB w/ exertion.     Problem/Plan - 1:  ·  Problem: Chest pain  ·  Plan: Described as exertional CP  - h/o CABG and multiple PCI  - s/p Cath 7/22/24 with patent grafts to LAD/OM, patent stents to RCA  - c/w ASA, Rosuvastatin   - Trop 33--> 29  - Although with recent cath with patent grafts/coronaries now with typical exertional angina will need to proceed with repeat cardiac cath, still pending     Problem/Plan - 2:  ·  Problem: Chronic diastolic congestive heart failure.   ·  Plan:   - CXR with clear lungs  - Prior TTE 7/16/24 with preserved EF; Repeat pending     Problem/Plan - 3:  ·  Problem: HTN (hypertension).   ·  Plan: - Continue Toprol 100mg PO daily  - (Previously on nifedipine 60mg)    Problem/Plan - 4:  ·  Problem: Paroxysmal A-fib/SVT  ·  Plan: - Continue metoprolol and Eliquis  ------- Hold Eliquis in anticipation for cath   - Monitor on tele        ALBANIA Zapata,  [facility]ly [facility]  Cardiovascular Medicine  [address], [address]  Available through call or text on [employer] TEAMs  Office: [phone]   DATE OF SERVICE: 09-25-24 @ 16:51    Patient is a 84y old  Male who presents with a chief complaint of CP (25 Sep 2024 11:25)      INTERVAL HISTORY: no events    REVIEW OF SYSTEMS:  CONSTITUTIONAL: No weakness  EYES/ENT: No visual changes;  No throat pain   NECK: No pain or stiffness  RESPIRATORY: No cough, wheezing; No shortness of breath  CARDIOVASCULAR: No chest pain or palpitations  GASTROINTESTINAL: No abdominal  pain. No nausea, vomiting, or hematemesis  GENITOURINARY: No dysuria, frequency or hematuria  NEUROLOGICAL: No stroke like symptoms  SKIN: No rashes    TELEMETRY Personally reviewed:  	  MEDICATIONS:  metoprolol succinate  milliGRAM(s) Oral daily        PHYSICAL EXAM:  T(C): 36.6 (09-25-24 @ 13:45), Max: 36.7 (09-24-24 @ 17:45)  HR: 86 (09-25-24 @ 16:23) (83 - 89)  BP: 167/91 (09-25-24 @ 16:23) (137/74 - 167/91)  RR: 18 (09-25-24 @ 16:23) (18 - 18)  SpO2: 99% (09-25-24 @ 16:23) (97% - 100%)  Wt(kg): --  I&O's Summary    24 Sep 2024 07:01  -  25 Sep 2024 07:00  --------------------------------------------------------  IN: 200 mL / OUT: 0 mL / NET: 200 mL    25 Sep 2024 07:01  -  25 Sep 2024 16:51  --------------------------------------------------------  IN: 400 mL / OUT: 0 mL / NET: 400 mL          Appearance: In no distress	  HEENT:    PERRL, EOMI	  Cardiovascular:  S1 S2, No JVD  Respiratory: Lungs clear to auscultation	  Gastrointestinal:  Soft, Non-tender, + BS	  Vascularature:  No edema of LE  Psychiatric: Appropriate affect   Neuro: no acute focal deficits                               12.8   9.21  )-----------( 319      ( 25 Sep 2024 06:24 )             43.6     09-25    136  |  101  |  34[H]  ----------------------------<  198[H]  4.3   |  19[L]  |  1.50[H]    Ca    10.2      25 Sep 2024 06:24  Phos  3.9     09-24  Mg     2.4     09-24    TPro  7.3  /  Alb  3.6  /  TBili  0.4  /  DBili  x   /  AST  16  /  ALT  12  /  AlkPhos  73  09-23        Labs personally reviewed      ASSESSMENT/PLAN: 	      84M w/ PMH of HTN, HLD, DM2, CAD s/p CABG, pAF on Eliquis, HFpEF, PAD, CKD, prostate CA, w/ recent admission for dizziness found to have abnormal stress s/p cath w/ NO new stenting pw 3 weeks of dizziness, weakness and CP a/w SOB w/ exertion.     Problem/Plan - 1:  ·  Problem: Chest pain  ·  Plan: Described as exertional CP  - h/o CABG and multiple PCI  - s/p Cath 7/22/24 with patent grafts to LAD/OM, patent stents to RCA  - c/w ASA, Rosuvastatin   - Trop 33--> 29  - Although with recent cath with patent grafts/coronaries now with typical exertional angina will need to proceed with repeat cardiac cath, still pending     Problem/Plan - 2:  ·  Problem: Chronic diastolic congestive heart failure.   ·  Plan:   - CXR with clear lungs  - Prior TTE 7/16/24 with preserved EF; Repeat pending     Problem/Plan - 3:  ·  Problem: HTN (hypertension).   ·  Plan: - Continue Toprol 100mg PO daily  - Elevated BP readings, Cr stable, will start Lisinopril 10mg PO daily    Problem/Plan - 4:  ·  Problem: Paroxysmal A-fib/SVT  ·  Plan: - Continue metoprolol and Eliquis  ------- Hold Eliquis in anticipation for cath   - Monitor on tele        ALBANIA Zapata, DO [facility]  Cardiovascular Medicine  [address], [address]  Available through call or text on [employer] TEAMs  Office: [phone]   DATE OF SERVICE: 09-25-24 @ 16:51    Patient is a 84y old  Male who presents with a chief complaint of CP (25 Sep 2024 11:25)      INTERVAL HISTORY: no events    REVIEW OF SYSTEMS:  CONSTITUTIONAL: No weakness  EYES/ENT: No visual changes;  No throat pain   NECK: No pain or stiffness  RESPIRATORY: No cough, wheezing; No shortness of breath  CARDIOVASCULAR: No chest pain or palpitations  GASTROINTESTINAL: No abdominal  pain. No nausea, vomiting, or hematemesis  GENITOURINARY: No dysuria, frequency or hematuria  NEUROLOGICAL: No stroke like symptoms  SKIN: No rashes    TELEMETRY Personally reviewed:  	  MEDICATIONS:  metoprolol succinate  milliGRAM(s) Oral daily        PHYSICAL EXAM:  T(C): 36.6 (09-25-24 @ 13:45), Max: 36.7 (09-24-24 @ 17:45)  HR: 86 (09-25-24 @ 16:23) (83 - 89)  BP: 167/91 (09-25-24 @ 16:23) (137/74 - 167/91)  RR: 18 (09-25-24 @ 16:23) (18 - 18)  SpO2: 99% (09-25-24 @ 16:23) (97% - 100%)  Wt(kg): --  I&O's Summary    24 Sep 2024 07:01  -  25 Sep 2024 07:00  --------------------------------------------------------  IN: 200 mL / OUT: 0 mL / NET: 200 mL    25 Sep 2024 07:01  -  25 Sep 2024 16:51  --------------------------------------------------------  IN: 400 mL / OUT: 0 mL / NET: 400 mL          Appearance: In no distress	  HEENT:    PERRL, EOMI	  Cardiovascular:  S1 S2, No JVD  Respiratory: Lungs clear to auscultation	  Gastrointestinal:  Soft, Non-tender, + BS	  Vascularature:  No edema of LE  Psychiatric: Appropriate affect   Neuro: no acute focal deficits                               12.8   9.21  )-----------( 319      ( 25 Sep 2024 06:24 )             43.6     09-25    136  |  101  |  34[H]  ----------------------------<  198[H]  4.3   |  19[L]  |  1.50[H]    Ca    10.2      25 Sep 2024 06:24  Phos  3.9     09-24  Mg     2.4     09-24    TPro  7.3  /  Alb  3.6  /  TBili  0.4  /  DBili  x   /  AST  16  /  ALT  12  /  AlkPhos  73  09-23        Labs personally reviewed      ASSESSMENT/PLAN: 	      84M w/ PMH of HTN, HLD, DM2, CAD s/p CABG, pAF on Eliquis, HFpEF, PAD, CKD, prostate CA, w/ recent admission for dizziness found to have abnormal stress s/p cath w/ NO new stenting pw 3 weeks of dizziness, weakness and CP a/w SOB w/ exertion.     Problem/Plan - 1:  ·  Problem: Chest pain  ·  Plan: Described as exertional CP  - h/o CABG and multiple PCI  - s/p Cath 7/22/24 with patent grafts to LAD/OM, patent stents to RCA  - c/w ASA, Rosuvastatin   - Trop 33--> 29  - Although with recent cath with patent grafts/coronaries now with typical exertional angina will need to proceed with repeat cardiac cath, still pending     Problem/Plan - 2:  ·  Problem: Chronic diastolic congestive heart failure.   ·  Plan:   - CXR with clear lungs  - Prior TTE 7/16/24 with preserved EF; Repeat pending     Problem/Plan - 3:  ·  Problem: HTN (hypertension).   ·  Plan: - Continue Toprol 100mg PO daily  - Elevated BP readings, Cr stable, will start Lisinopril 20mg PO daily    Problem/Plan - 4:  ·  Problem: Paroxysmal A-fib/SVT  ·  Plan: - Continue metoprolol and Eliquis  ------- Hold Eliquis in anticipation for cath   - Monitor on tele        ALBANIA Zapata, DO [facility]  Cardiovascular Medicine  [address], [address]  Available through call or text on [employer] TEAM[employer]  Office: [phone]

## 2024-09-25 NOTE — CONSULT NOTE ADULT - ASSESSMENT
84M w/ PMH of HTN, HLD, DM2, CAD s/p CABG, pAF on Eliquis, HFpEF, PAD, CKD, prostate CA w/ recent admission for dizziness found to have abnormal stress s/p cath w/ NO new stenting pw 3 weeks of dizziness, weakness and CP. Nephrology on board for CKD and precath optimization    CKD Stage 3   Appears to be at baseline   SCR baseline 1.2-1.6  Avoid Nephrotoxins  Avoid contrast and nsaids  lisinopril on hold for now  Can hold SGLT2 inhibitor  For cath optimization please give NS @ 60 cc/hour 6 hours pre and 6 hours post procedure  Avoid hypotension    HTN  Continue on current meds    Low CO2/acidosis  Bicarb 19  Monitor    Anemia  Mild  Monitor    Discussed with team

## 2024-09-25 NOTE — PROVIDER CONTACT NOTE (OTHER) - ACTION/TREATMENT ORDERED:
SAMARA Quinonez notified. Pt safety maintained. ACP Loretta Quinonez notified.  Normal Saline 0.9% IVF ordered for pt pre cath (stop after 6 hrs) and post cath (stop after 6 hrs) 60 ml/hr. Pt safety maintained.

## 2024-09-25 NOTE — PROGRESS NOTE ADULT - ASSESSMENT
84M w/ PMH of HTN, HLD, DM2, CAD s/p CABG, pAF on Eliquis, HFpEF, PAD, CKD, prostate CA, w/ recent admission for dizziness found to have abnormal stress s/p cath w/ NO new stenting pw 3 weeks of dizziness, weakness and CP a/w SOB w/ exertion

## 2024-09-25 NOTE — PROGRESS NOTE ADULT - SUBJECTIVE AND OBJECTIVE BOX
Carrie Merida MD  Division of Hospital Medicine  Please contact via MS Teams (prefer message first)  Office: [phone]    Patient is a 84y old  Male who presents with a chief complaint of CP (25 Sep 2024 11:06)      SUBJECTIVE / OVERNIGHT EVENTS:  no acute events overnight, afebrile  (+) orthostatic this morning, pt with mild lightheadedness  plan for TTE/LHC today  currently denies chest pain, dyspnea    ROS:  14 point ROS negative in detail except stated as above    MEDICATIONS  (STANDING):  aspirin  chewable 81 milliGRAM(s) Oral daily  clopidogrel Tablet 75 milliGRAM(s) Oral daily  dextrose 5%. 1000 milliLiter(s) (100 mL/Hr) IV Continuous <Continuous>  dextrose 5%. 1000 milliLiter(s) (50 mL/Hr) IV Continuous <Continuous>  dextrose 50% Injectable 25 Gram(s) IV Push once  dextrose 50% Injectable 25 Gram(s) IV Push once  dextrose 50% Injectable 12.5 Gram(s) IV Push once  glucagon  Injectable 1 milliGRAM(s) IntraMuscular once  insulin glargine Injectable (LANTUS) 15 Unit(s) SubCutaneous at bedtime  insulin lispro (ADMELOG) corrective regimen sliding scale   SubCutaneous at bedtime  insulin lispro (ADMELOG) corrective regimen sliding scale   SubCutaneous three times a day before meals  insulin lispro Injectable (ADMELOG) 3 Unit(s) SubCutaneous three times a day before meals  metoprolol succinate  milliGRAM(s) Oral daily  multivitamin 1 Tablet(s) Oral daily  pantoprazole    Tablet 40 milliGRAM(s) Oral before breakfast  rosuvastatin 40 milliGRAM(s) Oral at bedtime  sodium chloride 0.9%. 1000 milliLiter(s) (60 mL/Hr) IV Continuous <Continuous>  sodium chloride 0.9%. 1000 milliLiter(s) (60 mL/Hr) IV Continuous <Continuous>    MEDICATIONS  (PRN):  acetaminophen     Tablet .. 650 milliGRAM(s) Oral every 6 hours PRN Temp greater or equal to 38C (100.4F), Mild Pain (1 - 3)  dextrose Oral Gel 15 Gram(s) Oral once PRN Blood Glucose LESS THAN 70 milliGRAM(s)/deciliter      CAPILLARY BLOOD GLUCOSE      POCT Blood Glucose.: 224 mg/dL (25 Sep 2024 11:22)  POCT Blood Glucose.: 194 mg/dL (25 Sep 2024 07:37)  POCT Blood Glucose.: 224 mg/dL (24 Sep 2024 21:12)  POCT Blood Glucose.: 177 mg/dL (24 Sep 2024 20:05)    I&O's Summary    24 Sep 2024 07:01  -  25 Sep 2024 07:00  --------------------------------------------------------  IN: 200 mL / OUT: 0 mL / NET: 200 mL        PHYSICAL EXAM:  Vital Signs Last 24 Hrs  T(C): 36.7 (25 Sep 2024 04:21), Max: 36.9 (24 Sep 2024 16:21)  T(F): 98 (25 Sep 2024 04:21), Max: 98.4 (24 Sep 2024 16:21)  HR: 83 (25 Sep 2024 04:21) (83 - 88)  BP: 137/74 (25 Sep 2024 04:21) (115/76 - 158/84)  BP(mean): 89 (24 Sep 2024 15:51) (89 - 89)  RR: 18 (25 Sep 2024 04:21) (16 - 18)  SpO2: 97% (25 Sep 2024 04:21) (97% - 100%)    Parameters below as of 25 Sep 2024 04:21  Patient On (Oxygen Delivery Method): room air      GENERAL: NAD, well-developed  HEAD:  Atraumatic, Normocephalic  EYES: EOMI, PERRLA, conjunctiva and sclera clear  NECK: Supple, No JVD  CHEST/LUNG: Clear to auscultation bilaterally; No wheeze  HEART: Regular rate and rhythm; No murmurs, rubs, or gallops  ABDOMEN: Soft, Nontender, Nondistended; Bowel sounds present  EXTREMITIES:  2+ Peripheral Pulses, No clubbing, cyanosis, or edema  NEUROLOGY: AAOx3; non-focal  SKIN: No rashes or lesions    LABS:                        12.8   9.21  )-----------( 319      ( 25 Sep 2024 06:24 )             43.6     09-25    136  |  101  |  34[H]  ----------------------------<  198[H]  4.3   |  19[L]  |  1.50[H]    Ca    10.2      25 Sep 2024 06:24  Phos  3.9     09-24  Mg     2.4     09-24    TPro  7.3  /  Alb  3.6  /  TBili  0.4  /  DBili  x   /  AST  16  /  ALT  12  /  AlkPhos  73  09-23    PT/INR - ( 24 Sep 2024 06:06 )   PT: 11.8 sec;   INR: 1.07 ratio         PTT - ( 24 Sep 2024 06:06 )  PTT:29.0 sec  CARDIAC MARKERS ( 24 Sep 2024 06:06 )  x     / x     / x     / x     / 2.6 ng/mL      Urinalysis Basic - ( 25 Sep 2024 06:24 )    Color: x / Appearance: x / SG: x / pH: x  Gluc: 198 mg/dL / Ketone: x  / Bili: x / Urobili: x   Blood: x / Protein: x / Nitrite: x   Leuk Esterase: x / RBC: x / WBC x   Sq Epi: x / Non Sq Epi: x / Bacteria: x        RADIOLOGY & ADDITIONAL TESTS:    Imaging Personally Reviewed:    Consultant(s) Notes Reviewed:  cards, nephro    Care Discussed with Consultants/Other Providers:

## 2024-09-25 NOTE — PROVIDER CONTACT NOTE (OTHER) - ASSESSMENT
Pt A&Ox4. Lyin/72 HR 88; Sitting 162/86 HR 88; Standin/76 HR 89.  Pt denies SOB, lightheadedness, or dizziness. Pt A&Ox4. Lyin/72 HR 88; Sitting 162/86 HR 88; Standin/76 HR 89.  Pt denies SOB, lightheadedness, or dizziness. Pt scheduled for Left Heart Cath today.

## 2024-09-26 LAB
ANION GAP SERPL CALC-SCNC: 14 MMOL/L — SIGNIFICANT CHANGE UP (ref 5–17)
APTT BLD: 139.6 SEC — CRITICAL HIGH (ref 24.5–35.6)
BUN SERPL-MCNC: 32 MG/DL — HIGH (ref 7–23)
CALCIUM SERPL-MCNC: 9.7 MG/DL — SIGNIFICANT CHANGE UP (ref 8.4–10.5)
CHLORIDE SERPL-SCNC: 100 MMOL/L — SIGNIFICANT CHANGE UP (ref 96–108)
CO2 SERPL-SCNC: 21 MMOL/L — LOW (ref 22–31)
CREAT SERPL-MCNC: 1.42 MG/DL — HIGH (ref 0.5–1.3)
EGFR: 49 ML/MIN/1.73M2 — LOW
GLUCOSE BLDC GLUCOMTR-MCNC: 170 MG/DL — HIGH (ref 70–99)
GLUCOSE BLDC GLUCOMTR-MCNC: 179 MG/DL — HIGH (ref 70–99)
GLUCOSE BLDC GLUCOMTR-MCNC: 185 MG/DL — HIGH (ref 70–99)
GLUCOSE BLDC GLUCOMTR-MCNC: 187 MG/DL — HIGH (ref 70–99)
GLUCOSE SERPL-MCNC: 178 MG/DL — HIGH (ref 70–99)
HCT VFR BLD CALC: 39.1 % — SIGNIFICANT CHANGE UP (ref 39–50)
HCT VFR BLD CALC: 39.6 % — SIGNIFICANT CHANGE UP (ref 39–50)
HGB BLD-MCNC: 11.5 G/DL — LOW (ref 13–17)
HGB BLD-MCNC: 11.8 G/DL — LOW (ref 13–17)
MAGNESIUM SERPL-MCNC: 2.2 MG/DL — SIGNIFICANT CHANGE UP (ref 1.6–2.6)
MCHC RBC-ENTMCNC: 24.4 PG — LOW (ref 27–34)
MCHC RBC-ENTMCNC: 24.7 PG — LOW (ref 27–34)
MCHC RBC-ENTMCNC: 29.4 GM/DL — LOW (ref 32–36)
MCHC RBC-ENTMCNC: 29.8 GM/DL — LOW (ref 32–36)
MCV RBC AUTO: 82.8 FL — SIGNIFICANT CHANGE UP (ref 80–100)
MCV RBC AUTO: 82.8 FL — SIGNIFICANT CHANGE UP (ref 80–100)
NRBC # BLD: 0 /100 WBCS — SIGNIFICANT CHANGE UP (ref 0–0)
NRBC # BLD: 0 /100 WBCS — SIGNIFICANT CHANGE UP (ref 0–0)
PLATELET # BLD AUTO: 297 K/UL — SIGNIFICANT CHANGE UP (ref 150–400)
PLATELET # BLD AUTO: 320 K/UL — SIGNIFICANT CHANGE UP (ref 150–400)
POTASSIUM SERPL-MCNC: 4.3 MMOL/L — SIGNIFICANT CHANGE UP (ref 3.5–5.3)
POTASSIUM SERPL-SCNC: 4.3 MMOL/L — SIGNIFICANT CHANGE UP (ref 3.5–5.3)
RBC # BLD: 4.72 M/UL — SIGNIFICANT CHANGE UP (ref 4.2–5.8)
RBC # BLD: 4.78 M/UL — SIGNIFICANT CHANGE UP (ref 4.2–5.8)
RBC # FLD: 15.6 % — HIGH (ref 10.3–14.5)
RBC # FLD: 15.7 % — HIGH (ref 10.3–14.5)
SODIUM SERPL-SCNC: 135 MMOL/L — SIGNIFICANT CHANGE UP (ref 135–145)
WBC # BLD: 7.11 K/UL — SIGNIFICANT CHANGE UP (ref 3.8–10.5)
WBC # BLD: 7.5 K/UL — SIGNIFICANT CHANGE UP (ref 3.8–10.5)
WBC # FLD AUTO: 7.11 K/UL — SIGNIFICANT CHANGE UP (ref 3.8–10.5)
WBC # FLD AUTO: 7.5 K/UL — SIGNIFICANT CHANGE UP (ref 3.8–10.5)

## 2024-09-26 PROCEDURE — 99233 SBSQ HOSP IP/OBS HIGH 50: CPT

## 2024-09-26 RX ORDER — APIXABAN 5 MG/1
2.5 TABLET, FILM COATED ORAL ONCE
Refills: 0 | Status: DISCONTINUED | OUTPATIENT
Start: 2024-09-26 | End: 2024-09-26

## 2024-09-26 RX ORDER — APIXABAN 5 MG/1
2.5 TABLET, FILM COATED ORAL
Refills: 0 | Status: DISCONTINUED | OUTPATIENT
Start: 2024-09-26 | End: 2024-09-26

## 2024-09-26 RX ORDER — APIXABAN 5 MG/1
TABLET, FILM COATED ORAL
Refills: 0 | Status: DISCONTINUED | OUTPATIENT
Start: 2024-09-26 | End: 2024-09-26

## 2024-09-26 RX ADMIN — Medication 60 MILLILITER(S): at 09:27

## 2024-09-26 RX ADMIN — PANTOPRAZOLE SODIUM 40 MILLIGRAM(S): 40 TABLET, DELAYED RELEASE ORAL at 05:18

## 2024-09-26 RX ADMIN — Medication 3 UNIT(S): at 17:59

## 2024-09-26 RX ADMIN — Medication 0 UNIT(S)/HR: at 18:43

## 2024-09-26 RX ADMIN — Medication 1: at 17:59

## 2024-09-26 RX ADMIN — INSULIN GLARGINE 15 UNIT(S): 300 INJECTION, SOLUTION SUBCUTANEOUS at 21:41

## 2024-09-26 RX ADMIN — Medication 3 UNIT(S): at 07:42

## 2024-09-26 RX ADMIN — MULTI VITAMIN/MINERAL SUPPLEMENT WITH ASCORBIC ACID, NIACIN, PYRIDOXINE, PANTOTHENIC ACID, FOLIC ACID, RIBOFLAVIN, THIAMIN, BIOTIN, COBALAMIN AND ZINC. 1 TABLET(S): 60; 20; 12.5; 10; 10; 1.7; 1.5; 1; .3; .006 TABLET, COATED ORAL at 11:53

## 2024-09-26 RX ADMIN — Medication 900 UNIT(S)/HR: at 19:46

## 2024-09-26 RX ADMIN — ROSUVASTATIN CALCIUM 40 MILLIGRAM(S): 20 TABLET, COATED ORAL at 21:40

## 2024-09-26 RX ADMIN — Medication 100 MILLIGRAM(S): at 05:17

## 2024-09-26 RX ADMIN — Medication 81 MILLIGRAM(S): at 11:53

## 2024-09-26 RX ADMIN — Medication 1: at 11:54

## 2024-09-26 RX ADMIN — Medication 1100 UNIT(S)/HR: at 12:00

## 2024-09-26 RX ADMIN — Medication 1: at 07:42

## 2024-09-26 RX ADMIN — Medication 20 MILLIGRAM(S): at 05:17

## 2024-09-26 RX ADMIN — Medication 3 UNIT(S): at 11:53

## 2024-09-26 RX ADMIN — Medication 75 MILLIGRAM(S): at 11:53

## 2024-09-26 NOTE — PROGRESS NOTE ADULT - SUBJECTIVE AND OBJECTIVE BOX
DATE OF SERVICE: 09-26-24 @ 11:33    Patient is a 84y old  Male who presents with a chief complaint of CP (26 Sep 2024 10:55)      INTERVAL HISTORY: no events    REVIEW OF SYSTEMS:  CONSTITUTIONAL: No weakness  EYES/ENT: No visual changes;  No throat pain   NECK: No pain or stiffness  RESPIRATORY: No cough, wheezing; No shortness of breath  CARDIOVASCULAR: No chest pain or palpitations  GASTROINTESTINAL: No abdominal  pain. No nausea, vomiting, or hematemesis  GENITOURINARY: No dysuria, frequency or hematuria  NEUROLOGICAL: No stroke like symptoms  SKIN: No rashes    TELEMETRY Personally reviewed:  	  MEDICATIONS:  lisinopril 20 milliGRAM(s) Oral daily  metoprolol succinate  milliGRAM(s) Oral daily        PHYSICAL EXAM:  T(C): 36.6 (09-26-24 @ 10:59), Max: 36.6 (09-25-24 @ 13:45)  HR: 87 (09-26-24 @ 10:59) (82 - 89)  BP: 155/87 (09-26-24 @ 10:59) (155/87 - 175/84)  RR: 18 (09-26-24 @ 10:59) (18 - 18)  SpO2: 99% (09-26-24 @ 10:59) (98% - 100%)  Wt(kg): --  I&O's Summary    25 Sep 2024 07:01  -  26 Sep 2024 07:00  --------------------------------------------------------  IN: 640 mL / OUT: 0 mL / NET: 640 mL          Appearance: In no distress	  HEENT:    PERRL, EOMI	  Cardiovascular:  S1 S2, No JVD  Respiratory: Lungs clear to auscultation	  Gastrointestinal:  Soft, Non-tender, + BS	  Vascularature:  No edema of LE  Psychiatric: Appropriate affect   Neuro: no acute focal deficits                               11.5   7.11  )-----------( 320      ( 26 Sep 2024 07:23 )             39.1     09-26    135  |  100  |  32[H]  ----------------------------<  178[H]  4.3   |  21[L]  |  1.42[H]    Ca    9.7      26 Sep 2024 07:22  Mg     2.2     09-26          Labs personally reviewed      ASSESSMENT/PLAN: 	      84M w/ PMH of HTN, HLD, DM2, CAD s/p CABG, pAF on Eliquis, HFpEF, PAD, CKD, prostate CA, w/ recent admission for dizziness found to have abnormal stress s/p cath w/ NO new stenting pw 3 weeks of dizziness, weakness and CP a/w SOB w/ exertion.     Problem/Plan - 1:  ·  Problem: Chest pain  ·  Plan: Described as exertional CP  - h/o CABG and multiple PCI  - s/p Cath 7/22/24 with patent grafts to LAD/OM, patent stents to RCA  - c/w ASA, Rosuvastatin   - Trop 33--> 29  - Although with recent cath with patent grafts/coronaries now with typical exertional angina will need to proceed with repeat cardiac cath, still pending     Problem/Plan - 2:  ·  Problem: Chronic diastolic congestive heart failure.   ·  Plan:   - CXR with clear lungs  - Prior TTE 7/16/24 with preserved EF; Repeat pending     Problem/Plan - 3:  ·  Problem: HTN (hypertension).   ·  Plan: - Continue Toprol 100mg PO daily  - Elevated BP readings, Cr stable, started Lisinopril 20mg PO daily    Problem/Plan - 4:  ·  Problem: Paroxysmal A-fib/SVT  ·  Plan: - Continue metoprolol and Eliquis  ------- Hold Eliquis in anticipation for cath   - Monitor on tele      ALBANIA Zapata, DO [facility]  Cardiovascular Medicine  [address], [address]  Available through call or text on [employer] TEAMs  Office: [phone]   DATE OF SERVICE: 09-26-24 @ 11:33    Patient is a 84y old  Male who presents with a chief complaint of CP (26 Sep 2024 10:55)      INTERVAL HISTORY: no events    REVIEW OF SYSTEMS:  CONSTITUTIONAL: No weakness  EYES/ENT: No visual changes;  No throat pain   NECK: No pain or stiffness  RESPIRATORY: No cough, wheezing; No shortness of breath  CARDIOVASCULAR: No chest pain or palpitations  GASTROINTESTINAL: No abdominal  pain. No nausea, vomiting, or hematemesis  GENITOURINARY: No dysuria, frequency or hematuria  NEUROLOGICAL: No stroke like symptoms  SKIN: No rashes    TELEMETRY Personally reviewed:  	  MEDICATIONS:  lisinopril 20 milliGRAM(s) Oral daily  metoprolol succinate  milliGRAM(s) Oral daily        PHYSICAL EXAM:  T(C): 36.6 (09-26-24 @ 10:59), Max: 36.6 (09-25-24 @ 13:45)  HR: 87 (09-26-24 @ 10:59) (82 - 89)  BP: 155/87 (09-26-24 @ 10:59) (155/87 - 175/84)  RR: 18 (09-26-24 @ 10:59) (18 - 18)  SpO2: 99% (09-26-24 @ 10:59) (98% - 100%)  Wt(kg): --  I&O's Summary    25 Sep 2024 07:01  -  26 Sep 2024 07:00  --------------------------------------------------------  IN: 640 mL / OUT: 0 mL / NET: 640 mL          Appearance: In no distress	  HEENT:    PERRL, EOMI	  Cardiovascular:  S1 S2, No JVD  Respiratory: Lungs clear to auscultation	  Gastrointestinal:  Soft, Non-tender, + BS	  Vascularature:  No edema of LE  Psychiatric: Appropriate affect   Neuro: no acute focal deficits                               11.5   7.11  )-----------( 320      ( 26 Sep 2024 07:23 )             39.1     09-26    135  |  100  |  32[H]  ----------------------------<  178[H]  4.3   |  21[L]  |  1.42[H]    Ca    9.7      26 Sep 2024 07:22  Mg     2.2     09-26          Labs personally reviewed      ASSESSMENT/PLAN: 	      84M w/ PMH of HTN, HLD, DM2, CAD s/p CABG, pAF on Eliquis, HFpEF, PAD, CKD, prostate CA, w/ recent admission for dizziness found to have abnormal stress s/p cath w/ NO new stenting pw 3 weeks of dizziness, weakness and CP a/w SOB w/ exertion.     Problem/Plan - 1:  ·  Problem: Chest pain  ·  Plan: Described as exertional CP  - h/o CABG and multiple PCI  - s/p Cath 7/22/24 with patent grafts to LAD/OM, patent stents to RCA  - c/w ASA, Rosuvastatin   - Trop 33--> 29  - Although with recent cath with patent grafts/coronaries now with typical exertional angina will need to proceed with repeat cardiac cath  --- Discussed with interventional cards Dr Owens given pt with diffuse small vessel disease recs NM stress test first to assess for territory of ischemia     Problem/Plan - 2:  ·  Problem: Chronic diastolic congestive heart failure.   ·  Plan:   - CXR with clear lungs  - Prior TTE 7/16/24 with preserved EF; Repeat pending     Problem/Plan - 3:  ·  Problem: HTN (hypertension).   ·  Plan: - Continue Toprol 100mg PO daily  - Elevated BP readings, Cr stable, started Lisinopril 20mg PO daily    Problem/Plan - 4:  ·  Problem: Paroxysmal A-fib/SVT  ·  Plan: - Continue metoprolol and Eliquis  ------- Hold Eliquis in anticipation for cath, hep gtt  - Monitor on tele            ALBANIA Zapata,  [facility]  Cardiovascular Medicine  [address], [address]  Available through call or text on [employer] TEAMs  Office: [phone]

## 2024-09-26 NOTE — PROGRESS NOTE ADULT - PROBLEM SELECTOR PLAN 3
euvolemic on exam  - holding diuretics at this time  - repeat TTE pending euvolemic on exam  - holding diuretics at this time

## 2024-09-26 NOTE — PROGRESS NOTE ADULT - SUBJECTIVE AND OBJECTIVE BOX
Carrie Merida MD  Division of Hospital Medicine  Please contact via MS Teams (prefer message first)  Office: [phone]    Patient is a 84y old  Male who presents with a chief complaint of CP (25 Sep 2024 16:51)      SUBJECTIVE / OVERNIGHT EVENTS:  no acute events overnight, vss, afebrile  pt feels okay, scheduled for NST  denies chest pain, dyspnea  noted to have Afib/Aflutter upto 160s while ambulating    ROS:  14 point ROS negative in detail except stated as above    MEDICATIONS  (STANDING):  aspirin  chewable 81 milliGRAM(s) Oral daily  clopidogrel Tablet 75 milliGRAM(s) Oral daily  dextrose 5%. 1000 milliLiter(s) (50 mL/Hr) IV Continuous <Continuous>  dextrose 5%. 1000 milliLiter(s) (100 mL/Hr) IV Continuous <Continuous>  dextrose 50% Injectable 25 Gram(s) IV Push once  dextrose 50% Injectable 25 Gram(s) IV Push once  dextrose 50% Injectable 12.5 Gram(s) IV Push once  glucagon  Injectable 1 milliGRAM(s) IntraMuscular once  insulin glargine Injectable (LANTUS) 15 Unit(s) SubCutaneous at bedtime  insulin lispro (ADMELOG) corrective regimen sliding scale   SubCutaneous three times a day before meals  insulin lispro (ADMELOG) corrective regimen sliding scale   SubCutaneous at bedtime  insulin lispro Injectable (ADMELOG) 3 Unit(s) SubCutaneous three times a day before meals  lisinopril 20 milliGRAM(s) Oral daily  metoprolol succinate  milliGRAM(s) Oral daily  multivitamin 1 Tablet(s) Oral daily  pantoprazole    Tablet 40 milliGRAM(s) Oral before breakfast  rosuvastatin 40 milliGRAM(s) Oral at bedtime  sodium chloride 0.9%. 1000 milliLiter(s) (60 mL/Hr) IV Continuous <Continuous>    MEDICATIONS  (PRN):  acetaminophen     Tablet .. 650 milliGRAM(s) Oral every 6 hours PRN Temp greater or equal to 38C (100.4F), Mild Pain (1 - 3)  dextrose Oral Gel 15 Gram(s) Oral once PRN Blood Glucose LESS THAN 70 milliGRAM(s)/deciliter      CAPILLARY BLOOD GLUCOSE      POCT Blood Glucose.: 170 mg/dL (26 Sep 2024 07:22)  POCT Blood Glucose.: 309 mg/dL (25 Sep 2024 21:18)  POCT Blood Glucose.: 194 mg/dL (25 Sep 2024 16:56)  POCT Blood Glucose.: 224 mg/dL (25 Sep 2024 11:22)    I&O's Summary    25 Sep 2024 07:01  -  26 Sep 2024 07:00  --------------------------------------------------------  IN: 640 mL / OUT: 0 mL / NET: 640 mL        PHYSICAL EXAM:  Vital Signs Last 24 Hrs  T(C): 36.5 (26 Sep 2024 04:44), Max: 36.7 (25 Sep 2024 11:30)  T(F): 97.7 (26 Sep 2024 04:44), Max: 98 (25 Sep 2024 11:30)  HR: 87 (26 Sep 2024 04:44) (82 - 89)  BP: 162/83 (26 Sep 2024 04:44) (162/81 - 175/84)  BP(mean): --  RR: 18 (26 Sep 2024 04:44) (18 - 18)  SpO2: 99% (26 Sep 2024 04:44) (98% - 100%)    Parameters below as of 26 Sep 2024 04:44  Patient On (Oxygen Delivery Method): room air      GENERAL: NAD, well-developed  HEAD:  Atraumatic, Normocephalic  EYES: EOMI, PERRLA, conjunctiva and sclera clear  NECK: Supple, No JVD  CHEST/LUNG: Clear to auscultation bilaterally; No wheeze  HEART: Regular rate and rhythm; No murmurs, rubs, or gallops  ABDOMEN: Soft, Nontender, Nondistended; Bowel sounds present  EXTREMITIES:  2+ Peripheral Pulses, No clubbing, cyanosis, or edema  NEUROLOGY: AAOx3; non-focal  SKIN: No rashes or lesions    LABS:                        11.5   7.11  )-----------( 320      ( 26 Sep 2024 07:23 )             39.1     09-26    135  |  100  |  32[H]  ----------------------------<  178[H]  4.3   |  21[L]  |  1.42[H]    Ca    9.7      26 Sep 2024 07:22  Mg     2.2     09-26            Urinalysis Basic - ( 26 Sep 2024 07:22 )    Color: x / Appearance: x / SG: x / pH: x  Gluc: 178 mg/dL / Ketone: x  / Bili: x / Urobili: x   Blood: x / Protein: x / Nitrite: x   Leuk Esterase: x / RBC: x / WBC x   Sq Epi: x / Non Sq Epi: x / Bacteria: x        RADIOLOGY & ADDITIONAL TESTS:    Imaging Personally Reviewed:    Consultant(s) Notes Reviewed:  cards, nephro    Care Discussed with Consultants/Other Providers: Dr. Pressley (cards)

## 2024-09-26 NOTE — PROGRESS NOTE ADULT - SUBJECTIVE AND OBJECTIVE BOX
[facility]    Dr. Shine Navarro     Office [phone] (9 am to 5 pm)  Service: [phone] (5pm to 9am)        RENAL PROGRESS NOTE: DATE OF SERVICE 09-26-24 @ 10:54    Patient is a 84y old  Male who presents with a chief complaint of CP (25 Sep 2024 16:51)      Patient seen and examined at bedside. No chest pain/sob    VITALS:  T(F): 97.7 (09-26-24 @ 04:44), Max: 98 (09-25-24 @ 11:30)  HR: 87 (09-26-24 @ 04:44)  BP: 162/83 (09-26-24 @ 04:44)  RR: 18 (09-26-24 @ 04:44)  SpO2: 99% (09-26-24 @ 04:44)  Wt(kg): --    09-25 @ 07:01  -  09-26 @ 07:00  --------------------------------------------------------  IN: 640 mL / OUT: 0 mL / NET: 640 mL          PHYSICAL EXAM:  Constitutional: NAD  Neck: No JVD  Respiratory: CTAB, no wheezes, rales or rhonchi  Cardiovascular: S1, S2, RRR  Gastrointestinal: BS+, soft, NT/ND  Extremities: No peripheral edema    Hospital Medications:   MEDICATIONS  (STANDING):  aspirin  chewable 81 milliGRAM(s) Oral daily  clopidogrel Tablet 75 milliGRAM(s) Oral daily  dextrose 5%. 1000 milliLiter(s) (50 mL/Hr) IV Continuous <Continuous>  dextrose 5%. 1000 milliLiter(s) (100 mL/Hr) IV Continuous <Continuous>  dextrose 50% Injectable 25 Gram(s) IV Push once  dextrose 50% Injectable 25 Gram(s) IV Push once  dextrose 50% Injectable 12.5 Gram(s) IV Push once  glucagon  Injectable 1 milliGRAM(s) IntraMuscular once  insulin glargine Injectable (LANTUS) 15 Unit(s) SubCutaneous at bedtime  insulin lispro (ADMELOG) corrective regimen sliding scale   SubCutaneous at bedtime  insulin lispro (ADMELOG) corrective regimen sliding scale   SubCutaneous three times a day before meals  insulin lispro Injectable (ADMELOG) 3 Unit(s) SubCutaneous three times a day before meals  lisinopril 20 milliGRAM(s) Oral daily  metoprolol succinate  milliGRAM(s) Oral daily  multivitamin 1 Tablet(s) Oral daily  pantoprazole    Tablet 40 milliGRAM(s) Oral before breakfast  rosuvastatin 40 milliGRAM(s) Oral at bedtime  sodium chloride 0.9%. 1000 milliLiter(s) (60 mL/Hr) IV Continuous <Continuous>      LABS:  09-26    135  |  100  |  32[H]  ----------------------------<  178[H]  4.3   |  21[L]  |  1.42[H]    Ca    9.7      26 Sep 2024 07:22  Mg     2.2     09-26      Creatinine Trend: 1.42 <--, 1.50 <--, 1.67 <--, 1.58 <--                                11.5   7.11  )-----------( 320      ( 26 Sep 2024 07:23 )             39.1     Urine Studies:  Urinalysis - [09-26-24 @ 07:22]      Color  / Appearance  / SG  / pH       Gluc 178 / Ketone   / Bili  / Urobili        Blood  / Protein  / Leuk Est  / Nitrite       RBC  / WBC  / Hyaline  / Gran  / Sq Epi  / Non Sq Epi  / Bacteria     Urine Creatinine 47      [09-24-24 @ 02:28]  Urine Protein 14      [09-24-24 @ 02:28]  Urine Sodium 84      [09-24-24 @ 02:28]  Urine Urea Nitrogen 541      [09-24-24 @ 02:28]  Urine Potassium 28      [09-24-24 @ 02:28]  Urine Osmolality 536      [09-24-24 @ 02:28]    TSH 0.84      [09-24-24 @ 06:06]  Lipid: chol 190, , HDL 43, LDL --      [07-16-24 @ 10:48]        RADIOLOGY & ADDITIONAL STUDIES:

## 2024-09-26 NOTE — PROGRESS NOTE ADULT - PROBLEM SELECTOR PLAN 2
- c/w Toprol 100mg qd   - resume eliquis - c/w Toprol 100mg qd   - [id number]  - start IV heparin gtt  - continue to hold eliquis in anticipation of LHC

## 2024-09-26 NOTE — PROGRESS NOTE ADULT - PROBLEM SELECTOR PLAN 11
DVT ppx: Eliquis 2.5mg BID (on hold)  Diet: DASH/CC  Dispo: Pending clinical improvement DVT ppx: heparin gtt  Diet: DASH/CC  Dispo: Pending clinical improvement

## 2024-09-27 ENCOUNTER — RESULT REVIEW (OUTPATIENT)
Age: 84
End: 2024-09-27

## 2024-09-27 LAB
ANION GAP SERPL CALC-SCNC: 14 MMOL/L — SIGNIFICANT CHANGE UP (ref 5–17)
APTT BLD: 125.4 SEC — CRITICAL HIGH (ref 24.5–35.6)
APTT BLD: 184.9 SEC — CRITICAL HIGH (ref 24.5–35.6)
APTT BLD: 73.6 SEC — HIGH (ref 24.5–35.6)
BUN SERPL-MCNC: 39 MG/DL — HIGH (ref 7–23)
CALCIUM SERPL-MCNC: 9.5 MG/DL — SIGNIFICANT CHANGE UP (ref 8.4–10.5)
CHLORIDE SERPL-SCNC: 101 MMOL/L — SIGNIFICANT CHANGE UP (ref 96–108)
CO2 SERPL-SCNC: 19 MMOL/L — LOW (ref 22–31)
CREAT ?TM UR-MCNC: 66 MG/DL — SIGNIFICANT CHANGE UP
CREAT SERPL-MCNC: 1.83 MG/DL — HIGH (ref 0.5–1.3)
EGFR: 36 ML/MIN/1.73M2 — LOW
GLUCOSE BLDC GLUCOMTR-MCNC: 191 MG/DL — HIGH (ref 70–99)
GLUCOSE BLDC GLUCOMTR-MCNC: 204 MG/DL — HIGH (ref 70–99)
GLUCOSE BLDC GLUCOMTR-MCNC: 221 MG/DL — HIGH (ref 70–99)
GLUCOSE BLDC GLUCOMTR-MCNC: 281 MG/DL — HIGH (ref 70–99)
GLUCOSE SERPL-MCNC: 181 MG/DL — HIGH (ref 70–99)
HCT VFR BLD CALC: 37.2 % — LOW (ref 39–50)
HGB BLD-MCNC: 11.1 G/DL — LOW (ref 13–17)
MCHC RBC-ENTMCNC: 24.7 PG — LOW (ref 27–34)
MCHC RBC-ENTMCNC: 29.8 GM/DL — LOW (ref 32–36)
MCV RBC AUTO: 82.9 FL — SIGNIFICANT CHANGE UP (ref 80–100)
NRBC # BLD: 0 /100 WBCS — SIGNIFICANT CHANGE UP (ref 0–0)
PLATELET # BLD AUTO: 289 K/UL — SIGNIFICANT CHANGE UP (ref 150–400)
POTASSIUM SERPL-MCNC: 4.3 MMOL/L — SIGNIFICANT CHANGE UP (ref 3.5–5.3)
POTASSIUM SERPL-SCNC: 4.3 MMOL/L — SIGNIFICANT CHANGE UP (ref 3.5–5.3)
RBC # BLD: 4.49 M/UL — SIGNIFICANT CHANGE UP (ref 4.2–5.8)
RBC # FLD: 15.9 % — HIGH (ref 10.3–14.5)
SODIUM SERPL-SCNC: 134 MMOL/L — LOW (ref 135–145)
SODIUM UR-SCNC: 45 MMOL/L — SIGNIFICANT CHANGE UP
WBC # BLD: 6.68 K/UL — SIGNIFICANT CHANGE UP (ref 3.8–10.5)
WBC # FLD AUTO: 6.68 K/UL — SIGNIFICANT CHANGE UP (ref 3.8–10.5)

## 2024-09-27 PROCEDURE — 93016 CV STRESS TEST SUPVJ ONLY: CPT

## 2024-09-27 PROCEDURE — 99233 SBSQ HOSP IP/OBS HIGH 50: CPT

## 2024-09-27 PROCEDURE — 78452 HT MUSCLE IMAGE SPECT MULT: CPT | Mod: 26

## 2024-09-27 PROCEDURE — 93018 CV STRESS TEST I&R ONLY: CPT

## 2024-09-27 PROCEDURE — 93010 ELECTROCARDIOGRAM REPORT: CPT

## 2024-09-27 RX ADMIN — Medication 600 UNIT(S)/HR: at 18:48

## 2024-09-27 RX ADMIN — Medication 3 UNIT(S): at 17:28

## 2024-09-27 RX ADMIN — Medication 100 MILLIGRAM(S): at 05:12

## 2024-09-27 RX ADMIN — ROSUVASTATIN CALCIUM 40 MILLIGRAM(S): 20 TABLET, COATED ORAL at 21:46

## 2024-09-27 RX ADMIN — Medication 75 MILLIGRAM(S): at 11:25

## 2024-09-27 RX ADMIN — MULTI VITAMIN/MINERAL SUPPLEMENT WITH ASCORBIC ACID, NIACIN, PYRIDOXINE, PANTOTHENIC ACID, FOLIC ACID, RIBOFLAVIN, THIAMIN, BIOTIN, COBALAMIN AND ZINC. 1 TABLET(S): 60; 20; 12.5; 10; 10; 1.7; 1.5; 1; .3; .006 TABLET, COATED ORAL at 11:24

## 2024-09-27 RX ADMIN — PANTOPRAZOLE SODIUM 40 MILLIGRAM(S): 40 TABLET, DELAYED RELEASE ORAL at 05:12

## 2024-09-27 RX ADMIN — Medication 600 UNIT(S)/HR: at 10:42

## 2024-09-27 RX ADMIN — Medication 1: at 08:00

## 2024-09-27 RX ADMIN — INSULIN GLARGINE 15 UNIT(S): 300 INJECTION, SOLUTION SUBCUTANEOUS at 21:46

## 2024-09-27 RX ADMIN — Medication 2: at 11:26

## 2024-09-27 RX ADMIN — Medication 81 MILLIGRAM(S): at 11:25

## 2024-09-27 RX ADMIN — Medication 3 UNIT(S): at 11:26

## 2024-09-27 RX ADMIN — Medication 700 UNIT(S)/HR: at 03:37

## 2024-09-27 RX ADMIN — Medication 3: at 17:28

## 2024-09-27 RX ADMIN — Medication 3 UNIT(S): at 08:01

## 2024-09-27 RX ADMIN — Medication 0 UNIT(S)/HR: at 02:31

## 2024-09-27 RX ADMIN — Medication 20 MILLIGRAM(S): at 05:12

## 2024-09-27 RX ADMIN — Medication 600 UNIT(S)/HR: at 19:20

## 2024-09-27 NOTE — PROGRESS NOTE ADULT - ASSESSMENT
84M w/ PMH of HTN, HLD, DM2, CAD s/p CABG, pAF on Eliquis, HFpEF, PAD, CKD, prostate CA w/ recent admission for dizziness found to have abnormal stress s/p cath w/ NO new stenting pw 3 weeks of dizziness, weakness and CP. Nephrology on board for CKD and precath optimization    CKD Stage 3   Appears to be at baseline   SCR baseline 1.2-1.6  Avoid Nephrotoxins  Avoid contrast and nsaids  lisinopril on hold for now  Can hold SGLT2 inhibitor  Now planned for stress test, however if going for cardiac cath can give NS 60 cc/hour 6 pre and 6 post procedure   Avoid hypotension    HTN  Continue on current meds    Low CO2/acidosis  Bicarb 19  Monitor    Anemia  Mild  Monitor    Discussed with team        84M w/ PMH of HTN, HLD, DM2, CAD s/p CABG, pAF on Eliquis, HFpEF, PAD, CKD, prostate CA w/ recent admission for dizziness found to have abnormal stress s/p cath w/ NO new stenting pw 3 weeks of dizziness, weakness and CP. Nephrology on board for CKD and precath optimization    CKD Stage 3   Appears to be at baseline   SCR baseline 1.2-1.6, slight uptake to 1.83 today  Will send repeat urine na and urine creatinine for fena   Avoid Nephrotoxins  Avoid contrast and nsaids  Hold lisinopril  Can hold SGLT2 inhibitor  Now planned for stress test, however if going for cardiac cath can give NS 60 cc/hour 6 pre and 6 post procedure   Avoid hypotension    HTN  Continue on current meds    Low CO2/acidosis  Bicarb 19  Monitor    Anemia  Mild  Monitor    Discussed with team

## 2024-09-27 NOTE — PROGRESS NOTE ADULT - SUBJECTIVE AND OBJECTIVE BOX
[facility] Division of Hospital Medicine  Magda Kohli MD  MS Teams PREFERRED        SUBJECTIVE / OVERNIGHT EVENTS: Seen at bedside, NAD.     MEDICATIONS  (STANDING):  aspirin  chewable 81 milliGRAM(s) Oral daily  clopidogrel Tablet 75 milliGRAM(s) Oral daily  dextrose 5%. 1000 milliLiter(s) (100 mL/Hr) IV Continuous <Continuous>  dextrose 5%. 1000 milliLiter(s) (50 mL/Hr) IV Continuous <Continuous>  dextrose 50% Injectable 25 Gram(s) IV Push once  dextrose 50% Injectable 25 Gram(s) IV Push once  dextrose 50% Injectable 12.5 Gram(s) IV Push once  glucagon  Injectable 1 milliGRAM(s) IntraMuscular once  heparin  Infusion.  Unit(s)/Hr (11 mL/Hr) IV Continuous <Continuous>  insulin glargine Injectable (LANTUS) 15 Unit(s) SubCutaneous at bedtime  insulin lispro (ADMELOG) corrective regimen sliding scale   SubCutaneous at bedtime  insulin lispro (ADMELOG) corrective regimen sliding scale   SubCutaneous three times a day before meals  insulin lispro Injectable (ADMELOG) 3 Unit(s) SubCutaneous three times a day before meals  lisinopril 20 milliGRAM(s) Oral daily  metoprolol succinate  milliGRAM(s) Oral daily  multivitamin 1 Tablet(s) Oral daily  pantoprazole    Tablet 40 milliGRAM(s) Oral before breakfast  rosuvastatin 40 milliGRAM(s) Oral at bedtime    MEDICATIONS  (PRN):  acetaminophen     Tablet .. 650 milliGRAM(s) Oral every 6 hours PRN Temp greater or equal to 38C (100.4F), Mild Pain (1 - 3)  dextrose Oral Gel 15 Gram(s) Oral once PRN Blood Glucose LESS THAN 70 milliGRAM(s)/deciliter  heparin   Injectable 5000 Unit(s) IV Push every 6 hours PRN For aPTT less than 40  heparin   Injectable 2500 Unit(s) IV Push every 6 hours PRN For aPTT between 40 - 57      I&O's Summary    26 Sep 2024 07:01  -  27 Sep 2024 07:00  --------------------------------------------------------  IN: 440 mL / OUT: 200 mL / NET: 240 mL    27 Sep 2024 07:01  -  27 Sep 2024 13:41  --------------------------------------------------------  IN: 200 mL / OUT: 0 mL / NET: 200 mL        PHYSICAL EXAM:  Vital Signs Last 24 Hrs  T(C): 36.4 (27 Sep 2024 11:09), Max: 36.7 (26 Sep 2024 20:09)  T(F): 97.6 (27 Sep 2024 11:09), Max: 98.1 (26 Sep 2024 20:09)  HR: 87 (27 Sep 2024 11:09) (84 - 89)  BP: 121/73 (27 Sep 2024 11:09) (113/70 - 161/74)  BP(mean): --  RR: 18 (27 Sep 2024 11:09) (18 - 18)  SpO2: 99% (27 Sep 2024 11:09) (99% - 99%)    Parameters below as of 27 Sep 2024 11:09  Patient On (Oxygen Delivery Method): room air      GENERAL: NAD, well-developed  HEAD:  Atraumatic, Normocephalic  EYES: EOMI, PERRLA, conjunctiva and sclera clear  NECK: Supple, No JVD  CHEST/LUNG: Clear to auscultation bilaterally; No wheeze  HEART: Regular rate and rhythm; No murmurs, rubs, or gallops  ABDOMEN: Soft, Nontender, Nondistended; Bowel sounds present  EXTREMITIES:  2+ Peripheral Pulses, No clubbing, cyanosis, or edema  NEUROLOGY: AAOx3; non-focal  SKIN: No rashes or lesions    LABS:                        11.1   6.68  )-----------( 289      ( 27 Sep 2024 06:56 )             37.2     09-27    134[L]  |  101  |  39[H]  ----------------------------<  181[H]  4.3   |  19[L]  |  1.83[H]    Ca    9.5      27 Sep 2024 06:57  Mg     2.2     09-26      PTT - ( 27 Sep 2024 09:53 )  PTT:125.4 sec      Urinalysis Basic - ( 27 Sep 2024 06:57 )    Color: x / Appearance: x / SG: x / pH: x  Gluc: 181 mg/dL / Ketone: x  / Bili: x / Urobili: x   Blood: x / Protein: x / Nitrite: x   Leuk Esterase: x / RBC: x / WBC x   Sq Epi: x / Non Sq Epi: x / Bacteria: x

## 2024-09-27 NOTE — PROGRESS NOTE ADULT - SUBJECTIVE AND OBJECTIVE BOX
DATE OF SERVICE: 09-27-24 @ 14:44    Patient is a 84y old  Male who presents with a chief complaint of CP (27 Sep 2024 14:38)      INTERVAL HISTORY:     REVIEW OF SYSTEMS:  CONSTITUTIONAL: No weakness  EYES/ENT: No visual changes;  No throat pain   NECK: No pain or stiffness  RESPIRATORY: No cough, wheezing; No shortness of breath  CARDIOVASCULAR: No chest pain or palpitations  GASTROINTESTINAL: No abdominal  pain. No nausea, vomiting, or hematemesis  GENITOURINARY: No dysuria, frequency or hematuria  NEUROLOGICAL: No stroke like symptoms  SKIN: No rashes    TELEMETRY Personally reviewed: Afib 80s  	  MEDICATIONS:  lisinopril 20 milliGRAM(s) Oral daily  metoprolol succinate  milliGRAM(s) Oral daily        PHYSICAL EXAM:  T(C): 36.4 (09-27-24 @ 11:09), Max: 36.7 (09-26-24 @ 20:09)  HR: 87 (09-27-24 @ 11:09) (84 - 89)  BP: 121/73 (09-27-24 @ 11:09) (113/70 - 161/74)  RR: 18 (09-27-24 @ 11:09) (18 - 18)  SpO2: 99% (09-27-24 @ 11:09) (99% - 99%)  Wt(kg): --  I&O's Summary    26 Sep 2024 07:01  -  27 Sep 2024 07:00  --------------------------------------------------------  IN: 440 mL / OUT: 200 mL / NET: 240 mL    27 Sep 2024 07:01  -  27 Sep 2024 14:44  --------------------------------------------------------  IN: 200 mL / OUT: 0 mL / NET: 200 mL          Appearance: In no distress	  HEENT:    PERRL, EOMI	  Cardiovascular:  S1 S2, No JVD  Respiratory: Lungs clear to auscultation	  Gastrointestinal:  Soft, Non-tender, + BS	  Vascularature:  No edema of LE  Psychiatric: Appropriate affect   Neuro: no acute focal deficits                               11.1   6.68  )-----------( 289      ( 27 Sep 2024 06:56 )             37.2     09-27    134[L]  |  101  |  39[H]  ----------------------------<  181[H]  4.3   |  19[L]  |  1.83[H]    Ca    9.5      27 Sep 2024 06:57  Mg     2.2     09-26          Labs personally reviewed      ASSESSMENT/PLAN: 	    84M w/ PMH of HTN, HLD, DM2, CAD s/p CABG, pAF on Eliquis, HFpEF, PAD, CKD, prostate CA, w/ recent admission for dizziness found to have abnormal stress s/p cath w/ NO new stenting pw 3 weeks of dizziness, weakness and CP a/w SOB w/ exertion.     Problem/Plan - 1:  ·  Problem: Chest pain  ·  Plan: Described as exertional CP  - h/o CABG and multiple PCI  - s/p Cath 7/22/24 with patent grafts to LAD/OM, patent stents to RCA  - c/w ASA, Rosuvastatin   - Trop 33--> 29  - Although with recent cath with patent grafts/coronaries now with typical exertional angina will need to proceed with repeat cardiac cath  --- Discussed with interventional cards Dr Owens given pt with diffuse small vessel disease recs NM stress test first to assess for territory of ischemia   - f/u results NST    Problem/Plan - 2:  ·  Problem: Chronic diastolic congestive heart failure.   ·  Plan:   - CXR with clear lungs  - Prior TTE 7/16/24 with preserved EF; Repeat pending     Problem/Plan - 3:  ·  Problem: HTN (hypertension).   ·  Plan: - Continue Toprol 100mg PO daily  - Elevated BP readings, Cr stable, started Lisinopril 20mg PO daily. Improved today 9/27    Problem/Plan - 4:  ·  Problem: Paroxysmal A-fib/SVT  ·  Plan: - Continue metoprolol and Eliquis  ------- Hold Eliquis in anticipation for cath, hep gtt  - Monitor on tele          Iolani Behrbom, AG-NP   Jan Pressley, DO [facility]  Cardiovascular Medicine  [address] [facility], [address]  Available through call or text on [employer] TEAMs  Office: [phone]   DATE OF SERVICE: 09-27-24 @ 14:44    Patient is a 84y old  Male who presents with a chief complaint of CP (27 Sep 2024 14:38)      INTERVAL HISTORY: feels okay, wife at bedside    REVIEW OF SYSTEMS:  CONSTITUTIONAL: No weakness  EYES/ENT: No visual changes;  No throat pain   NECK: No pain or stiffness  RESPIRATORY: No cough, wheezing; No shortness of breath  CARDIOVASCULAR: No chest pain or palpitations  GASTROINTESTINAL: No abdominal  pain. No nausea, vomiting, or hematemesis  GENITOURINARY: No dysuria, frequency or hematuria  NEUROLOGICAL: No stroke like symptoms  SKIN: No rashes    TELEMETRY Personally reviewed: Afib 80s  	  MEDICATIONS:  lisinopril 20 milliGRAM(s) Oral daily  metoprolol succinate  milliGRAM(s) Oral daily        PHYSICAL EXAM:  T(C): 36.4 (09-27-24 @ 11:09), Max: 36.7 (09-26-24 @ 20:09)  HR: 87 (09-27-24 @ 11:09) (84 - 89)  BP: 121/73 (09-27-24 @ 11:09) (113/70 - 161/74)  RR: 18 (09-27-24 @ 11:09) (18 - 18)  SpO2: 99% (09-27-24 @ 11:09) (99% - 99%)  Wt(kg): --  I&O's Summary    26 Sep 2024 07:01  -  27 Sep 2024 07:00  --------------------------------------------------------  IN: 440 mL / OUT: 200 mL / NET: 240 mL    27 Sep 2024 07:01  -  27 Sep 2024 14:44  --------------------------------------------------------  IN: 200 mL / OUT: 0 mL / NET: 200 mL          Appearance: In no distress	  HEENT:    PERRL, EOMI	  Cardiovascular:  S1 S2, No JVD  Respiratory: Lungs clear to auscultation	  Gastrointestinal:  Soft, Non-tender, + BS	  Vascularature:  No edema of LE  Psychiatric: Appropriate affect   Neuro: no acute focal deficits                               11.1   6.68  )-----------( 289      ( 27 Sep 2024 06:56 )             37.2     09-27    134[L]  |  101  |  39[H]  ----------------------------<  181[H]  4.3   |  19[L]  |  1.83[H]    Ca    9.5      27 Sep 2024 06:57  Mg     2.2     09-26          Labs personally reviewed      ASSESSMENT/PLAN: 	    84M w/ PMH of HTN, HLD, DM2, CAD s/p CABG, pAF on Eliquis, HFpEF, PAD, CKD, prostate CA, w/ recent admission for dizziness found to have abnormal stress s/p cath w/ NO new stenting pw 3 weeks of dizziness, weakness and CP a/w SOB w/ exertion.     Problem/Plan - 1:  ·  Problem: Chest pain  ·  Plan: Described as exertional CP  - h/o CABG and multiple PCI  - s/p Cath 7/22/24 with patent grafts to LAD/OM, patent stents to RCA  - c/w ASA, Rosuvastatin   - Trop 33--> 29  - Although with recent cath with patent grafts/coronaries now with typical exertional angina will need to proceed with repeat cardiac cath  --- Discussed with interventional cards Dr Owens given pt with diffuse small vessel disease recs NM stress test first to assess for territory of ischemia   - f/u results NST    Problem/Plan - 2:  ·  Problem: Chronic diastolic congestive heart failure.   ·  Plan:   - CXR with clear lungs  - Prior TTE 7/16/24 with preserved EF; Repeat pending     Problem/Plan - 3:  ·  Problem: HTN (hypertension).   ·  Plan: - Continue Toprol 100mg PO daily  - Elevated BP readings, Cr stable, started Lisinopril 20mg PO daily. Improved today 9/27    Problem/Plan - 4:  ·  Problem: Paroxysmal A-fib/SVT  ·  Plan: - Continue metoprolol and Eliquis  ------- Hold Eliquis in anticipation for cath, hep gtt  - Monitor on tele          [facility] Behrb, AG-FRANKIE Pressley DO [facility]  Cardiovascular Medicine  [address], [address]  Available through call or text on [employer] TEAMs  Office: [phone]   DATE OF SERVICE: 09-27-24 @ 14:44    Patient is a 84y old  Male who presents with a chief complaint of CP (27 Sep 2024 14:38)      INTERVAL HISTORY: feels okay, wife at bedside    REVIEW OF SYSTEMS:  CONSTITUTIONAL: No weakness  EYES/ENT: No visual changes;  No throat pain   NECK: No pain or stiffness  RESPIRATORY: No cough, wheezing; No shortness of breath  CARDIOVASCULAR: No chest pain or palpitations  GASTROINTESTINAL: No abdominal  pain. No nausea, vomiting, or hematemesis  GENITOURINARY: No dysuria, frequency or hematuria  NEUROLOGICAL: No stroke like symptoms  SKIN: No rashes    TELEMETRY Personally reviewed: Afib 80s  	  MEDICATIONS:  lisinopril 20 milliGRAM(s) Oral daily  metoprolol succinate  milliGRAM(s) Oral daily        PHYSICAL EXAM:  T(C): 36.4 (09-27-24 @ 11:09), Max: 36.7 (09-26-24 @ 20:09)  HR: 87 (09-27-24 @ 11:09) (84 - 89)  BP: 121/73 (09-27-24 @ 11:09) (113/70 - 161/74)  RR: 18 (09-27-24 @ 11:09) (18 - 18)  SpO2: 99% (09-27-24 @ 11:09) (99% - 99%)  Wt(kg): --  I&O's Summary    26 Sep 2024 07:01  -  27 Sep 2024 07:00  --------------------------------------------------------  IN: 440 mL / OUT: 200 mL / NET: 240 mL    27 Sep 2024 07:01  -  27 Sep 2024 14:44  --------------------------------------------------------  IN: 200 mL / OUT: 0 mL / NET: 200 mL          Appearance: In no distress	  HEENT:    PERRL, EOMI	  Cardiovascular:  S1 S2, No JVD  Respiratory: Lungs clear to auscultation	  Gastrointestinal:  Soft, Non-tender, + BS	  Vascularature:  No edema of LE  Psychiatric: Appropriate affect   Neuro: no acute focal deficits                               11.1   6.68  )-----------( 289      ( 27 Sep 2024 06:56 )             37.2     09-27    134[L]  |  101  |  39[H]  ----------------------------<  181[H]  4.3   |  19[L]  |  1.83[H]    Ca    9.5      27 Sep 2024 06:57  Mg     2.2     09-26          Labs personally reviewed      ASSESSMENT/PLAN: 	    84M w/ PMH of HTN, HLD, DM2, CAD s/p CABG, pAF on Eliquis, HFpEF, PAD, CKD, prostate CA, w/ recent admission for dizziness found to have abnormal stress s/p cath w/ NO new stenting pw 3 weeks of dizziness, weakness and CP a/w SOB w/ exertion.     Problem/Plan - 1:  ·  Problem: Chest pain  ·  Plan: Described as exertional CP  - h/o CABG and multiple PCI  - s/p Cath 7/22/24 with patent grafts to LAD/OM, patent stents to RCA  - c/w ASA, Rosuvastatin   - Trop 33--> 29  - Although with recent cath with patent grafts/coronaries now with typical exertional angina will need to proceed with repeat cardiac cath  --- Discussed with interventional cards Dr Owens given pt with diffuse small vessel disease recs NM stress test first to assess for territory of ischemia   - NST abnormal, plan for cath Monday 9/30    Problem/Plan - 2:  ·  Problem: Chronic diastolic congestive heart failure.   ·  Plan:   - CXR with clear lungs  - Prior TTE 7/16/24 with preserved EF; Repeat pending     Problem/Plan - 3:  ·  Problem: HTN (hypertension).   ·  Plan: - Continue Toprol 100mg PO daily  - Elevated BP readings, Cr stable, started Lisinopril 20mg PO daily. Improved today 9/27    Problem/Plan - 4:  ·  Problem: Paroxysmal A-fib/SVT  ·  Plan: - Continue metoprolol and Eliquis  ------- Hold Eliquis in anticipation for cath, hep gtt  - Monitor on tele          Iolani Behrbom, AG-NP Omid Javdan, DO [facility]  Cardiovascular Medicine  [address] [place] [address], [address]  Available through call or text on [employer] TEAMs  Office: [phone]   DATE OF SERVICE: 09-27-24 @ 14:44    Patient is a 84y old  Male who presents with a chief complaint of CP (27 Sep 2024 14:38)      INTERVAL HISTORY: feels okay, wife at bedside    REVIEW OF SYSTEMS:  CONSTITUTIONAL: No weakness  EYES/ENT: No visual changes;  No throat pain   NECK: No pain or stiffness  RESPIRATORY: No cough, wheezing; No shortness of breath  CARDIOVASCULAR: No chest pain or palpitations  GASTROINTESTINAL: No abdominal  pain. No nausea, vomiting, or hematemesis  GENITOURINARY: No dysuria, frequency or hematuria  NEUROLOGICAL: No stroke like symptoms  SKIN: No rashes    TELEMETRY Personally reviewed: Afib 80s  	  MEDICATIONS:  lisinopril 20 milliGRAM(s) Oral daily  metoprolol succinate  milliGRAM(s) Oral daily        PHYSICAL EXAM:  T(C): 36.4 (09-27-24 @ 11:09), Max: 36.7 (09-26-24 @ 20:09)  HR: 87 (09-27-24 @ 11:09) (84 - 89)  BP: 121/73 (09-27-24 @ 11:09) (113/70 - 161/74)  RR: 18 (09-27-24 @ 11:09) (18 - 18)  SpO2: 99% (09-27-24 @ 11:09) (99% - 99%)  Wt(kg): --  I&O's Summary    26 Sep 2024 07:01  -  27 Sep 2024 07:00  --------------------------------------------------------  IN: 440 mL / OUT: 200 mL / NET: 240 mL    27 Sep 2024 07:01  -  27 Sep 2024 14:44  --------------------------------------------------------  IN: 200 mL / OUT: 0 mL / NET: 200 mL          Appearance: In no distress	  HEENT:    PERRL, EOMI	  Cardiovascular:  S1 S2, No JVD  Respiratory: Lungs clear to auscultation	  Gastrointestinal:  Soft, Non-tender, + BS	  Vascularature:  No edema of LE  Psychiatric: Appropriate affect   Neuro: no acute focal deficits                               11.1   6.68  )-----------( 289      ( 27 Sep 2024 06:56 )             37.2     09-27    134[L]  |  101  |  39[H]  ----------------------------<  181[H]  4.3   |  19[L]  |  1.83[H]    Ca    9.5      27 Sep 2024 06:57  Mg     2.2     09-26          Labs personally reviewed      ASSESSMENT/PLAN: 	    84M w/ PMH of HTN, HLD, DM2, CAD s/p CABG, pAF on Eliquis, HFpEF, PAD, CKD, prostate CA, w/ recent admission for dizziness found to have abnormal stress s/p cath w/ NO new stenting pw 3 weeks of dizziness, weakness and CP a/w SOB w/ exertion.     Problem/Plan - 1:  ·  Problem: Chest pain  ·  Plan: Described as exertional CP  - h/o CABG and multiple PCI  - s/p Cath 7/22/24 with patent grafts to LAD/OM, patent stents to RCA  - c/w ASA, Rosuvastatin   - Trop 33--> 29  - Although with recent cath with patent grafts/coronaries now with typical exertional angina will need to proceed with repeat cardiac cath  --- Discussed with interventional cards Dr Owens given pt with diffuse small vessel disease recs NM stress test first to assess for territory of ischemia   - NST abnormal, plan for cath Monday 9/30 with Dr Owens    Problem/Plan - 2:  ·  Problem: Chronic diastolic congestive heart failure.   ·  Plan:   - CXR with clear lungs  - Prior TTE 7/16/24 with preserved EF; Repeat pending     Problem/Plan - 3:  ·  Problem: HTN (hypertension).   ·  Plan: - Continue Toprol 100mg PO daily  - Elevated BP readings, Cr stable, started Lisinopril 20mg PO daily. Improved today 9/27    Problem/Plan - 4:  ·  Problem: Paroxysmal A-fib/SVT  ·  Plan: - Continue metoprolol and Eliquis  ------- Hold Eliquis in anticipation for cath, hep gtt  - Monitor on tele          Iolani Behrbom, AG-NP Omid Javdan, DO [facility]  Cardiovascular Medicine  [address] [facility] [address], [address]  Available through call or text on [employer] TEAMs  Office: [phone]

## 2024-09-27 NOTE — PROGRESS NOTE ADULT - PROBLEM SELECTOR PLAN 2
- c/w Toprol 100mg qd   - [id number]  - continue IV heparin gtt  - continue to hold eliquis in anticipation of LHC

## 2024-09-27 NOTE — PROGRESS NOTE ADULT - SUBJECTIVE AND OBJECTIVE BOX
[facility]    Dr. Shine Navarro     Office [phone] (9 am to 5 pm)  Service: [phone] (5pm to 9am)        RENAL PROGRESS NOTE: DATE OF SERVICE 09-27-24 @ 14:38    Patient is a 84y old  Male who presents with a chief complaint of CP (27 Sep 2024 13:41)      Patient seen and examined at bedside. No chest pain/sob    VITALS:  T(F): 97.6 (09-27-24 @ 11:09), Max: 98.1 (09-26-24 @ 20:09)  HR: 87 (09-27-24 @ 11:09)  BP: 121/73 (09-27-24 @ 11:09)  RR: 18 (09-27-24 @ 11:09)  SpO2: 99% (09-27-24 @ 11:09)  Wt(kg): --    09-26 @ 07:01  -  09-27 @ 07:00  --------------------------------------------------------  IN: 440 mL / OUT: 200 mL / NET: 240 mL    09-27 @ 07:01  -  09-27 @ 14:38  --------------------------------------------------------  IN: 200 mL / OUT: 0 mL / NET: 200 mL          PHYSICAL EXAM:  Constitutional: NAD  Neck: No JVD  Respiratory: CTAB, no wheezes, rales or rhonchi  Cardiovascular: S1, S2, RRR  Gastrointestinal: BS+, soft, NT/ND  Extremities: No peripheral edema    Hospital Medications:   MEDICATIONS  (STANDING):  aspirin  chewable 81 milliGRAM(s) Oral daily  clopidogrel Tablet 75 milliGRAM(s) Oral daily  dextrose 5%. 1000 milliLiter(s) (100 mL/Hr) IV Continuous <Continuous>  dextrose 5%. 1000 milliLiter(s) (50 mL/Hr) IV Continuous <Continuous>  dextrose 50% Injectable 25 Gram(s) IV Push once  dextrose 50% Injectable 25 Gram(s) IV Push once  dextrose 50% Injectable 12.5 Gram(s) IV Push once  glucagon  Injectable 1 milliGRAM(s) IntraMuscular once  heparin  Infusion.  Unit(s)/Hr (11 mL/Hr) IV Continuous <Continuous>  insulin glargine Injectable (LANTUS) 15 Unit(s) SubCutaneous at bedtime  insulin lispro (ADMELOG) corrective regimen sliding scale   SubCutaneous three times a day before meals  insulin lispro (ADMELOG) corrective regimen sliding scale   SubCutaneous at bedtime  insulin lispro Injectable (ADMELOG) 3 Unit(s) SubCutaneous three times a day before meals  lisinopril 20 milliGRAM(s) Oral daily  metoprolol succinate  milliGRAM(s) Oral daily  multivitamin 1 Tablet(s) Oral daily  pantoprazole    Tablet 40 milliGRAM(s) Oral before breakfast  rosuvastatin 40 milliGRAM(s) Oral at bedtime      LABS:  09-27    134[L]  |  101  |  39[H]  ----------------------------<  181[H]  4.3   |  19[L]  |  1.83[H]    Ca    9.5      27 Sep 2024 06:57  Mg     2.2     09-26      Creatinine Trend: 1.83 <--, 1.42 <--, 1.50 <--, 1.67 <--, 1.58 <--                                11.1   6.68  )-----------( 289      ( 27 Sep 2024 06:56 )             37.2     Urine Studies:  Urinalysis - [09-27-24 @ 06:57]      Color  / Appearance  / SG  / pH       Gluc 181 / Ketone   / Bili  / Urobili        Blood  / Protein  / Leuk Est  / Nitrite       RBC  / WBC  / Hyaline  / Gran  / Sq Epi  / Non Sq Epi  / Bacteria     Urine Creatinine 47      [09-24-24 @ 02:28]  Urine Protein 14      [09-24-24 @ 02:28]  Urine Sodium 84      [09-24-24 @ 02:28]  Urine Urea Nitrogen 541      [09-24-24 @ 02:28]  Urine Potassium 28      [09-24-24 @ 02:28]  Urine Osmolality 536      [09-24-24 @ 02:28]    TSH 0.84      [09-24-24 @ 06:06]  Lipid: chol 190, , HDL 43, LDL --      [07-16-24 @ 10:48]        RADIOLOGY & ADDITIONAL STUDIES:

## 2024-09-28 LAB
ANION GAP SERPL CALC-SCNC: 11 MMOL/L — SIGNIFICANT CHANGE UP (ref 5–17)
APTT BLD: 60 SEC — HIGH (ref 24.5–35.6)
BUN SERPL-MCNC: 47 MG/DL — HIGH (ref 7–23)
CALCIUM SERPL-MCNC: 9.4 MG/DL — SIGNIFICANT CHANGE UP (ref 8.4–10.5)
CHLORIDE SERPL-SCNC: 102 MMOL/L — SIGNIFICANT CHANGE UP (ref 96–108)
CO2 SERPL-SCNC: 19 MMOL/L — LOW (ref 22–31)
CREAT SERPL-MCNC: 1.93 MG/DL — HIGH (ref 0.5–1.3)
EGFR: 34 ML/MIN/1.73M2 — LOW
GLUCOSE BLDC GLUCOMTR-MCNC: 184 MG/DL — HIGH (ref 70–99)
GLUCOSE BLDC GLUCOMTR-MCNC: 212 MG/DL — HIGH (ref 70–99)
GLUCOSE BLDC GLUCOMTR-MCNC: 242 MG/DL — HIGH (ref 70–99)
GLUCOSE BLDC GLUCOMTR-MCNC: 248 MG/DL — HIGH (ref 70–99)
GLUCOSE SERPL-MCNC: 255 MG/DL — HIGH (ref 70–99)
HCT VFR BLD CALC: 33.8 % — LOW (ref 39–50)
HGB BLD-MCNC: 10 G/DL — LOW (ref 13–17)
MCHC RBC-ENTMCNC: 24.4 PG — LOW (ref 27–34)
MCHC RBC-ENTMCNC: 29.6 GM/DL — LOW (ref 32–36)
MCV RBC AUTO: 82.4 FL — SIGNIFICANT CHANGE UP (ref 80–100)
NRBC # BLD: 0 /100 WBCS — SIGNIFICANT CHANGE UP (ref 0–0)
PLATELET # BLD AUTO: 263 K/UL — SIGNIFICANT CHANGE UP (ref 150–400)
POTASSIUM SERPL-MCNC: 4.1 MMOL/L — SIGNIFICANT CHANGE UP (ref 3.5–5.3)
POTASSIUM SERPL-SCNC: 4.1 MMOL/L — SIGNIFICANT CHANGE UP (ref 3.5–5.3)
RBC # BLD: 4.1 M/UL — LOW (ref 4.2–5.8)
RBC # FLD: 15.9 % — HIGH (ref 10.3–14.5)
SODIUM SERPL-SCNC: 132 MMOL/L — LOW (ref 135–145)
WBC # BLD: 7.31 K/UL — SIGNIFICANT CHANGE UP (ref 3.8–10.5)
WBC # FLD AUTO: 7.31 K/UL — SIGNIFICANT CHANGE UP (ref 3.8–10.5)

## 2024-09-28 PROCEDURE — 99232 SBSQ HOSP IP/OBS MODERATE 35: CPT

## 2024-09-28 RX ADMIN — Medication 100 MILLIGRAM(S): at 05:32

## 2024-09-28 RX ADMIN — MULTI VITAMIN/MINERAL SUPPLEMENT WITH ASCORBIC ACID, NIACIN, PYRIDOXINE, PANTOTHENIC ACID, FOLIC ACID, RIBOFLAVIN, THIAMIN, BIOTIN, COBALAMIN AND ZINC. 1 TABLET(S): 60; 20; 12.5; 10; 10; 1.7; 1.5; 1; .3; .006 TABLET, COATED ORAL at 12:25

## 2024-09-28 RX ADMIN — ROSUVASTATIN CALCIUM 40 MILLIGRAM(S): 20 TABLET, COATED ORAL at 21:18

## 2024-09-28 RX ADMIN — Medication 3 UNIT(S): at 18:21

## 2024-09-28 RX ADMIN — PANTOPRAZOLE SODIUM 40 MILLIGRAM(S): 40 TABLET, DELAYED RELEASE ORAL at 05:32

## 2024-09-28 RX ADMIN — Medication 3 UNIT(S): at 12:23

## 2024-09-28 RX ADMIN — Medication 75 MILLIGRAM(S): at 12:26

## 2024-09-28 RX ADMIN — Medication 2: at 18:21

## 2024-09-28 RX ADMIN — Medication 2: at 08:17

## 2024-09-28 RX ADMIN — Medication 600 UNIT(S)/HR: at 07:04

## 2024-09-28 RX ADMIN — Medication 600 UNIT(S)/HR: at 19:11

## 2024-09-28 RX ADMIN — INSULIN GLARGINE 15 UNIT(S): 300 INJECTION, SOLUTION SUBCUTANEOUS at 21:18

## 2024-09-28 RX ADMIN — Medication 3 UNIT(S): at 08:17

## 2024-09-28 RX ADMIN — Medication 81 MILLIGRAM(S): at 12:25

## 2024-09-28 RX ADMIN — Medication 600 UNIT(S)/HR: at 01:35

## 2024-09-28 RX ADMIN — Medication 2: at 12:22

## 2024-09-28 NOTE — PROGRESS NOTE ADULT - SUBJECTIVE AND OBJECTIVE BOX
Carrie Merida MD  Division of Hospital Medicine  Please contact via MS Teams (prefer message first)  Office: [phone]    Patient is a 84y old  Male who presents with a chief complaint of CP (28 Sep 2024 08:43)      SUBJECTIVE / OVERNIGHT EVENTS:  no acute events overnight, vss, afebrile  s/p NST with abnormal results  pending [facility] on Monday with Dr. Owens  denies chest pain, dyspnea at this time    ROS:  14 point ROS negative in detail except stated as above    MEDICATIONS  (STANDING):  aspirin  chewable 81 milliGRAM(s) Oral daily  clopidogrel Tablet 75 milliGRAM(s) Oral daily  dextrose 5%. 1000 milliLiter(s) (100 mL/Hr) IV Continuous <Continuous>  dextrose 5%. 1000 milliLiter(s) (50 mL/Hr) IV Continuous <Continuous>  dextrose 50% Injectable 25 Gram(s) IV Push once  dextrose 50% Injectable 25 Gram(s) IV Push once  dextrose 50% Injectable 12.5 Gram(s) IV Push once  glucagon  Injectable 1 milliGRAM(s) IntraMuscular once  heparin  Infusion.  Unit(s)/Hr (11 mL/Hr) IV Continuous <Continuous>  insulin glargine Injectable (LANTUS) 15 Unit(s) SubCutaneous at bedtime  insulin lispro (ADMELOG) corrective regimen sliding scale   SubCutaneous three times a day before meals  insulin lispro (ADMELOG) corrective regimen sliding scale   SubCutaneous at bedtime  insulin lispro Injectable (ADMELOG) 3 Unit(s) SubCutaneous three times a day before meals  metoprolol succinate  milliGRAM(s) Oral daily  multivitamin 1 Tablet(s) Oral daily  pantoprazole    Tablet 40 milliGRAM(s) Oral before breakfast  rosuvastatin 40 milliGRAM(s) Oral at bedtime    MEDICATIONS  (PRN):  acetaminophen     Tablet .. 650 milliGRAM(s) Oral every 6 hours PRN Temp greater or equal to 38C (100.4F), Mild Pain (1 - 3)  dextrose Oral Gel 15 Gram(s) Oral once PRN Blood Glucose LESS THAN 70 milliGRAM(s)/deciliter  heparin   Injectable 2500 Unit(s) IV Push every 6 hours PRN For aPTT between 40 - 57  heparin   Injectable 5000 Unit(s) IV Push every 6 hours PRN For aPTT less than 40      CAPILLARY BLOOD GLUCOSE      POCT Blood Glucose.: 248 mg/dL (28 Sep 2024 07:49)  POCT Blood Glucose.: 221 mg/dL (27 Sep 2024 21:10)  POCT Blood Glucose.: 281 mg/dL (27 Sep 2024 17:20)    I&O's Summary    27 Sep 2024 07:01  -  28 Sep 2024 07:00  --------------------------------------------------------  IN: 243 mL / OUT: 275 mL / NET: -32 mL        PHYSICAL EXAM:  Vital Signs Last 24 Hrs  T(C): 36.5 (28 Sep 2024 04:29), Max: 36.5 (27 Sep 2024 20:06)  T(F): 97.7 (28 Sep 2024 04:29), Max: 97.7 (27 Sep 2024 20:06)  HR: 84 (28 Sep 2024 04:29) (84 - 86)  BP: 105/67 (28 Sep 2024 04:29) (105/67 - 158/93)  BP(mean): --  RR: 18 (28 Sep 2024 04:29) (18 - 18)  SpO2: 99% (28 Sep 2024 04:29) (98% - 99%)    Parameters below as of 28 Sep 2024 04:29  Patient On (Oxygen Delivery Method): room air      GENERAL: NAD, well-developed  HEAD:  Atraumatic, Normocephalic  EYES: EOMI, PERRLA, conjunctiva and sclera clear  NECK: Supple, No JVD  CHEST/LUNG: Clear to auscultation bilaterally; No wheeze  HEART: Regular rate and rhythm; No murmurs, rubs, or gallops  ABDOMEN: Soft, Nontender, Nondistended; Bowel sounds present  EXTREMITIES:  2+ Peripheral Pulses, No clubbing, cyanosis, or edema  NEUROLOGY: AAOx3; non-focal  SKIN: No rashes or lesions    LABS:                        10.0   7.31  )-----------( 263      ( 28 Sep 2024 06:49 )             33.8     09-28    132[L]  |  102  |  47[H]  ----------------------------<  255[H]  4.1   |  19[L]  |  1.93[H]    Ca    9.4      28 Sep 2024 06:50      PTT - ( 28 Sep 2024 00:54 )  PTT:60.0 sec      Urinalysis Basic - ( 28 Sep 2024 06:50 )    Color: x / Appearance: x / SG: x / pH: x  Gluc: 255 mg/dL / Ketone: x  / Bili: x / Urobili: x   Blood: x / Protein: x / Nitrite: x   Leuk Esterase: x / RBC: x / WBC x   Sq Epi: x / Non Sq Epi: x / Bacteria: x        RADIOLOGY & ADDITIONAL TESTS:    Imaging Personally Reviewed:  < from: Nuclear Stress Test-Pharmacologic.. (09.27.24 @ 14:06) >   1. Myocardial Perfusion: Mildly Abnormal.   2. Qualitative Perfusion:      - small-sized, mild defect(s) in the distal anterior and anteroapical wall that is reversible consistent with ischemia.   3. The left ventricle is normal in function and normal in size. The post stress left ventricular EF is 68 %. The stress end diastolic volume is 43 ml and systolic volume is 14 ml.    < end of copied text >      Consultant(s) Notes Reviewed:  cards, nephro    Care Discussed with Consultants/Other Providers: Dr. Pressley (cards)

## 2024-09-28 NOTE — PROGRESS NOTE ADULT - PROBLEM SELECTOR PLAN 2
- c/w Toprol 100mg qd   - [id number]  - continue IV heparin gtt  - continue to hold eliquis in anticipation of LHC

## 2024-09-28 NOTE — PROGRESS NOTE ADULT - ASSESSMENT
84M w/ PMH of HTN, HLD, DM2, CAD s/p CABG, pAF on Eliquis, HFpEF, PAD, CKD, prostate CA w/ recent admission for dizziness found to have abnormal stress s/p cath w/ NO new stenting pw 3 weeks of dizziness, weakness and CP. Nephrology on board for CKD and precath optimization    Nat on CKD Stage 3   Appears to be at baseline   SCR baseline 1.2-1.6,   Fena suggestive of pre renal   Consider IVF NS  for 1 liter   Avoid Nephrotoxins  Avoid contrast and nsaids  Hold lisinopril  Can hold SGLT2 inhibitor  Avoid hypotension    HTN  Continue on current meds    Low CO2/acidosis  Monitor    Anemia  Mild  Monitor

## 2024-09-28 NOTE — PROGRESS NOTE ADULT - TIME BILLING
Time-based billing (NON-critical care).     The necessity of the time spent during the encounter on this date of service was due to:     - Ordering, reviewing, and interpreting labs, testing, and imaging.  - Independently obtaining a review of systems and performing a physical exam  - Reviewing prior hospitalization and where necessary, outpatient records.  - Counselling and educating patient and family regarding interpretation of aforementioned items and plan of care.
patient encounter, reviewing tests and imaging, independently obtaining a history, performing a physical examination, discussing the plan with the patient, ordering medications/tests, documenting clinical information, and coordinating care. This excludes any time spent on teaching.

## 2024-09-28 NOTE — PROGRESS NOTE ADULT - SUBJECTIVE AND OBJECTIVE BOX
[facility]   MD AMBREEN ELI MD RUORU WONG, PA    TEL:  FROM 9 AM to 5 PM ---OFFICE: [phone]  AVAILABLE ON TEAMS    FROM 5 PM - 9 AM PLEASE CALL ANSWERING SERVICE: [phone]    RENAL FOLLOW UP NOTE--Date of Service 09-28-24 @ 08:43  --------------------------------------------------------------------------------  HPI:      Pt seen and examined at bedside.   Rodríguezies SOB, chest pain     PAST HISTORY  --------------------------------------------------------------------------------  No significant changes to PMH, PSH, FHx, SHx, unless otherwise noted    ALLERGIES & MEDICATIONS  --------------------------------------------------------------------------------  Allergies    No Known Allergies    Intolerances      Standing Inpatient Medications  aspirin  chewable 81 milliGRAM(s) Oral daily  clopidogrel Tablet 75 milliGRAM(s) Oral daily  dextrose 5%. 1000 milliLiter(s) IV Continuous <Continuous>  dextrose 5%. 1000 milliLiter(s) IV Continuous <Continuous>  dextrose 50% Injectable 25 Gram(s) IV Push once  dextrose 50% Injectable 25 Gram(s) IV Push once  dextrose 50% Injectable 12.5 Gram(s) IV Push once  glucagon  Injectable 1 milliGRAM(s) IntraMuscular once  heparin  Infusion.  Unit(s)/Hr IV Continuous <Continuous>  insulin glargine Injectable (LANTUS) 15 Unit(s) SubCutaneous at bedtime  insulin lispro (ADMELOG) corrective regimen sliding scale   SubCutaneous at bedtime  insulin lispro (ADMELOG) corrective regimen sliding scale   SubCutaneous three times a day before meals  insulin lispro Injectable (ADMELOG) 3 Unit(s) SubCutaneous three times a day before meals  metoprolol succinate  milliGRAM(s) Oral daily  multivitamin 1 Tablet(s) Oral daily  pantoprazole    Tablet 40 milliGRAM(s) Oral before breakfast  rosuvastatin 40 milliGRAM(s) Oral at bedtime    PRN Inpatient Medications  acetaminophen     Tablet .. 650 milliGRAM(s) Oral every 6 hours PRN  dextrose Oral Gel 15 Gram(s) Oral once PRN  heparin   Injectable 2500 Unit(s) IV Push every 6 hours PRN  heparin   Injectable 5000 Unit(s) IV Push every 6 hours PRN      REVIEW OF SYSTEMS  --------------------------------------------------------------------------------  General: no fever  MSK: no edema     VITALS/PHYSICAL EXAM  --------------------------------------------------------------------------------  T(C): 36.5 (09-28-24 @ 04:29), Max: 36.5 (09-27-24 @ 20:06)  HR: 84 (09-28-24 @ 04:29) (84 - 87)  BP: 105/67 (09-28-24 @ 04:29) (105/67 - 158/93)  RR: 18 (09-28-24 @ 04:29) (18 - 18)  SpO2: 99% (09-28-24 @ 04:29) (98% - 99%)  Wt(kg): --        09-27-24 @ 07:01  -  09-28-24 @ 07:00  --------------------------------------------------------  IN: 243 mL / OUT: 275 mL / NET: -32 mL      Physical Exam:  	Gen: NAD  	HEENT: MMM  	Pulm: CTA B/L  	CV: S1S2  	Abd: Soft, +BS  	Ext: No LE edema B/L                      Neuro: Awake   	Skin: Warm and Dry   	Vascular access: NO HD catheter            no  whatley  LABS/STUDIES  --------------------------------------------------------------------------------              10.0   7.31  >-----------<  263      [09-28-24 @ 06:49]              33.8     132  |  102  |  47  ----------------------------<  255      [09-28-24 @ 06:50]  4.1   |  19  |  1.93        Ca     9.4     [09-28-24 @ 06:50]        PTT: 60.0       [09-28-24 @ 00:54]      Creatinine Trend:  SCr 1.93 [09-28 @ 06:50]  SCr 1.83 [09-27 @ 06:57]  SCr 1.42 [09-26 @ 07:22]  SCr 1.50 [09-25 @ 06:24]  SCr 1.67 [09-24 @ 06:06]    Urinalysis - [09-28-24 @ 06:50]      Color  / Appearance  / SG  / pH       Gluc 255 / Ketone   / Bili  / Urobili        Blood  / Protein  / Leuk Est  / Nitrite       RBC  / WBC  / Hyaline  / Gran  / Sq Epi  / Non Sq Epi  / Bacteria     Urine Creatinine 66      [09-27-24 @ 18:05]  Urine Protein 14      [09-24-24 @ 02:28]  Urine Sodium 45      [09-27-24 @ 18:05]  Urine Urea Nitrogen 541      [09-24-24 @ 02:28]  Urine Potassium 28      [09-24-24 @ 02:28]  Urine Osmolality 536      [09-24-24 @ 02:28]    TSH 0.84      [09-24-24 @ 06:06]  Lipid: chol 190, , HDL 43, LDL --      [07-16-24 @ 10:48]

## 2024-09-28 NOTE — PROGRESS NOTE ADULT - SUBJECTIVE AND OBJECTIVE BOX
DATE OF SERVICE: 09-28-24 @ 15:12    Patient is a 84y old  Male who presents with a chief complaint of CP (28 Sep 2024 11:30)      INTERVAL HISTORY: no complaints     REVIEW OF SYSTEMS:  CONSTITUTIONAL: No weakness  EYES/ENT: No visual changes;  No throat pain   NECK: No pain or stiffness  RESPIRATORY: No cough, wheezing; No shortness of breath  CARDIOVASCULAR: No chest pain or palpitations  GASTROINTESTINAL: No abdominal  pain. No nausea, vomiting, or hematemesis  GENITOURINARY: No dysuria, frequency or hematuria  NEUROLOGICAL: No stroke like symptoms  SKIN: No rashes      	  MEDICATIONS:  metoprolol succinate  milliGRAM(s) Oral daily        PHYSICAL EXAM:  T(C): 36.6 (09-28-24 @ 11:46), Max: 36.6 (09-28-24 @ 11:46)  HR: 86 (09-28-24 @ 11:46) (84 - 86)  BP: 119/74 (09-28-24 @ 11:46) (105/67 - 158/93)  RR: 18 (09-28-24 @ 11:46) (18 - 18)  SpO2: 99% (09-28-24 @ 11:46) (98% - 99%)  Wt(kg): --  I&O's Summary    27 Sep 2024 07:01  -  28 Sep 2024 07:00  --------------------------------------------------------  IN: 243 mL / OUT: 275 mL / NET: -32 mL          Appearance: In no distress	  HEENT:    PERRL, EOMI	  Cardiovascular:  S1 S2, No JVD  Respiratory: Lungs clear to auscultation	  Gastrointestinal:  Soft, Non-tender, + BS	  Vascularature:  No edema of LE  Psychiatric: Appropriate affect   Neuro: no acute focal deficits                               10.0   7.31  )-----------( 263      ( 28 Sep 2024 06:49 )             33.8     09-28    132[L]  |  102  |  47[H]  ----------------------------<  255[H]  4.1   |  19[L]  |  1.93[H]    Ca    9.4      28 Sep 2024 06:50          Labs personally reviewed      ASSESSMENT/PLAN: 	    84M w/ PMH of HTN, HLD, DM2, CAD s/p CABG, pAF on Eliquis, HFpEF, PAD, CKD, prostate CA, w/ recent admission for dizziness found to have abnormal stress s/p cath w/ NO new stenting pw 3 weeks of dizziness, weakness and CP a/w SOB w/ exertion.     Problem/Plan - 1:  ·  Problem: Chest pain  ·  Plan: Described as exertional CP  - h/o CABG and multiple PCI  - s/p Cath 7/22/24 with patent grafts to LAD/OM, patent stents to RCA  - c/w ASA, Rosuvastatin   - Trop 33--> 29  - Although with recent cath with patent grafts/coronaries now with typical exertional angina will need to proceed with repeat cardiac cath  --- Discussed with interventional cards Dr Owens given pt with diffuse small vessel disease recs NM stress test first to assess for territory of ischemia   - NST abnormal, plan for cath Monday 9/30 with Dr Owens  - IVF Hydration prior to cath    Problem/Plan - 2:  ·  Problem: Chronic diastolic congestive heart failure.   ·  Plan:   - CXR with clear lungs  - Prior TTE 7/16/24 with preserved EF; Repeat pending     Problem/Plan - 3:  ·  Problem: HTN (hypertension).   ·  Plan: - Continue Toprol 100mg PO daily  - Elevated BP readings, Cr stable, started Lisinopril 20mg PO daily. Improved today 9/27    Problem/Plan - 4:  ·  Problem: Paroxysmal A-fib/SVT  ·  Plan: - Continue metoprolol and Eliquis  ------- Hold Eliquis in anticipation for cath, hep gtt  - Monitor on tele            ALBANIA Lester DO [facility]  Cardiovascular Medicine  [address], [address]  Office: [phone]  Available via call/text on Microsoft Teams

## 2024-09-29 LAB
ANION GAP SERPL CALC-SCNC: 10 MMOL/L — SIGNIFICANT CHANGE UP (ref 5–17)
APTT BLD: 102.9 SEC — HIGH (ref 24.5–35.6)
APTT BLD: 68 SEC — HIGH (ref 24.5–35.6)
APTT BLD: 77.1 SEC — HIGH (ref 24.5–35.6)
BUN SERPL-MCNC: 40 MG/DL — HIGH (ref 7–23)
CALCIUM SERPL-MCNC: 9.7 MG/DL — SIGNIFICANT CHANGE UP (ref 8.4–10.5)
CHLORIDE SERPL-SCNC: 106 MMOL/L — SIGNIFICANT CHANGE UP (ref 96–108)
CO2 SERPL-SCNC: 20 MMOL/L — LOW (ref 22–31)
CREAT SERPL-MCNC: 1.48 MG/DL — HIGH (ref 0.5–1.3)
EGFR: 47 ML/MIN/1.73M2 — LOW
GLUCOSE BLDC GLUCOMTR-MCNC: 153 MG/DL — HIGH (ref 70–99)
GLUCOSE BLDC GLUCOMTR-MCNC: 200 MG/DL — HIGH (ref 70–99)
GLUCOSE BLDC GLUCOMTR-MCNC: 205 MG/DL — HIGH (ref 70–99)
GLUCOSE BLDC GLUCOMTR-MCNC: 217 MG/DL — HIGH (ref 70–99)
GLUCOSE SERPL-MCNC: 151 MG/DL — HIGH (ref 70–99)
HCT VFR BLD CALC: 32.7 % — LOW (ref 39–50)
HGB BLD-MCNC: 9.9 G/DL — LOW (ref 13–17)
MCHC RBC-ENTMCNC: 24.4 PG — LOW (ref 27–34)
MCHC RBC-ENTMCNC: 30.3 GM/DL — LOW (ref 32–36)
MCV RBC AUTO: 80.7 FL — SIGNIFICANT CHANGE UP (ref 80–100)
NRBC # BLD: 0 /100 WBCS — SIGNIFICANT CHANGE UP (ref 0–0)
PLATELET # BLD AUTO: 267 K/UL — SIGNIFICANT CHANGE UP (ref 150–400)
POTASSIUM SERPL-MCNC: 4.1 MMOL/L — SIGNIFICANT CHANGE UP (ref 3.5–5.3)
POTASSIUM SERPL-SCNC: 4.1 MMOL/L — SIGNIFICANT CHANGE UP (ref 3.5–5.3)
RBC # BLD: 4.05 M/UL — LOW (ref 4.2–5.8)
RBC # FLD: 15.9 % — HIGH (ref 10.3–14.5)
SODIUM SERPL-SCNC: 136 MMOL/L — SIGNIFICANT CHANGE UP (ref 135–145)
WBC # BLD: 7.27 K/UL — SIGNIFICANT CHANGE UP (ref 3.8–10.5)
WBC # FLD AUTO: 7.27 K/UL — SIGNIFICANT CHANGE UP (ref 3.8–10.5)

## 2024-09-29 PROCEDURE — 93010 ELECTROCARDIOGRAM REPORT: CPT

## 2024-09-29 PROCEDURE — 99232 SBSQ HOSP IP/OBS MODERATE 35: CPT

## 2024-09-29 RX ORDER — SODIUM CHLORIDE 0.9 % (FLUSH) 0.9 %
1000 SYRINGE (ML) INJECTION
Refills: 0 | Status: COMPLETED | OUTPATIENT
Start: 2024-09-30

## 2024-09-29 RX ORDER — SODIUM CHLORIDE 0.9 % (FLUSH) 0.9 %
1000 SYRINGE (ML) INJECTION
Refills: 0 | Status: DISCONTINUED | OUTPATIENT
Start: 2024-09-30 | End: 2024-10-01

## 2024-09-29 RX ADMIN — Medication 500 UNIT(S)/HR: at 19:24

## 2024-09-29 RX ADMIN — Medication 3 UNIT(S): at 17:30

## 2024-09-29 RX ADMIN — PANTOPRAZOLE SODIUM 40 MILLIGRAM(S): 40 TABLET, DELAYED RELEASE ORAL at 05:45

## 2024-09-29 RX ADMIN — Medication 3 UNIT(S): at 12:14

## 2024-09-29 RX ADMIN — Medication 75 MILLIGRAM(S): at 11:38

## 2024-09-29 RX ADMIN — Medication 500 UNIT(S)/HR: at 06:25

## 2024-09-29 RX ADMIN — Medication 81 MILLIGRAM(S): at 11:39

## 2024-09-29 RX ADMIN — Medication 1: at 07:47

## 2024-09-29 RX ADMIN — Medication 2: at 17:30

## 2024-09-29 RX ADMIN — Medication 2: at 12:14

## 2024-09-29 RX ADMIN — Medication 3 UNIT(S): at 07:48

## 2024-09-29 RX ADMIN — Medication 500 UNIT(S)/HR: at 13:07

## 2024-09-29 RX ADMIN — MULTI VITAMIN/MINERAL SUPPLEMENT WITH ASCORBIC ACID, NIACIN, PYRIDOXINE, PANTOTHENIC ACID, FOLIC ACID, RIBOFLAVIN, THIAMIN, BIOTIN, COBALAMIN AND ZINC. 1 TABLET(S): 60; 20; 12.5; 10; 10; 1.7; 1.5; 1; .3; .006 TABLET, COATED ORAL at 12:50

## 2024-09-29 RX ADMIN — Medication 100 MILLIGRAM(S): at 05:45

## 2024-09-29 RX ADMIN — INSULIN GLARGINE 15 UNIT(S): 300 INJECTION, SOLUTION SUBCUTANEOUS at 21:50

## 2024-09-29 RX ADMIN — ROSUVASTATIN CALCIUM 40 MILLIGRAM(S): 20 TABLET, COATED ORAL at 21:50

## 2024-09-29 RX ADMIN — Medication 500 UNIT(S)/HR: at 19:22

## 2024-09-29 NOTE — PROVIDER CONTACT NOTE (OTHER) - ASSESSMENT
pt is A&Ox4,  upon entering room, pt ambulating from bathroom back to bed. pt denies SOB, chest pain, palpitations, N/HA, lethargy. VSS as seen in flowsheet. HR back to 80s once in bed

## 2024-09-29 NOTE — PROVIDER CONTACT NOTE (OTHER) - BACKGROUND
(Admit Diagnosis) Weakness; Chest pain; Chronic heart failure with preserved ejection fraction (HFpEF)
84M admitted for weakness. PMH HFpEF, PAD, CKD, pAF
84M admitted for weakness. PMH HLD, malignant neoplasm of prostate

## 2024-09-29 NOTE — PROGRESS NOTE ADULT - ASSESSMENT
84M w/ PMH of HTN, HLD, DM2, CAD s/p CABG, pAF on Eliquis, HFpEF, PAD, CKD, prostate CA w/ recent admission for dizziness found to have abnormal stress s/p cath w/ NO new stenting pw 3 weeks of dizziness, weakness and CP. Nephrology on board for CKD and precath optimization    Nat on CKD Stage 3   Appears to be at baseline   SCR baseline 1.2-1.6,   Fena suggestive of pre renal   Renal function improving today  Avoid Nephrotoxins  Avoid contrast and nsaids  Hold lisinopril  Can hold SGLT2 inhibitor  Avoid hypotension    HTN  Continue on current meds    Low CO2/acidosis  Monitor    Anemia  Mild  Check iron studies   Monitor

## 2024-09-29 NOTE — PROGRESS NOTE ADULT - PROBLEM SELECTOR PLAN 2
- c/w Toprol 100mg qd   - [id number]  - continue IV heparin gtt  - continue to hold eliquis in anticipation of LHC

## 2024-09-29 NOTE — PROGRESS NOTE ADULT - SUBJECTIVE AND OBJECTIVE BOX
DATE OF SERVICE: 09-29-24 @ 15:40    Patient is a 84y old  Male who presents with a chief complaint of CP (29 Sep 2024 11:07)      INTERVAL HISTORY: no complaints     REVIEW OF SYSTEMS:  CONSTITUTIONAL: No weakness  EYES/ENT: No visual changes;  No throat pain   NECK: No pain or stiffness  RESPIRATORY: No cough, wheezing; No shortness of breath  CARDIOVASCULAR: No chest pain or palpitations  GASTROINTESTINAL: No abdominal  pain. No nausea, vomiting, or hematemesis  GENITOURINARY: No dysuria, frequency or hematuria  NEUROLOGICAL: No stroke like symptoms  SKIN: No rashes      	  MEDICATIONS:  metoprolol succinate  milliGRAM(s) Oral daily        PHYSICAL EXAM:  T(C): 36.7 (09-29-24 @ 11:32), Max: 36.7 (09-29-24 @ 04:00)  HR: 88 (09-29-24 @ 11:32) (83 - 89)  BP: 95/63 (09-29-24 @ 11:32) (95/63 - 154/79)  RR: 17 (09-29-24 @ 11:32) (17 - 18)  SpO2: 100% (09-29-24 @ 11:32) (97% - 100%)  Wt(kg): --  I&O's Summary    29 Sep 2024 07:01  -  29 Sep 2024 15:40  --------------------------------------------------------  IN: 200 mL / OUT: 0 mL / NET: 200 mL          Appearance: In no distress	  HEENT:    PERRL, EOMI	  Cardiovascular:  S1 S2, No JVD  Respiratory: Lungs clear to auscultation	  Gastrointestinal:  Soft, Non-tender, + BS	  Vascularature:  No edema of LE  Psychiatric: Appropriate affect   Neuro: no acute focal deficits                               9.9    7.27  )-----------( 267      ( 29 Sep 2024 06:08 )             32.7     09-29    136  |  106  |  40[H]  ----------------------------<  151[H]  4.1   |  20[L]  |  1.48[H]    Ca    9.7      29 Sep 2024 06:08          Labs personally reviewed      ASSESSMENT/PLAN: 	    84M w/ PMH of HTN, HLD, DM2, CAD s/p CABG, pAF on Eliquis, HFpEF, PAD, CKD, prostate CA, w/ recent admission for dizziness found to have abnormal stress s/p cath w/ NO new stenting pw 3 weeks of dizziness, weakness and CP a/w SOB w/ exertion.     Problem/Plan - 1:  ·  Problem: Chest pain  ·  Plan: Described as exertional CP  - h/o CABG and multiple PCI  - s/p Cath 7/22/24 with patent grafts to LAD/OM, patent stents to RCA  - c/w ASA, Rosuvastatin   - Trop 33--> 29  - Although with recent cath with patent grafts/coronaries now with typical exertional angina will need to proceed with repeat cardiac cath  --- Discussed with interventional cards Dr Owens given pt with diffuse small vessel disease recs NM stress test first to assess for territory of ischemia   - NST abnormal, plan for cath Monday 9/30 with Dr Owens  - IVF Hydration prior to cath    Problem/Plan - 2:  ·  Problem: Chronic diastolic congestive heart failure.   ·  Plan:   - CXR with clear lungs  - Prior TTE 7/16/24 with preserved EF; Repeat pending     Problem/Plan - 3:  ·  Problem: HTN (hypertension).   ·  Plan: - Continue Toprol 100mg PO daily  - Elevated BP readings, Cr stable, started Lisinopril 20mg PO daily. Improved today 9/27    Problem/Plan - 4:  ·  Problem: Paroxysmal A-fib/SVT  ·  Plan: - Continue metoprolol and Eliquis  ------- Hold Eliquis in anticipation for cath, hep gtt  - Monitor on tele          ALBANIA Lester DO [facility]  Cardiovascular Medicine  [address], [address]  Office: [phone]  Available via call/text on Microsoft Teams

## 2024-09-29 NOTE — PROGRESS NOTE ADULT - SUBJECTIVE AND OBJECTIVE BOX
Carrie Merida MD  Division of Hospital Medicine  Please contact via MS Teams (prefer message first)  Office: [phone]    Patient is a 84y old  Male who presents with a chief complaint of CP (29 Sep 2024 08:15)      SUBJECTIVE / OVERNIGHT EVENTS:  no acute events overnight, vss, afebrile  pt feels well  scheduled for [facility] tomorrow    ROS:  14 point ROS negative in detail except stated as above    MEDICATIONS  (STANDING):  aspirin  chewable 81 milliGRAM(s) Oral daily  clopidogrel Tablet 75 milliGRAM(s) Oral daily  dextrose 5%. 1000 milliLiter(s) (50 mL/Hr) IV Continuous <Continuous>  dextrose 5%. 1000 milliLiter(s) (100 mL/Hr) IV Continuous <Continuous>  dextrose 50% Injectable 25 Gram(s) IV Push once  dextrose 50% Injectable 25 Gram(s) IV Push once  dextrose 50% Injectable 12.5 Gram(s) IV Push once  glucagon  Injectable 1 milliGRAM(s) IntraMuscular once  heparin  Infusion.  Unit(s)/Hr (11 mL/Hr) IV Continuous <Continuous>  insulin glargine Injectable (LANTUS) 15 Unit(s) SubCutaneous at bedtime  insulin lispro (ADMELOG) corrective regimen sliding scale   SubCutaneous three times a day before meals  insulin lispro (ADMELOG) corrective regimen sliding scale   SubCutaneous at bedtime  insulin lispro Injectable (ADMELOG) 3 Unit(s) SubCutaneous three times a day before meals  metoprolol succinate  milliGRAM(s) Oral daily  multivitamin 1 Tablet(s) Oral daily  pantoprazole    Tablet 40 milliGRAM(s) Oral before breakfast  rosuvastatin 40 milliGRAM(s) Oral at bedtime    MEDICATIONS  (PRN):  acetaminophen     Tablet .. 650 milliGRAM(s) Oral every 6 hours PRN Temp greater or equal to 38C (100.4F), Mild Pain (1 - 3)  dextrose Oral Gel 15 Gram(s) Oral once PRN Blood Glucose LESS THAN 70 milliGRAM(s)/deciliter  heparin   Injectable 2500 Unit(s) IV Push every 6 hours PRN For aPTT between 40 - 57  heparin   Injectable 5000 Unit(s) IV Push every 6 hours PRN For aPTT less than 40      CAPILLARY BLOOD GLUCOSE      POCT Blood Glucose.: 153 mg/dL (29 Sep 2024 07:19)  POCT Blood Glucose.: 184 mg/dL (28 Sep 2024 21:10)  POCT Blood Glucose.: 242 mg/dL (28 Sep 2024 17:36)  POCT Blood Glucose.: 212 mg/dL (28 Sep 2024 11:30)    I&O's Summary    29 Sep 2024 07:01  -  29 Sep 2024 11:07  --------------------------------------------------------  IN: 100 mL / OUT: 0 mL / NET: 100 mL        PHYSICAL EXAM:  Vital Signs Last 24 Hrs  T(C): 36.7 (29 Sep 2024 04:00), Max: 36.7 (29 Sep 2024 04:00)  T(F): 98 (29 Sep 2024 04:00), Max: 98 (29 Sep 2024 04:00)  HR: 83 (29 Sep 2024 04:00) (83 - 89)  BP: 154/79 (29 Sep 2024 04:00) (114/71 - 154/79)  BP(mean): --  RR: 17 (29 Sep 2024 04:00) (17 - 18)  SpO2: 97% (29 Sep 2024 04:00) (97% - 99%)    Parameters below as of 29 Sep 2024 04:00  Patient On (Oxygen Delivery Method): room air      GENERAL: NAD, well-developed  HEAD:  Atraumatic, Normocephalic  EYES: EOMI, PERRLA, conjunctiva and sclera clear  NECK: Supple, No JVD  CHEST/LUNG: Clear to auscultation bilaterally; No wheeze  HEART: Regular rate and rhythm; No murmurs, rubs, or gallops  ABDOMEN: Soft, Nontender, Nondistended; Bowel sounds present  EXTREMITIES:  2+ Peripheral Pulses, No clubbing, cyanosis, or edema  NEUROLOGY: AAOx3; non-focal  SKIN: No rashes or lesions    LABS:                        9.9    7.27  )-----------( 267      ( 29 Sep 2024 06:08 )             32.7     09-29    136  |  106  |  40[H]  ----------------------------<  151[H]  4.1   |  20[L]  |  1.48[H]    Ca    9.7      29 Sep 2024 06:08      PTT - ( 29 Sep 2024 06:08 )  PTT:102.9 sec      Urinalysis Basic - ( 29 Sep 2024 06:08 )    Color: x / Appearance: x / SG: x / pH: x  Gluc: 151 mg/dL / Ketone: x  / Bili: x / Urobili: x   Blood: x / Protein: x / Nitrite: x   Leuk Esterase: x / RBC: x / WBC x   Sq Epi: x / Non Sq Epi: x / Bacteria: x        RADIOLOGY & ADDITIONAL TESTS:    Imaging Personally Reviewed:    Consultant(s) Notes Reviewed:  cards, nephro    Care Discussed with Consultants/Other Providers:

## 2024-09-29 NOTE — PROGRESS NOTE ADULT - SUBJECTIVE AND OBJECTIVE BOX
[facility]   MD AMBREEN ELI MD RUORU WONG, PA    TEL:  FROM 9 AM to 5 PM ---OFFICE: [phone]  AVAILABLE ON TEAMS    FROM 5 PM - 9 AM PLEASE CALL ANSWERING SERVICE: [phone]    RENAL FOLLOW UP NOTE--Date of Service 09-29-24 @ 08:15  --------------------------------------------------------------------------------  HPI:      Pt seen and examined at bedside.   Rodríguezies SOB, chest pain     PAST HISTORY  --------------------------------------------------------------------------------  No significant changes to PMH, PSH, FHx, SHx, unless otherwise noted    ALLERGIES & MEDICATIONS  --------------------------------------------------------------------------------  Allergies    No Known Allergies    Intolerances      Standing Inpatient Medications  aspirin  chewable 81 milliGRAM(s) Oral daily  clopidogrel Tablet 75 milliGRAM(s) Oral daily  dextrose 5%. 1000 milliLiter(s) IV Continuous <Continuous>  dextrose 5%. 1000 milliLiter(s) IV Continuous <Continuous>  dextrose 50% Injectable 25 Gram(s) IV Push once  dextrose 50% Injectable 25 Gram(s) IV Push once  dextrose 50% Injectable 12.5 Gram(s) IV Push once  glucagon  Injectable 1 milliGRAM(s) IntraMuscular once  heparin  Infusion.  Unit(s)/Hr IV Continuous <Continuous>  insulin glargine Injectable (LANTUS) 15 Unit(s) SubCutaneous at bedtime  insulin lispro (ADMELOG) corrective regimen sliding scale   SubCutaneous at bedtime  insulin lispro (ADMELOG) corrective regimen sliding scale   SubCutaneous three times a day before meals  insulin lispro Injectable (ADMELOG) 3 Unit(s) SubCutaneous three times a day before meals  metoprolol succinate  milliGRAM(s) Oral daily  multivitamin 1 Tablet(s) Oral daily  pantoprazole    Tablet 40 milliGRAM(s) Oral before breakfast  rosuvastatin 40 milliGRAM(s) Oral at bedtime    PRN Inpatient Medications  acetaminophen     Tablet .. 650 milliGRAM(s) Oral every 6 hours PRN  dextrose Oral Gel 15 Gram(s) Oral once PRN  heparin   Injectable 2500 Unit(s) IV Push every 6 hours PRN  heparin   Injectable 5000 Unit(s) IV Push every 6 hours PRN      REVIEW OF SYSTEMS  --------------------------------------------------------------------------------  General: no fever  CVS: no chest pain  RESP: no sob, no cough  ABD: no abdominal pain  : no dysuria,  MSK: no edema     VITALS/PHYSICAL EXAM  --------------------------------------------------------------------------------  T(C): 36.7 (09-29-24 @ 04:00), Max: 36.7 (09-29-24 @ 04:00)  HR: 83 (09-29-24 @ 04:00) (83 - 89)  BP: 154/79 (09-29-24 @ 04:00) (114/71 - 154/79)  RR: 17 (09-29-24 @ 04:00) (17 - 18)  SpO2: 97% (09-29-24 @ 04:00) (97% - 99%)  Wt(kg): --        Physical Exam:  	Gen: NAD  	HEENT: MMM  	Pulm: CTA B/L  	CV: S1S2  	Abd: Soft, +BS  	Ext: No LE edema B/L                      Neuro: Awake   	Skin: Warm and Dry   	Vascular access: NO HD catheter            no  phylicia  LABS/STUDIES  --------------------------------------------------------------------------------              9.9    7.27  >-----------<  267      [09-29-24 @ 06:08]              32.7     136  |  106  |  40  ----------------------------<  151      [09-29-24 @ 06:08]  4.1   |  20  |  1.48        Ca     9.7     [09-29-24 @ 06:08]        PTT: 102.9      [09-29-24 @ 06:08]      Creatinine Trend:  SCr 1.48 [09-29 @ 06:08]  SCr 1.93 [09-28 @ 06:50]  SCr 1.83 [09-27 @ 06:57]  SCr 1.42 [09-26 @ 07:22]  SCr 1.50 [09-25 @ 06:24]    Urinalysis - [09-29-24 @ 06:08]      Color  / Appearance  / SG  / pH       Gluc 151 / Ketone   / Bili  / Urobili        Blood  / Protein  / Leuk Est  / Nitrite       RBC  / WBC  / Hyaline  / Gran  / Sq Epi  / Non Sq Epi  / Bacteria     Urine Creatinine 66      [09-27-24 @ 18:05]  Urine Protein 14      [09-24-24 @ 02:28]  Urine Sodium 45      [09-27-24 @ 18:05]  Urine Urea Nitrogen 541      [09-24-24 @ 02:28]  Urine Potassium 28      [09-24-24 @ 02:28]  Urine Osmolality 536      [09-24-24 @ 02:28]    TSH 0.84      [09-24-24 @ 06:06]  Lipid: chol 190, , HDL 43, LDL --      [07-16-24 @ 10:48]

## 2024-09-29 NOTE — PROVIDER CONTACT NOTE (OTHER) - SITUATION
Pt Afib/Afluter when ambulating up to 150s, then returns to NSR at rest
sinus tachycardia with frequent PATS, tele tech notified pt 130-149s possible conversion to afib
orthostatic positive

## 2024-09-30 LAB
ANION GAP SERPL CALC-SCNC: 9 MMOL/L — SIGNIFICANT CHANGE UP (ref 5–17)
APTT BLD: 30.1 SEC — SIGNIFICANT CHANGE UP (ref 24.5–35.6)
APTT BLD: 61.3 SEC — HIGH (ref 24.5–35.6)
BUN SERPL-MCNC: 35 MG/DL — HIGH (ref 7–23)
CALCIUM SERPL-MCNC: 9.6 MG/DL — SIGNIFICANT CHANGE UP (ref 8.4–10.5)
CHLORIDE SERPL-SCNC: 105 MMOL/L — SIGNIFICANT CHANGE UP (ref 96–108)
CO2 SERPL-SCNC: 22 MMOL/L — SIGNIFICANT CHANGE UP (ref 22–31)
CREAT SERPL-MCNC: 1.59 MG/DL — HIGH (ref 0.5–1.3)
EGFR: 43 ML/MIN/1.73M2 — LOW
GLUCOSE BLDC GLUCOMTR-MCNC: 163 MG/DL — HIGH (ref 70–99)
GLUCOSE BLDC GLUCOMTR-MCNC: 196 MG/DL — HIGH (ref 70–99)
GLUCOSE BLDC GLUCOMTR-MCNC: 275 MG/DL — HIGH (ref 70–99)
GLUCOSE BLDC GLUCOMTR-MCNC: 293 MG/DL — HIGH (ref 70–99)
GLUCOSE SERPL-MCNC: 208 MG/DL — HIGH (ref 70–99)
HCT VFR BLD CALC: 32.6 % — LOW (ref 39–50)
HGB BLD-MCNC: 9.9 G/DL — LOW (ref 13–17)
MCHC RBC-ENTMCNC: 24.8 PG — LOW (ref 27–34)
MCHC RBC-ENTMCNC: 30.4 GM/DL — LOW (ref 32–36)
MCV RBC AUTO: 81.5 FL — SIGNIFICANT CHANGE UP (ref 80–100)
NRBC # BLD: 0 /100 WBCS — SIGNIFICANT CHANGE UP (ref 0–0)
PLATELET # BLD AUTO: 275 K/UL — SIGNIFICANT CHANGE UP (ref 150–400)
POTASSIUM SERPL-MCNC: 4.3 MMOL/L — SIGNIFICANT CHANGE UP (ref 3.5–5.3)
POTASSIUM SERPL-SCNC: 4.3 MMOL/L — SIGNIFICANT CHANGE UP (ref 3.5–5.3)
RBC # BLD: 4 M/UL — LOW (ref 4.2–5.8)
RBC # FLD: 16 % — HIGH (ref 10.3–14.5)
SODIUM SERPL-SCNC: 136 MMOL/L — SIGNIFICANT CHANGE UP (ref 135–145)
WBC # BLD: 7.52 K/UL — SIGNIFICANT CHANGE UP (ref 3.8–10.5)
WBC # FLD AUTO: 7.52 K/UL — SIGNIFICANT CHANGE UP (ref 3.8–10.5)

## 2024-09-30 PROCEDURE — 99232 SBSQ HOSP IP/OBS MODERATE 35: CPT

## 2024-09-30 PROCEDURE — 99152 MOD SED SAME PHYS/QHP 5/>YRS: CPT

## 2024-09-30 PROCEDURE — 93455 CORONARY ART/GRFT ANGIO S&I: CPT | Mod: 26

## 2024-09-30 RX ORDER — SODIUM CHLORIDE 0.9 % (FLUSH) 0.9 %
1000 SYRINGE (ML) INJECTION
Refills: 0 | Status: DISCONTINUED | OUTPATIENT
Start: 2024-09-30 | End: 2024-10-01

## 2024-09-30 RX ADMIN — Medication 500 UNIT(S)/HR: at 06:21

## 2024-09-30 RX ADMIN — Medication 1: at 07:48

## 2024-09-30 RX ADMIN — Medication 500 UNIT(S)/HR: at 07:19

## 2024-09-30 RX ADMIN — Medication 60 MILLILITER(S): at 02:02

## 2024-09-30 RX ADMIN — Medication 3: at 17:02

## 2024-09-30 RX ADMIN — INSULIN GLARGINE 15 UNIT(S): 300 INJECTION, SOLUTION SUBCUTANEOUS at 21:31

## 2024-09-30 RX ADMIN — Medication 100 MILLIGRAM(S): at 05:17

## 2024-09-30 RX ADMIN — Medication 1: at 21:33

## 2024-09-30 RX ADMIN — ROSUVASTATIN CALCIUM 40 MILLIGRAM(S): 20 TABLET, COATED ORAL at 21:33

## 2024-09-30 RX ADMIN — Medication 3 UNIT(S): at 07:48

## 2024-09-30 RX ADMIN — Medication 1100 UNIT(S)/HR: at 22:32

## 2024-09-30 RX ADMIN — PANTOPRAZOLE SODIUM 40 MILLIGRAM(S): 40 TABLET, DELAYED RELEASE ORAL at 05:17

## 2024-09-30 RX ADMIN — Medication 3 UNIT(S): at 11:26

## 2024-09-30 RX ADMIN — Medication 75 MILLIGRAM(S): at 11:04

## 2024-09-30 RX ADMIN — Medication 81 MILLIGRAM(S): at 11:05

## 2024-09-30 RX ADMIN — Medication 60 MILLILITER(S): at 19:00

## 2024-09-30 RX ADMIN — Medication 3 UNIT(S): at 17:02

## 2024-09-30 RX ADMIN — Medication 1: at 11:25

## 2024-09-30 RX ADMIN — MULTI VITAMIN/MINERAL SUPPLEMENT WITH ASCORBIC ACID, NIACIN, PYRIDOXINE, PANTOTHENIC ACID, FOLIC ACID, RIBOFLAVIN, THIAMIN, BIOTIN, COBALAMIN AND ZINC. 1 TABLET(S): 60; 20; 12.5; 10; 10; 1.7; 1.5; 1; .3; .006 TABLET, COATED ORAL at 11:04

## 2024-09-30 NOTE — CHART NOTE - NSCHARTNOTEFT_GEN_A_CORE
Cardiology consult called to Dr. Jan Pressley, he has agreed to see patient.
Pt s/p cardiac cath via right femoral artery access site on 9/30/24.   Pt seen and examined at bedside, resting, NAD.  RFA access site examined.   Skin around site warm, soft, non-tender, no hematoma noted, dressing dry and intact, strength and sensation intact of LE.    >Vital signs stable  >Will continue to closely assess and monitor overnight        Michelle Mitchell PA-C  [id number]
patient seen and exami.gomez at bedside. feels ok. c/o of exertional chest pain. no fevers, chills, palpitations.  On exam NAD, CTA b/l irregularly irregular abd soft NT ND BS+ ext no edema  Labs crt 1.67  a1c 7.6  proBNP 973    # Chest pain  # CHRISSY on CKD stage III  # Type 2DM  # CABG  # CAD s/p PCI    - plan for [facility] tomorrow d/w Dr Pressley- hold night dose of eliquis resume when OK w cards  - monitor crt- hold lasix and lisinopril, crt rising U/A no uti- likely pre renal if does not improve would obtain renal US, ordered bladder scan to r/o retention  - c/w home metoprolol 100mg daily  - increase lantus from 10u to 15uqhs, takes 20 at home  - c/w tele monitoring    updated wife Susan of the plan  d/w NISHA Butler

## 2024-09-30 NOTE — PROGRESS NOTE ADULT - NSPROGADDITIONALINFOA_GEN_ALL_CORE
above plans discussed with medicine ACP Molly
above plans discussed with medicine ACP Loretta
above plans discussed with medicine ACP Yaritza
above plans discussed with medicine ACP Yaritza
above plans discussed with medicine ACP Loretta

## 2024-09-30 NOTE — PROGRESS NOTE ADULT - SUBJECTIVE AND OBJECTIVE BOX
Carrie Merida MD  Division of Hospital Medicine  Please contact via MS Teams (prefer message first)  Office: [phone]    Patient is a 84y old  Male who presents with a chief complaint of CP (29 Sep 2024 15:36)      SUBJECTIVE / OVERNIGHT EVENTS:  no acute events overnight, vss, afebrile  pending [facility] this morning  denies chest pain, dyspnea, palpitation    ROS:  14 point ROS negative in detail except stated as above    MEDICATIONS  (STANDING):  aspirin  chewable 81 milliGRAM(s) Oral daily  clopidogrel Tablet 75 milliGRAM(s) Oral daily  dextrose 5%. 1000 milliLiter(s) (100 mL/Hr) IV Continuous <Continuous>  dextrose 5%. 1000 milliLiter(s) (50 mL/Hr) IV Continuous <Continuous>  dextrose 50% Injectable 25 Gram(s) IV Push once  dextrose 50% Injectable 25 Gram(s) IV Push once  dextrose 50% Injectable 12.5 Gram(s) IV Push once  glucagon  Injectable 1 milliGRAM(s) IntraMuscular once  heparin  Infusion.  Unit(s)/Hr (11 mL/Hr) IV Continuous <Continuous>  insulin glargine Injectable (LANTUS) 15 Unit(s) SubCutaneous at bedtime  insulin lispro (ADMELOG) corrective regimen sliding scale   SubCutaneous at bedtime  insulin lispro (ADMELOG) corrective regimen sliding scale   SubCutaneous three times a day before meals  insulin lispro Injectable (ADMELOG) 3 Unit(s) SubCutaneous three times a day before meals  metoprolol succinate  milliGRAM(s) Oral daily  multivitamin 1 Tablet(s) Oral daily  pantoprazole    Tablet 40 milliGRAM(s) Oral before breakfast  rosuvastatin 40 milliGRAM(s) Oral at bedtime  sodium chloride 0.9%. 1000 milliLiter(s) (60 mL/Hr) IV Continuous <Continuous>  sodium chloride 0.9%. 1000 milliLiter(s) (60 mL/Hr) IV Continuous <Continuous>    MEDICATIONS  (PRN):  acetaminophen     Tablet .. 650 milliGRAM(s) Oral every 6 hours PRN Temp greater or equal to 38C (100.4F), Mild Pain (1 - 3)  dextrose Oral Gel 15 Gram(s) Oral once PRN Blood Glucose LESS THAN 70 milliGRAM(s)/deciliter  heparin   Injectable 5000 Unit(s) IV Push every 6 hours PRN For aPTT less than 40  heparin   Injectable 2500 Unit(s) IV Push every 6 hours PRN For aPTT between 40 - 57      CAPILLARY BLOOD GLUCOSE      POCT Blood Glucose.: 196 mg/dL (30 Sep 2024 07:44)  POCT Blood Glucose.: 200 mg/dL (29 Sep 2024 21:37)  POCT Blood Glucose.: 205 mg/dL (29 Sep 2024 17:08)  POCT Blood Glucose.: 217 mg/dL (29 Sep 2024 11:43)    I&O's Summary    29 Sep 2024 07:01  -  30 Sep 2024 07:00  --------------------------------------------------------  IN: 200 mL / OUT: 0 mL / NET: 200 mL        PHYSICAL EXAM:  Vital Signs Last 24 Hrs  T(C): 37 (30 Sep 2024 04:00), Max: 37.1 (29 Sep 2024 16:34)  T(F): 98.6 (30 Sep 2024 04:00), Max: 98.7 (29 Sep 2024 16:34)  HR: 87 (30 Sep 2024 04:00) (87 - 88)  BP: 139/72 (30 Sep 2024 04:00) (95/63 - 163/79)  BP(mean): --  RR: 17 (30 Sep 2024 04:00) (17 - 18)  SpO2: 98% (30 Sep 2024 04:00) (98% - 100%)    Parameters below as of 30 Sep 2024 04:00  Patient On (Oxygen Delivery Method): room air      GENERAL: NAD, well-developed  HEAD:  Atraumatic, Normocephalic  EYES: EOMI, PERRLA, conjunctiva and sclera clear  NECK: Supple, No JVD  CHEST/LUNG: Clear to auscultation bilaterally; No wheeze  HEART: Regular rate and rhythm; No murmurs, rubs, or gallops  ABDOMEN: Soft, Nontender, Nondistended; Bowel sounds present  EXTREMITIES:  2+ Peripheral Pulses, No clubbing, cyanosis, or edema  NEUROLOGY: AAOx3; non-focal  SKIN: No rashes or lesions    LABS:                        9.9    7.52  )-----------( 275      ( 30 Sep 2024 05:29 )             32.6     09-30    136  |  105  |  35[H]  ----------------------------<  208[H]  4.3   |  22  |  1.59[H]    Ca    9.6      30 Sep 2024 05:36      PTT - ( 30 Sep 2024 05:29 )  PTT:61.3 sec      Urinalysis Basic - ( 30 Sep 2024 05:36 )    Color: x / Appearance: x / SG: x / pH: x  Gluc: 208 mg/dL / Ketone: x  / Bili: x / Urobili: x   Blood: x / Protein: x / Nitrite: x   Leuk Esterase: x / RBC: x / WBC x   Sq Epi: x / Non Sq Epi: x / Bacteria: x        RADIOLOGY & ADDITIONAL TESTS:    Imaging Personally Reviewed:    Consultant(s) Notes Reviewed:  cards, nephro    Care Discussed with Consultants/Other Providers: Dr. Pressley (cards)

## 2024-09-30 NOTE — PROGRESS NOTE ADULT - ASSESSMENT
84M w/ PMH of HTN, HLD, DM2, CAD s/p CABG, pAF on Eliquis, HFpEF, PAD, CKD, prostate CA w/ recent admission for dizziness found to have abnormal stress s/p cath w/ NO new stenting pw 3 weeks of dizziness, weakness and CP. Nephrology on board for CKD and precath optimization    Nat on CKD Stage 3   Appears to be at baseline   SCR baseline 1.2-1.6,   Fena suggestive of pre renal   Renal function stabel, Can start IV fluids NS 60 cc/hour 6 hours pre and post planned cardiac cath today  Avoid Nephrotoxins  Avoid contrast and nsaids  Hold lisinopril  Can hold SGLT2 inhibitor  Avoid hypotension    HTN  Continue on current meds    Low CO2/acidosis  Monitor    Anemia  Mild  Monitor

## 2024-09-30 NOTE — PROGRESS NOTE ADULT - SUBJECTIVE AND OBJECTIVE BOX
[facility]    Dr. Shine Navarro     Office [phone] (9 am to 5 pm)  Service: [phone] (5pm to 9am)        RENAL PROGRESS NOTE: DATE OF SERVICE 09-30-24 @ 11:54    Patient is a 84y old  Male who presents with a chief complaint of CP (30 Sep 2024 10:56)      Patient seen and examined at bedside. No chest pain/sob    VITALS:  T(F): 98.6 (09-30-24 @ 11:10), Max: 98.7 (09-29-24 @ 16:34)  HR: 87 (09-30-24 @ 11:10)  BP: 148/63 (09-30-24 @ 11:10)  RR: 18 (09-30-24 @ 11:10)  SpO2: 100% (09-30-24 @ 11:10)  Wt(kg): --    09-29 @ 07:01  -  09-30 @ 07:00  --------------------------------------------------------  IN: 200 mL / OUT: 0 mL / NET: 200 mL          PHYSICAL EXAM:  Constitutional: NAD  Neck: No JVD  Respiratory: CTAB, no wheezes, rales or rhonchi  Cardiovascular: S1, S2, RRR  Gastrointestinal: BS+, soft, NT/ND  Extremities: No peripheral edema    Hospital Medications:   MEDICATIONS  (STANDING):  aspirin  chewable 81 milliGRAM(s) Oral daily  clopidogrel Tablet 75 milliGRAM(s) Oral daily  dextrose 5%. 1000 milliLiter(s) (100 mL/Hr) IV Continuous <Continuous>  dextrose 5%. 1000 milliLiter(s) (50 mL/Hr) IV Continuous <Continuous>  dextrose 50% Injectable 25 Gram(s) IV Push once  dextrose 50% Injectable 25 Gram(s) IV Push once  dextrose 50% Injectable 12.5 Gram(s) IV Push once  glucagon  Injectable 1 milliGRAM(s) IntraMuscular once  heparin  Infusion.  Unit(s)/Hr (11 mL/Hr) IV Continuous <Continuous>  insulin glargine Injectable (LANTUS) 15 Unit(s) SubCutaneous at bedtime  insulin lispro (ADMELOG) corrective regimen sliding scale   SubCutaneous three times a day before meals  insulin lispro (ADMELOG) corrective regimen sliding scale   SubCutaneous at bedtime  insulin lispro Injectable (ADMELOG) 3 Unit(s) SubCutaneous three times a day before meals  metoprolol succinate  milliGRAM(s) Oral daily  multivitamin 1 Tablet(s) Oral daily  pantoprazole    Tablet 40 milliGRAM(s) Oral before breakfast  rosuvastatin 40 milliGRAM(s) Oral at bedtime  sodium chloride 0.9%. 1000 milliLiter(s) (60 mL/Hr) IV Continuous <Continuous>  sodium chloride 0.9%. 1000 milliLiter(s) (60 mL/Hr) IV Continuous <Continuous>      LABS:  09-30    136  |  105  |  35[H]  ----------------------------<  208[H]  4.3   |  22  |  1.59[H]    Ca    9.6      30 Sep 2024 05:36      Creatinine Trend: 1.59 <--, 1.48 <--, 1.93 <--, 1.83 <--, 1.42 <--, 1.50 <--, 1.67 <--, 1.58 <--                                9.9    7.52  )-----------( 275      ( 30 Sep 2024 05:29 )             32.6     Urine Studies:  Urinalysis - [09-30-24 @ 05:36]      Color  / Appearance  / SG  / pH       Gluc 208 / Ketone   / Bili  / Urobili        Blood  / Protein  / Leuk Est  / Nitrite       RBC  / WBC  / Hyaline  / Gran  / Sq Epi  / Non Sq Epi  / Bacteria     Urine Creatinine 66      [09-27-24 @ 18:05]  Urine Protein 14      [09-24-24 @ 02:28]  Urine Sodium 45      [09-27-24 @ 18:05]  Urine Urea Nitrogen 541      [09-24-24 @ 02:28]  Urine Potassium 28      [09-24-24 @ 02:28]  Urine Osmolality 536      [09-24-24 @ 02:28]    TSH 0.84      [09-24-24 @ 06:06]  Lipid: chol 190, , HDL 43, LDL --      [07-16-24 @ 10:48]        RADIOLOGY & ADDITIONAL STUDIES:

## 2024-09-30 NOTE — PROGRESS NOTE ADULT - SUBJECTIVE AND OBJECTIVE BOX
DATE OF SERVICE: 09-30-24 @ 10:56    Patient is a 84y old  Male who presents with a chief complaint of CP (29 Sep 2024 15:36)      INTERVAL HISTORY: feels ok    TELEMETRY Personally reviewed: no events  	  MEDICATIONS:  metoprolol succinate  milliGRAM(s) Oral daily        PHYSICAL EXAM:  T(C): 37 (09-30-24 @ 04:00), Max: 37.1 (09-29-24 @ 16:34)  HR: 87 (09-30-24 @ 04:00) (87 - 88)  BP: 139/72 (09-30-24 @ 04:00) (95/63 - 163/79)  RR: 17 (09-30-24 @ 04:00) (17 - 18)  SpO2: 98% (09-30-24 @ 04:00) (98% - 100%)  Wt(kg): --  I&O's Summary    29 Sep 2024 07:01  -  30 Sep 2024 07:00  --------------------------------------------------------  IN: 200 mL / OUT: 0 mL / NET: 200 mL          Appearance: In no distress	  HEENT:    PERRL, EOMI	  Cardiovascular:  S1 S2, No JVD  Respiratory: Lungs clear to auscultation	  Gastrointestinal:  Soft, Non-tender, + BS	  Vascularature:  No edema of LE  Psychiatric: Appropriate affect   Neuro: no acute focal deficits                               9.9    7.52  )-----------( 275      ( 30 Sep 2024 05:29 )             32.6     09-30    136  |  105  |  35[H]  ----------------------------<  208[H]  4.3   |  22  |  1.59[H]    Ca    9.6      30 Sep 2024 05:36          Labs personally reviewed      ASSESSMENT/PLAN: 	    84M w/ PMH of HTN, HLD, DM2, CAD s/p CABG, pAF on Eliquis, HFpEF, PAD, CKD, prostate CA, w/ recent admission for dizziness found to have abnormal stress s/p cath w/ NO new stenting pw 3 weeks of dizziness, weakness and CP a/w SOB w/ exertion.     Problem/Plan - 1:  ·  Problem: Chest pain  ·  Plan: Described as exertional CP  - h/o CABG and multiple PCI  - s/p Cath 7/22/24 with patent grafts to LAD/OM, patent stents to RCA  - c/w ASA, Rosuvastatin   - Trop 33--> 29  - Although with recent cath with patent grafts/coronaries now with typical exertional angina will need to proceed with repeat cardiac cath  --- Discussed with interventional cards Dr Owens given pt with diffuse small vessel disease recs NM stress test first to assess for territory of ischemia   - NST abnormal, plan for cath Monday 9/30 with Dr Owens    Problem/Plan - 2:  ·  Problem: Chronic diastolic congestive heart failure.   ·  Plan:   - CXR with clear lungs  - Prior TTE 7/16/24 with preserved EF; Repeat pending     Problem/Plan - 3:  ·  Problem: HTN (hypertension).   ·  Plan: - Continue Toprol 100mg PO daily  - Elevated BP readings, Cr stable, started Lisinopril 20mg PO daily. Improved today 9/27    Problem/Plan - 4:  ·  Problem: Paroxysmal A-fib/SVT  ·  Plan: - Continue metoprolol and Eliquis  ------- Hold Eliquis in anticipation for cath, hep gtt  - Monitor on tele            Jan Pressley DO [facility]  Cardiovascular Medicine  [address], [address]  Office: [phone]  Available via Text/call on Microsoft Teams

## 2024-09-30 NOTE — PROGRESS NOTE ADULT - PROBLEM SELECTOR PLAN 2
- c/w Toprol 100mg qd   - [id number]  - continue IV heparin gtt  - continue to hold eliquis in anticipation of LHC

## 2024-10-01 ENCOUNTER — TRANSCRIPTION ENCOUNTER (OUTPATIENT)
Age: 84
End: 2024-10-01

## 2024-10-01 VITALS
SYSTOLIC BLOOD PRESSURE: 134 MMHG | TEMPERATURE: 98 F | OXYGEN SATURATION: 100 % | HEART RATE: 87 BPM | DIASTOLIC BLOOD PRESSURE: 82 MMHG | RESPIRATION RATE: 18 BRPM

## 2024-10-01 LAB
ANION GAP SERPL CALC-SCNC: 11 MMOL/L — SIGNIFICANT CHANGE UP (ref 5–17)
APTT BLD: 120.8 SEC — CRITICAL HIGH (ref 24.5–35.6)
APTT BLD: >200 SEC — CRITICAL HIGH (ref 24.5–35.6)
BUN SERPL-MCNC: 30 MG/DL — HIGH (ref 7–23)
CALCIUM SERPL-MCNC: 9.6 MG/DL — SIGNIFICANT CHANGE UP (ref 8.4–10.5)
CHLORIDE SERPL-SCNC: 105 MMOL/L — SIGNIFICANT CHANGE UP (ref 96–108)
CO2 SERPL-SCNC: 20 MMOL/L — LOW (ref 22–31)
CREAT SERPL-MCNC: 1.32 MG/DL — HIGH (ref 0.5–1.3)
EGFR: 54 ML/MIN/1.73M2 — LOW
GLUCOSE BLDC GLUCOMTR-MCNC: 214 MG/DL — HIGH (ref 70–99)
GLUCOSE BLDC GLUCOMTR-MCNC: 226 MG/DL — HIGH (ref 70–99)
GLUCOSE SERPL-MCNC: 195 MG/DL — HIGH (ref 70–99)
HCT VFR BLD CALC: 33.9 % — LOW (ref 39–50)
HGB BLD-MCNC: 9.6 G/DL — LOW (ref 13–17)
MCHC RBC-ENTMCNC: 24.1 PG — LOW (ref 27–34)
MCHC RBC-ENTMCNC: 28.3 GM/DL — LOW (ref 32–36)
MCV RBC AUTO: 85.2 FL — SIGNIFICANT CHANGE UP (ref 80–100)
NRBC # BLD: 0 /100 WBCS — SIGNIFICANT CHANGE UP (ref 0–0)
PLATELET # BLD AUTO: 270 K/UL — SIGNIFICANT CHANGE UP (ref 150–400)
POTASSIUM SERPL-MCNC: 4.1 MMOL/L — SIGNIFICANT CHANGE UP (ref 3.5–5.3)
POTASSIUM SERPL-SCNC: 4.1 MMOL/L — SIGNIFICANT CHANGE UP (ref 3.5–5.3)
RBC # BLD: 3.98 M/UL — LOW (ref 4.2–5.8)
RBC # FLD: 16.1 % — HIGH (ref 10.3–14.5)
SODIUM SERPL-SCNC: 136 MMOL/L — SIGNIFICANT CHANGE UP (ref 135–145)
WBC # BLD: 7.38 K/UL — SIGNIFICANT CHANGE UP (ref 3.8–10.5)
WBC # FLD AUTO: 7.38 K/UL — SIGNIFICANT CHANGE UP (ref 3.8–10.5)

## 2024-10-01 PROCEDURE — 83935 ASSAY OF URINE OSMOLALITY: CPT

## 2024-10-01 PROCEDURE — 71045 X-RAY EXAM CHEST 1 VIEW: CPT

## 2024-10-01 PROCEDURE — 93455 CORONARY ART/GRFT ANGIO S&I: CPT

## 2024-10-01 PROCEDURE — 84540 ASSAY OF URINE/UREA-N: CPT

## 2024-10-01 PROCEDURE — 83036 HEMOGLOBIN GLYCOSYLATED A1C: CPT

## 2024-10-01 PROCEDURE — 78452 HT MUSCLE IMAGE SPECT MULT: CPT | Mod: MC

## 2024-10-01 PROCEDURE — 36415 COLL VENOUS BLD VENIPUNCTURE: CPT

## 2024-10-01 PROCEDURE — 84156 ASSAY OF PROTEIN URINE: CPT

## 2024-10-01 PROCEDURE — 85025 COMPLETE CBC W/AUTO DIFF WBC: CPT

## 2024-10-01 PROCEDURE — 83735 ASSAY OF MAGNESIUM: CPT

## 2024-10-01 PROCEDURE — 81003 URINALYSIS AUTO W/O SCOPE: CPT

## 2024-10-01 PROCEDURE — C1894: CPT

## 2024-10-01 PROCEDURE — A9500: CPT

## 2024-10-01 PROCEDURE — C1769: CPT

## 2024-10-01 PROCEDURE — 85730 THROMBOPLASTIN TIME PARTIAL: CPT

## 2024-10-01 PROCEDURE — 80048 BASIC METABOLIC PNL TOTAL CA: CPT

## 2024-10-01 PROCEDURE — 84484 ASSAY OF TROPONIN QUANT: CPT

## 2024-10-01 PROCEDURE — 85610 PROTHROMBIN TIME: CPT

## 2024-10-01 PROCEDURE — 93010 ELECTROCARDIOGRAM REPORT: CPT

## 2024-10-01 PROCEDURE — 93005 ELECTROCARDIOGRAM TRACING: CPT

## 2024-10-01 PROCEDURE — 93017 CV STRESS TEST TRACING ONLY: CPT

## 2024-10-01 PROCEDURE — 99239 HOSP IP/OBS DSCHRG MGMT >30: CPT

## 2024-10-01 PROCEDURE — 84100 ASSAY OF PHOSPHORUS: CPT

## 2024-10-01 PROCEDURE — 83880 ASSAY OF NATRIURETIC PEPTIDE: CPT

## 2024-10-01 PROCEDURE — 82550 ASSAY OF CK (CPK): CPT

## 2024-10-01 PROCEDURE — 80053 COMPREHEN METABOLIC PANEL: CPT

## 2024-10-01 PROCEDURE — 84443 ASSAY THYROID STIM HORMONE: CPT

## 2024-10-01 PROCEDURE — 84300 ASSAY OF URINE SODIUM: CPT

## 2024-10-01 PROCEDURE — 82962 GLUCOSE BLOOD TEST: CPT

## 2024-10-01 PROCEDURE — 99285 EMERGENCY DEPT VISIT HI MDM: CPT

## 2024-10-01 PROCEDURE — 82553 CREATINE MB FRACTION: CPT

## 2024-10-01 PROCEDURE — 85027 COMPLETE CBC AUTOMATED: CPT

## 2024-10-01 PROCEDURE — C1887: CPT

## 2024-10-01 PROCEDURE — 84133 ASSAY OF URINE POTASSIUM: CPT

## 2024-10-01 PROCEDURE — 82570 ASSAY OF URINE CREATININE: CPT

## 2024-10-01 RX ORDER — FUROSEMIDE 10 MG/ML
1 INJECTION INTRAVENOUS
Refills: 0 | DISCHARGE

## 2024-10-01 RX ORDER — RANOLAZINE 500 MG/1
1 TABLET, EXTENDED RELEASE ORAL
Qty: 60 | Refills: 0
Start: 2024-10-01 | End: 2024-10-30

## 2024-10-01 RX ORDER — ISOSORBIDE MONONITRATE 30 MG
1 TABLET, EXTENDED RELEASE 24 HR ORAL
Qty: 30 | Refills: 0
Start: 2024-10-01 | End: 2024-10-30

## 2024-10-01 RX ORDER — ACETAMINOPHEN 325 MG
2 TABLET ORAL
Qty: 0 | Refills: 0 | DISCHARGE
Start: 2024-10-01

## 2024-10-01 RX ADMIN — MULTI VITAMIN/MINERAL SUPPLEMENT WITH ASCORBIC ACID, NIACIN, PYRIDOXINE, PANTOTHENIC ACID, FOLIC ACID, RIBOFLAVIN, THIAMIN, BIOTIN, COBALAMIN AND ZINC. 1 TABLET(S): 60; 20; 12.5; 10; 10; 1.7; 1.5; 1; .3; .006 TABLET, COATED ORAL at 11:34

## 2024-10-01 RX ADMIN — PANTOPRAZOLE SODIUM 40 MILLIGRAM(S): 40 TABLET, DELAYED RELEASE ORAL at 05:40

## 2024-10-01 RX ADMIN — Medication 0 UNIT(S)/HR: at 12:23

## 2024-10-01 RX ADMIN — Medication 81 MILLIGRAM(S): at 11:35

## 2024-10-01 RX ADMIN — Medication 1000 UNIT(S)/HR: at 07:08

## 2024-10-01 RX ADMIN — Medication 2: at 11:41

## 2024-10-01 RX ADMIN — Medication 100 MILLIGRAM(S): at 05:41

## 2024-10-01 RX ADMIN — Medication 1000 UNIT(S)/HR: at 05:43

## 2024-10-01 RX ADMIN — Medication 75 MILLIGRAM(S): at 11:35

## 2024-10-01 RX ADMIN — Medication 3 UNIT(S): at 07:50

## 2024-10-01 RX ADMIN — Medication 2: at 07:49

## 2024-10-01 RX ADMIN — Medication 3 UNIT(S): at 11:41

## 2024-10-01 NOTE — PROGRESS NOTE ADULT - SUBJECTIVE AND OBJECTIVE BOX
[facility] Division of Hospital Medicine  Magda Kohli MD  MS Teams PREFERRED        SUBJECTIVE / OVERNIGHT EVENTS:  ADDITIONAL REVIEW OF SYSTEMS:    MEDICATIONS  (STANDING):  aspirin  chewable 81 milliGRAM(s) Oral daily  clopidogrel Tablet 75 milliGRAM(s) Oral daily  dextrose 5%. 1000 milliLiter(s) (50 mL/Hr) IV Continuous <Continuous>  dextrose 5%. 1000 milliLiter(s) (100 mL/Hr) IV Continuous <Continuous>  dextrose 50% Injectable 25 Gram(s) IV Push once  dextrose 50% Injectable 25 Gram(s) IV Push once  dextrose 50% Injectable 12.5 Gram(s) IV Push once  glucagon  Injectable 1 milliGRAM(s) IntraMuscular once  heparin  Infusion.  Unit(s)/Hr (11 mL/Hr) IV Continuous <Continuous>  insulin glargine Injectable (LANTUS) 15 Unit(s) SubCutaneous at bedtime  insulin lispro (ADMELOG) corrective regimen sliding scale   SubCutaneous three times a day before meals  insulin lispro (ADMELOG) corrective regimen sliding scale   SubCutaneous at bedtime  insulin lispro Injectable (ADMELOG) 3 Unit(s) SubCutaneous three times a day before meals  metoprolol succinate  milliGRAM(s) Oral daily  multivitamin 1 Tablet(s) Oral daily  pantoprazole    Tablet 40 milliGRAM(s) Oral before breakfast  rosuvastatin 40 milliGRAM(s) Oral at bedtime  sodium chloride 0.9%. 1000 milliLiter(s) (60 mL/Hr) IV Continuous <Continuous>  sodium chloride 0.9%. 1000 milliLiter(s) (60 mL/Hr) IV Continuous <Continuous>    MEDICATIONS  (PRN):  acetaminophen     Tablet .. 650 milliGRAM(s) Oral every 6 hours PRN Temp greater or equal to 38C (100.4F), Mild Pain (1 - 3)  dextrose Oral Gel 15 Gram(s) Oral once PRN Blood Glucose LESS THAN 70 milliGRAM(s)/deciliter  heparin   Injectable 2500 Unit(s) IV Push every 6 hours PRN For aPTT between 40 - 57  heparin   Injectable 5000 Unit(s) IV Push every 6 hours PRN For aPTT less than 40      I&O's Summary    30 Sep 2024 07:01  -  01 Oct 2024 07:00  --------------------------------------------------------  IN: 480 mL / OUT: 0 mL / NET: 480 mL    01 Oct 2024 07:01  -  01 Oct 2024 11:49  --------------------------------------------------------  IN: 240 mL / OUT: 0 mL / NET: 240 mL        PHYSICAL EXAM:  Vital Signs Last 24 Hrs  T(C): 36.7 (01 Oct 2024 11:05), Max: 36.9 (30 Sep 2024 14:36)  T(F): 98.1 (01 Oct 2024 11:05), Max: 98.4 (30 Sep 2024 14:36)  HR: 87 (01 Oct 2024 11:05) (68 - 87)  BP: 134/82 (01 Oct 2024 11:05) (123/72 - 183/83)  BP(mean): --  RR: 18 (01 Oct 2024 11:05) (15 - 18)  SpO2: 100% (01 Oct 2024 11:05) (97% - 100%)    Parameters below as of 01 Oct 2024 11:05  Patient On (Oxygen Delivery Method): room air      CONSTITUTIONAL: NAD, well-developed, well-groomed  EYES: PERRLA; conjunctiva and sclera clear  ENMT: Moist oral mucosa, no pharyngeal injection or exudates; normal dentition  NECK: Supple, no palpable masses; no thyromegaly  RESPIRATORY: Normal respiratory effort; lungs are clear to auscultation bilaterally  CARDIOVASCULAR: Regular rate and rhythm, normal S1 and S2, no murmur/rub/gallop; No lower extremity edema; Peripheral pulses are 2+ bilaterally  ABDOMEN: Nontender to palpation, normoactive bowel sounds, no rebound/guarding; No hepatosplenomegaly  MUSCULOSKELETAL:  Normal gait; no clubbing or cyanosis of digits; no joint swelling or tenderness to palpation  PSYCH: A+O to person, place, and time; affect appropriate  NEUROLOGY: CN 2-12 are intact and symmetric; no gross sensory deficits   SKIN: No rashes; no palpable lesions    LABS:                        9.6    7.38  )-----------( 270      ( 01 Oct 2024 05:08 )             33.9     10-01    136  |  105  |  30[H]  ----------------------------<  195[H]  4.1   |  20[L]  |  1.32[H]    Ca    9.6      01 Oct 2024 05:08      PTT - ( 01 Oct 2024 05:08 )  PTT:120.8 sec      Urinalysis Basic - ( 01 Oct 2024 05:08 )    Color: x / Appearance: x / SG: x / pH: x  Gluc: 195 mg/dL / Ketone: x  / Bili: x / Urobili: x   Blood: x / Protein: x / Nitrite: x   Leuk Esterase: x / RBC: x / WBC x   Sq Epi: x / Non Sq Epi: x / Bacteria: x          RADIOLOGY & ADDITIONAL TESTS:  Results Reviewed:   Imaging Personally Reviewed:  Electrocardiogram Personally Reviewed:    COORDINATION OF CARE:  Care Discussed with Consultants/Other Providers [Y/N]:  Prior or Outpatient Records Reviewed [Y/N]:

## 2024-10-01 NOTE — DISCHARGE NOTE NURSING/CASE MANAGEMENT/SOCIAL WORK - PATIENT PORTAL LINK FT
You can access the FollowMyHealth Patient Portal offered by [facility] by registering at the following website: [email or web address]. By joining [employer]’s FollowMyHealth portal, you will also be able to view your health information using other applications (apps) compatible with our system.

## 2024-10-01 NOTE — PROGRESS NOTE ADULT - ASSESSMENT
84M w/ PMH of HTN, HLD, DM2, CAD s/p CABG, pAF on Eliquis, HFpEF, PAD, CKD, prostate CA w/ recent admission for dizziness found to have abnormal stress s/p cath w/ NO new stenting pw 3 weeks of dizziness, weakness and CP. Nephrology on board for CKD and precath optimization    Nat on CKD Stage 3   Appears to be at baseline   SCR baseline 1.2-1.6,   Fena suggestive of pre renal   Avoid Nephrotoxins  Avoid contrast and nsaids  Hold lisinopril  Can hold SGLT2 inhibitor  Avoid hypotension  S/p cath on 09/30    HTN  Continue on current meds    Low CO2/acidosis  Monitor    Anemia  Mild  Monitor

## 2024-10-01 NOTE — PROGRESS NOTE ADULT - PROBLEM SELECTOR PLAN 4
- c/w DAPT for now

## 2024-10-01 NOTE — PROGRESS NOTE ADULT - PROBLEM SELECTOR PLAN 5
- Hold home oral hypoglycemics  - On home Lantus 20U qhs and Humalog 12U TID  - Start weight based w/ Lantus 10U and Admelog 3U for now and uptitrate as needed based on ISS needs  - Start MONICA  - Diabetic education  - CC diet
- f/u AM A1c  - Hold home oral hypoglycemics  - On home Lantus 20U qhs and Humalog 12U TID  - Start weight based w/ Lantus 10U and Admelog 3U for now and uptitrate as needed based on ISS needs  - Start OMNICA  - Diabetic education  - CC diet

## 2024-10-01 NOTE — DISCHARGE NOTE PROVIDER - ATTENDING DISCHARGE PHYSICAL EXAMINATION:
Seen at bedside. NAD.  Vital Signs Last 24 Hrs  T(C): 36.7 (01 Oct 2024 11:05), Max: 36.9 (30 Sep 2024 14:36)  T(F): 98.1 (01 Oct 2024 11:05), Max: 98.4 (30 Sep 2024 14:36)  HR: 87 (01 Oct 2024 11:05) (68 - 87)  BP: 134/82 (01 Oct 2024 11:05) (123/72 - 183/83)  BP(mean): --  RR: 18 (01 Oct 2024 11:05) (15 - 18)  SpO2: 100% (01 Oct 2024 11:05) (97% - 100%)    Parameters below as of 01 Oct 2024 11:05  Patient On (Oxygen Delivery Method): room air        CONSTITUTIONAL: NAD, well-developed, well-groomed  EYES: PERRLA; conjunctiva and sclera clear  ENMT: Moist oral mucosa, no pharyngeal injection or exudates; normal dentition  NECK: Supple, no palpable masses; no thyromegaly  RESPIRATORY: Normal respiratory effort; lungs are clear to auscultation bilaterally  CARDIOVASCULAR: Regular rate and rhythm, normal S1 and S2, no murmur/rub/gallop; No lower extremity edema; Peripheral pulses are 2+ bilaterally  ABDOMEN: Nontender to palpation, normoactive bowel sounds, no rebound/guarding; No hepatosplenomegaly  MUSCULOSKELETAL:  Normal gait; no clubbing or cyanosis of digits; no joint swelling or tenderness to palpation  PSYCH: A+O to person, place, and time; affect appropriate  NEUROLOGY: CN 2-12 are intact and symmetric; no gross sensory deficits   SKIN: No rashes; no palpable lesions

## 2024-10-01 NOTE — PROGRESS NOTE ADULT - PROBLEM SELECTOR PROBLEM 2
.Pt masked on arrival, staff masked    [facility], called for failure to thrive/not eating/drinking, s/p covid 1/15  
Paroxysmal atrial fibrillation

## 2024-10-01 NOTE — PROGRESS NOTE ADULT - SUBJECTIVE AND OBJECTIVE BOX
[facility]    Dr. Shine Navarro     Office [phone] (9 am to 5 pm)  Service: [phone] (5pm to 9am)        RENAL PROGRESS NOTE: DATE OF SERVICE 10-01-24 @ 11:35    Patient is a 84y old  Male who presents with a chief complaint of CP (30 Sep 2024 11:54)      Patient seen and examined at bedside. No chest pain/sob    VITALS:  T(F): 98.1 (10-01-24 @ 11:05), Max: 98.4 (09-30-24 @ 14:36)  HR: 87 (10-01-24 @ 11:05)  BP: 134/82 (10-01-24 @ 11:05)  RR: 18 (10-01-24 @ 11:05)  SpO2: 100% (10-01-24 @ 11:05)  Wt(kg): --    09-30 @ 07:01  -  10-01 @ 07:00  --------------------------------------------------------  IN: 480 mL / OUT: 0 mL / NET: 480 mL    10-01 @ 07:01  -  10-01 @ 11:35  --------------------------------------------------------  IN: 240 mL / OUT: 0 mL / NET: 240 mL          PHYSICAL EXAM:  Constitutional: NAD  Neck: No JVD  Respiratory: CTAB, no wheezes, rales or rhonchi  Cardiovascular: S1, S2, RRR  Gastrointestinal: BS+, soft, NT/ND  Extremities: No peripheral edema    Hospital Medications:   MEDICATIONS  (STANDING):  aspirin  chewable 81 milliGRAM(s) Oral daily  clopidogrel Tablet 75 milliGRAM(s) Oral daily  dextrose 5%. 1000 milliLiter(s) (100 mL/Hr) IV Continuous <Continuous>  dextrose 5%. 1000 milliLiter(s) (50 mL/Hr) IV Continuous <Continuous>  dextrose 50% Injectable 25 Gram(s) IV Push once  dextrose 50% Injectable 25 Gram(s) IV Push once  dextrose 50% Injectable 12.5 Gram(s) IV Push once  glucagon  Injectable 1 milliGRAM(s) IntraMuscular once  heparin  Infusion.  Unit(s)/Hr (11 mL/Hr) IV Continuous <Continuous>  insulin glargine Injectable (LANTUS) 15 Unit(s) SubCutaneous at bedtime  insulin lispro (ADMELOG) corrective regimen sliding scale   SubCutaneous at bedtime  insulin lispro (ADMELOG) corrective regimen sliding scale   SubCutaneous three times a day before meals  insulin lispro Injectable (ADMELOG) 3 Unit(s) SubCutaneous three times a day before meals  metoprolol succinate  milliGRAM(s) Oral daily  multivitamin 1 Tablet(s) Oral daily  pantoprazole    Tablet 40 milliGRAM(s) Oral before breakfast  rosuvastatin 40 milliGRAM(s) Oral at bedtime  sodium chloride 0.9%. 1000 milliLiter(s) (60 mL/Hr) IV Continuous <Continuous>  sodium chloride 0.9%. 1000 milliLiter(s) (60 mL/Hr) IV Continuous <Continuous>      LABS:  10-01    136  |  105  |  30[H]  ----------------------------<  195[H]  4.1   |  20[L]  |  1.32[H]    Ca    9.6      01 Oct 2024 05:08      Creatinine Trend: 1.32 <--, 1.59 <--, 1.48 <--, 1.93 <--, 1.83 <--, 1.42 <--, 1.50 <--                                9.6    7.38  )-----------( 270      ( 01 Oct 2024 05:08 )             33.9     Urine Studies:  Urinalysis - [10-01-24 @ 05:08]      Color  / Appearance  / SG  / pH       Gluc 195 / Ketone   / Bili  / Urobili        Blood  / Protein  / Leuk Est  / Nitrite       RBC  / WBC  / Hyaline  / Gran  / Sq Epi  / Non Sq Epi  / Bacteria     Urine Creatinine 66      [09-27-24 @ 18:05]  Urine Sodium 45      [09-27-24 @ 18:05]    TSH 0.84      [09-24-24 @ 06:06]  Lipid: chol 190, , HDL 43, LDL --      [07-16-24 @ 10:48]        RADIOLOGY & ADDITIONAL STUDIES:

## 2024-10-01 NOTE — PROGRESS NOTE ADULT - PROBLEM SELECTOR PLAN 10
- On Lupron [id number] outpt
- On Lupron [id number] outpt
- On Lupron [id number] outpt
- On Lupron [id number] outpt
- On Lupron [id number] outpt
- On Lupron [id number] outpt
- On Lupron [id number] outpt

## 2024-10-01 NOTE — PROGRESS NOTE ADULT - SUBJECTIVE AND OBJECTIVE BOX
DATE OF SERVICE: 10-01-24 @ 21:13    Patient is a 84y old  Male who presents with a chief complaint of CP (01 Oct 2024 12:34)      INTERVAL HISTORY: feels ok    TELEMETRY Personally reviewed: no events  	  MEDICATIONS:  metoprolol succinate  milliGRAM(s) Oral daily        PHYSICAL EXAM:  T(C): 36.7 (10-01-24 @ 11:05), Max: 36.8 (10-01-24 @ 04:28)  HR: 87 (10-01-24 @ 11:05) (68 - 87)  BP: 134/82 (10-01-24 @ 11:05) (123/72 - 144/76)  RR: 18 (10-01-24 @ 11:05) (18 - 18)  SpO2: 100% (10-01-24 @ 11:05) (98% - 100%)  Wt(kg): --  I&O's Summary    30 Sep 2024 07:01  -  01 Oct 2024 07:00  --------------------------------------------------------  IN: 480 mL / OUT: 0 mL / NET: 480 mL    01 Oct 2024 07:01  -  01 Oct 2024 21:13  --------------------------------------------------------  IN: 480 mL / OUT: 0 mL / NET: 480 mL          Appearance: In no distress	  HEENT:    PERRL, EOMI	  Cardiovascular:  S1 S2, No JVD  Respiratory: Lungs clear to auscultation	  Gastrointestinal:  Soft, Non-tender, + BS	  Vascularature:  No edema of LE  Psychiatric: Appropriate affect   Neuro: no acute focal deficits                               9.6    7.38  )-----------( 270      ( 01 Oct 2024 05:08 )             33.9     10-01    136  |  105  |  30[H]  ----------------------------<  195[H]  4.1   |  20[L]  |  1.32[H]    Ca    9.6      01 Oct 2024 05:08          Labs personally reviewed      ASSESSMENT/PLAN: 	    84M w/ PMH of HTN, HLD, DM2, CAD s/p CABG, pAF on Eliquis, HFpEF, PAD, CKD, prostate CA, w/ recent admission for dizziness found to have abnormal stress s/p cath w/ NO new stenting pw 3 weeks of dizziness, weakness and CP a/w SOB w/ exertion.     Problem/Plan - 1:  ·  Problem: Chest pain  ·  Plan: Described as exertional CP  - h/o CABG and multiple PCI  - s/p Cath 7/22/24 with patent grafts to LAD/OM, patent stents to RCA  - c/w ASA, Rosuvastatin   - Trop 33--> 29  - Although with recent cath with patent grafts/coronaries now with typical exertional angina will need to proceed with repeat cardiac cath  --- Discussed with interventional cards Dr Owens given pt with diffuse small vessel disease recs NM stress test first to assess for territory of ischemia   - NST abnormal  - s/p cath - unchanged from prior   - Will add Imdur 30mg and Ranexa 500mg BID    Problem/Plan - 2:  ·  Problem: Chronic diastolic congestive heart failure.   ·  Plan:   - CXR with clear lungs  - Prior TTE 7/16/24 with preserved EF      Problem/Plan - 3:  ·  Problem: HTN (hypertension).   ·  Plan: - Continue Toprol 100mg PO daily  - Elevated BP readings, Cr stable, started Lisinopril 20mg PO daily. Improved today 9/27    Problem/Plan - 4:  ·  Problem: Paroxysmal A-fib/SVT  ·  Plan: - Continue metoprolol and Eliquis  ------- Resume Eliquis         Jan Pressley DO [facility]  Cardiovascular Medicine  [address], [address]  Office: [phone]  Available via Text/call on Microsoft Teams

## 2024-10-01 NOTE — PROVIDER CONTACT NOTE (CRITICAL VALUE NOTIFICATION) - ASSESSMENT
Pt A&Ox4, no s/s of bleeding.
Pt A&Ox4. VSS. Pt asymptomatic. Pt denies CP/SOB.
Patient A & O x4. VSS. No s/s of bleeding.
Pt. AOx4. VSS. No signs of bleeding noted.
pt asymptomatic, denies bleeding. AOx4

## 2024-10-01 NOTE — DISCHARGE NOTE PROVIDER - NSDCMRMEDTOKEN_GEN_ALL_CORE_FT
aspirin 81 mg oral tablet, chewable: 1 tab(s) chewed once a day  clopidogrel 75 mg oral tablet: 1 tab(s) orally once a day  Eliquis 2.5 mg oral tablet: 1 tab(s) orally 2 times a day  furosemide 20 mg oral tablet: 1 tab(s) orally once a day  insulin lispro 100 units/mL subcutaneous solution: 12 unit(s) subcutaneous 3 times a day (with meals)  Jardiance 25 mg oral tablet: 1 tab(s) orally once a day  Lantus 100 units/mL subcutaneous solution: 20 unit(s) subcutaneous once a day (at bedtime)  lisinopril 40 mg oral tablet: 1 tab(s) orally once a day  Lupron Depot 45 mg/6 months intramuscular kit: 45 milligram(s) intramuscular every 6 months  meclizine 25 mg oral tablet: 1 tab(s) orally every 8 hours as needed for Dizziness  metoprolol succinate 100 mg oral capsule, extended release: 1 cap(s) orally once a day  multivitamin: 1 tab(s) orally once a day  omeprazole 20 mg oral delayed release tablet: 1 tab(s) orally once a day  rosuvastatin 40 mg oral capsule: 1 cap(s) orally once a day   acetaminophen 325 mg oral tablet: 2 tab(s) orally every 6 hours As needed Temp greater or equal to 38C (100.4F), Mild Pain (1 - 3)  aspirin 81 mg oral tablet, chewable: 1 tab(s) chewed once a day  clopidogrel 75 mg oral tablet: 1 tab(s) orally once a day  Eliquis 2.5 mg oral tablet: 1 tab(s) orally 2 times a day  insulin lispro 100 units/mL subcutaneous solution: 12 unit(s) subcutaneous 3 times a day (with meals)  isosorbide mononitrate 30 mg oral tablet, extended release: 1 tab(s) orally once a day  Jardiance 25 mg oral tablet: 1 tab(s) orally once a day  Lantus 100 units/mL subcutaneous solution: 20 unit(s) subcutaneous once a day (at bedtime)  Lupron Depot 45 mg/6 months intramuscular kit: 45 milligram(s) intramuscular every 6 months  metoprolol succinate 100 mg oral capsule, extended release: 1 cap(s) orally once a day  multivitamin: 1 tab(s) orally once a day  omeprazole 20 mg oral delayed release tablet: 1 tab(s) orally once a day  Ranexa 500 mg oral tablet, extended release: 1 tab(s) orally 2 times a day  rosuvastatin 40 mg oral capsule: 1 cap(s) orally once a day

## 2024-10-01 NOTE — PROVIDER CONTACT NOTE (CRITICAL VALUE NOTIFICATION) - RECOMMENDATIONS
Notify Nestor Neumann, ACP
SAMARA Quinonez notified and aware.
Provider notified.
Will continue the full AC nomogram. As per nomogram, reduce the rate to 6 ml/hr and Ptt check in 6 hrs.

## 2024-10-01 NOTE — PROGRESS NOTE ADULT - PROBLEM SELECTOR PLAN 8
January 28, 2019       Patient: Harmony Monroe   YOB: 1958   Date of Visit: 1/28/2019         To Whom It May Concern:    It is my medical opinion that Harmony Monroe should be excused from work for tomorrow (1/29/2019 and 1/30/2019) and Wednesday of this week due to illness.        If you have any questions or concerns, please don't hesitate to call [phone]          Sincerely,          Mauro Stock P.A.-C.  Electronically Signed     
- c/w home Rosuvastatin 40mg qhs

## 2024-10-01 NOTE — PROGRESS NOTE ADULT - PROVIDER SPECIALTY LIST ADULT
Cardiology
Hospitalist
Nephrology
Nephrology
Cardiology
Nephrology
Cardiology
Nephrology
Hospitalist

## 2024-10-01 NOTE — PROGRESS NOTE ADULT - PROBLEM SELECTOR PLAN 2
- c/w Toprol 100mg qd   - [id number]  - continue IV heparin gtt  - continue to hold eliquis in anticipation of LHC

## 2024-10-01 NOTE — PROVIDER CONTACT NOTE (CRITICAL VALUE NOTIFICATION) - BACKGROUND
Patient on heparin drip at 7 ml/hr. Admitted for chest pain, pending cath.
(Admit Diagnosis) Weakness; Chest pain; Chronic heart failure with preserved ejection fraction (HFpEF)
84M admitted for weakness. PMH HFpEF, PAD, CKD, pAF
84 year old male admitted for Weakness.
84M w/ PMH of HTN, HLD, DM2, CAD s/p CABG, pAF on Eliquis, HFpEF, PAD, CKD, prostate CA, w/ recent admission for dizziness found to have abnormal stress s/p cath.

## 2024-10-01 NOTE — PROGRESS NOTE ADULT - PROBLEM SELECTOR PLAN 1
Exertional chest pain associated with dyspnea  - h/o CABG and multiple PCI  - s/p Cath 7/22/24 with patent grafts to LAD/OM, patent stents to RCA  - c/w ASA, Rosuvastatin   - Trop 33--> 29  - s/p NST with abnormal results  - plan for [facility] today with Dr. Owens
Exertional chest pain associated with dyspnea  - h/o CABG and multiple PCI  - s/p Cath 7/22/24 with patent grafts to LAD/OM, patent stents to RCA  - c/w ASA, Rosuvastatin   - Trop 33--> 29  - as per interventional team, recommend NST rather than LHC at this time given know diffuse disease - to be done today 9/27, follow up results  - appreciate card recs
Exertional chest pain associated with dyspnea  - h/o CABG and multiple PCI  - s/p Cath 7/22/24 with patent grafts to LAD/OM, patent stents to RCA  - c/w ASA, Rosuvastatin   - Trop 33--> 29  - Although with recent cath with patent grafts/coronaries now with typical exertional angina will need to proceed with repeat cardiac cath likely 9/25  - appreciate card recs  - holding eliquis
Exertional chest pain associated with dyspnea  - h/o CABG and multiple PCI  - s/p Cath 7/22/24 with patent grafts to LAD/OM, patent stents to RCA  - c/w ASA, Rosuvastatin   - Trop 33--> 29  - as per interventional team, recommend NST rather than LHC at this time given know diffuse disease  - appreciate card recs
Exertional chest pain associated with dyspnea  - h/o CABG and multiple PCI  - s/p Cath 7/22/24 with patent grafts to LAD/OM, patent stents to RCA  - c/w ASA, Rosuvastatin   - Trop 33--> 29  - s/p NST with abnormal results  - plan for C on Monday with Dr. Owens
Exertional chest pain associated with dyspnea  - h/o CABG and multiple PCI  - s/p Cath 7/22/24 with patent grafts to LAD/OM, patent stents to RCA  - c/w ASA, Rosuvastatin   - Trop 33--> 29  - s/p NST with abnormal results  - plan for C on Monday with Dr. Owens
Exertional chest pain associated with dyspnea  - h/o CABG and multiple PCI  - s/p Cath 7/22/24 with patent grafts to LAD/OM, patent stents to RCA  - c/w ASA, Rosuvastatin   - Trop 33--> 29  - s/p NST with abnormal results  - plan for [facility] today with Dr. Owens

## 2024-10-01 NOTE — PROGRESS NOTE ADULT - PROBLEM SELECTOR PLAN 6
- c/w ASA 81mg qd, plavix 75mg daily

## 2024-10-01 NOTE — DISCHARGE NOTE PROVIDER - NSDCCPCAREPLAN_GEN_ALL_CORE_FT
PRINCIPAL DISCHARGE DIAGNOSIS  Diagnosis: Chest pain  Assessment and Plan of Treatment: S/p repeat [facility] 9/30 with no significant changes. Started on Ranexa and Imdur. Please follow up outpatient with you PCP and cardiology.      SECONDARY DISCHARGE DIAGNOSES  Diagnosis: Type 2 diabetes mellitus  Assessment and Plan of Treatment: HgA1C this admission - 7.6  Make sure you get your HgA1c checked every three months.  If you take oral diabetes medications, check your blood glucose two times a day.  If you take insulin, check your blood glucose before meals and at bedtime.  It's important not to skip any meals.  Keep a log of your blood glucose results and always take it with you to your doctor appointments.  Keep a list of your current medications including injectables and over the counter medications and bring this medication list with you to all your doctor appointments.  If you have not seen your ophthalmologist this year call for appointment.  Check your feet daily for redness, sores, or openings. Do not self treat. If no improvement in two days call your primary care physician for an appointment.  Low blood sugar (hypoglycemia) is a blood sugar below 70mg/dl. Check your blood sugar if you feel signs/symptoms of hypoglycemia. If your blood sugar is below 70 take 15 grams of carbohydrates (ex 4 oz of apple juice, 3-4 glucose tablets, or 4-6 oz of regular soda) wait 15 minutes and repeat blood sugar to make sure it comes up above 70.  If your blood sugar is above 70 and you are due for a meal, have a meal.  If you are not due for a meal have a snack.  This snack helps keeps your blood sugar at a safe range.      Diagnosis: CAD (coronary artery disease)  Assessment and Plan of Treatment: Coronary artery disease is a condition where the arteries the supply the heart muscle get clogges with fatty deposits & puts you at risk for a heart attack  Call your doctor if you have any new pain, pressure, or discomfort in the center of your chest, pain, tingling or discomfort in arms, back, neck, jaw, or stomach, shortness of breath, nausea, vomiting, burping or heartburn, sweating, cold and clammy skin, racing or abnormal heartbeat for more than 10 minutes or if they keep coming & going.  Call 911 and do not try to get to hospital by care  Make sure to keep appointments with doctor for cardiac follow up care      Diagnosis: Paroxysmal atrial fibrillation  Assessment and Plan of Treatment: Atrial fibrillation is the most common heart rhythm problem.  The condition puts you at risk for has stroke and heart attack  It helps if you control your blood pressure, not drink more than 1-2 alcohol drinks per day, cut down on caffeine, getting treatment for over active thyroid gland, and get regular exercise  Call your doctor if you feel your heart racing or beating unusually, chest tightness or pain, lightheaded, faint, shortness of breath especially with exercise  It is important to take your heart medication as prescribed

## 2024-10-01 NOTE — DISCHARGE NOTE PROVIDER - CARE PROVIDER_API CALL
JORGITO ROBBINS  [address] [place] [address]  Phone: [phone]  Fax: ()-  Follow Up Time: 1-3 days

## 2024-10-01 NOTE — DISCHARGE NOTE PROVIDER - HOSPITAL COURSE
HPI:  Pt is an 84M w/ PMH of HTN, HLD, DM2, CAD s/p CABG, pAF on Eliquis, HFpEF, PAD, CKD, prostate CA w/ recent admission for dizziness found to have abnormal stress s/p cath w/ NO new stenting pw 3 weeks of dizziness, weakness and CP.    Reports doing well when laying down and not moving but upon standing and walking he feels pressure like non-radiating retrosternal 8/10 CP a/w SOB and fatigue that resolves upon sitting back down or laying down.     Otherwise denies any abd pain, N/V, constipation or diarrhea. No diaphoresis.    Of note, reports recent procedure outpt having "surgery on both logs" at [facility] now on ASA/Plavix and Eliquis.     In the ED upon entering patient's room he was noted to be in SVT to 180 as he was standing to urinate in urinal. Once seated in stretcher HR returned to 80s-90s. Symptoms did occur once before that as well when patient was up from his stretcher w/ events noted on tele.    (24 Sep 2024 02:40)    Hospital Course:      Important Medication Changes and Reason:    Active or Pending Issues Requiring Follow-up:    Advanced Directives:   [ ] Full code  [ ] DNR  [ ] Hospice    Discharge Diagnoses:         HPI:  Pt is an 84M w/ PMH of HTN, HLD, DM2, CAD s/p CABG, pAF on Eliquis, HFpEF, PAD, CKD, prostate CA w/ recent admission for dizziness found to have abnormal stress s/p cath w/ NO new stenting pw 3 weeks of dizziness, weakness and CP.    Reports doing well when laying down and not moving but upon standing and walking he feels pressure like non-radiating retrosternal 8/10 CP a/w SOB and fatigue that resolves upon sitting back down or laying down.     Otherwise denies any abd pain, N/V, constipation or diarrhea. No diaphoresis.    Of note, reports recent procedure outpt having "surgery on both logs" at [facility] now on ASA/Plavix and Eliquis.     In the ED upon entering patient's room he was noted to be in SVT to 180 as he was standing to urinate in urinal. Once seated in stretcher HR returned to 80s-90s. Symptoms did occur once before that as well when patient was up from his stretcher w/ events noted on tele.    (24 Sep 2024 02:40)    Hospital Course:  Evaluated by cardiology. Recent cath 7/22/24 with patent grafts to LAD/OM, patent stents to RCA.  Stress test abnormal. S/p repeat C 9/30 with no significant changes. Started on Ranexa and Imdur. Patient cleared for discharge and to follow up outpatient with PCP and cardiology.     Important Medication Changes and Reason: Started on Ranexa and Imdur    Active or Pending Issues Requiring Follow-up: PCP, cardiology    Advanced Directives:   [x] Full code  [ ] DNR  [ ] Hospice    Discharge Diagnoses:  chest pain  Paroxysmal atrial fibrillation  Chronic heart failure with preserved ejection fraction (HFpEF)  T2DM  CAD       Statement Selected

## 2024-10-01 NOTE — PROVIDER CONTACT NOTE (CRITICAL VALUE NOTIFICATION) - PERSON GIVING RESULT:
Brennan Dominguez / Laboratory
Brennan Dominguez, LAB
Laboratory- Patty Christian
Ryan Rivera
Robert Kunz/Lab

## 2024-10-01 NOTE — PROGRESS NOTE ADULT - PROBLEM SELECTOR PROBLEM 3
Chronic heart failure with preserved ejection fraction (HFpEF)

## 2024-10-01 NOTE — PROVIDER CONTACT NOTE (CRITICAL VALUE NOTIFICATION) - ACTION/TREATMENT ORDERED:
SAMARA- Melvi Gutierrez made aware ok to follow nomogram protocol. Plan of care ongoing.
Provider aware. Heparin gtt adjusted according to nomogram. Care ongoing.
Heparin paused for 1 hour, resumed at lower rate (7ml/hr). weight ordered by provider to verify proper weight for drug dosing
ACP Loretta Quinonez notified. RN following heparin nomogram. Pausing heparin gtt for 60 minutes and will restart at 9 ml/hr. Pt safety maintained.
NISHA Burgos agreed to follow the heparin nomogram. will closely monitor the pt for bleeding and mentation.

## 2024-12-05 RX ORDER — CLOPIDOGREL BISULFATE 75 MG/1
75 TABLET, FILM COATED ORAL
Refills: 0 | Status: ACTIVE | COMMUNITY

## 2024-12-11 ENCOUNTER — APPOINTMENT (OUTPATIENT)
Dept: NEUROSURGERY | Facility: CLINIC | Age: 84
End: 2024-12-11

## 2024-12-11 VITALS
SYSTOLIC BLOOD PRESSURE: 122 MMHG | TEMPERATURE: 96.4 F | BODY MASS INDEX: 22.66 KG/M2 | WEIGHT: 132 LBS | DIASTOLIC BLOOD PRESSURE: 63 MMHG | OXYGEN SATURATION: 99 % | HEART RATE: 94 BPM

## 2024-12-11 DIAGNOSIS — I65.1 OCCLUSION AND STENOSIS OF BASILAR ARTERY: ICD-10-CM

## 2024-12-11 DIAGNOSIS — R42 DIZZINESS AND GIDDINESS: ICD-10-CM

## 2024-12-11 PROCEDURE — 99205 OFFICE O/P NEW HI 60 MIN: CPT

## 2024-12-16 ENCOUNTER — RESULT REVIEW (OUTPATIENT)
Age: 84
End: 2024-12-16

## 2024-12-27 ENCOUNTER — OUTPATIENT (OUTPATIENT)
Dept: OUTPATIENT SERVICES | Facility: HOSPITAL | Age: 84
LOS: 1 days | End: 2024-12-27
Payer: COMMERCIAL

## 2024-12-27 ENCOUNTER — APPOINTMENT (OUTPATIENT)
Dept: MRI IMAGING | Facility: HOSPITAL | Age: 84
End: 2024-12-27

## 2024-12-27 DIAGNOSIS — Z00.00 ENCOUNTER FOR GENERAL ADULT MEDICAL EXAMINATION WITHOUT ABNORMAL FINDINGS: ICD-10-CM

## 2024-12-27 DIAGNOSIS — I65.1 OCCLUSION AND STENOSIS OF BASILAR ARTERY: ICD-10-CM

## 2024-12-27 PROCEDURE — 70544 MR ANGIOGRAPHY HEAD W/O DYE: CPT | Mod: 26

## 2024-12-27 PROCEDURE — 70544 MR ANGIOGRAPHY HEAD W/O DYE: CPT

## 2024-12-27 PROCEDURE — 70547 MR ANGIOGRAPHY NECK W/O DYE: CPT | Mod: 26

## 2024-12-27 PROCEDURE — 70547 MR ANGIOGRAPHY NECK W/O DYE: CPT

## 2025-01-06 ENCOUNTER — APPOINTMENT (OUTPATIENT)
Dept: ENDOCRINOLOGY | Facility: CLINIC | Age: 85
End: 2025-01-06

## 2025-01-07 ENCOUNTER — APPOINTMENT (OUTPATIENT)
Dept: NEUROSURGERY | Facility: CLINIC | Age: 85
End: 2025-01-07
Payer: COMMERCIAL

## 2025-01-07 DIAGNOSIS — I65.1 OCCLUSION AND STENOSIS OF BASILAR ARTERY: ICD-10-CM

## 2025-01-07 PROCEDURE — 99212 OFFICE O/P EST SF 10 MIN: CPT

## 2025-02-07 ENCOUNTER — NON-APPOINTMENT (OUTPATIENT)
Age: 85
End: 2025-02-07

## 2025-02-10 ENCOUNTER — LABORATORY RESULT (OUTPATIENT)
Age: 85
End: 2025-02-10

## 2025-02-10 DIAGNOSIS — R82.89 OTHER ABNRM FNDNGS ON CYTOLOGICAL: ICD-10-CM

## 2025-02-10 DIAGNOSIS — E55.9 VITAMIN D DEFICIENCY, UNSPECIFIED: ICD-10-CM

## 2025-02-10 DIAGNOSIS — N28.9 DISORDER OF KIDNEY AND URETER, UNSPECIFIED: ICD-10-CM

## 2025-02-10 DIAGNOSIS — R79.89 OTHER SPECIFIED ABNORMAL FINDINGS OF BLOOD CHEMISTRY: ICD-10-CM

## 2025-02-10 RX ORDER — LEUPROLIDE ACETATE 22.5 MG
22.5 KIT INTRAMUSCULAR
Qty: 1 | Refills: 3 | Status: ACTIVE | COMMUNITY
Start: 2025-02-10 | End: 1900-01-01

## 2025-02-12 ENCOUNTER — APPOINTMENT (OUTPATIENT)
Dept: UROLOGY | Facility: CLINIC | Age: 85
End: 2025-02-12

## 2025-02-14 ENCOUNTER — APPOINTMENT (OUTPATIENT)
Dept: CARDIOLOGY | Facility: CLINIC | Age: 85
End: 2025-02-14

## 2025-02-19 ENCOUNTER — APPOINTMENT (OUTPATIENT)
Dept: UROLOGY | Facility: CLINIC | Age: 85
End: 2025-02-19
Payer: COMMERCIAL

## 2025-02-19 ENCOUNTER — NON-APPOINTMENT (OUTPATIENT)
Age: 85
End: 2025-02-19

## 2025-02-19 VITALS
HEART RATE: 77 BPM | RESPIRATION RATE: 18 BRPM | DIASTOLIC BLOOD PRESSURE: 73 MMHG | SYSTOLIC BLOOD PRESSURE: 130 MMHG | OXYGEN SATURATION: 100 % | TEMPERATURE: 97.3 F

## 2025-02-19 DIAGNOSIS — C61 MALIGNANT NEOPLASM OF PROSTATE: ICD-10-CM

## 2025-02-19 PROCEDURE — 96402 CHEMO HORMON ANTINEOPL SQ/IM: CPT

## 2025-02-19 RX ORDER — LEUPROLIDE ACETATE 45 MG/.375ML
45 INJECTION, SUSPENSION, EXTENDED RELEASE SUBCUTANEOUS
Refills: 0 | Status: COMPLETED | OUTPATIENT
Start: 2025-02-19

## 2025-02-19 RX ADMIN — LEUPROLIDE ACETATE 0 MG: 45 INJECTION, SUSPENSION, EXTENDED RELEASE SUBCUTANEOUS at 00:00

## 2025-02-20 LAB
24R-OH-CALCIDIOL SERPL-MCNC: 62.3 PG/ML
25(OH)D3 SERPL-MCNC: 58 NG/ML
ALBUMIN SERPL ELPH-MCNC: 4.1 G/DL
ALP BLD-CCNC: 71 U/L
ALT SERPL-CCNC: 13 U/L
ANION GAP SERPL CALC-SCNC: 16 MMOL/L
APPEARANCE: CLEAR
AST SERPL-CCNC: 24 U/L
BACTERIA UR CULT: NORMAL
BACTERIA: NEGATIVE /HPF
BASOPHILS # BLD AUTO: 0 K/UL
BASOPHILS NFR BLD AUTO: 0 %
BILIRUB SERPL-MCNC: 0.3 MG/DL
BILIRUBIN URINE: NEGATIVE
BLOOD URINE: NEGATIVE
BUN SERPL-MCNC: 44 MG/DL
CALCIUM SERPL-MCNC: 9.4 MG/DL
CAST: 1 /LPF
CHLORIDE SERPL-SCNC: 104 MMOL/L
CO2 SERPL-SCNC: 17 MMOL/L
COLOR: YELLOW
CREAT SERPL-MCNC: 1.65 MG/DL
CYSTATIN C SERPL-MCNC: 1.89 MG/L
EGFR: 41 ML/MIN/1.73M2
EOSINOPHIL # BLD AUTO: 0.63 K/UL
EOSINOPHIL NFR BLD AUTO: 9.5 %
EPITHELIAL CELLS: 0 /HPF
ESTIMATED AVERAGE GLUCOSE: 174 MG/DL
GFR/BSA.PRED SERPLBLD CYS-BASED-ARV: 31 ML/MIN/1.73M2
GLUCOSE QUALITATIVE U: >=1000 MG/DL
GLUCOSE SERPL-MCNC: 259 MG/DL
HBA1C MFR BLD HPLC: 7.7 %
HCT VFR BLD CALC: 35.8 %
HGB BLD-MCNC: 10 G/DL
KETONES URINE: NEGATIVE MG/DL
LEUKOCYTE ESTERASE URINE: NEGATIVE
LYMPHOCYTES # BLD AUTO: 2.3 K/UL
LYMPHOCYTES NFR BLD AUTO: 34.9 %
MAN DIFF?: NORMAL
MCHC RBC-ENTMCNC: 22.5 PG
MCHC RBC-ENTMCNC: 27.9 G/DL
MCV RBC AUTO: 80.4 FL
MICROSCOPIC-UA: NORMAL
MONOCYTES # BLD AUTO: 0.32 K/UL
MONOCYTES NFR BLD AUTO: 4.8 %
NEUTROPHILS # BLD AUTO: 3.34 K/UL
NEUTROPHILS NFR BLD AUTO: 50.8 %
NITRITE URINE: NEGATIVE
PH URINE: 5.5
PLATELET # BLD AUTO: 302 K/UL
POTASSIUM SERPL-SCNC: 5.7 MMOL/L
PROT SERPL-MCNC: 7.6 G/DL
PROTEIN URINE: NEGATIVE MG/DL
PSA SERPL-MCNC: 0.13 NG/ML
RBC # BLD: 4.45 M/UL
RBC # FLD: 18.9 %
RED BLOOD CELLS URINE: 1 /HPF
SHBG SERPL-SCNC: 87.4 NMOL/L
SODIUM SERPL-SCNC: 137 MMOL/L
SPECIFIC GRAVITY URINE: 1.02
TESTOST FREE SERPL-MCNC: 0.2 PG/ML
TESTOST SERPL-MCNC: <2.5 NG/DL
UROBILINOGEN URINE: 0.2 MG/DL
WBC # FLD AUTO: 6.58 K/UL
WHITE BLOOD CELLS URINE: 0 /HPF

## 2025-03-07 ENCOUNTER — APPOINTMENT (OUTPATIENT)
Dept: UROLOGY | Facility: CLINIC | Age: 85
End: 2025-03-07

## 2025-04-02 ENCOUNTER — APPOINTMENT (OUTPATIENT)
Dept: NEUROSURGERY | Facility: CLINIC | Age: 85
End: 2025-04-02

## 2025-04-02 ENCOUNTER — NON-APPOINTMENT (OUTPATIENT)
Age: 85
End: 2025-04-02

## 2025-04-22 ENCOUNTER — APPOINTMENT (OUTPATIENT)
Dept: NEUROSURGERY | Facility: CLINIC | Age: 85
End: 2025-04-22

## 2025-04-23 ENCOUNTER — APPOINTMENT (OUTPATIENT)
Dept: NEUROSURGERY | Facility: CLINIC | Age: 85
End: 2025-04-23
Payer: COMMERCIAL

## 2025-04-23 DIAGNOSIS — I65.1 OCCLUSION AND STENOSIS OF BASILAR ARTERY: ICD-10-CM

## 2025-04-23 PROCEDURE — 99422 OL DIG E/M SVC 11-20 MIN: CPT

## 2025-05-06 NOTE — PROGRESS NOTE ADULT - ASSESSMENT
84M w/ PMH of HTN, HLD, DM2, CAD s/p CABG, pAF on Eliquis, HFpEF, PAD, CKD, prostate CA w/ recent admission for dizziness found to have abnormal stress s/p cath w/ NO new stenting pw 3 weeks of dizziness, weakness and CP. Nephrology on board for CKD and precath optimization    CKD Stage 3   Appears to be at baseline   SCR baseline 1.2-1.6  Avoid Nephrotoxins  Avoid contrast and nsaids  lisinopril on hold for now  Can hold SGLT2 inhibitor  Hydration pre and post cath to mitigate SYED  Avoid hypotension    HTN  Continue on current meds    Low CO2/acidosis  Bicarb 19  Monitor    Anemia  Mild  Monitor    Discussed with team        - Post-op c/b ZAHRA likely hemodynamic , no acute needs for CRRT/HD  - CRRT on HOLD - on bumex  - Cardiorenal recs consulted  - Can dc Femoral HD line for line holiday - Post-op c/b ZAHRA likely hemodynamic , no acute needs for CRRT/HD  - CRRT on HOLD - d/c bumex  - Cardiorenal recs consulted

## 2025-05-07 ENCOUNTER — NON-APPOINTMENT (OUTPATIENT)
Age: 85
End: 2025-05-07

## 2025-05-07 ENCOUNTER — APPOINTMENT (OUTPATIENT)
Dept: NEUROSURGERY | Facility: CLINIC | Age: 85
End: 2025-05-07
Payer: MEDICARE

## 2025-05-07 VITALS
OXYGEN SATURATION: 97 % | HEIGHT: 64 IN | HEART RATE: 72 BPM | TEMPERATURE: 98 F | BODY MASS INDEX: 23.22 KG/M2 | WEIGHT: 136 LBS | DIASTOLIC BLOOD PRESSURE: 77 MMHG | SYSTOLIC BLOOD PRESSURE: 152 MMHG

## 2025-05-07 DIAGNOSIS — I65.1 OCCLUSION AND STENOSIS OF BASILAR ARTERY: ICD-10-CM

## 2025-05-07 PROCEDURE — 99205 OFFICE O/P NEW HI 60 MIN: CPT

## 2025-05-07 RX ORDER — APIXABAN 2.5 MG/1
2.5 TABLET, FILM COATED ORAL
Refills: 0 | Status: ACTIVE | COMMUNITY

## 2025-05-07 RX ORDER — ROSUVASTATIN CALCIUM 5 MG/1
TABLET, FILM COATED ORAL
Refills: 0 | Status: ACTIVE | COMMUNITY

## 2025-05-07 RX ORDER — METOPROLOL TARTRATE 50 MG/1
50 TABLET ORAL
Refills: 0 | Status: ACTIVE | COMMUNITY

## 2025-05-11 ENCOUNTER — INPATIENT (INPATIENT)
Facility: HOSPITAL | Age: 85
LOS: 12 days | Discharge: HOME | DRG: 25 | End: 2025-05-24
Attending: STUDENT IN AN ORGANIZED HEALTH CARE EDUCATION/TRAINING PROGRAM | Admitting: STUDENT IN AN ORGANIZED HEALTH CARE EDUCATION/TRAINING PROGRAM
Payer: COMMERCIAL

## 2025-05-11 VITALS
HEART RATE: 50 BPM | TEMPERATURE: 99 F | HEIGHT: 61 IN | OXYGEN SATURATION: 97 % | RESPIRATION RATE: 18 BRPM | WEIGHT: 138.01 LBS | SYSTOLIC BLOOD PRESSURE: 159 MMHG | DIASTOLIC BLOOD PRESSURE: 84 MMHG

## 2025-05-11 LAB
ACANTHOCYTES BLD QL SMEAR: SLIGHT — SIGNIFICANT CHANGE UP
ALBUMIN SERPL ELPH-MCNC: 3.8 G/DL — SIGNIFICANT CHANGE UP (ref 3.3–5)
ALP SERPL-CCNC: 64 U/L — SIGNIFICANT CHANGE UP (ref 40–120)
ALT FLD-CCNC: 29 U/L — SIGNIFICANT CHANGE UP (ref 10–45)
ANION GAP SERPL CALC-SCNC: 13 MMOL/L — SIGNIFICANT CHANGE UP (ref 5–17)
ANISOCYTOSIS BLD QL: SLIGHT — SIGNIFICANT CHANGE UP
APTT BLD: 34.2 SEC — SIGNIFICANT CHANGE UP (ref 26.1–36.8)
AST SERPL-CCNC: 28 U/L — SIGNIFICANT CHANGE UP (ref 10–40)
BASOPHILS # BLD AUTO: 0 K/UL — SIGNIFICANT CHANGE UP (ref 0–0.2)
BASOPHILS NFR BLD AUTO: 0 % — SIGNIFICANT CHANGE UP (ref 0–2)
BILIRUB SERPL-MCNC: 0.4 MG/DL — SIGNIFICANT CHANGE UP (ref 0.2–1.2)
BUN SERPL-MCNC: 32 MG/DL — HIGH (ref 7–23)
BURR CELLS BLD QL SMEAR: PRESENT — SIGNIFICANT CHANGE UP
CALCIUM SERPL-MCNC: 9.8 MG/DL — SIGNIFICANT CHANGE UP (ref 8.4–10.5)
CHLORIDE SERPL-SCNC: 102 MMOL/L — SIGNIFICANT CHANGE UP (ref 96–108)
CO2 SERPL-SCNC: 18 MMOL/L — LOW (ref 22–31)
CREAT SERPL-MCNC: 1.6 MG/DL — HIGH (ref 0.5–1.3)
EGFR: 42 ML/MIN/1.73M2 — LOW
EGFR: 42 ML/MIN/1.73M2 — LOW
ELLIPTOCYTES BLD QL SMEAR: SLIGHT — SIGNIFICANT CHANGE UP
EOSINOPHIL # BLD AUTO: 0.05 K/UL — SIGNIFICANT CHANGE UP (ref 0–0.5)
EOSINOPHIL NFR BLD AUTO: 0.9 % — SIGNIFICANT CHANGE UP (ref 0–6)
GLUCOSE BLDC GLUCOMTR-MCNC: 296 MG/DL — HIGH (ref 70–99)
GLUCOSE SERPL-MCNC: 308 MG/DL — HIGH (ref 70–99)
HCT VFR BLD CALC: 38.7 % — LOW (ref 39–50)
HGB BLD-MCNC: 11.5 G/DL — LOW (ref 13–17)
HYPOCHROMIA BLD QL: SLIGHT — SIGNIFICANT CHANGE UP
INR BLD: 1.21 RATIO — HIGH (ref 0.85–1.16)
LYMPHOCYTES # BLD AUTO: 2.37 K/UL — SIGNIFICANT CHANGE UP (ref 1–3.3)
LYMPHOCYTES # BLD AUTO: 46.8 % — HIGH (ref 13–44)
MACROCYTES BLD QL: SLIGHT — SIGNIFICANT CHANGE UP
MANUAL SMEAR VERIFICATION: SIGNIFICANT CHANGE UP
MCHC RBC-ENTMCNC: 24.1 PG — LOW (ref 27–34)
MCHC RBC-ENTMCNC: 29.7 G/DL — LOW (ref 32–36)
MCV RBC AUTO: 81 FL — SIGNIFICANT CHANGE UP (ref 80–100)
MONOCYTES # BLD AUTO: 0.27 K/UL — SIGNIFICANT CHANGE UP (ref 0–0.9)
MONOCYTES NFR BLD AUTO: 5.4 % — SIGNIFICANT CHANGE UP (ref 2–14)
NEUTROPHILS # BLD AUTO: 2.37 K/UL — SIGNIFICANT CHANGE UP (ref 1.8–7.4)
NEUTROPHILS NFR BLD AUTO: 46.9 % — SIGNIFICANT CHANGE UP (ref 43–77)
OVALOCYTES BLD QL SMEAR: SLIGHT — SIGNIFICANT CHANGE UP
PLAT MORPH BLD: NORMAL — SIGNIFICANT CHANGE UP
PLATELET # BLD AUTO: 250 K/UL — SIGNIFICANT CHANGE UP (ref 150–400)
POIKILOCYTOSIS BLD QL AUTO: SLIGHT — SIGNIFICANT CHANGE UP
POLYCHROMASIA BLD QL SMEAR: SLIGHT — SIGNIFICANT CHANGE UP
POTASSIUM SERPL-MCNC: 5.1 MMOL/L — SIGNIFICANT CHANGE UP (ref 3.5–5.3)
POTASSIUM SERPL-SCNC: 5.1 MMOL/L — SIGNIFICANT CHANGE UP (ref 3.5–5.3)
PROT SERPL-MCNC: 7.7 G/DL — SIGNIFICANT CHANGE UP (ref 6–8.3)
PROTHROM AB SERPL-ACNC: 13.9 SEC — HIGH (ref 9.9–13.4)
RBC # BLD: 4.78 M/UL — SIGNIFICANT CHANGE UP (ref 4.2–5.8)
RBC # FLD: 22.2 % — HIGH (ref 10.3–14.5)
RBC BLD AUTO: ABNORMAL
SODIUM SERPL-SCNC: 133 MMOL/L — LOW (ref 135–145)
TARGETS BLD QL SMEAR: SLIGHT — SIGNIFICANT CHANGE UP
TROPONIN T, HIGH SENSITIVITY RESULT: 30 NG/L — SIGNIFICANT CHANGE UP (ref 0–51)
WBC # BLD: 5.06 K/UL — SIGNIFICANT CHANGE UP (ref 3.8–10.5)
WBC # FLD AUTO: 5.06 K/UL — SIGNIFICANT CHANGE UP (ref 3.8–10.5)

## 2025-05-11 PROCEDURE — 70496 CT ANGIOGRAPHY HEAD: CPT | Mod: 26

## 2025-05-11 PROCEDURE — 99285 EMERGENCY DEPT VISIT HI MDM: CPT

## 2025-05-11 PROCEDURE — 93010 ELECTROCARDIOGRAM REPORT: CPT

## 2025-05-11 PROCEDURE — 70498 CT ANGIOGRAPHY NECK: CPT | Mod: 26

## 2025-05-11 PROCEDURE — 99223 1ST HOSP IP/OBS HIGH 75: CPT

## 2025-05-11 PROCEDURE — 71045 X-RAY EXAM CHEST 1 VIEW: CPT | Mod: 26

## 2025-05-11 PROCEDURE — 70553 MRI BRAIN STEM W/O & W/DYE: CPT | Mod: 26

## 2025-05-11 PROCEDURE — 70450 CT HEAD/BRAIN W/O DYE: CPT | Mod: 26,59

## 2025-05-11 PROCEDURE — 93970 EXTREMITY STUDY: CPT | Mod: 26

## 2025-05-11 RX ORDER — ENOXAPARIN SODIUM 100 MG/ML
40 INJECTION SUBCUTANEOUS EVERY 24 HOURS
Refills: 0 | Status: DISCONTINUED | OUTPATIENT
Start: 2025-05-11 | End: 2025-05-13

## 2025-05-11 RX ORDER — ROSUVASTATIN CALCIUM 20 MG/1
40 TABLET, FILM COATED ORAL AT BEDTIME
Refills: 0 | Status: DISCONTINUED | OUTPATIENT
Start: 2025-05-11 | End: 2025-05-24

## 2025-05-11 RX ORDER — ASPIRIN 325 MG
81 TABLET ORAL DAILY
Refills: 0 | Status: DISCONTINUED | OUTPATIENT
Start: 2025-05-11 | End: 2025-05-12

## 2025-05-11 RX ORDER — LISINOPRIL 5 MG/1
40 TABLET ORAL DAILY
Refills: 0 | Status: DISCONTINUED | OUTPATIENT
Start: 2025-05-11 | End: 2025-05-12

## 2025-05-11 RX ORDER — GLUCAGON 3 MG/1
1 POWDER NASAL ONCE
Refills: 0 | Status: DISCONTINUED | OUTPATIENT
Start: 2025-05-11 | End: 2025-05-12

## 2025-05-11 RX ORDER — DEXTROSE 50 % IN WATER 50 %
25 SYRINGE (ML) INTRAVENOUS ONCE
Refills: 0 | Status: DISCONTINUED | OUTPATIENT
Start: 2025-05-11 | End: 2025-05-12

## 2025-05-11 RX ORDER — SODIUM CHLORIDE 9 G/1000ML
1000 INJECTION, SOLUTION INTRAVENOUS
Refills: 0 | Status: DISCONTINUED | OUTPATIENT
Start: 2025-05-11 | End: 2025-05-12

## 2025-05-11 RX ORDER — INSULIN LISPRO 100 U/ML
INJECTION, SOLUTION INTRAVENOUS; SUBCUTANEOUS
Refills: 0 | Status: DISCONTINUED | OUTPATIENT
Start: 2025-05-11 | End: 2025-05-14

## 2025-05-11 RX ORDER — CLOPIDOGREL BISULFATE 75 MG/1
75 TABLET, FILM COATED ORAL DAILY
Refills: 0 | Status: DISCONTINUED | OUTPATIENT
Start: 2025-05-11 | End: 2025-05-13

## 2025-05-11 RX ORDER — ACETAMINOPHEN 500 MG/5ML
1000 LIQUID (ML) ORAL EVERY 6 HOURS
Refills: 0 | Status: DISCONTINUED | OUTPATIENT
Start: 2025-05-11 | End: 2025-05-15

## 2025-05-11 RX ORDER — DRONEDARONE 400 MG/1
400 TABLET, FILM COATED ORAL
Refills: 0 | Status: DISCONTINUED | OUTPATIENT
Start: 2025-05-11 | End: 2025-05-24

## 2025-05-11 RX ORDER — INSULIN LISPRO 100 U/ML
6 INJECTION, SOLUTION INTRAVENOUS; SUBCUTANEOUS ONCE
Refills: 0 | Status: COMPLETED | OUTPATIENT
Start: 2025-05-11 | End: 2025-05-11

## 2025-05-11 RX ORDER — ISOSORBIDE MONONITRATE 60 MG/1
30 TABLET, EXTENDED RELEASE ORAL DAILY
Refills: 0 | Status: DISCONTINUED | OUTPATIENT
Start: 2025-05-11 | End: 2025-05-11

## 2025-05-11 RX ORDER — DEXTROSE 50 % IN WATER 50 %
12.5 SYRINGE (ML) INTRAVENOUS ONCE
Refills: 0 | Status: DISCONTINUED | OUTPATIENT
Start: 2025-05-11 | End: 2025-05-12

## 2025-05-11 RX ORDER — RANOLAZINE 1000 MG/1
500 TABLET, FILM COATED, EXTENDED RELEASE ORAL
Refills: 0 | Status: DISCONTINUED | OUTPATIENT
Start: 2025-05-11 | End: 2025-05-12

## 2025-05-11 RX ORDER — METOPROLOL SUCCINATE 50 MG/1
100 TABLET, EXTENDED RELEASE ORAL DAILY
Refills: 0 | Status: DISCONTINUED | OUTPATIENT
Start: 2025-05-11 | End: 2025-05-12

## 2025-05-11 RX ORDER — DEXTROSE 50 % IN WATER 50 %
15 SYRINGE (ML) INTRAVENOUS ONCE
Refills: 0 | Status: DISCONTINUED | OUTPATIENT
Start: 2025-05-11 | End: 2025-05-12

## 2025-05-11 RX ADMIN — ENOXAPARIN SODIUM 40 MILLIGRAM(S): 100 INJECTION SUBCUTANEOUS at 21:48

## 2025-05-11 RX ADMIN — LISINOPRIL 40 MILLIGRAM(S): 5 TABLET ORAL at 18:13

## 2025-05-11 RX ADMIN — INSULIN LISPRO 6 UNIT(S): 100 INJECTION, SOLUTION INTRAVENOUS; SUBCUTANEOUS at 13:42

## 2025-05-11 RX ADMIN — CLOPIDOGREL BISULFATE 75 MILLIGRAM(S): 75 TABLET, FILM COATED ORAL at 18:12

## 2025-05-11 RX ADMIN — ROSUVASTATIN CALCIUM 40 MILLIGRAM(S): 20 TABLET, FILM COATED ORAL at 21:48

## 2025-05-11 RX ADMIN — DRONEDARONE 400 MILLIGRAM(S): 400 TABLET, FILM COATED ORAL at 20:01

## 2025-05-12 DIAGNOSIS — R00.1 BRADYCARDIA, UNSPECIFIED: ICD-10-CM

## 2025-05-12 DIAGNOSIS — E78.5 HYPERLIPIDEMIA, UNSPECIFIED: ICD-10-CM

## 2025-05-12 DIAGNOSIS — I65.1 OCCLUSION AND STENOSIS OF BASILAR ARTERY: ICD-10-CM

## 2025-05-12 DIAGNOSIS — I48.0 PAROXYSMAL ATRIAL FIBRILLATION: ICD-10-CM

## 2025-05-12 DIAGNOSIS — N18.30 CHRONIC KIDNEY DISEASE, STAGE 3 UNSPECIFIED: ICD-10-CM

## 2025-05-12 DIAGNOSIS — I25.10 ATHEROSCLEROTIC HEART DISEASE OF NATIVE CORONARY ARTERY WITHOUT ANGINA PECTORIS: ICD-10-CM

## 2025-05-12 DIAGNOSIS — Z29.9 ENCOUNTER FOR PROPHYLACTIC MEASURES, UNSPECIFIED: ICD-10-CM

## 2025-05-12 DIAGNOSIS — I50.32 CHRONIC DIASTOLIC (CONGESTIVE) HEART FAILURE: ICD-10-CM

## 2025-05-12 DIAGNOSIS — Z01.818 ENCOUNTER FOR OTHER PREPROCEDURAL EXAMINATION: ICD-10-CM

## 2025-05-12 DIAGNOSIS — I10 ESSENTIAL (PRIMARY) HYPERTENSION: ICD-10-CM

## 2025-05-12 DIAGNOSIS — E11.9 TYPE 2 DIABETES MELLITUS WITHOUT COMPLICATIONS: ICD-10-CM

## 2025-05-12 LAB
A1C WITH ESTIMATED AVERAGE GLUCOSE RESULT: 8.2 % — HIGH (ref 4–5.6)
ALBUMIN SERPL ELPH-MCNC: 3.7 G/DL — SIGNIFICANT CHANGE UP (ref 3.3–5)
ALP SERPL-CCNC: 62 U/L — SIGNIFICANT CHANGE UP (ref 40–120)
ALT FLD-CCNC: 23 U/L — SIGNIFICANT CHANGE UP (ref 10–45)
ANION GAP SERPL CALC-SCNC: 12 MMOL/L — SIGNIFICANT CHANGE UP (ref 5–17)
APPEARANCE UR: CLEAR — SIGNIFICANT CHANGE UP
APTT BLD: 37.3 SEC — HIGH (ref 26.1–36.8)
AST SERPL-CCNC: 25 U/L — SIGNIFICANT CHANGE UP (ref 10–40)
BILIRUB SERPL-MCNC: 0.3 MG/DL — SIGNIFICANT CHANGE UP (ref 0.2–1.2)
BILIRUB UR-MCNC: NEGATIVE — SIGNIFICANT CHANGE UP
BLD GP AB SCN SERPL QL: NEGATIVE — SIGNIFICANT CHANGE UP
BUN SERPL-MCNC: 34 MG/DL — HIGH (ref 7–23)
CALCIUM SERPL-MCNC: 9.7 MG/DL — SIGNIFICANT CHANGE UP (ref 8.4–10.5)
CHLORIDE SERPL-SCNC: 102 MMOL/L — SIGNIFICANT CHANGE UP (ref 96–108)
CO2 SERPL-SCNC: 22 MMOL/L — SIGNIFICANT CHANGE UP (ref 22–31)
COLOR SPEC: YELLOW — SIGNIFICANT CHANGE UP
CREAT ?TM UR-MCNC: 48 MG/DL — SIGNIFICANT CHANGE UP
CREAT SERPL-MCNC: 1.69 MG/DL — HIGH (ref 0.5–1.3)
DIFF PNL FLD: NEGATIVE — SIGNIFICANT CHANGE UP
EGFR: 40 ML/MIN/1.73M2 — LOW
EGFR: 40 ML/MIN/1.73M2 — LOW
ESTIMATED AVERAGE GLUCOSE: 189 MG/DL — HIGH (ref 68–114)
GLUCOSE BLDC GLUCOMTR-MCNC: 157 MG/DL — HIGH (ref 70–99)
GLUCOSE BLDC GLUCOMTR-MCNC: 164 MG/DL — HIGH (ref 70–99)
GLUCOSE BLDC GLUCOMTR-MCNC: 166 MG/DL — HIGH (ref 70–99)
GLUCOSE BLDC GLUCOMTR-MCNC: 207 MG/DL — HIGH (ref 70–99)
GLUCOSE BLDC GLUCOMTR-MCNC: 244 MG/DL — HIGH (ref 70–99)
GLUCOSE BLDC GLUCOMTR-MCNC: 249 MG/DL — HIGH (ref 70–99)
GLUCOSE SERPL-MCNC: 239 MG/DL — HIGH (ref 70–99)
GLUCOSE UR QL: >=1000 MG/DL
HCT VFR BLD CALC: 37.4 % — LOW (ref 39–50)
HGB BLD-MCNC: 10.8 G/DL — LOW (ref 13–17)
INR BLD: 1.05 RATIO — SIGNIFICANT CHANGE UP (ref 0.85–1.16)
KETONES UR QL: NEGATIVE MG/DL — SIGNIFICANT CHANGE UP
LEUKOCYTE ESTERASE UR-ACNC: NEGATIVE — SIGNIFICANT CHANGE UP
MCHC RBC-ENTMCNC: 23.3 PG — LOW (ref 27–34)
MCHC RBC-ENTMCNC: 28.9 G/DL — LOW (ref 32–36)
MCV RBC AUTO: 80.8 FL — SIGNIFICANT CHANGE UP (ref 80–100)
MRSA PCR RESULT.: SIGNIFICANT CHANGE UP
NITRITE UR-MCNC: NEGATIVE — SIGNIFICANT CHANGE UP
NRBC BLD AUTO-RTO: 0 /100 WBCS — SIGNIFICANT CHANGE UP (ref 0–0)
PA ADP PRP-ACNC: 219 PRU — SIGNIFICANT CHANGE UP (ref 182–335)
PH UR: 5 — SIGNIFICANT CHANGE UP (ref 5–8)
PLATELET # BLD AUTO: 238 K/UL — SIGNIFICANT CHANGE UP (ref 150–400)
PLATELET RESPONSE ASPIRIN RESULT: 626 ARU — SIGNIFICANT CHANGE UP
POTASSIUM SERPL-MCNC: 5 MMOL/L — SIGNIFICANT CHANGE UP (ref 3.5–5.3)
POTASSIUM SERPL-SCNC: 5 MMOL/L — SIGNIFICANT CHANGE UP (ref 3.5–5.3)
PROT ?TM UR-MCNC: 16 MG/DL — HIGH (ref 0–12)
PROT SERPL-MCNC: 7.1 G/DL — SIGNIFICANT CHANGE UP (ref 6–8.3)
PROT UR-MCNC: SIGNIFICANT CHANGE UP MG/DL
PROT/CREAT UR-RTO: 0.3 RATIO — HIGH (ref 0–0.2)
PROTHROM AB SERPL-ACNC: 12.1 SEC — SIGNIFICANT CHANGE UP (ref 9.9–13.4)
RBC # BLD: 4.63 M/UL — SIGNIFICANT CHANGE UP (ref 4.2–5.8)
RBC # FLD: 22.4 % — HIGH (ref 10.3–14.5)
RH IG SCN BLD-IMP: POSITIVE — SIGNIFICANT CHANGE UP
S AUREUS DNA NOSE QL NAA+PROBE: SIGNIFICANT CHANGE UP
SODIUM SERPL-SCNC: 136 MMOL/L — SIGNIFICANT CHANGE UP (ref 135–145)
SP GR SPEC: 1.03 — SIGNIFICANT CHANGE UP (ref 1–1.03)
UROBILINOGEN FLD QL: 0.2 MG/DL — SIGNIFICANT CHANGE UP (ref 0.2–1)
WBC # BLD: 5 K/UL — SIGNIFICANT CHANGE UP (ref 3.8–10.5)
WBC # FLD AUTO: 5 K/UL — SIGNIFICANT CHANGE UP (ref 3.8–10.5)

## 2025-05-12 PROCEDURE — 70547 MR ANGIOGRAPHY NECK W/O DYE: CPT | Mod: 26

## 2025-05-12 PROCEDURE — 93010 ELECTROCARDIOGRAM REPORT: CPT

## 2025-05-12 PROCEDURE — 70544 MR ANGIOGRAPHY HEAD W/O DYE: CPT | Mod: 26

## 2025-05-12 PROCEDURE — 99232 SBSQ HOSP IP/OBS MODERATE 35: CPT

## 2025-05-12 PROCEDURE — 99223 1ST HOSP IP/OBS HIGH 75: CPT

## 2025-05-12 RX ORDER — INSULIN GLARGINE-YFGN 100 [IU]/ML
5 INJECTION, SOLUTION SUBCUTANEOUS AT BEDTIME
Refills: 0 | Status: DISCONTINUED | OUTPATIENT
Start: 2025-05-12 | End: 2025-05-12

## 2025-05-12 RX ORDER — INSULIN LISPRO 100 U/ML
10 INJECTION, SOLUTION INTRAVENOUS; SUBCUTANEOUS
Refills: 0 | Status: DISCONTINUED | OUTPATIENT
Start: 2025-05-12 | End: 2025-05-14

## 2025-05-12 RX ORDER — INSULIN GLARGINE-YFGN 100 [IU]/ML
6 INJECTION, SOLUTION SUBCUTANEOUS AT BEDTIME
Refills: 0 | Status: DISCONTINUED | OUTPATIENT
Start: 2025-05-12 | End: 2025-05-14

## 2025-05-12 RX ORDER — CLOPIDOGREL BISULFATE 75 MG/1
600 TABLET, FILM COATED ORAL ONCE
Refills: 0 | Status: COMPLETED | OUTPATIENT
Start: 2025-05-12 | End: 2025-05-12

## 2025-05-12 RX ORDER — CLOPIDOGREL BISULFATE 75 MG/1
1 TABLET, FILM COATED ORAL
Refills: 0 | DISCHARGE

## 2025-05-12 RX ORDER — METOPROLOL SUCCINATE 50 MG/1
12.5 TABLET, EXTENDED RELEASE ORAL EVERY 12 HOURS
Refills: 0 | Status: DISCONTINUED | OUTPATIENT
Start: 2025-05-13 | End: 2025-05-16

## 2025-05-12 RX ORDER — POLYETHYLENE GLYCOL 3350 17 G/17G
17 POWDER, FOR SOLUTION ORAL DAILY
Refills: 0 | Status: DISCONTINUED | OUTPATIENT
Start: 2025-05-12 | End: 2025-05-15

## 2025-05-12 RX ORDER — ASPIRIN 325 MG
325 TABLET ORAL DAILY
Refills: 0 | Status: DISCONTINUED | OUTPATIENT
Start: 2025-05-12 | End: 2025-05-13

## 2025-05-12 RX ORDER — METOPROLOL SUCCINATE 50 MG/1
25 TABLET, EXTENDED RELEASE ORAL
Refills: 0 | Status: DISCONTINUED | OUTPATIENT
Start: 2025-05-12 | End: 2025-05-12

## 2025-05-12 RX ADMIN — CLOPIDOGREL BISULFATE 600 MILLIGRAM(S): 75 TABLET, FILM COATED ORAL at 21:11

## 2025-05-12 RX ADMIN — DRONEDARONE 400 MILLIGRAM(S): 400 TABLET, FILM COATED ORAL at 05:13

## 2025-05-12 RX ADMIN — INSULIN LISPRO 2: 100 INJECTION, SOLUTION INTRAVENOUS; SUBCUTANEOUS at 17:13

## 2025-05-12 RX ADMIN — LISINOPRIL 40 MILLIGRAM(S): 5 TABLET ORAL at 05:12

## 2025-05-12 RX ADMIN — Medication 81 MILLIGRAM(S): at 11:35

## 2025-05-12 RX ADMIN — CLOPIDOGREL BISULFATE 75 MILLIGRAM(S): 75 TABLET, FILM COATED ORAL at 11:35

## 2025-05-12 RX ADMIN — INSULIN LISPRO 10 UNIT(S): 100 INJECTION, SOLUTION INTRAVENOUS; SUBCUTANEOUS at 17:15

## 2025-05-12 RX ADMIN — INSULIN GLARGINE-YFGN 6 UNIT(S): 100 INJECTION, SOLUTION SUBCUTANEOUS at 21:11

## 2025-05-12 RX ADMIN — POLYETHYLENE GLYCOL 3350 17 GRAM(S): 17 POWDER, FOR SOLUTION ORAL at 17:14

## 2025-05-12 RX ADMIN — ENOXAPARIN SODIUM 40 MILLIGRAM(S): 100 INJECTION SUBCUTANEOUS at 21:10

## 2025-05-12 RX ADMIN — METOPROLOL SUCCINATE 100 MILLIGRAM(S): 50 TABLET, EXTENDED RELEASE ORAL at 05:12

## 2025-05-12 RX ADMIN — INSULIN LISPRO 10 UNIT(S): 100 INJECTION, SOLUTION INTRAVENOUS; SUBCUTANEOUS at 12:29

## 2025-05-12 RX ADMIN — Medication 40 MILLIGRAM(S): at 05:13

## 2025-05-12 RX ADMIN — ROSUVASTATIN CALCIUM 40 MILLIGRAM(S): 20 TABLET, FILM COATED ORAL at 21:10

## 2025-05-12 RX ADMIN — RANOLAZINE 500 MILLIGRAM(S): 1000 TABLET, FILM COATED, EXTENDED RELEASE ORAL at 05:12

## 2025-05-12 RX ADMIN — DRONEDARONE 400 MILLIGRAM(S): 400 TABLET, FILM COATED ORAL at 17:13

## 2025-05-12 RX ADMIN — INSULIN LISPRO 4: 100 INJECTION, SOLUTION INTRAVENOUS; SUBCUTANEOUS at 11:37

## 2025-05-13 LAB
ALBUMIN SERPL ELPH-MCNC: 3.3 G/DL — SIGNIFICANT CHANGE UP (ref 3.3–5)
ALP SERPL-CCNC: 50 U/L — SIGNIFICANT CHANGE UP (ref 40–120)
ALT FLD-CCNC: 17 U/L — SIGNIFICANT CHANGE UP (ref 10–45)
ANION GAP SERPL CALC-SCNC: 17 MMOL/L — SIGNIFICANT CHANGE UP (ref 5–17)
AST SERPL-CCNC: 22 U/L — SIGNIFICANT CHANGE UP (ref 10–40)
BILIRUB SERPL-MCNC: 0.6 MG/DL — SIGNIFICANT CHANGE UP (ref 0.2–1.2)
BUN SERPL-MCNC: 38 MG/DL — HIGH (ref 7–23)
CALCIUM SERPL-MCNC: 8.8 MG/DL — SIGNIFICANT CHANGE UP (ref 8.4–10.5)
CHLORIDE SERPL-SCNC: 107 MMOL/L — SIGNIFICANT CHANGE UP (ref 96–108)
CO2 SERPL-SCNC: 13 MMOL/L — LOW (ref 22–31)
CREAT SERPL-MCNC: 1.64 MG/DL — HIGH (ref 0.5–1.3)
EGFR: 41 ML/MIN/1.73M2 — LOW
EGFR: 41 ML/MIN/1.73M2 — LOW
GAS PNL BLDA: SIGNIFICANT CHANGE UP
GLUCOSE BLDC GLUCOMTR-MCNC: 153 MG/DL — HIGH (ref 70–99)
GLUCOSE BLDC GLUCOMTR-MCNC: 181 MG/DL — HIGH (ref 70–99)
GLUCOSE BLDC GLUCOMTR-MCNC: 292 MG/DL — HIGH (ref 70–99)
GLUCOSE SERPL-MCNC: 265 MG/DL — HIGH (ref 70–99)
HCT VFR BLD CALC: 31.3 % — LOW (ref 39–50)
HGB BLD-MCNC: 9.2 G/DL — LOW (ref 13–17)
MAGNESIUM SERPL-MCNC: 1.9 MG/DL — SIGNIFICANT CHANGE UP (ref 1.6–2.6)
MCHC RBC-ENTMCNC: 23.7 PG — LOW (ref 27–34)
MCHC RBC-ENTMCNC: 29.4 G/DL — LOW (ref 32–36)
MCV RBC AUTO: 80.7 FL — SIGNIFICANT CHANGE UP (ref 80–100)
NRBC BLD AUTO-RTO: 0 /100 WBCS — SIGNIFICANT CHANGE UP (ref 0–0)
PA ADP PRP-ACNC: 193 PRU — SIGNIFICANT CHANGE UP (ref 182–335)
PA ADP PRP-ACNC: 201 PRU — SIGNIFICANT CHANGE UP (ref 182–335)
PHOSPHATE SERPL-MCNC: 6.3 MG/DL — HIGH (ref 2.5–4.5)
PLATELET # BLD AUTO: 199 K/UL — SIGNIFICANT CHANGE UP (ref 150–400)
POTASSIUM SERPL-MCNC: 5.2 MMOL/L — SIGNIFICANT CHANGE UP (ref 3.5–5.3)
POTASSIUM SERPL-SCNC: 5.2 MMOL/L — SIGNIFICANT CHANGE UP (ref 3.5–5.3)
PROT SERPL-MCNC: 6 G/DL — SIGNIFICANT CHANGE UP (ref 6–8.3)
RBC # BLD: 3.88 M/UL — LOW (ref 4.2–5.8)
RBC # FLD: 22.1 % — HIGH (ref 10.3–14.5)
SODIUM SERPL-SCNC: 137 MMOL/L — SIGNIFICANT CHANGE UP (ref 135–145)
WBC # BLD: 8.16 K/UL — SIGNIFICANT CHANGE UP (ref 3.8–10.5)
WBC # FLD AUTO: 8.16 K/UL — SIGNIFICANT CHANGE UP (ref 3.8–10.5)

## 2025-05-13 PROCEDURE — 36556 INSERT NON-TUNNEL CV CATH: CPT | Mod: RT

## 2025-05-13 PROCEDURE — 93970 EXTREMITY STUDY: CPT | Mod: 26

## 2025-05-13 PROCEDURE — 99291 CRITICAL CARE FIRST HOUR: CPT

## 2025-05-13 PROCEDURE — 76937 US GUIDE VASCULAR ACCESS: CPT | Mod: 26

## 2025-05-13 PROCEDURE — 36223 PLACE CATH CAROTID/INOM ART: CPT | Mod: 50

## 2025-05-13 PROCEDURE — 61635 INTRACRAN ANGIOPLSTY W/STENT: CPT

## 2025-05-13 PROCEDURE — 70551 MRI BRAIN STEM W/O DYE: CPT | Mod: 26

## 2025-05-13 RX ORDER — DESMOPRESSIN ACETATE 4 UG/ML
21 INJECTION INTRAVENOUS ONCE
Refills: 0 | Status: COMPLETED | OUTPATIENT
Start: 2025-05-13 | End: 2025-05-13

## 2025-05-13 RX ORDER — CANGRELOR 50 MG/1
1 INJECTION, POWDER, LYOPHILIZED, FOR SOLUTION INTRAVENOUS
Qty: 50 | Refills: 0 | Status: DISCONTINUED | OUTPATIENT
Start: 2025-05-13 | End: 2025-05-17

## 2025-05-13 RX ADMIN — METOPROLOL SUCCINATE 12.5 MILLIGRAM(S): 50 TABLET, EXTENDED RELEASE ORAL at 18:53

## 2025-05-13 RX ADMIN — DRONEDARONE 400 MILLIGRAM(S): 400 TABLET, FILM COATED ORAL at 19:52

## 2025-05-13 RX ADMIN — ROSUVASTATIN CALCIUM 40 MILLIGRAM(S): 20 TABLET, FILM COATED ORAL at 21:58

## 2025-05-13 RX ADMIN — CANGRELOR 37.6 MICROGRAM(S)/KG/MIN: 50 INJECTION, POWDER, LYOPHILIZED, FOR SOLUTION INTRAVENOUS at 16:53

## 2025-05-13 RX ADMIN — INSULIN LISPRO 2: 100 INJECTION, SOLUTION INTRAVENOUS; SUBCUTANEOUS at 07:55

## 2025-05-13 RX ADMIN — Medication 70 MILLILITER(S): at 16:51

## 2025-05-13 RX ADMIN — CANGRELOR 37.6 MICROGRAM(S)/KG/MIN: 50 INJECTION, POWDER, LYOPHILIZED, FOR SOLUTION INTRAVENOUS at 19:00

## 2025-05-13 RX ADMIN — Medication 70 MILLILITER(S): at 00:24

## 2025-05-13 RX ADMIN — CANGRELOR 37.6 MICROGRAM(S)/KG/MIN: 50 INJECTION, POWDER, LYOPHILIZED, FOR SOLUTION INTRAVENOUS at 16:51

## 2025-05-13 RX ADMIN — CANGRELOR 18.8 MICROGRAM(S)/KG/MIN: 50 INJECTION, POWDER, LYOPHILIZED, FOR SOLUTION INTRAVENOUS at 20:00

## 2025-05-13 RX ADMIN — Medication 70 MILLILITER(S): at 16:53

## 2025-05-13 RX ADMIN — CLOPIDOGREL BISULFATE 75 MILLIGRAM(S): 75 TABLET, FILM COATED ORAL at 09:42

## 2025-05-13 RX ADMIN — Medication 1 APPLICATION(S): at 18:42

## 2025-05-13 RX ADMIN — DRONEDARONE 400 MILLIGRAM(S): 400 TABLET, FILM COATED ORAL at 05:19

## 2025-05-13 RX ADMIN — Medication 325 MILLIGRAM(S): at 09:41

## 2025-05-13 RX ADMIN — INSULIN GLARGINE-YFGN 6 UNIT(S): 100 INJECTION, SOLUTION SUBCUTANEOUS at 21:58

## 2025-05-13 RX ADMIN — Medication 40 MILLIGRAM(S): at 05:20

## 2025-05-13 RX ADMIN — Medication 70 MILLILITER(S): at 19:00

## 2025-05-14 LAB
ANION GAP SERPL CALC-SCNC: 14 MMOL/L — SIGNIFICANT CHANGE UP (ref 5–17)
ANION GAP SERPL CALC-SCNC: 15 MMOL/L — SIGNIFICANT CHANGE UP (ref 5–17)
ANION GAP SERPL CALC-SCNC: 16 MMOL/L — SIGNIFICANT CHANGE UP (ref 5–17)
BUN SERPL-MCNC: 40 MG/DL — HIGH (ref 7–23)
BUN SERPL-MCNC: 44 MG/DL — HIGH (ref 7–23)
BUN SERPL-MCNC: 49 MG/DL — HIGH (ref 7–23)
CALCIUM SERPL-MCNC: 8.3 MG/DL — LOW (ref 8.4–10.5)
CALCIUM SERPL-MCNC: 8.6 MG/DL — SIGNIFICANT CHANGE UP (ref 8.4–10.5)
CALCIUM SERPL-MCNC: 8.6 MG/DL — SIGNIFICANT CHANGE UP (ref 8.4–10.5)
CHLORIDE SERPL-SCNC: 108 MMOL/L — SIGNIFICANT CHANGE UP (ref 96–108)
CHLORIDE SERPL-SCNC: 110 MMOL/L — HIGH (ref 96–108)
CHLORIDE SERPL-SCNC: 110 MMOL/L — HIGH (ref 96–108)
CO2 SERPL-SCNC: 13 MMOL/L — LOW (ref 22–31)
CO2 SERPL-SCNC: 14 MMOL/L — LOW (ref 22–31)
CO2 SERPL-SCNC: 14 MMOL/L — LOW (ref 22–31)
CREAT SERPL-MCNC: 1.88 MG/DL — HIGH (ref 0.5–1.3)
CREAT SERPL-MCNC: 2.11 MG/DL — HIGH (ref 0.5–1.3)
CREAT SERPL-MCNC: 2.2 MG/DL — HIGH (ref 0.5–1.3)
EGFR: 29 ML/MIN/1.73M2 — LOW
EGFR: 29 ML/MIN/1.73M2 — LOW
EGFR: 30 ML/MIN/1.73M2 — LOW
EGFR: 30 ML/MIN/1.73M2 — LOW
EGFR: 35 ML/MIN/1.73M2 — LOW
EGFR: 35 ML/MIN/1.73M2 — LOW
GLUCOSE BLDC GLUCOMTR-MCNC: 117 MG/DL — HIGH (ref 70–99)
GLUCOSE BLDC GLUCOMTR-MCNC: 122 MG/DL — HIGH (ref 70–99)
GLUCOSE BLDC GLUCOMTR-MCNC: 126 MG/DL — HIGH (ref 70–99)
GLUCOSE BLDC GLUCOMTR-MCNC: 138 MG/DL — HIGH (ref 70–99)
GLUCOSE BLDC GLUCOMTR-MCNC: 139 MG/DL — HIGH (ref 70–99)
GLUCOSE BLDC GLUCOMTR-MCNC: 143 MG/DL — HIGH (ref 70–99)
GLUCOSE BLDC GLUCOMTR-MCNC: 156 MG/DL — HIGH (ref 70–99)
GLUCOSE BLDC GLUCOMTR-MCNC: 164 MG/DL — HIGH (ref 70–99)
GLUCOSE BLDC GLUCOMTR-MCNC: 170 MG/DL — HIGH (ref 70–99)
GLUCOSE BLDC GLUCOMTR-MCNC: 238 MG/DL — HIGH (ref 70–99)
GLUCOSE BLDC GLUCOMTR-MCNC: 247 MG/DL — HIGH (ref 70–99)
GLUCOSE BLDC GLUCOMTR-MCNC: 264 MG/DL — HIGH (ref 70–99)
GLUCOSE BLDC GLUCOMTR-MCNC: 266 MG/DL — HIGH (ref 70–99)
GLUCOSE BLDC GLUCOMTR-MCNC: 281 MG/DL — HIGH (ref 70–99)
GLUCOSE BLDC GLUCOMTR-MCNC: 288 MG/DL — HIGH (ref 70–99)
GLUCOSE BLDC GLUCOMTR-MCNC: 99 MG/DL — SIGNIFICANT CHANGE UP (ref 70–99)
GLUCOSE SERPL-MCNC: 118 MG/DL — HIGH (ref 70–99)
GLUCOSE SERPL-MCNC: 118 MG/DL — HIGH (ref 70–99)
GLUCOSE SERPL-MCNC: 247 MG/DL — HIGH (ref 70–99)
MAGNESIUM SERPL-MCNC: 1.9 MG/DL — SIGNIFICANT CHANGE UP (ref 1.6–2.6)
MAGNESIUM SERPL-MCNC: 2.4 MG/DL — SIGNIFICANT CHANGE UP (ref 1.6–2.6)
MAGNESIUM SERPL-MCNC: 2.5 MG/DL — SIGNIFICANT CHANGE UP (ref 1.6–2.6)
PA ADP PRP-ACNC: 34 PRU — LOW (ref 182–335)
PHOSPHATE SERPL-MCNC: 5.2 MG/DL — HIGH (ref 2.5–4.5)
PHOSPHATE SERPL-MCNC: 5.6 MG/DL — HIGH (ref 2.5–4.5)
PHOSPHATE SERPL-MCNC: 6.5 MG/DL — HIGH (ref 2.5–4.5)
POTASSIUM SERPL-MCNC: 4.4 MMOL/L — SIGNIFICANT CHANGE UP (ref 3.5–5.3)
POTASSIUM SERPL-MCNC: 4.6 MMOL/L — SIGNIFICANT CHANGE UP (ref 3.5–5.3)
POTASSIUM SERPL-MCNC: 4.7 MMOL/L — SIGNIFICANT CHANGE UP (ref 3.5–5.3)
POTASSIUM SERPL-SCNC: 4.4 MMOL/L — SIGNIFICANT CHANGE UP (ref 3.5–5.3)
POTASSIUM SERPL-SCNC: 4.6 MMOL/L — SIGNIFICANT CHANGE UP (ref 3.5–5.3)
POTASSIUM SERPL-SCNC: 4.7 MMOL/L — SIGNIFICANT CHANGE UP (ref 3.5–5.3)
SODIUM SERPL-SCNC: 137 MMOL/L — SIGNIFICANT CHANGE UP (ref 135–145)
SODIUM SERPL-SCNC: 138 MMOL/L — SIGNIFICANT CHANGE UP (ref 135–145)
SODIUM SERPL-SCNC: 139 MMOL/L — SIGNIFICANT CHANGE UP (ref 135–145)

## 2025-05-14 PROCEDURE — 71045 X-RAY EXAM CHEST 1 VIEW: CPT | Mod: 26

## 2025-05-14 PROCEDURE — 99232 SBSQ HOSP IP/OBS MODERATE 35: CPT | Mod: GC

## 2025-05-14 PROCEDURE — 70450 CT HEAD/BRAIN W/O DYE: CPT | Mod: 26

## 2025-05-14 PROCEDURE — 99291 CRITICAL CARE FIRST HOUR: CPT

## 2025-05-14 RX ORDER — INSULIN GLARGINE-YFGN 100 [IU]/ML
4 INJECTION, SOLUTION SUBCUTANEOUS ONCE
Refills: 0 | Status: COMPLETED | OUTPATIENT
Start: 2025-05-14 | End: 2025-05-14

## 2025-05-14 RX ORDER — IPRATROPIUM BROMIDE AND ALBUTEROL SULFATE .5; 2.5 MG/3ML; MG/3ML
3 SOLUTION RESPIRATORY (INHALATION) EVERY 6 HOURS
Refills: 0 | Status: DISCONTINUED | OUTPATIENT
Start: 2025-05-14 | End: 2025-05-16

## 2025-05-14 RX ORDER — DEXTROMETHORPHAN HBR, GUAIFENESIN 200 MG/10ML
100 LIQUID ORAL EVERY 6 HOURS
Refills: 0 | Status: DISCONTINUED | OUTPATIENT
Start: 2025-05-14 | End: 2025-05-24

## 2025-05-14 RX ORDER — INSULIN LISPRO 100 U/ML
4 INJECTION, SOLUTION INTRAVENOUS; SUBCUTANEOUS
Refills: 0 | Status: DISCONTINUED | OUTPATIENT
Start: 2025-05-14 | End: 2025-05-16

## 2025-05-14 RX ORDER — INSULIN GLARGINE-YFGN 100 [IU]/ML
10 INJECTION, SOLUTION SUBCUTANEOUS AT BEDTIME
Refills: 0 | Status: DISCONTINUED | OUTPATIENT
Start: 2025-05-14 | End: 2025-05-14

## 2025-05-14 RX ORDER — MAGNESIUM SULFATE 500 MG/ML
1 SYRINGE (ML) INJECTION ONCE
Refills: 0 | Status: COMPLETED | OUTPATIENT
Start: 2025-05-14 | End: 2025-05-14

## 2025-05-14 RX ORDER — INSULIN LISPRO 100 U/ML
INJECTION, SOLUTION INTRAVENOUS; SUBCUTANEOUS
Refills: 0 | Status: DISCONTINUED | OUTPATIENT
Start: 2025-05-14 | End: 2025-05-16

## 2025-05-14 RX ORDER — IPRATROPIUM BROMIDE AND ALBUTEROL SULFATE .5; 2.5 MG/3ML; MG/3ML
3 SOLUTION RESPIRATORY (INHALATION) ONCE
Refills: 0 | Status: COMPLETED | OUTPATIENT
Start: 2025-05-14 | End: 2025-05-14

## 2025-05-14 RX ADMIN — IPRATROPIUM BROMIDE AND ALBUTEROL SULFATE 3 MILLILITER(S): .5; 2.5 SOLUTION RESPIRATORY (INHALATION) at 07:40

## 2025-05-14 RX ADMIN — INSULIN GLARGINE-YFGN 4 UNIT(S): 100 INJECTION, SOLUTION SUBCUTANEOUS at 02:08

## 2025-05-14 RX ADMIN — CANGRELOR 18.8 MICROGRAM(S)/KG/MIN: 50 INJECTION, POWDER, LYOPHILIZED, FOR SOLUTION INTRAVENOUS at 07:18

## 2025-05-14 RX ADMIN — METOPROLOL SUCCINATE 12.5 MILLIGRAM(S): 50 TABLET, EXTENDED RELEASE ORAL at 05:08

## 2025-05-14 RX ADMIN — ROSUVASTATIN CALCIUM 40 MILLIGRAM(S): 20 TABLET, FILM COATED ORAL at 22:07

## 2025-05-14 RX ADMIN — DRONEDARONE 400 MILLIGRAM(S): 400 TABLET, FILM COATED ORAL at 17:12

## 2025-05-14 RX ADMIN — INSULIN LISPRO 4 UNIT(S): 100 INJECTION, SOLUTION INTRAVENOUS; SUBCUTANEOUS at 16:57

## 2025-05-14 RX ADMIN — CANGRELOR 18.8 MICROGRAM(S)/KG/MIN: 50 INJECTION, POWDER, LYOPHILIZED, FOR SOLUTION INTRAVENOUS at 19:00

## 2025-05-14 RX ADMIN — DEXTROMETHORPHAN HBR, GUAIFENESIN 100 MILLIGRAM(S): 200 LIQUID ORAL at 16:19

## 2025-05-14 RX ADMIN — Medication 1000 MILLILITER(S): at 22:26

## 2025-05-14 RX ADMIN — DEXTROMETHORPHAN HBR, GUAIFENESIN 100 MILLIGRAM(S): 200 LIQUID ORAL at 23:15

## 2025-05-14 RX ADMIN — Medication 3 UNIT(S): at 02:35

## 2025-05-14 RX ADMIN — Medication 3 UNIT(S)/HR: at 07:18

## 2025-05-14 RX ADMIN — Medication 70 MILLILITER(S): at 07:18

## 2025-05-14 RX ADMIN — Medication 400 MILLIGRAM(S): at 22:00

## 2025-05-14 RX ADMIN — Medication 40 MILLIGRAM(S): at 08:18

## 2025-05-14 RX ADMIN — Medication 3 UNIT(S)/HR: at 03:05

## 2025-05-14 RX ADMIN — DRONEDARONE 400 MILLIGRAM(S): 400 TABLET, FILM COATED ORAL at 05:07

## 2025-05-14 RX ADMIN — Medication 1 APPLICATION(S): at 05:08

## 2025-05-14 RX ADMIN — IPRATROPIUM BROMIDE AND ALBUTEROL SULFATE 3 MILLILITER(S): .5; 2.5 SOLUTION RESPIRATORY (INHALATION) at 23:28

## 2025-05-14 RX ADMIN — Medication 100 GRAM(S): at 05:10

## 2025-05-14 RX ADMIN — METOPROLOL SUCCINATE 12.5 MILLIGRAM(S): 50 TABLET, EXTENDED RELEASE ORAL at 17:18

## 2025-05-14 RX ADMIN — Medication 1000 MILLIGRAM(S): at 22:15

## 2025-05-15 LAB
ANION GAP SERPL CALC-SCNC: 14 MMOL/L — SIGNIFICANT CHANGE UP (ref 5–17)
BUN SERPL-MCNC: 62 MG/DL — HIGH (ref 7–23)
CALCIUM SERPL-MCNC: 8.8 MG/DL — SIGNIFICANT CHANGE UP (ref 8.4–10.5)
CHLORIDE SERPL-SCNC: 110 MMOL/L — HIGH (ref 96–108)
CHOLEST SERPL-MCNC: 117 MG/DL — SIGNIFICANT CHANGE UP
CO2 SERPL-SCNC: 11 MMOL/L — LOW (ref 22–31)
CREAT SERPL-MCNC: 2.7 MG/DL — HIGH (ref 0.5–1.3)
EGFR: 23 ML/MIN/1.73M2 — LOW
EGFR: 23 ML/MIN/1.73M2 — LOW
GLUCOSE BLDC GLUCOMTR-MCNC: 118 MG/DL — HIGH (ref 70–99)
GLUCOSE BLDC GLUCOMTR-MCNC: 144 MG/DL — HIGH (ref 70–99)
GLUCOSE BLDC GLUCOMTR-MCNC: 165 MG/DL — HIGH (ref 70–99)
GLUCOSE SERPL-MCNC: 161 MG/DL — HIGH (ref 70–99)
HCT VFR BLD CALC: 29.4 % — LOW (ref 39–50)
HDLC SERPL-MCNC: 48 MG/DL — SIGNIFICANT CHANGE UP
HGB BLD-MCNC: 8.9 G/DL — LOW (ref 13–17)
LDLC SERPL-MCNC: 49 MG/DL — SIGNIFICANT CHANGE UP
LIPID PNL WITH DIRECT LDL SERPL: 49 MG/DL — SIGNIFICANT CHANGE UP
MAGNESIUM SERPL-MCNC: 2.8 MG/DL — HIGH (ref 1.6–2.6)
MCHC RBC-ENTMCNC: 24.4 PG — LOW (ref 27–34)
MCHC RBC-ENTMCNC: 30.3 G/DL — LOW (ref 32–36)
MCV RBC AUTO: 80.5 FL — SIGNIFICANT CHANGE UP (ref 80–100)
NONHDLC SERPL-MCNC: 69 MG/DL — SIGNIFICANT CHANGE UP
NRBC BLD AUTO-RTO: 0 /100 WBCS — SIGNIFICANT CHANGE UP (ref 0–0)
PHOSPHATE SERPL-MCNC: 5.7 MG/DL — HIGH (ref 2.5–4.5)
PLATELET # BLD AUTO: 202 K/UL — SIGNIFICANT CHANGE UP (ref 150–400)
POTASSIUM SERPL-MCNC: 6.3 MMOL/L — CRITICAL HIGH (ref 3.5–5.3)
POTASSIUM SERPL-SCNC: 6.3 MMOL/L — CRITICAL HIGH (ref 3.5–5.3)
RBC # BLD: 3.65 M/UL — LOW (ref 4.2–5.8)
RBC # FLD: 24 % — HIGH (ref 10.3–14.5)
SODIUM SERPL-SCNC: 135 MMOL/L — SIGNIFICANT CHANGE UP (ref 135–145)
TRIGL SERPL-MCNC: 104 MG/DL — SIGNIFICANT CHANGE UP
TROPONIN T, HIGH SENSITIVITY RESULT: 134 NG/L — HIGH (ref 0–51)
TROPONIN T, HIGH SENSITIVITY RESULT: 147 NG/L — HIGH (ref 0–51)
TROPONIN T, HIGH SENSITIVITY RESULT: 154 NG/L — HIGH (ref 0–51)
WBC # BLD: 7.97 K/UL — SIGNIFICANT CHANGE UP (ref 3.8–10.5)
WBC # FLD AUTO: 7.97 K/UL — SIGNIFICANT CHANGE UP (ref 3.8–10.5)

## 2025-05-15 PROCEDURE — 70551 MRI BRAIN STEM W/O DYE: CPT | Mod: 26

## 2025-05-15 PROCEDURE — 71045 X-RAY EXAM CHEST 1 VIEW: CPT | Mod: 26

## 2025-05-15 PROCEDURE — 99291 CRITICAL CARE FIRST HOUR: CPT

## 2025-05-15 PROCEDURE — 70544 MR ANGIOGRAPHY HEAD W/O DYE: CPT | Mod: 26,59

## 2025-05-15 PROCEDURE — 93010 ELECTROCARDIOGRAM REPORT: CPT

## 2025-05-15 PROCEDURE — 99232 SBSQ HOSP IP/OBS MODERATE 35: CPT

## 2025-05-15 RX ORDER — NICARDIPINE HCL 30 MG
5 CAPSULE ORAL
Qty: 40 | Refills: 0 | Status: DISCONTINUED | OUTPATIENT
Start: 2025-05-15 | End: 2025-05-16

## 2025-05-15 RX ORDER — ASPIRIN 325 MG
325 TABLET ORAL DAILY
Refills: 0 | Status: DISCONTINUED | OUTPATIENT
Start: 2025-05-16 | End: 2025-05-17

## 2025-05-15 RX ORDER — ACETAMINOPHEN 500 MG/5ML
1000 LIQUID (ML) ORAL ONCE
Refills: 0 | Status: COMPLETED | OUTPATIENT
Start: 2025-05-15 | End: 2025-05-15

## 2025-05-15 RX ORDER — ASPIRIN 325 MG
650 TABLET ORAL ONCE
Refills: 0 | Status: COMPLETED | OUTPATIENT
Start: 2025-05-15 | End: 2025-05-15

## 2025-05-15 RX ORDER — TICAGRELOR 90 MG/1
180 TABLET ORAL ONCE
Refills: 0 | Status: COMPLETED | OUTPATIENT
Start: 2025-05-15 | End: 2025-05-15

## 2025-05-15 RX ORDER — TICAGRELOR 90 MG/1
60 TABLET ORAL
Refills: 0 | Status: DISCONTINUED | OUTPATIENT
Start: 2025-05-16 | End: 2025-05-16

## 2025-05-15 RX ORDER — IPRATROPIUM BROMIDE AND ALBUTEROL SULFATE .5; 2.5 MG/3ML; MG/3ML
3 SOLUTION RESPIRATORY (INHALATION) ONCE
Refills: 0 | Status: COMPLETED | OUTPATIENT
Start: 2025-05-15 | End: 2025-05-15

## 2025-05-15 RX ORDER — ASPIRIN 325 MG
325 TABLET ORAL DAILY
Refills: 0 | Status: DISCONTINUED | OUTPATIENT
Start: 2025-05-15 | End: 2025-05-15

## 2025-05-15 RX ORDER — MAGNESIUM CITRATE
296 SOLUTION, ORAL ORAL ONCE
Refills: 0 | Status: DISCONTINUED | OUTPATIENT
Start: 2025-05-15 | End: 2025-05-15

## 2025-05-15 RX ORDER — MAGNESIUM CITRATE
296 SOLUTION, ORAL ORAL ONCE
Refills: 0 | Status: COMPLETED | OUTPATIENT
Start: 2025-05-15 | End: 2025-05-15

## 2025-05-15 RX ORDER — POLYETHYLENE GLYCOL 3350 17 G/17G
17 POWDER, FOR SOLUTION ORAL EVERY 12 HOURS
Refills: 0 | Status: DISCONTINUED | OUTPATIENT
Start: 2025-05-15 | End: 2025-05-24

## 2025-05-15 RX ORDER — SODIUM BICARBONATE 1 MEQ/ML
650 SYRINGE (ML) INTRAVENOUS EVERY 6 HOURS
Refills: 0 | Status: DISCONTINUED | OUTPATIENT
Start: 2025-05-15 | End: 2025-05-16

## 2025-05-15 RX ADMIN — TICAGRELOR 180 MILLIGRAM(S): 90 TABLET ORAL at 17:33

## 2025-05-15 RX ADMIN — INSULIN LISPRO 4 UNIT(S): 100 INJECTION, SOLUTION INTRAVENOUS; SUBCUTANEOUS at 12:17

## 2025-05-15 RX ADMIN — METOPROLOL SUCCINATE 12.5 MILLIGRAM(S): 50 TABLET, EXTENDED RELEASE ORAL at 05:34

## 2025-05-15 RX ADMIN — Medication 650 MILLIGRAM(S): at 17:32

## 2025-05-15 RX ADMIN — Medication 1 APPLICATION(S): at 05:35

## 2025-05-15 RX ADMIN — DRONEDARONE 400 MILLIGRAM(S): 400 TABLET, FILM COATED ORAL at 05:35

## 2025-05-15 RX ADMIN — Medication 25 MG/HR: at 19:40

## 2025-05-15 RX ADMIN — Medication 650 MILLIGRAM(S): at 17:34

## 2025-05-15 RX ADMIN — DRONEDARONE 400 MILLIGRAM(S): 400 TABLET, FILM COATED ORAL at 17:33

## 2025-05-15 RX ADMIN — Medication 400 MILLIGRAM(S): at 05:30

## 2025-05-15 RX ADMIN — Medication 400 MILLIGRAM(S): at 23:25

## 2025-05-15 RX ADMIN — CANGRELOR 18.8 MICROGRAM(S)/KG/MIN: 50 INJECTION, POWDER, LYOPHILIZED, FOR SOLUTION INTRAVENOUS at 08:12

## 2025-05-15 RX ADMIN — Medication 40 MILLIGRAM(S): at 08:11

## 2025-05-15 RX ADMIN — Medication 1000 MILLIGRAM(S): at 23:55

## 2025-05-15 RX ADMIN — IPRATROPIUM BROMIDE AND ALBUTEROL SULFATE 3 MILLILITER(S): .5; 2.5 SOLUTION RESPIRATORY (INHALATION) at 06:05

## 2025-05-15 RX ADMIN — IPRATROPIUM BROMIDE AND ALBUTEROL SULFATE 3 MILLILITER(S): .5; 2.5 SOLUTION RESPIRATORY (INHALATION) at 14:43

## 2025-05-15 RX ADMIN — ROSUVASTATIN CALCIUM 40 MILLIGRAM(S): 20 TABLET, FILM COATED ORAL at 21:39

## 2025-05-15 RX ADMIN — METOPROLOL SUCCINATE 12.5 MILLIGRAM(S): 50 TABLET, EXTENDED RELEASE ORAL at 17:33

## 2025-05-15 RX ADMIN — IPRATROPIUM BROMIDE AND ALBUTEROL SULFATE 3 MILLILITER(S): .5; 2.5 SOLUTION RESPIRATORY (INHALATION) at 13:26

## 2025-05-15 RX ADMIN — INSULIN LISPRO 2: 100 INJECTION, SOLUTION INTRAVENOUS; SUBCUTANEOUS at 08:12

## 2025-05-15 RX ADMIN — CANGRELOR 18.8 MICROGRAM(S)/KG/MIN: 50 INJECTION, POWDER, LYOPHILIZED, FOR SOLUTION INTRAVENOUS at 19:30

## 2025-05-15 RX ADMIN — Medication 1000 MILLIGRAM(S): at 05:45

## 2025-05-15 RX ADMIN — INSULIN LISPRO 4 UNIT(S): 100 INJECTION, SOLUTION INTRAVENOUS; SUBCUTANEOUS at 08:11

## 2025-05-15 RX ADMIN — Medication 650 MILLIGRAM(S): at 12:48

## 2025-05-15 RX ADMIN — Medication 5 MILLIGRAM(S): at 06:00

## 2025-05-15 RX ADMIN — Medication 650 MILLIGRAM(S): at 23:30

## 2025-05-15 RX ADMIN — Medication 296 MILLILITER(S): at 19:40

## 2025-05-15 RX ADMIN — POLYETHYLENE GLYCOL 3350 17 GRAM(S): 17 POWDER, FOR SOLUTION ORAL at 18:34

## 2025-05-15 RX ADMIN — DEXTROMETHORPHAN HBR, GUAIFENESIN 100 MILLIGRAM(S): 200 LIQUID ORAL at 23:30

## 2025-05-16 ENCOUNTER — RESULT REVIEW (OUTPATIENT)
Age: 85
End: 2025-05-16

## 2025-05-16 DIAGNOSIS — R06.2 WHEEZING: ICD-10-CM

## 2025-05-16 LAB
ADD ON TEST-SPECIMEN IN LAB: SIGNIFICANT CHANGE UP
ADD ON TEST-SPECIMEN IN LAB: SIGNIFICANT CHANGE UP
ALBUMIN SERPL ELPH-MCNC: 3.1 G/DL — LOW (ref 3.3–5)
ALP SERPL-CCNC: 58 U/L — SIGNIFICANT CHANGE UP (ref 40–120)
ALT FLD-CCNC: 33 U/L — SIGNIFICANT CHANGE UP (ref 10–45)
ANION GAP SERPL CALC-SCNC: 12 MMOL/L — SIGNIFICANT CHANGE UP (ref 5–17)
ANION GAP SERPL CALC-SCNC: 13 MMOL/L — SIGNIFICANT CHANGE UP (ref 5–17)
ANION GAP SERPL CALC-SCNC: 14 MMOL/L — SIGNIFICANT CHANGE UP (ref 5–17)
ANION GAP SERPL CALC-SCNC: 14 MMOL/L — SIGNIFICANT CHANGE UP (ref 5–17)
ANISOCYTOSIS BLD QL: SLIGHT — SIGNIFICANT CHANGE UP
AST SERPL-CCNC: 44 U/L — HIGH (ref 10–40)
B-OH-BUTYR SERPL-SCNC: 0.3 MMOL/L — SIGNIFICANT CHANGE UP
BASOPHILS # BLD AUTO: 0 K/UL — SIGNIFICANT CHANGE UP (ref 0–0.2)
BASOPHILS NFR BLD AUTO: 0 % — SIGNIFICANT CHANGE UP (ref 0–2)
BILIRUB SERPL-MCNC: 0.5 MG/DL — SIGNIFICANT CHANGE UP (ref 0.2–1.2)
BUN SERPL-MCNC: 68 MG/DL — HIGH (ref 7–23)
BUN SERPL-MCNC: 76 MG/DL — HIGH (ref 7–23)
BUN SERPL-MCNC: 80 MG/DL — HIGH (ref 7–23)
BUN SERPL-MCNC: 86 MG/DL — HIGH (ref 7–23)
BURR CELLS BLD QL SMEAR: SIGNIFICANT CHANGE UP
CALCIUM SERPL-MCNC: 12.4 MG/DL — HIGH (ref 8.4–10.5)
CALCIUM SERPL-MCNC: 8.7 MG/DL — SIGNIFICANT CHANGE UP (ref 8.4–10.5)
CALCIUM SERPL-MCNC: 8.9 MG/DL — SIGNIFICANT CHANGE UP (ref 8.4–10.5)
CALCIUM SERPL-MCNC: 9.3 MG/DL — SIGNIFICANT CHANGE UP (ref 8.4–10.5)
CHLORIDE SERPL-SCNC: 106 MMOL/L — SIGNIFICANT CHANGE UP (ref 96–108)
CHLORIDE SERPL-SCNC: 108 MMOL/L — SIGNIFICANT CHANGE UP (ref 96–108)
CHLORIDE SERPL-SCNC: 110 MMOL/L — HIGH (ref 96–108)
CHLORIDE SERPL-SCNC: 110 MMOL/L — HIGH (ref 96–108)
CK SERPL-CCNC: 255 U/L — HIGH (ref 30–200)
CO2 SERPL-SCNC: 13 MMOL/L — LOW (ref 22–31)
CO2 SERPL-SCNC: 13 MMOL/L — LOW (ref 22–31)
CO2 SERPL-SCNC: 14 MMOL/L — LOW (ref 22–31)
CO2 SERPL-SCNC: 15 MMOL/L — LOW (ref 22–31)
CREAT SERPL-MCNC: 2.71 MG/DL — HIGH (ref 0.5–1.3)
CREAT SERPL-MCNC: 2.96 MG/DL — HIGH (ref 0.5–1.3)
CREAT SERPL-MCNC: 3.03 MG/DL — HIGH (ref 0.5–1.3)
CREAT SERPL-MCNC: 3.09 MG/DL — HIGH (ref 0.5–1.3)
EGFR: 19 ML/MIN/1.73M2 — LOW
EGFR: 19 ML/MIN/1.73M2 — LOW
EGFR: 20 ML/MIN/1.73M2 — LOW
EGFR: 23 ML/MIN/1.73M2 — LOW
EGFR: 23 ML/MIN/1.73M2 — LOW
ELLIPTOCYTES BLD QL SMEAR: SIGNIFICANT CHANGE UP
EOSINOPHIL # BLD AUTO: 0 K/UL — SIGNIFICANT CHANGE UP (ref 0–0.5)
EOSINOPHIL NFR BLD AUTO: 0 % — SIGNIFICANT CHANGE UP (ref 0–6)
FLUAV AG NPH QL: SIGNIFICANT CHANGE UP
FLUBV AG NPH QL: SIGNIFICANT CHANGE UP
GAS PNL BLDA: SIGNIFICANT CHANGE UP
GIANT PLATELETS BLD QL SMEAR: PRESENT — SIGNIFICANT CHANGE UP
GLUCOSE BLDC GLUCOMTR-MCNC: 131 MG/DL — HIGH (ref 70–99)
GLUCOSE BLDC GLUCOMTR-MCNC: 142 MG/DL — HIGH (ref 70–99)
GLUCOSE BLDC GLUCOMTR-MCNC: 144 MG/DL — HIGH (ref 70–99)
GLUCOSE BLDC GLUCOMTR-MCNC: 152 MG/DL — HIGH (ref 70–99)
GLUCOSE BLDC GLUCOMTR-MCNC: 156 MG/DL — HIGH (ref 70–99)
GLUCOSE BLDC GLUCOMTR-MCNC: 169 MG/DL — HIGH (ref 70–99)
GLUCOSE BLDC GLUCOMTR-MCNC: 194 MG/DL — HIGH (ref 70–99)
GLUCOSE BLDC GLUCOMTR-MCNC: 216 MG/DL — HIGH (ref 70–99)
GLUCOSE BLDC GLUCOMTR-MCNC: 223 MG/DL — HIGH (ref 70–99)
GLUCOSE BLDC GLUCOMTR-MCNC: 236 MG/DL — HIGH (ref 70–99)
GLUCOSE BLDC GLUCOMTR-MCNC: 242 MG/DL — HIGH (ref 70–99)
GLUCOSE BLDC GLUCOMTR-MCNC: 290 MG/DL — HIGH (ref 70–99)
GLUCOSE BLDC GLUCOMTR-MCNC: 292 MG/DL — HIGH (ref 70–99)
GLUCOSE BLDC GLUCOMTR-MCNC: 312 MG/DL — HIGH (ref 70–99)
GLUCOSE SERPL-MCNC: 187 MG/DL — HIGH (ref 70–99)
GLUCOSE SERPL-MCNC: 191 MG/DL — HIGH (ref 70–99)
GLUCOSE SERPL-MCNC: 236 MG/DL — HIGH (ref 70–99)
GLUCOSE SERPL-MCNC: 280 MG/DL — HIGH (ref 70–99)
HCT VFR BLD CALC: 28.6 % — LOW (ref 39–50)
HGB BLD-MCNC: 8.3 G/DL — LOW (ref 13–17)
LYMPHOCYTES # BLD AUTO: 0.43 K/UL — LOW (ref 1–3.3)
LYMPHOCYTES # BLD AUTO: 6.1 % — LOW (ref 13–44)
MAGNESIUM SERPL-MCNC: 2.9 MG/DL — HIGH (ref 1.6–2.6)
MANUAL SMEAR VERIFICATION: SIGNIFICANT CHANGE UP
MCHC RBC-ENTMCNC: 23.9 PG — LOW (ref 27–34)
MCHC RBC-ENTMCNC: 29 G/DL — LOW (ref 32–36)
MCV RBC AUTO: 82.4 FL — SIGNIFICANT CHANGE UP (ref 80–100)
MICROCYTES BLD QL: SLIGHT — SIGNIFICANT CHANGE UP
MONOCYTES # BLD AUTO: 0.43 K/UL — SIGNIFICANT CHANGE UP (ref 0–0.9)
MONOCYTES NFR BLD AUTO: 6.1 % — SIGNIFICANT CHANGE UP (ref 2–14)
NEUTROPHILS # BLD AUTO: 6.26 K/UL — SIGNIFICANT CHANGE UP (ref 1.8–7.4)
NEUTROPHILS NFR BLD AUTO: 86.9 % — HIGH (ref 43–77)
NEUTS BAND # BLD: 0.9 % — SIGNIFICANT CHANGE UP (ref 0–8)
NEUTS BAND NFR BLD: 0.9 % — SIGNIFICANT CHANGE UP (ref 0–8)
NRBC BLD AUTO-RTO: 0 /100 WBCS — SIGNIFICANT CHANGE UP (ref 0–0)
NT-PROBNP SERPL-SCNC: HIGH PG/ML (ref 0–300)
PA ADP PRP-ACNC: 80 PRU — LOW (ref 182–335)
PHOSPHATE SERPL-MCNC: 5 MG/DL — HIGH (ref 2.5–4.5)
PLAT MORPH BLD: ABNORMAL
PLATELET # BLD AUTO: 153 K/UL — SIGNIFICANT CHANGE UP (ref 150–400)
PLATELET RESPONSE ASPIRIN RESULT: 551 ARU — SIGNIFICANT CHANGE UP
POIKILOCYTOSIS BLD QL AUTO: SIGNIFICANT CHANGE UP
POTASSIUM SERPL-MCNC: 4.9 MMOL/L — SIGNIFICANT CHANGE UP (ref 3.5–5.3)
POTASSIUM SERPL-MCNC: 4.9 MMOL/L — SIGNIFICANT CHANGE UP (ref 3.5–5.3)
POTASSIUM SERPL-MCNC: 5.1 MMOL/L — SIGNIFICANT CHANGE UP (ref 3.5–5.3)
POTASSIUM SERPL-MCNC: 5.9 MMOL/L — HIGH (ref 3.5–5.3)
POTASSIUM SERPL-SCNC: 4.9 MMOL/L — SIGNIFICANT CHANGE UP (ref 3.5–5.3)
POTASSIUM SERPL-SCNC: 4.9 MMOL/L — SIGNIFICANT CHANGE UP (ref 3.5–5.3)
POTASSIUM SERPL-SCNC: 5.1 MMOL/L — SIGNIFICANT CHANGE UP (ref 3.5–5.3)
POTASSIUM SERPL-SCNC: 5.9 MMOL/L — HIGH (ref 3.5–5.3)
PROT SERPL-MCNC: 6.4 G/DL — SIGNIFICANT CHANGE UP (ref 6–8.3)
RBC # BLD: 3.47 M/UL — LOW (ref 4.2–5.8)
RBC # FLD: 23.7 % — HIGH (ref 10.3–14.5)
RBC BLD AUTO: ABNORMAL
RSV RNA NPH QL NAA+NON-PROBE: SIGNIFICANT CHANGE UP
SARS-COV-2 RNA SPEC QL NAA+PROBE: SIGNIFICANT CHANGE UP
SCHISTOCYTES BLD QL AUTO: SLIGHT — SIGNIFICANT CHANGE UP
SODIUM SERPL-SCNC: 133 MMOL/L — LOW (ref 135–145)
SODIUM SERPL-SCNC: 135 MMOL/L — SIGNIFICANT CHANGE UP (ref 135–145)
SODIUM SERPL-SCNC: 136 MMOL/L — SIGNIFICANT CHANGE UP (ref 135–145)
SODIUM SERPL-SCNC: 138 MMOL/L — SIGNIFICANT CHANGE UP (ref 135–145)
SOURCE RESPIRATORY: SIGNIFICANT CHANGE UP
WBC # BLD: 7.13 K/UL — SIGNIFICANT CHANGE UP (ref 3.8–10.5)
WBC # FLD AUTO: 7.13 K/UL — SIGNIFICANT CHANGE UP (ref 3.8–10.5)

## 2025-05-16 PROCEDURE — 93306 TTE W/DOPPLER COMPLETE: CPT | Mod: 26

## 2025-05-16 PROCEDURE — 74018 RADEX ABDOMEN 1 VIEW: CPT | Mod: 26

## 2025-05-16 PROCEDURE — 99291 CRITICAL CARE FIRST HOUR: CPT

## 2025-05-16 PROCEDURE — 71045 X-RAY EXAM CHEST 1 VIEW: CPT | Mod: 26

## 2025-05-16 PROCEDURE — 93356 MYOCRD STRAIN IMG SPCKL TRCK: CPT

## 2025-05-16 PROCEDURE — 99222 1ST HOSP IP/OBS MODERATE 55: CPT | Mod: GC

## 2025-05-16 RX ORDER — SODIUM BICARBONATE 1 MEQ/ML
0.07 SYRINGE (ML) INTRAVENOUS
Qty: 75 | Refills: 0 | Status: DISCONTINUED | OUTPATIENT
Start: 2025-05-16 | End: 2025-05-16

## 2025-05-16 RX ORDER — SODIUM BICARBONATE 1 MEQ/ML
25 SYRINGE (ML) INTRAVENOUS
Refills: 0 | Status: COMPLETED | OUTPATIENT
Start: 2025-05-16 | End: 2025-05-17

## 2025-05-16 RX ORDER — CALCIUM GLUCONATE 20 MG/ML
2 INJECTION, SOLUTION INTRAVENOUS ONCE
Refills: 0 | Status: COMPLETED | OUTPATIENT
Start: 2025-05-16 | End: 2025-05-16

## 2025-05-16 RX ORDER — DEXTROSE 50 % IN WATER 50 %
25 SYRINGE (ML) INTRAVENOUS
Refills: 0 | Status: DISCONTINUED | OUTPATIENT
Start: 2025-05-16 | End: 2025-05-17

## 2025-05-16 RX ORDER — BISACODYL 5 MG
10 TABLET, DELAYED RELEASE (ENTERIC COATED) ORAL ONCE
Refills: 0 | Status: COMPLETED | OUTPATIENT
Start: 2025-05-16 | End: 2025-05-16

## 2025-05-16 RX ORDER — METHYLPREDNISOLONE ACETATE 80 MG/ML
20 INJECTION, SUSPENSION INTRA-ARTICULAR; INTRALESIONAL; INTRAMUSCULAR; SOFT TISSUE EVERY 8 HOURS
Refills: 0 | Status: DISCONTINUED | OUTPATIENT
Start: 2025-05-16 | End: 2025-05-17

## 2025-05-16 RX ORDER — IPRATROPIUM BROMIDE AND ALBUTEROL SULFATE .5; 2.5 MG/3ML; MG/3ML
3 SOLUTION RESPIRATORY (INHALATION) EVERY 6 HOURS
Refills: 0 | Status: DISCONTINUED | OUTPATIENT
Start: 2025-05-16 | End: 2025-05-24

## 2025-05-16 RX ORDER — AMLODIPINE BESYLATE 10 MG/1
5 TABLET ORAL DAILY
Refills: 0 | Status: DISCONTINUED | OUTPATIENT
Start: 2025-05-16 | End: 2025-05-24

## 2025-05-16 RX ORDER — BUMETANIDE 1 MG/1
2 TABLET ORAL
Refills: 0 | Status: DISCONTINUED | OUTPATIENT
Start: 2025-05-16 | End: 2025-05-19

## 2025-05-16 RX ORDER — DEXTROSE 50 % IN WATER 50 %
50 SYRINGE (ML) INTRAVENOUS
Refills: 0 | Status: DISCONTINUED | OUTPATIENT
Start: 2025-05-16 | End: 2025-05-17

## 2025-05-16 RX ORDER — SODIUM BICARBONATE 1 MEQ/ML
25 SYRINGE (ML) INTRAVENOUS ONCE
Refills: 0 | Status: COMPLETED | OUTPATIENT
Start: 2025-05-16 | End: 2025-05-16

## 2025-05-16 RX ORDER — DEXTROSE 50 % IN WATER 50 %
50 SYRINGE (ML) INTRAVENOUS ONCE
Refills: 0 | Status: DISCONTINUED | OUTPATIENT
Start: 2025-05-16 | End: 2025-05-16

## 2025-05-16 RX ORDER — METOCLOPRAMIDE HCL 10 MG
5 TABLET ORAL DAILY
Refills: 0 | Status: DISCONTINUED | OUTPATIENT
Start: 2025-05-17 | End: 2025-05-17

## 2025-05-16 RX ORDER — DEXTROSE 50 % IN WATER 50 %
25 SYRINGE (ML) INTRAVENOUS ONCE
Refills: 0 | Status: COMPLETED | OUTPATIENT
Start: 2025-05-16 | End: 2025-05-16

## 2025-05-16 RX ORDER — SODIUM BICARBONATE 1 MEQ/ML
25 SYRINGE (ML) INTRAVENOUS
Refills: 0 | Status: DISCONTINUED | OUTPATIENT
Start: 2025-05-16 | End: 2025-05-16

## 2025-05-16 RX ORDER — METHYLPREDNISOLONE ACETATE 80 MG/ML
125 INJECTION, SUSPENSION INTRA-ARTICULAR; INTRALESIONAL; INTRAMUSCULAR; SOFT TISSUE ONCE
Refills: 0 | Status: COMPLETED | OUTPATIENT
Start: 2025-05-16 | End: 2025-05-16

## 2025-05-16 RX ORDER — FUROSEMIDE 10 MG/ML
40 INJECTION INTRAMUSCULAR; INTRAVENOUS ONCE
Refills: 0 | Status: COMPLETED | OUTPATIENT
Start: 2025-05-16 | End: 2025-05-16

## 2025-05-16 RX ORDER — SENNA 187 MG
2 TABLET ORAL AT BEDTIME
Refills: 0 | Status: DISCONTINUED | OUTPATIENT
Start: 2025-05-16 | End: 2025-05-24

## 2025-05-16 RX ORDER — TICAGRELOR 90 MG/1
60 TABLET ORAL
Refills: 0 | Status: DISCONTINUED | OUTPATIENT
Start: 2025-05-16 | End: 2025-05-17

## 2025-05-16 RX ADMIN — INSULIN LISPRO 6: 100 INJECTION, SOLUTION INTRAVENOUS; SUBCUTANEOUS at 08:06

## 2025-05-16 RX ADMIN — INSULIN LISPRO 4 UNIT(S): 100 INJECTION, SOLUTION INTRAVENOUS; SUBCUTANEOUS at 08:05

## 2025-05-16 RX ADMIN — Medication 5 UNIT(S): at 09:05

## 2025-05-16 RX ADMIN — Medication 25 MILLIEQUIVALENT(S): at 13:50

## 2025-05-16 RX ADMIN — Medication 1 APPLICATION(S): at 05:27

## 2025-05-16 RX ADMIN — Medication 3 UNIT(S)/HR: at 19:14

## 2025-05-16 RX ADMIN — IPRATROPIUM BROMIDE AND ALBUTEROL SULFATE 3 MILLILITER(S): .5; 2.5 SOLUTION RESPIRATORY (INHALATION) at 12:21

## 2025-05-16 RX ADMIN — FUROSEMIDE 40 MILLIGRAM(S): 10 INJECTION INTRAMUSCULAR; INTRAVENOUS at 15:16

## 2025-05-16 RX ADMIN — Medication 25 MG/HR: at 07:56

## 2025-05-16 RX ADMIN — METHYLPREDNISOLONE ACETATE 125 MILLIGRAM(S): 80 INJECTION, SUSPENSION INTRA-ARTICULAR; INTRALESIONAL; INTRAMUSCULAR; SOFT TISSUE at 10:38

## 2025-05-16 RX ADMIN — Medication 25 MILLIEQUIVALENT(S): at 18:00

## 2025-05-16 RX ADMIN — CANGRELOR 18.8 MICROGRAM(S)/KG/MIN: 50 INJECTION, POWDER, LYOPHILIZED, FOR SOLUTION INTRAVENOUS at 19:13

## 2025-05-16 RX ADMIN — DRONEDARONE 400 MILLIGRAM(S): 400 TABLET, FILM COATED ORAL at 05:28

## 2025-05-16 RX ADMIN — Medication 25 MILLIEQUIVALENT(S): at 18:16

## 2025-05-16 RX ADMIN — Medication 40 MILLIGRAM(S): at 06:02

## 2025-05-16 RX ADMIN — CALCIUM GLUCONATE 200 GRAM(S): 20 INJECTION, SOLUTION INTRAVENOUS at 10:38

## 2025-05-16 RX ADMIN — IPRATROPIUM BROMIDE AND ALBUTEROL SULFATE 3 MILLILITER(S): .5; 2.5 SOLUTION RESPIRATORY (INHALATION) at 00:45

## 2025-05-16 RX ADMIN — CANGRELOR 18.8 MICROGRAM(S)/KG/MIN: 50 INJECTION, POWDER, LYOPHILIZED, FOR SOLUTION INTRAVENOUS at 07:57

## 2025-05-16 RX ADMIN — BUMETANIDE 2 MILLIGRAM(S): 1 TABLET ORAL at 21:41

## 2025-05-16 RX ADMIN — METOPROLOL SUCCINATE 12.5 MILLIGRAM(S): 50 TABLET, EXTENDED RELEASE ORAL at 06:02

## 2025-05-16 RX ADMIN — Medication 40 MILLIGRAM(S): at 07:20

## 2025-05-16 RX ADMIN — Medication 5 MILLIGRAM(S): at 00:39

## 2025-05-16 RX ADMIN — AMLODIPINE BESYLATE 5 MILLIGRAM(S): 10 TABLET ORAL at 00:46

## 2025-05-16 RX ADMIN — Medication 70 MILLILITER(S): at 22:00

## 2025-05-16 RX ADMIN — Medication 25 MILLILITER(S): at 09:06

## 2025-05-16 RX ADMIN — Medication 650 MILLIGRAM(S): at 05:29

## 2025-05-16 RX ADMIN — METHYLPREDNISOLONE ACETATE 20 MILLIGRAM(S): 80 INJECTION, SUSPENSION INTRA-ARTICULAR; INTRALESIONAL; INTRAMUSCULAR; SOFT TISSUE at 21:43

## 2025-05-16 RX ADMIN — IPRATROPIUM BROMIDE AND ALBUTEROL SULFATE 3 MILLILITER(S): .5; 2.5 SOLUTION RESPIRATORY (INHALATION) at 18:21

## 2025-05-16 RX ADMIN — Medication 3 UNIT(S)/HR: at 10:38

## 2025-05-16 RX ADMIN — Medication 60 MEQ/KG/HR: at 10:14

## 2025-05-16 RX ADMIN — DRONEDARONE 400 MILLIGRAM(S): 400 TABLET, FILM COATED ORAL at 17:51

## 2025-05-16 RX ADMIN — IPRATROPIUM BROMIDE AND ALBUTEROL SULFATE 3 MILLILITER(S): .5; 2.5 SOLUTION RESPIRATORY (INHALATION) at 23:23

## 2025-05-16 RX ADMIN — Medication 1 DOSE(S): at 18:22

## 2025-05-17 DIAGNOSIS — J90 PLEURAL EFFUSION, NOT ELSEWHERE CLASSIFIED: ICD-10-CM

## 2025-05-17 LAB
ANION GAP SERPL CALC-SCNC: 14 MMOL/L — SIGNIFICANT CHANGE UP (ref 5–17)
ANION GAP SERPL CALC-SCNC: 16 MMOL/L — SIGNIFICANT CHANGE UP (ref 5–17)
ANION GAP SERPL CALC-SCNC: 18 MMOL/L — HIGH (ref 5–17)
BLD GP AB SCN SERPL QL: NEGATIVE — SIGNIFICANT CHANGE UP
BUN SERPL-MCNC: 78 MG/DL — HIGH (ref 7–23)
BUN SERPL-MCNC: 80 MG/DL — HIGH (ref 7–23)
BUN SERPL-MCNC: 85 MG/DL — HIGH (ref 7–23)
CALCIUM SERPL-MCNC: 8.6 MG/DL — SIGNIFICANT CHANGE UP (ref 8.4–10.5)
CALCIUM SERPL-MCNC: 8.7 MG/DL — SIGNIFICANT CHANGE UP (ref 8.4–10.5)
CALCIUM SERPL-MCNC: 9 MG/DL — SIGNIFICANT CHANGE UP (ref 8.4–10.5)
CHLORIDE SERPL-SCNC: 106 MMOL/L — SIGNIFICANT CHANGE UP (ref 96–108)
CHLORIDE SERPL-SCNC: 106 MMOL/L — SIGNIFICANT CHANGE UP (ref 96–108)
CHLORIDE SERPL-SCNC: 111 MMOL/L — HIGH (ref 96–108)
CO2 SERPL-SCNC: 16 MMOL/L — LOW (ref 22–31)
CO2 SERPL-SCNC: 16 MMOL/L — LOW (ref 22–31)
CO2 SERPL-SCNC: 17 MMOL/L — LOW (ref 22–31)
CREAT SERPL-MCNC: 3.04 MG/DL — HIGH (ref 0.5–1.3)
CREAT SERPL-MCNC: 3.06 MG/DL — HIGH (ref 0.5–1.3)
CREAT SERPL-MCNC: 3.15 MG/DL — HIGH (ref 0.5–1.3)
EGFR: 19 ML/MIN/1.73M2 — LOW
EGFR: 19 ML/MIN/1.73M2 — LOW
EGFR: 20 ML/MIN/1.73M2 — LOW
FERRITIN SERPL-MCNC: 62 NG/ML — SIGNIFICANT CHANGE UP (ref 30–400)
FOLATE SERPL-MCNC: 14.4 NG/ML — SIGNIFICANT CHANGE UP
GAS PNL BLDA: SIGNIFICANT CHANGE UP
GAS PNL BLDA: SIGNIFICANT CHANGE UP
GLUCOSE BLDC GLUCOMTR-MCNC: 114 MG/DL — HIGH (ref 70–99)
GLUCOSE BLDC GLUCOMTR-MCNC: 118 MG/DL — HIGH (ref 70–99)
GLUCOSE BLDC GLUCOMTR-MCNC: 120 MG/DL — HIGH (ref 70–99)
GLUCOSE BLDC GLUCOMTR-MCNC: 120 MG/DL — HIGH (ref 70–99)
GLUCOSE BLDC GLUCOMTR-MCNC: 124 MG/DL — HIGH (ref 70–99)
GLUCOSE BLDC GLUCOMTR-MCNC: 126 MG/DL — HIGH (ref 70–99)
GLUCOSE BLDC GLUCOMTR-MCNC: 133 MG/DL — HIGH (ref 70–99)
GLUCOSE BLDC GLUCOMTR-MCNC: 136 MG/DL — HIGH (ref 70–99)
GLUCOSE BLDC GLUCOMTR-MCNC: 159 MG/DL — HIGH (ref 70–99)
GLUCOSE BLDC GLUCOMTR-MCNC: 176 MG/DL — HIGH (ref 70–99)
GLUCOSE BLDC GLUCOMTR-MCNC: 189 MG/DL — HIGH (ref 70–99)
GLUCOSE BLDC GLUCOMTR-MCNC: 195 MG/DL — HIGH (ref 70–99)
GLUCOSE BLDC GLUCOMTR-MCNC: 195 MG/DL — HIGH (ref 70–99)
GLUCOSE BLDC GLUCOMTR-MCNC: 211 MG/DL — HIGH (ref 70–99)
GLUCOSE BLDC GLUCOMTR-MCNC: 221 MG/DL — HIGH (ref 70–99)
GLUCOSE BLDC GLUCOMTR-MCNC: 232 MG/DL — HIGH (ref 70–99)
GLUCOSE BLDC GLUCOMTR-MCNC: 243 MG/DL — HIGH (ref 70–99)
GLUCOSE BLDC GLUCOMTR-MCNC: 268 MG/DL — HIGH (ref 70–99)
GLUCOSE BLDC GLUCOMTR-MCNC: 283 MG/DL — HIGH (ref 70–99)
GLUCOSE BLDC GLUCOMTR-MCNC: 324 MG/DL — HIGH (ref 70–99)
GLUCOSE BLDC GLUCOMTR-MCNC: 345 MG/DL — HIGH (ref 70–99)
GLUCOSE BLDC GLUCOMTR-MCNC: 353 MG/DL — HIGH (ref 70–99)
GLUCOSE BLDC GLUCOMTR-MCNC: 385 MG/DL — HIGH (ref 70–99)
GLUCOSE BLDC GLUCOMTR-MCNC: 427 MG/DL — HIGH (ref 70–99)
GLUCOSE SERPL-MCNC: 119 MG/DL — HIGH (ref 70–99)
GLUCOSE SERPL-MCNC: 155 MG/DL — HIGH (ref 70–99)
GLUCOSE SERPL-MCNC: 369 MG/DL — HIGH (ref 70–99)
HAPTOGLOB SERPL-MCNC: 206 MG/DL — HIGH (ref 34–200)
HCT VFR BLD CALC: 24.5 % — LOW (ref 39–50)
HCT VFR BLD CALC: 28 % — LOW (ref 39–50)
HGB BLD-MCNC: 7.6 G/DL — LOW (ref 13–17)
HGB BLD-MCNC: 8.7 G/DL — LOW (ref 13–17)
IRON SATN MFR SERPL: 17 UG/DL — LOW (ref 45–165)
IRON SATN MFR SERPL: 5 % — LOW (ref 16–55)
LACTATE SERPL-SCNC: 3.6 MMOL/L — HIGH (ref 0.5–2)
LDH SERPL L TO P-CCNC: 208 U/L — SIGNIFICANT CHANGE UP (ref 50–242)
MAGNESIUM SERPL-MCNC: 3 MG/DL — HIGH (ref 1.6–2.6)
MAGNESIUM SERPL-MCNC: 3 MG/DL — HIGH (ref 1.6–2.6)
MAGNESIUM SERPL-MCNC: 3.3 MG/DL — HIGH (ref 1.6–2.6)
MCHC RBC-ENTMCNC: 24.5 PG — LOW (ref 27–34)
MCHC RBC-ENTMCNC: 24.9 PG — LOW (ref 27–34)
MCHC RBC-ENTMCNC: 31 G/DL — LOW (ref 32–36)
MCHC RBC-ENTMCNC: 31.1 G/DL — LOW (ref 32–36)
MCV RBC AUTO: 79 FL — LOW (ref 80–100)
MCV RBC AUTO: 80 FL — SIGNIFICANT CHANGE UP (ref 80–100)
NRBC BLD AUTO-RTO: 0 /100 WBCS — SIGNIFICANT CHANGE UP (ref 0–0)
NRBC BLD AUTO-RTO: 0 /100 WBCS — SIGNIFICANT CHANGE UP (ref 0–0)
PA ADP PRP-ACNC: 89 PRU — LOW (ref 182–335)
PHOSPHATE SERPL-MCNC: 3.1 MG/DL — SIGNIFICANT CHANGE UP (ref 2.5–4.5)
PHOSPHATE SERPL-MCNC: 3.2 MG/DL — SIGNIFICANT CHANGE UP (ref 2.5–4.5)
PHOSPHATE SERPL-MCNC: 4 MG/DL — SIGNIFICANT CHANGE UP (ref 2.5–4.5)
PLATELET # BLD AUTO: 155 K/UL — SIGNIFICANT CHANGE UP (ref 150–400)
PLATELET # BLD AUTO: 162 K/UL — SIGNIFICANT CHANGE UP (ref 150–400)
PLATELET RESPONSE ASPIRIN RESULT: 534 ARU — SIGNIFICANT CHANGE UP
POTASSIUM SERPL-MCNC: 3.7 MMOL/L — SIGNIFICANT CHANGE UP (ref 3.5–5.3)
POTASSIUM SERPL-MCNC: 3.9 MMOL/L — SIGNIFICANT CHANGE UP (ref 3.5–5.3)
POTASSIUM SERPL-MCNC: 3.9 MMOL/L — SIGNIFICANT CHANGE UP (ref 3.5–5.3)
POTASSIUM SERPL-SCNC: 3.7 MMOL/L — SIGNIFICANT CHANGE UP (ref 3.5–5.3)
POTASSIUM SERPL-SCNC: 3.9 MMOL/L — SIGNIFICANT CHANGE UP (ref 3.5–5.3)
POTASSIUM SERPL-SCNC: 3.9 MMOL/L — SIGNIFICANT CHANGE UP (ref 3.5–5.3)
RBC # BLD: 3.01 M/UL — LOW (ref 4.2–5.8)
RBC # BLD: 3.01 M/UL — LOW (ref 4.2–5.8)
RBC # BLD: 3.1 M/UL — LOW (ref 4.2–5.8)
RBC # BLD: 3.5 M/UL — LOW (ref 4.2–5.8)
RBC # FLD: 21.5 % — HIGH (ref 10.3–14.5)
RBC # FLD: 23.9 % — HIGH (ref 10.3–14.5)
RETICS #: 38.5 K/UL — SIGNIFICANT CHANGE UP (ref 25–125)
RETICS #: 39.1 K/UL — SIGNIFICANT CHANGE UP (ref 25–125)
RETICS/RBC NFR: 1.3 % — SIGNIFICANT CHANGE UP (ref 0.5–2.5)
RETICS/RBC NFR: 1.3 % — SIGNIFICANT CHANGE UP (ref 0.5–2.5)
RH IG SCN BLD-IMP: POSITIVE — SIGNIFICANT CHANGE UP
SODIUM SERPL-SCNC: 139 MMOL/L — SIGNIFICANT CHANGE UP (ref 135–145)
SODIUM SERPL-SCNC: 140 MMOL/L — SIGNIFICANT CHANGE UP (ref 135–145)
SODIUM SERPL-SCNC: 141 MMOL/L — SIGNIFICANT CHANGE UP (ref 135–145)
TIBC SERPL-MCNC: 304 UG/DL — SIGNIFICANT CHANGE UP (ref 220–430)
UIBC SERPL-MCNC: 288 UG/DL — SIGNIFICANT CHANGE UP (ref 110–370)
VIT B12 SERPL-MCNC: 1105 PG/ML — SIGNIFICANT CHANGE UP (ref 232–1245)
WBC # BLD: 8.59 K/UL — SIGNIFICANT CHANGE UP (ref 3.8–10.5)
WBC # BLD: 8.73 K/UL — SIGNIFICANT CHANGE UP (ref 3.8–10.5)
WBC # FLD AUTO: 8.59 K/UL — SIGNIFICANT CHANGE UP (ref 3.8–10.5)
WBC # FLD AUTO: 8.73 K/UL — SIGNIFICANT CHANGE UP (ref 3.8–10.5)

## 2025-05-17 PROCEDURE — 71250 CT THORAX DX C-: CPT | Mod: 26

## 2025-05-17 PROCEDURE — 71045 X-RAY EXAM CHEST 1 VIEW: CPT | Mod: 26

## 2025-05-17 PROCEDURE — 99233 SBSQ HOSP IP/OBS HIGH 50: CPT

## 2025-05-17 RX ORDER — DEXTROSE 50 % IN WATER 50 %
12.5 SYRINGE (ML) INTRAVENOUS ONCE
Refills: 0 | Status: DISCONTINUED | OUTPATIENT
Start: 2025-05-17 | End: 2025-05-17

## 2025-05-17 RX ORDER — DEXTROSE 50 % IN WATER 50 %
15 SYRINGE (ML) INTRAVENOUS ONCE
Refills: 0 | Status: DISCONTINUED | OUTPATIENT
Start: 2025-05-17 | End: 2025-05-17

## 2025-05-17 RX ORDER — SODIUM CHLORIDE 9 G/1000ML
1000 INJECTION, SOLUTION INTRAVENOUS
Refills: 0 | Status: DISCONTINUED | OUTPATIENT
Start: 2025-05-17 | End: 2025-05-17

## 2025-05-17 RX ORDER — ASPIRIN 325 MG
81 TABLET ORAL DAILY
Refills: 0 | Status: DISCONTINUED | OUTPATIENT
Start: 2025-05-18 | End: 2025-05-24

## 2025-05-17 RX ORDER — METOCLOPRAMIDE HCL 10 MG
10 TABLET ORAL DAILY
Refills: 0 | Status: COMPLETED | OUTPATIENT
Start: 2025-05-17 | End: 2025-05-18

## 2025-05-17 RX ORDER — INSULIN LISPRO 100 U/ML
INJECTION, SOLUTION INTRAVENOUS; SUBCUTANEOUS
Refills: 0 | Status: DISCONTINUED | OUTPATIENT
Start: 2025-05-17 | End: 2025-05-17

## 2025-05-17 RX ORDER — METHYLPREDNISOLONE ACETATE 80 MG/ML
20 INJECTION, SUSPENSION INTRA-ARTICULAR; INTRALESIONAL; INTRAMUSCULAR; SOFT TISSUE EVERY 8 HOURS
Refills: 0 | Status: DISCONTINUED | OUTPATIENT
Start: 2025-05-17 | End: 2025-05-17

## 2025-05-17 RX ORDER — ASPIRIN 325 MG
325 TABLET ORAL DAILY
Refills: 0 | Status: DISCONTINUED | OUTPATIENT
Start: 2025-05-17 | End: 2025-05-17

## 2025-05-17 RX ORDER — SIMETHICONE 80 MG
80 TABLET,CHEWABLE ORAL ONCE
Refills: 0 | Status: COMPLETED | OUTPATIENT
Start: 2025-05-17 | End: 2025-05-17

## 2025-05-17 RX ORDER — SODIUM BICARBONATE 1 MEQ/ML
25 SYRINGE (ML) INTRAVENOUS ONCE
Refills: 0 | Status: COMPLETED | OUTPATIENT
Start: 2025-05-17 | End: 2025-05-17

## 2025-05-17 RX ORDER — TICAGRELOR 90 MG/1
90 TABLET ORAL
Refills: 0 | Status: DISCONTINUED | OUTPATIENT
Start: 2025-05-17 | End: 2025-05-17

## 2025-05-17 RX ORDER — PREDNISONE 20 MG/1
40 TABLET ORAL DAILY
Refills: 0 | Status: COMPLETED | OUTPATIENT
Start: 2025-05-17 | End: 2025-05-20

## 2025-05-17 RX ORDER — FERROUS SULFATE 137(45) MG
325 TABLET, EXTENDED RELEASE ORAL DAILY
Refills: 0 | Status: DISCONTINUED | OUTPATIENT
Start: 2025-05-17 | End: 2025-05-24

## 2025-05-17 RX ORDER — DEXTROSE 50 % IN WATER 50 %
25 SYRINGE (ML) INTRAVENOUS ONCE
Refills: 0 | Status: DISCONTINUED | OUTPATIENT
Start: 2025-05-17 | End: 2025-05-17

## 2025-05-17 RX ORDER — TICAGRELOR 90 MG/1
90 TABLET ORAL
Refills: 0 | Status: DISCONTINUED | OUTPATIENT
Start: 2025-05-17 | End: 2025-05-24

## 2025-05-17 RX ORDER — INSULIN LISPRO 100 U/ML
7 INJECTION, SOLUTION INTRAVENOUS; SUBCUTANEOUS
Refills: 0 | Status: DISCONTINUED | OUTPATIENT
Start: 2025-05-17 | End: 2025-05-17

## 2025-05-17 RX ORDER — GLUCAGON 3 MG/1
1 POWDER NASAL ONCE
Refills: 0 | Status: DISCONTINUED | OUTPATIENT
Start: 2025-05-17 | End: 2025-05-17

## 2025-05-17 RX ORDER — INSULIN GLARGINE-YFGN 100 [IU]/ML
20 INJECTION, SOLUTION SUBCUTANEOUS EVERY MORNING
Refills: 0 | Status: DISCONTINUED | OUTPATIENT
Start: 2025-05-17 | End: 2025-05-17

## 2025-05-17 RX ADMIN — DRONEDARONE 400 MILLIGRAM(S): 400 TABLET, FILM COATED ORAL at 05:34

## 2025-05-17 RX ADMIN — Medication 40 MILLIGRAM(S): at 05:35

## 2025-05-17 RX ADMIN — Medication 25 MILLIEQUIVALENT(S): at 13:35

## 2025-05-17 RX ADMIN — METHYLPREDNISOLONE ACETATE 20 MILLIGRAM(S): 80 INJECTION, SUSPENSION INTRA-ARTICULAR; INTRALESIONAL; INTRAMUSCULAR; SOFT TISSUE at 05:35

## 2025-05-17 RX ADMIN — Medication 80 MILLIGRAM(S): at 17:04

## 2025-05-17 RX ADMIN — INSULIN GLARGINE-YFGN 20 UNIT(S): 100 INJECTION, SOLUTION SUBCUTANEOUS at 11:19

## 2025-05-17 RX ADMIN — Medication 325 MILLIGRAM(S): at 07:58

## 2025-05-17 RX ADMIN — TICAGRELOR 90 MILLIGRAM(S): 90 TABLET ORAL at 20:07

## 2025-05-17 RX ADMIN — Medication 10 MILLIGRAM(S): at 11:34

## 2025-05-17 RX ADMIN — INSULIN LISPRO 7 UNIT(S): 100 INJECTION, SOLUTION INTRAVENOUS; SUBCUTANEOUS at 11:35

## 2025-05-17 RX ADMIN — IPRATROPIUM BROMIDE AND ALBUTEROL SULFATE 3 MILLILITER(S): .5; 2.5 SOLUTION RESPIRATORY (INHALATION) at 05:54

## 2025-05-17 RX ADMIN — Medication 1 DOSE(S): at 05:54

## 2025-05-17 RX ADMIN — Medication 2 TABLET(S): at 21:15

## 2025-05-17 RX ADMIN — POLYETHYLENE GLYCOL 3350 17 GRAM(S): 17 POWDER, FOR SOLUTION ORAL at 17:04

## 2025-05-17 RX ADMIN — IPRATROPIUM BROMIDE AND ALBUTEROL SULFATE 3 MILLILITER(S): .5; 2.5 SOLUTION RESPIRATORY (INHALATION) at 23:10

## 2025-05-17 RX ADMIN — DRONEDARONE 400 MILLIGRAM(S): 400 TABLET, FILM COATED ORAL at 17:04

## 2025-05-17 RX ADMIN — BUMETANIDE 2 MILLIGRAM(S): 1 TABLET ORAL at 13:34

## 2025-05-17 RX ADMIN — ROSUVASTATIN CALCIUM 40 MILLIGRAM(S): 20 TABLET, FILM COATED ORAL at 21:15

## 2025-05-17 RX ADMIN — Medication 70 MILLILITER(S): at 07:13

## 2025-05-17 RX ADMIN — Medication 40 MILLIGRAM(S): at 17:04

## 2025-05-17 RX ADMIN — CANGRELOR 18.8 MICROGRAM(S)/KG/MIN: 50 INJECTION, POWDER, LYOPHILIZED, FOR SOLUTION INTRAVENOUS at 07:13

## 2025-05-17 RX ADMIN — Medication 1 APPLICATION(S): at 05:34

## 2025-05-17 RX ADMIN — IPRATROPIUM BROMIDE AND ALBUTEROL SULFATE 3 MILLILITER(S): .5; 2.5 SOLUTION RESPIRATORY (INHALATION) at 11:27

## 2025-05-17 RX ADMIN — Medication 3 UNIT(S)/HR: at 19:17

## 2025-05-17 RX ADMIN — BUMETANIDE 2 MILLIGRAM(S): 1 TABLET ORAL at 06:10

## 2025-05-17 RX ADMIN — INSULIN LISPRO 10: 100 INJECTION, SOLUTION INTRAVENOUS; SUBCUTANEOUS at 11:35

## 2025-05-17 RX ADMIN — Medication 40 MILLIEQUIVALENT(S): at 21:15

## 2025-05-17 RX ADMIN — PREDNISONE 40 MILLIGRAM(S): 20 TABLET ORAL at 13:35

## 2025-05-17 RX ADMIN — TICAGRELOR 90 MILLIGRAM(S): 90 TABLET ORAL at 07:58

## 2025-05-17 RX ADMIN — Medication 1 DOSE(S): at 17:26

## 2025-05-17 RX ADMIN — Medication 500 MILLILITER(S): at 13:35

## 2025-05-17 RX ADMIN — IPRATROPIUM BROMIDE AND ALBUTEROL SULFATE 3 MILLILITER(S): .5; 2.5 SOLUTION RESPIRATORY (INHALATION) at 17:25

## 2025-05-17 RX ADMIN — Medication 3 UNIT(S)/HR: at 07:13

## 2025-05-17 RX ADMIN — CANGRELOR 18.8 MICROGRAM(S)/KG/MIN: 50 INJECTION, POWDER, LYOPHILIZED, FOR SOLUTION INTRAVENOUS at 03:13

## 2025-05-17 RX ADMIN — Medication 25 MILLIEQUIVALENT(S): at 07:58

## 2025-05-18 LAB
ANION GAP SERPL CALC-SCNC: 12 MMOL/L — SIGNIFICANT CHANGE UP (ref 5–17)
ANION GAP SERPL CALC-SCNC: 14 MMOL/L — SIGNIFICANT CHANGE UP (ref 5–17)
ANION GAP SERPL CALC-SCNC: 19 MMOL/L — HIGH (ref 5–17)
BUN SERPL-MCNC: 75 MG/DL — HIGH (ref 7–23)
BUN SERPL-MCNC: 78 MG/DL — HIGH (ref 7–23)
BUN SERPL-MCNC: 79 MG/DL — HIGH (ref 7–23)
CALCIUM SERPL-MCNC: 8.3 MG/DL — LOW (ref 8.4–10.5)
CALCIUM SERPL-MCNC: 8.6 MG/DL — SIGNIFICANT CHANGE UP (ref 8.4–10.5)
CALCIUM SERPL-MCNC: 8.6 MG/DL — SIGNIFICANT CHANGE UP (ref 8.4–10.5)
CHLORIDE SERPL-SCNC: 104 MMOL/L — SIGNIFICANT CHANGE UP (ref 96–108)
CHLORIDE SERPL-SCNC: 107 MMOL/L — SIGNIFICANT CHANGE UP (ref 96–108)
CHLORIDE SERPL-SCNC: 109 MMOL/L — HIGH (ref 96–108)
CO2 SERPL-SCNC: 17 MMOL/L — LOW (ref 22–31)
CO2 SERPL-SCNC: 19 MMOL/L — LOW (ref 22–31)
CO2 SERPL-SCNC: 20 MMOL/L — LOW (ref 22–31)
CREAT SERPL-MCNC: 2.62 MG/DL — HIGH (ref 0.5–1.3)
CREAT SERPL-MCNC: 2.71 MG/DL — HIGH (ref 0.5–1.3)
CREAT SERPL-MCNC: 2.82 MG/DL — HIGH (ref 0.5–1.3)
EGFR: 22 ML/MIN/1.73M2 — LOW
EGFR: 22 ML/MIN/1.73M2 — LOW
EGFR: 23 ML/MIN/1.73M2 — LOW
EGFR: 23 ML/MIN/1.73M2 — LOW
EGFR: 24 ML/MIN/1.73M2 — LOW
EGFR: 24 ML/MIN/1.73M2 — LOW
GAS PNL BLDA: SIGNIFICANT CHANGE UP
GAS PNL BLDA: SIGNIFICANT CHANGE UP
GLUCOSE BLDC GLUCOMTR-MCNC: 101 MG/DL — HIGH (ref 70–99)
GLUCOSE BLDC GLUCOMTR-MCNC: 102 MG/DL — HIGH (ref 70–99)
GLUCOSE BLDC GLUCOMTR-MCNC: 114 MG/DL — HIGH (ref 70–99)
GLUCOSE BLDC GLUCOMTR-MCNC: 123 MG/DL — HIGH (ref 70–99)
GLUCOSE BLDC GLUCOMTR-MCNC: 128 MG/DL — HIGH (ref 70–99)
GLUCOSE BLDC GLUCOMTR-MCNC: 138 MG/DL — HIGH (ref 70–99)
GLUCOSE BLDC GLUCOMTR-MCNC: 139 MG/DL — HIGH (ref 70–99)
GLUCOSE BLDC GLUCOMTR-MCNC: 140 MG/DL — HIGH (ref 70–99)
GLUCOSE BLDC GLUCOMTR-MCNC: 140 MG/DL — HIGH (ref 70–99)
GLUCOSE BLDC GLUCOMTR-MCNC: 147 MG/DL — HIGH (ref 70–99)
GLUCOSE BLDC GLUCOMTR-MCNC: 172 MG/DL — HIGH (ref 70–99)
GLUCOSE BLDC GLUCOMTR-MCNC: 188 MG/DL — HIGH (ref 70–99)
GLUCOSE BLDC GLUCOMTR-MCNC: 192 MG/DL — HIGH (ref 70–99)
GLUCOSE BLDC GLUCOMTR-MCNC: 196 MG/DL — HIGH (ref 70–99)
GLUCOSE BLDC GLUCOMTR-MCNC: 211 MG/DL — HIGH (ref 70–99)
GLUCOSE BLDC GLUCOMTR-MCNC: 216 MG/DL — HIGH (ref 70–99)
GLUCOSE BLDC GLUCOMTR-MCNC: 224 MG/DL — HIGH (ref 70–99)
GLUCOSE BLDC GLUCOMTR-MCNC: 225 MG/DL — HIGH (ref 70–99)
GLUCOSE BLDC GLUCOMTR-MCNC: 240 MG/DL — HIGH (ref 70–99)
GLUCOSE BLDC GLUCOMTR-MCNC: 266 MG/DL — HIGH (ref 70–99)
GLUCOSE BLDC GLUCOMTR-MCNC: 82 MG/DL — SIGNIFICANT CHANGE UP (ref 70–99)
GLUCOSE SERPL-MCNC: 112 MG/DL — HIGH (ref 70–99)
GLUCOSE SERPL-MCNC: 173 MG/DL — HIGH (ref 70–99)
GLUCOSE SERPL-MCNC: 227 MG/DL — HIGH (ref 70–99)
HCT VFR BLD CALC: 30.6 % — LOW (ref 39–50)
HGB BLD-MCNC: 9.8 G/DL — LOW (ref 13–17)
LACTATE SERPL-SCNC: 2.3 MMOL/L — HIGH (ref 0.5–2)
MAGNESIUM SERPL-MCNC: 2.9 MG/DL — HIGH (ref 1.6–2.6)
MAGNESIUM SERPL-MCNC: 2.9 MG/DL — HIGH (ref 1.6–2.6)
MCHC RBC-ENTMCNC: 25.5 PG — LOW (ref 27–34)
MCHC RBC-ENTMCNC: 32 G/DL — SIGNIFICANT CHANGE UP (ref 32–36)
MCV RBC AUTO: 79.5 FL — LOW (ref 80–100)
NRBC BLD AUTO-RTO: 0 /100 WBCS — SIGNIFICANT CHANGE UP (ref 0–0)
PHOSPHATE SERPL-MCNC: 3.3 MG/DL — SIGNIFICANT CHANGE UP (ref 2.5–4.5)
PHOSPHATE SERPL-MCNC: 3.5 MG/DL — SIGNIFICANT CHANGE UP (ref 2.5–4.5)
PLATELET # BLD AUTO: 199 K/UL — SIGNIFICANT CHANGE UP (ref 150–400)
POTASSIUM SERPL-MCNC: 3.9 MMOL/L — SIGNIFICANT CHANGE UP (ref 3.5–5.3)
POTASSIUM SERPL-MCNC: 4.3 MMOL/L — SIGNIFICANT CHANGE UP (ref 3.5–5.3)
POTASSIUM SERPL-MCNC: 4.3 MMOL/L — SIGNIFICANT CHANGE UP (ref 3.5–5.3)
POTASSIUM SERPL-SCNC: 3.9 MMOL/L — SIGNIFICANT CHANGE UP (ref 3.5–5.3)
POTASSIUM SERPL-SCNC: 4.3 MMOL/L — SIGNIFICANT CHANGE UP (ref 3.5–5.3)
POTASSIUM SERPL-SCNC: 4.3 MMOL/L — SIGNIFICANT CHANGE UP (ref 3.5–5.3)
RBC # BLD: 3.85 M/UL — LOW (ref 4.2–5.8)
RBC # FLD: 21.4 % — HIGH (ref 10.3–14.5)
SODIUM SERPL-SCNC: 140 MMOL/L — SIGNIFICANT CHANGE UP (ref 135–145)
SODIUM SERPL-SCNC: 140 MMOL/L — SIGNIFICANT CHANGE UP (ref 135–145)
SODIUM SERPL-SCNC: 141 MMOL/L — SIGNIFICANT CHANGE UP (ref 135–145)
WBC # BLD: 9.56 K/UL — SIGNIFICANT CHANGE UP (ref 3.8–10.5)
WBC # FLD AUTO: 9.56 K/UL — SIGNIFICANT CHANGE UP (ref 3.8–10.5)

## 2025-05-18 PROCEDURE — 99233 SBSQ HOSP IP/OBS HIGH 50: CPT

## 2025-05-18 RX ORDER — INSULIN LISPRO 100 U/ML
INJECTION, SOLUTION INTRAVENOUS; SUBCUTANEOUS
Refills: 0 | Status: DISCONTINUED | OUTPATIENT
Start: 2025-05-18 | End: 2025-05-19

## 2025-05-18 RX ORDER — SODIUM BICARBONATE 1 MEQ/ML
1300 SYRINGE (ML) INTRAVENOUS EVERY 8 HOURS
Refills: 0 | Status: DISCONTINUED | OUTPATIENT
Start: 2025-05-18 | End: 2025-05-18

## 2025-05-18 RX ORDER — INSULIN LISPRO 100 U/ML
12 INJECTION, SOLUTION INTRAVENOUS; SUBCUTANEOUS
Refills: 0 | Status: DISCONTINUED | OUTPATIENT
Start: 2025-05-18 | End: 2025-05-19

## 2025-05-18 RX ORDER — SIMETHICONE 80 MG
80 TABLET,CHEWABLE ORAL EVERY 8 HOURS
Refills: 0 | Status: DISCONTINUED | OUTPATIENT
Start: 2025-05-18 | End: 2025-05-24

## 2025-05-18 RX ORDER — INSULIN GLARGINE-YFGN 100 [IU]/ML
25 INJECTION, SOLUTION SUBCUTANEOUS AT BEDTIME
Refills: 0 | Status: DISCONTINUED | OUTPATIENT
Start: 2025-05-18 | End: 2025-05-20

## 2025-05-18 RX ORDER — SODIUM BICARBONATE 1 MEQ/ML
1300 SYRINGE (ML) INTRAVENOUS EVERY 8 HOURS
Refills: 0 | Status: DISCONTINUED | OUTPATIENT
Start: 2025-05-18 | End: 2025-05-21

## 2025-05-18 RX ADMIN — AMLODIPINE BESYLATE 5 MILLIGRAM(S): 10 TABLET ORAL at 05:30

## 2025-05-18 RX ADMIN — INSULIN LISPRO 4: 100 INJECTION, SOLUTION INTRAVENOUS; SUBCUTANEOUS at 22:51

## 2025-05-18 RX ADMIN — POLYETHYLENE GLYCOL 3350 17 GRAM(S): 17 POWDER, FOR SOLUTION ORAL at 17:34

## 2025-05-18 RX ADMIN — Medication 81 MILLIGRAM(S): at 11:24

## 2025-05-18 RX ADMIN — INSULIN LISPRO 12 UNIT(S): 100 INJECTION, SOLUTION INTRAVENOUS; SUBCUTANEOUS at 22:51

## 2025-05-18 RX ADMIN — IPRATROPIUM BROMIDE AND ALBUTEROL SULFATE 3 MILLILITER(S): .5; 2.5 SOLUTION RESPIRATORY (INHALATION) at 23:38

## 2025-05-18 RX ADMIN — PREDNISONE 40 MILLIGRAM(S): 20 TABLET ORAL at 05:29

## 2025-05-18 RX ADMIN — Medication 1 DOSE(S): at 05:14

## 2025-05-18 RX ADMIN — Medication 2 TABLET(S): at 21:26

## 2025-05-18 RX ADMIN — TICAGRELOR 90 MILLIGRAM(S): 90 TABLET ORAL at 19:39

## 2025-05-18 RX ADMIN — ROSUVASTATIN CALCIUM 40 MILLIGRAM(S): 20 TABLET, FILM COATED ORAL at 21:26

## 2025-05-18 RX ADMIN — Medication 1 DOSE(S): at 17:07

## 2025-05-18 RX ADMIN — Medication 40 MILLIGRAM(S): at 17:34

## 2025-05-18 RX ADMIN — TICAGRELOR 90 MILLIGRAM(S): 90 TABLET ORAL at 07:43

## 2025-05-18 RX ADMIN — Medication 1 APPLICATION(S): at 05:17

## 2025-05-18 RX ADMIN — Medication 1000 MILLILITER(S): at 00:45

## 2025-05-18 RX ADMIN — Medication 1300 MILLIGRAM(S): at 00:44

## 2025-05-18 RX ADMIN — IPRATROPIUM BROMIDE AND ALBUTEROL SULFATE 3 MILLILITER(S): .5; 2.5 SOLUTION RESPIRATORY (INHALATION) at 05:14

## 2025-05-18 RX ADMIN — DRONEDARONE 400 MILLIGRAM(S): 400 TABLET, FILM COATED ORAL at 17:34

## 2025-05-18 RX ADMIN — Medication 1300 MILLIGRAM(S): at 07:43

## 2025-05-18 RX ADMIN — DEXTROMETHORPHAN HBR, GUAIFENESIN 100 MILLIGRAM(S): 200 LIQUID ORAL at 08:57

## 2025-05-18 RX ADMIN — Medication 10 MILLIGRAM(S): at 11:24

## 2025-05-18 RX ADMIN — Medication 20 MILLIEQUIVALENT(S): at 02:54

## 2025-05-18 RX ADMIN — BUMETANIDE 2 MILLIGRAM(S): 1 TABLET ORAL at 14:13

## 2025-05-18 RX ADMIN — Medication 1300 MILLIGRAM(S): at 14:13

## 2025-05-18 RX ADMIN — DRONEDARONE 400 MILLIGRAM(S): 400 TABLET, FILM COATED ORAL at 05:30

## 2025-05-18 RX ADMIN — Medication 40 MILLIGRAM(S): at 05:17

## 2025-05-18 RX ADMIN — INSULIN GLARGINE-YFGN 25 UNIT(S): 100 INJECTION, SOLUTION SUBCUTANEOUS at 17:49

## 2025-05-18 RX ADMIN — POLYETHYLENE GLYCOL 3350 17 GRAM(S): 17 POWDER, FOR SOLUTION ORAL at 05:29

## 2025-05-18 RX ADMIN — Medication 3 UNIT(S)/HR: at 07:16

## 2025-05-18 RX ADMIN — IPRATROPIUM BROMIDE AND ALBUTEROL SULFATE 3 MILLILITER(S): .5; 2.5 SOLUTION RESPIRATORY (INHALATION) at 17:07

## 2025-05-18 RX ADMIN — Medication 325 MILLIGRAM(S): at 11:24

## 2025-05-18 RX ADMIN — BUMETANIDE 2 MILLIGRAM(S): 1 TABLET ORAL at 05:22

## 2025-05-18 RX ADMIN — DEXTROMETHORPHAN HBR, GUAIFENESIN 100 MILLIGRAM(S): 200 LIQUID ORAL at 22:50

## 2025-05-18 RX ADMIN — Medication 1300 MILLIGRAM(S): at 21:26

## 2025-05-18 RX ADMIN — IPRATROPIUM BROMIDE AND ALBUTEROL SULFATE 3 MILLILITER(S): .5; 2.5 SOLUTION RESPIRATORY (INHALATION) at 11:11

## 2025-05-19 LAB
ANION GAP SERPL CALC-SCNC: 14 MMOL/L — SIGNIFICANT CHANGE UP (ref 5–17)
ANION GAP SERPL CALC-SCNC: 14 MMOL/L — SIGNIFICANT CHANGE UP (ref 5–17)
BUN SERPL-MCNC: 61 MG/DL — HIGH (ref 7–23)
BUN SERPL-MCNC: 65 MG/DL — HIGH (ref 7–23)
CALCIUM SERPL-MCNC: 7.9 MG/DL — LOW (ref 8.4–10.5)
CALCIUM SERPL-MCNC: 8.4 MG/DL — SIGNIFICANT CHANGE UP (ref 8.4–10.5)
CHLORIDE SERPL-SCNC: 102 MMOL/L — SIGNIFICANT CHANGE UP (ref 96–108)
CHLORIDE SERPL-SCNC: 98 MMOL/L — SIGNIFICANT CHANGE UP (ref 96–108)
CO2 SERPL-SCNC: 21 MMOL/L — LOW (ref 22–31)
CO2 SERPL-SCNC: 22 MMOL/L — SIGNIFICANT CHANGE UP (ref 22–31)
CREAT SERPL-MCNC: 2.37 MG/DL — HIGH (ref 0.5–1.3)
CREAT SERPL-MCNC: 2.46 MG/DL — HIGH (ref 0.5–1.3)
EGFR: 25 ML/MIN/1.73M2 — LOW
EGFR: 25 ML/MIN/1.73M2 — LOW
EGFR: 27 ML/MIN/1.73M2 — LOW
EGFR: 27 ML/MIN/1.73M2 — LOW
GLUCOSE BLDC GLUCOMTR-MCNC: 144 MG/DL — HIGH (ref 70–99)
GLUCOSE BLDC GLUCOMTR-MCNC: 221 MG/DL — HIGH (ref 70–99)
GLUCOSE BLDC GLUCOMTR-MCNC: 354 MG/DL — HIGH (ref 70–99)
GLUCOSE BLDC GLUCOMTR-MCNC: 372 MG/DL — HIGH (ref 70–99)
GLUCOSE BLDC GLUCOMTR-MCNC: 477 MG/DL — CRITICAL HIGH (ref 70–99)
GLUCOSE BLDC GLUCOMTR-MCNC: 55 MG/DL — LOW (ref 70–99)
GLUCOSE BLDC GLUCOMTR-MCNC: 57 MG/DL — LOW (ref 70–99)
GLUCOSE BLDC GLUCOMTR-MCNC: 57 MG/DL — LOW (ref 70–99)
GLUCOSE BLDC GLUCOMTR-MCNC: 61 MG/DL — LOW (ref 70–99)
GLUCOSE BLDC GLUCOMTR-MCNC: 67 MG/DL — LOW (ref 70–99)
GLUCOSE BLDC GLUCOMTR-MCNC: 98 MG/DL — SIGNIFICANT CHANGE UP (ref 70–99)
GLUCOSE SERPL-MCNC: 132 MG/DL — HIGH (ref 70–99)
GLUCOSE SERPL-MCNC: 354 MG/DL — HIGH (ref 70–99)
MAGNESIUM SERPL-MCNC: 2.4 MG/DL — SIGNIFICANT CHANGE UP (ref 1.6–2.6)
PHOSPHATE SERPL-MCNC: 2.7 MG/DL — SIGNIFICANT CHANGE UP (ref 2.5–4.5)
POTASSIUM SERPL-MCNC: 5 MMOL/L — SIGNIFICANT CHANGE UP (ref 3.5–5.3)
POTASSIUM SERPL-MCNC: 5.5 MMOL/L — HIGH (ref 3.5–5.3)
POTASSIUM SERPL-SCNC: 5 MMOL/L — SIGNIFICANT CHANGE UP (ref 3.5–5.3)
POTASSIUM SERPL-SCNC: 5.5 MMOL/L — HIGH (ref 3.5–5.3)
SODIUM SERPL-SCNC: 133 MMOL/L — LOW (ref 135–145)
SODIUM SERPL-SCNC: 138 MMOL/L — SIGNIFICANT CHANGE UP (ref 135–145)

## 2025-05-19 PROCEDURE — 99232 SBSQ HOSP IP/OBS MODERATE 35: CPT | Mod: GC

## 2025-05-19 PROCEDURE — 99254 IP/OBS CNSLTJ NEW/EST MOD 60: CPT

## 2025-05-19 PROCEDURE — 99231 SBSQ HOSP IP/OBS SF/LOW 25: CPT | Mod: 24

## 2025-05-19 RX ORDER — INSULIN LISPRO 100 U/ML
INJECTION, SOLUTION INTRAVENOUS; SUBCUTANEOUS
Refills: 0 | Status: DISCONTINUED | OUTPATIENT
Start: 2025-05-19 | End: 2025-05-20

## 2025-05-19 RX ORDER — INSULIN LISPRO 100 U/ML
10 INJECTION, SOLUTION INTRAVENOUS; SUBCUTANEOUS ONCE
Refills: 0 | Status: COMPLETED | OUTPATIENT
Start: 2025-05-19 | End: 2025-05-19

## 2025-05-19 RX ORDER — DEXTROSE 50 % IN WATER 50 %
12.5 SYRINGE (ML) INTRAVENOUS ONCE
Refills: 0 | Status: COMPLETED | OUTPATIENT
Start: 2025-05-19 | End: 2025-05-19

## 2025-05-19 RX ORDER — POTASSIUM PHOSPHATE, MONOBASIC POTASSIUM PHOSPHATE, DIBASIC INJECTION, 236; 224 MG/ML; MG/ML
30 SOLUTION, CONCENTRATE INTRAVENOUS ONCE
Refills: 0 | Status: COMPLETED | OUTPATIENT
Start: 2025-05-19 | End: 2025-05-19

## 2025-05-19 RX ORDER — BUMETANIDE 1 MG/1
2 TABLET ORAL DAILY
Refills: 0 | Status: DISCONTINUED | OUTPATIENT
Start: 2025-05-19 | End: 2025-05-20

## 2025-05-19 RX ORDER — INSULIN LISPRO 100 U/ML
10 INJECTION, SOLUTION INTRAVENOUS; SUBCUTANEOUS
Refills: 0 | Status: DISCONTINUED | OUTPATIENT
Start: 2025-05-19 | End: 2025-05-19

## 2025-05-19 RX ORDER — INSULIN LISPRO 100 U/ML
INJECTION, SOLUTION INTRAVENOUS; SUBCUTANEOUS
Refills: 0 | Status: DISCONTINUED | OUTPATIENT
Start: 2025-05-19 | End: 2025-05-19

## 2025-05-19 RX ORDER — INSULIN LISPRO 100 U/ML
15 INJECTION, SOLUTION INTRAVENOUS; SUBCUTANEOUS
Refills: 0 | Status: DISCONTINUED | OUTPATIENT
Start: 2025-05-19 | End: 2025-05-20

## 2025-05-19 RX ORDER — SODIUM ZIRCONIUM CYCLOSILICATE 5 G/5G
10 POWDER, FOR SUSPENSION ORAL ONCE
Refills: 0 | Status: COMPLETED | OUTPATIENT
Start: 2025-05-19 | End: 2025-05-20

## 2025-05-19 RX ORDER — CALCIUM GLUCONATE 20 MG/ML
1 INJECTION, SOLUTION INTRAVENOUS ONCE
Refills: 0 | Status: COMPLETED | OUTPATIENT
Start: 2025-05-19 | End: 2025-05-19

## 2025-05-19 RX ORDER — SODIUM CHLORIDE 9 G/1000ML
1000 INJECTION, SOLUTION INTRAVENOUS
Refills: 0 | Status: DISCONTINUED | OUTPATIENT
Start: 2025-05-19 | End: 2025-05-19

## 2025-05-19 RX ORDER — GLUCAGON 3 MG/1
1 POWDER NASAL ONCE
Refills: 0 | Status: DISCONTINUED | OUTPATIENT
Start: 2025-05-19 | End: 2025-05-19

## 2025-05-19 RX ADMIN — INSULIN LISPRO 15 UNIT(S): 100 INJECTION, SOLUTION INTRAVENOUS; SUBCUTANEOUS at 16:36

## 2025-05-19 RX ADMIN — Medication 40 MILLIGRAM(S): at 05:23

## 2025-05-19 RX ADMIN — POLYETHYLENE GLYCOL 3350 17 GRAM(S): 17 POWDER, FOR SOLUTION ORAL at 05:20

## 2025-05-19 RX ADMIN — INSULIN LISPRO 10: 100 INJECTION, SOLUTION INTRAVENOUS; SUBCUTANEOUS at 11:45

## 2025-05-19 RX ADMIN — TICAGRELOR 90 MILLIGRAM(S): 90 TABLET ORAL at 21:34

## 2025-05-19 RX ADMIN — INSULIN LISPRO 16: 100 INJECTION, SOLUTION INTRAVENOUS; SUBCUTANEOUS at 16:35

## 2025-05-19 RX ADMIN — DRONEDARONE 400 MILLIGRAM(S): 400 TABLET, FILM COATED ORAL at 05:21

## 2025-05-19 RX ADMIN — CALCIUM GLUCONATE 100 GRAM(S): 20 INJECTION, SOLUTION INTRAVENOUS at 21:32

## 2025-05-19 RX ADMIN — INSULIN LISPRO 10 UNIT(S): 100 INJECTION, SOLUTION INTRAVENOUS; SUBCUTANEOUS at 11:45

## 2025-05-19 RX ADMIN — IPRATROPIUM BROMIDE AND ALBUTEROL SULFATE 3 MILLILITER(S): .5; 2.5 SOLUTION RESPIRATORY (INHALATION) at 17:00

## 2025-05-19 RX ADMIN — POTASSIUM PHOSPHATE, MONOBASIC POTASSIUM PHOSPHATE, DIBASIC INJECTION, 83.33 MILLIMOLE(S): 236; 224 SOLUTION, CONCENTRATE INTRAVENOUS at 03:12

## 2025-05-19 RX ADMIN — DRONEDARONE 400 MILLIGRAM(S): 400 TABLET, FILM COATED ORAL at 17:35

## 2025-05-19 RX ADMIN — Medication 1300 MILLIGRAM(S): at 13:56

## 2025-05-19 RX ADMIN — Medication 1 APPLICATION(S): at 06:37

## 2025-05-19 RX ADMIN — Medication 325 MILLIGRAM(S): at 11:42

## 2025-05-19 RX ADMIN — Medication 1 DOSE(S): at 05:16

## 2025-05-19 RX ADMIN — AMLODIPINE BESYLATE 5 MILLIGRAM(S): 10 TABLET ORAL at 05:21

## 2025-05-19 RX ADMIN — Medication 1 DOSE(S): at 17:01

## 2025-05-19 RX ADMIN — Medication 40 MILLIGRAM(S): at 17:36

## 2025-05-19 RX ADMIN — ROSUVASTATIN CALCIUM 40 MILLIGRAM(S): 20 TABLET, FILM COATED ORAL at 21:34

## 2025-05-19 RX ADMIN — Medication 1300 MILLIGRAM(S): at 05:21

## 2025-05-19 RX ADMIN — BUMETANIDE 2 MILLIGRAM(S): 1 TABLET ORAL at 05:22

## 2025-05-19 RX ADMIN — TICAGRELOR 90 MILLIGRAM(S): 90 TABLET ORAL at 07:44

## 2025-05-19 RX ADMIN — INSULIN LISPRO 10 UNIT(S): 100 INJECTION, SOLUTION INTRAVENOUS; SUBCUTANEOUS at 13:56

## 2025-05-19 RX ADMIN — Medication 2 TABLET(S): at 21:33

## 2025-05-19 RX ADMIN — Medication 81 MILLIGRAM(S): at 11:42

## 2025-05-19 RX ADMIN — IPRATROPIUM BROMIDE AND ALBUTEROL SULFATE 3 MILLILITER(S): .5; 2.5 SOLUTION RESPIRATORY (INHALATION) at 11:00

## 2025-05-19 RX ADMIN — IPRATROPIUM BROMIDE AND ALBUTEROL SULFATE 3 MILLILITER(S): .5; 2.5 SOLUTION RESPIRATORY (INHALATION) at 05:17

## 2025-05-19 RX ADMIN — INSULIN LISPRO 10 UNIT(S): 100 INJECTION, SOLUTION INTRAVENOUS; SUBCUTANEOUS at 12:48

## 2025-05-19 RX ADMIN — BUMETANIDE 2 MILLIGRAM(S): 1 TABLET ORAL at 17:35

## 2025-05-19 RX ADMIN — PREDNISONE 40 MILLIGRAM(S): 20 TABLET ORAL at 05:20

## 2025-05-19 RX ADMIN — Medication 12.5 GRAM(S): at 21:52

## 2025-05-19 RX ADMIN — Medication 1300 MILLIGRAM(S): at 21:33

## 2025-05-20 LAB
ANION GAP SERPL CALC-SCNC: 14 MMOL/L — SIGNIFICANT CHANGE UP (ref 5–17)
BUN SERPL-MCNC: 53 MG/DL — HIGH (ref 7–23)
CALCIUM SERPL-MCNC: 8.6 MG/DL — SIGNIFICANT CHANGE UP (ref 8.4–10.5)
CHLORIDE SERPL-SCNC: 97 MMOL/L — SIGNIFICANT CHANGE UP (ref 96–108)
CO2 SERPL-SCNC: 25 MMOL/L — SIGNIFICANT CHANGE UP (ref 22–31)
CREAT SERPL-MCNC: 2.52 MG/DL — HIGH (ref 0.5–1.3)
EGFR: 25 ML/MIN/1.73M2 — LOW
EGFR: 25 ML/MIN/1.73M2 — LOW
GLUCOSE BLDC GLUCOMTR-MCNC: 105 MG/DL — HIGH (ref 70–99)
GLUCOSE BLDC GLUCOMTR-MCNC: 116 MG/DL — HIGH (ref 70–99)
GLUCOSE BLDC GLUCOMTR-MCNC: 125 MG/DL — HIGH (ref 70–99)
GLUCOSE BLDC GLUCOMTR-MCNC: 134 MG/DL — HIGH (ref 70–99)
GLUCOSE BLDC GLUCOMTR-MCNC: 136 MG/DL — HIGH (ref 70–99)
GLUCOSE BLDC GLUCOMTR-MCNC: 165 MG/DL — HIGH (ref 70–99)
GLUCOSE BLDC GLUCOMTR-MCNC: 174 MG/DL — HIGH (ref 70–99)
GLUCOSE BLDC GLUCOMTR-MCNC: 81 MG/DL — SIGNIFICANT CHANGE UP (ref 70–99)
GLUCOSE SERPL-MCNC: 125 MG/DL — HIGH (ref 70–99)
MAGNESIUM SERPL-MCNC: 2 MG/DL — SIGNIFICANT CHANGE UP (ref 1.6–2.6)
PHOSPHATE SERPL-MCNC: 3.3 MG/DL — SIGNIFICANT CHANGE UP (ref 2.5–4.5)
POTASSIUM SERPL-MCNC: 4.4 MMOL/L — SIGNIFICANT CHANGE UP (ref 3.5–5.3)
POTASSIUM SERPL-SCNC: 4.4 MMOL/L — SIGNIFICANT CHANGE UP (ref 3.5–5.3)
SODIUM SERPL-SCNC: 136 MMOL/L — SIGNIFICANT CHANGE UP (ref 135–145)
TRYPTASE SERPL-MCNC: 7.6 UG/L — SIGNIFICANT CHANGE UP

## 2025-05-20 PROCEDURE — 99233 SBSQ HOSP IP/OBS HIGH 50: CPT

## 2025-05-20 PROCEDURE — 99231 SBSQ HOSP IP/OBS SF/LOW 25: CPT | Mod: 24

## 2025-05-20 PROCEDURE — 99232 SBSQ HOSP IP/OBS MODERATE 35: CPT

## 2025-05-20 RX ORDER — APIXABAN 2.5 MG/1
2.5 TABLET, FILM COATED ORAL
Refills: 0 | Status: DISCONTINUED | OUTPATIENT
Start: 2025-05-20 | End: 2025-05-20

## 2025-05-20 RX ORDER — INSULIN LISPRO 100 U/ML
INJECTION, SOLUTION INTRAVENOUS; SUBCUTANEOUS
Refills: 0 | Status: DISCONTINUED | OUTPATIENT
Start: 2025-05-20 | End: 2025-05-21

## 2025-05-20 RX ORDER — APIXABAN 2.5 MG/1
2.5 TABLET, FILM COATED ORAL
Refills: 0 | Status: DISCONTINUED | OUTPATIENT
Start: 2025-05-20 | End: 2025-05-24

## 2025-05-20 RX ORDER — INSULIN LISPRO 100 U/ML
INJECTION, SOLUTION INTRAVENOUS; SUBCUTANEOUS AT BEDTIME
Refills: 0 | Status: DISCONTINUED | OUTPATIENT
Start: 2025-05-20 | End: 2025-05-21

## 2025-05-20 RX ORDER — INSULIN GLARGINE-YFGN 100 [IU]/ML
18 INJECTION, SOLUTION SUBCUTANEOUS AT BEDTIME
Refills: 0 | Status: DISCONTINUED | OUTPATIENT
Start: 2025-05-20 | End: 2025-05-21

## 2025-05-20 RX ORDER — INSULIN LISPRO 100 U/ML
8 INJECTION, SOLUTION INTRAVENOUS; SUBCUTANEOUS
Refills: 0 | Status: DISCONTINUED | OUTPATIENT
Start: 2025-05-20 | End: 2025-05-21

## 2025-05-20 RX ADMIN — INSULIN LISPRO 2: 100 INJECTION, SOLUTION INTRAVENOUS; SUBCUTANEOUS at 12:01

## 2025-05-20 RX ADMIN — PREDNISONE 40 MILLIGRAM(S): 20 TABLET ORAL at 06:07

## 2025-05-20 RX ADMIN — AMLODIPINE BESYLATE 5 MILLIGRAM(S): 10 TABLET ORAL at 06:04

## 2025-05-20 RX ADMIN — Medication 1 DOSE(S): at 18:30

## 2025-05-20 RX ADMIN — Medication 40 MILLIGRAM(S): at 06:04

## 2025-05-20 RX ADMIN — DRONEDARONE 400 MILLIGRAM(S): 400 TABLET, FILM COATED ORAL at 06:04

## 2025-05-20 RX ADMIN — IPRATROPIUM BROMIDE AND ALBUTEROL SULFATE 3 MILLILITER(S): .5; 2.5 SOLUTION RESPIRATORY (INHALATION) at 22:47

## 2025-05-20 RX ADMIN — Medication 325 MILLIGRAM(S): at 12:01

## 2025-05-20 RX ADMIN — Medication 1300 MILLIGRAM(S): at 15:00

## 2025-05-20 RX ADMIN — APIXABAN 2.5 MILLIGRAM(S): 2.5 TABLET, FILM COATED ORAL at 19:23

## 2025-05-20 RX ADMIN — IPRATROPIUM BROMIDE AND ALBUTEROL SULFATE 3 MILLILITER(S): .5; 2.5 SOLUTION RESPIRATORY (INHALATION) at 11:43

## 2025-05-20 RX ADMIN — Medication 81 MILLIGRAM(S): at 12:01

## 2025-05-20 RX ADMIN — ROSUVASTATIN CALCIUM 40 MILLIGRAM(S): 20 TABLET, FILM COATED ORAL at 22:47

## 2025-05-20 RX ADMIN — Medication 40 MILLIGRAM(S): at 18:30

## 2025-05-20 RX ADMIN — TICAGRELOR 90 MILLIGRAM(S): 90 TABLET ORAL at 22:47

## 2025-05-20 RX ADMIN — INSULIN LISPRO 15 UNIT(S): 100 INJECTION, SOLUTION INTRAVENOUS; SUBCUTANEOUS at 12:00

## 2025-05-20 RX ADMIN — TICAGRELOR 90 MILLIGRAM(S): 90 TABLET ORAL at 08:13

## 2025-05-20 RX ADMIN — Medication 1 DOSE(S): at 05:35

## 2025-05-20 RX ADMIN — Medication 1300 MILLIGRAM(S): at 22:47

## 2025-05-20 RX ADMIN — SODIUM ZIRCONIUM CYCLOSILICATE 10 GRAM(S): 5 POWDER, FOR SUSPENSION ORAL at 00:03

## 2025-05-20 RX ADMIN — IPRATROPIUM BROMIDE AND ALBUTEROL SULFATE 3 MILLILITER(S): .5; 2.5 SOLUTION RESPIRATORY (INHALATION) at 18:30

## 2025-05-20 RX ADMIN — INSULIN LISPRO 2: 100 INJECTION, SOLUTION INTRAVENOUS; SUBCUTANEOUS at 16:02

## 2025-05-20 RX ADMIN — IPRATROPIUM BROMIDE AND ALBUTEROL SULFATE 3 MILLILITER(S): .5; 2.5 SOLUTION RESPIRATORY (INHALATION) at 00:03

## 2025-05-20 RX ADMIN — DRONEDARONE 400 MILLIGRAM(S): 400 TABLET, FILM COATED ORAL at 19:57

## 2025-05-20 RX ADMIN — INSULIN LISPRO 8 UNIT(S): 100 INJECTION, SOLUTION INTRAVENOUS; SUBCUTANEOUS at 16:15

## 2025-05-20 RX ADMIN — IPRATROPIUM BROMIDE AND ALBUTEROL SULFATE 3 MILLILITER(S): .5; 2.5 SOLUTION RESPIRATORY (INHALATION) at 05:35

## 2025-05-20 RX ADMIN — INSULIN LISPRO 15 UNIT(S): 100 INJECTION, SOLUTION INTRAVENOUS; SUBCUTANEOUS at 08:14

## 2025-05-20 RX ADMIN — BUMETANIDE 2 MILLIGRAM(S): 1 TABLET ORAL at 06:04

## 2025-05-20 RX ADMIN — Medication 1300 MILLIGRAM(S): at 06:04

## 2025-05-21 LAB
ANION GAP SERPL CALC-SCNC: 13 MMOL/L — SIGNIFICANT CHANGE UP (ref 5–17)
ANION GAP SERPL CALC-SCNC: 14 MMOL/L — SIGNIFICANT CHANGE UP (ref 5–17)
BUN SERPL-MCNC: 44 MG/DL — HIGH (ref 7–23)
BUN SERPL-MCNC: 46 MG/DL — HIGH (ref 7–23)
CALCIUM SERPL-MCNC: 8.4 MG/DL — SIGNIFICANT CHANGE UP (ref 8.4–10.5)
CALCIUM SERPL-MCNC: 8.5 MG/DL — SIGNIFICANT CHANGE UP (ref 8.4–10.5)
CHLORIDE SERPL-SCNC: 95 MMOL/L — LOW (ref 96–108)
CHLORIDE SERPL-SCNC: 97 MMOL/L — SIGNIFICANT CHANGE UP (ref 96–108)
CK MB CFR SERPL CALC: 6.5 NG/ML — SIGNIFICANT CHANGE UP (ref 0–6.7)
CO2 SERPL-SCNC: 26 MMOL/L — SIGNIFICANT CHANGE UP (ref 22–31)
CO2 SERPL-SCNC: 26 MMOL/L — SIGNIFICANT CHANGE UP (ref 22–31)
CREAT SERPL-MCNC: 2.18 MG/DL — HIGH (ref 0.5–1.3)
CREAT SERPL-MCNC: 2.25 MG/DL — HIGH (ref 0.5–1.3)
EGFR: 28 ML/MIN/1.73M2 — LOW
EGFR: 28 ML/MIN/1.73M2 — LOW
EGFR: 29 ML/MIN/1.73M2 — LOW
EGFR: 29 ML/MIN/1.73M2 — LOW
FLUAV AG NPH QL: SIGNIFICANT CHANGE UP
FLUBV AG NPH QL: SIGNIFICANT CHANGE UP
GLUCOSE BLDC GLUCOMTR-MCNC: 129 MG/DL — HIGH (ref 70–99)
GLUCOSE BLDC GLUCOMTR-MCNC: 137 MG/DL — HIGH (ref 70–99)
GLUCOSE BLDC GLUCOMTR-MCNC: 183 MG/DL — HIGH (ref 70–99)
GLUCOSE BLDC GLUCOMTR-MCNC: 188 MG/DL — HIGH (ref 70–99)
GLUCOSE SERPL-MCNC: 130 MG/DL — HIGH (ref 70–99)
GLUCOSE SERPL-MCNC: 132 MG/DL — HIGH (ref 70–99)
HCT VFR BLD CALC: 32.9 % — LOW (ref 39–50)
HGB BLD-MCNC: 10 G/DL — LOW (ref 13–17)
MAGNESIUM SERPL-MCNC: 1.9 MG/DL — SIGNIFICANT CHANGE UP (ref 1.6–2.6)
MCHC RBC-ENTMCNC: 24.7 PG — LOW (ref 27–34)
MCHC RBC-ENTMCNC: 30.4 G/DL — LOW (ref 32–36)
MCV RBC AUTO: 81.2 FL — SIGNIFICANT CHANGE UP (ref 80–100)
NRBC BLD AUTO-RTO: 1 /100 WBCS — HIGH (ref 0–0)
PHOSPHATE SERPL-MCNC: 3.5 MG/DL — SIGNIFICANT CHANGE UP (ref 2.5–4.5)
PLATELET # BLD AUTO: 198 K/UL — SIGNIFICANT CHANGE UP (ref 150–400)
POTASSIUM SERPL-MCNC: 4.4 MMOL/L — SIGNIFICANT CHANGE UP (ref 3.5–5.3)
POTASSIUM SERPL-MCNC: 4.4 MMOL/L — SIGNIFICANT CHANGE UP (ref 3.5–5.3)
POTASSIUM SERPL-SCNC: 4.4 MMOL/L — SIGNIFICANT CHANGE UP (ref 3.5–5.3)
POTASSIUM SERPL-SCNC: 4.4 MMOL/L — SIGNIFICANT CHANGE UP (ref 3.5–5.3)
RBC # BLD: 4.05 M/UL — LOW (ref 4.2–5.8)
RBC # FLD: 21.2 % — HIGH (ref 10.3–14.5)
RSV RNA NPH QL NAA+NON-PROBE: SIGNIFICANT CHANGE UP
SARS-COV-2 RNA SPEC QL NAA+PROBE: SIGNIFICANT CHANGE UP
SODIUM SERPL-SCNC: 135 MMOL/L — SIGNIFICANT CHANGE UP (ref 135–145)
SODIUM SERPL-SCNC: 136 MMOL/L — SIGNIFICANT CHANGE UP (ref 135–145)
SOURCE RESPIRATORY: SIGNIFICANT CHANGE UP
TROPONIN T, HIGH SENSITIVITY RESULT: 178 NG/L — HIGH (ref 0–51)
TROPONIN T, HIGH SENSITIVITY RESULT: 179 NG/L — HIGH (ref 0–51)
WBC # BLD: 7.62 K/UL — SIGNIFICANT CHANGE UP (ref 3.8–10.5)
WBC # FLD AUTO: 7.62 K/UL — SIGNIFICANT CHANGE UP (ref 3.8–10.5)

## 2025-05-21 PROCEDURE — 99232 SBSQ HOSP IP/OBS MODERATE 35: CPT

## 2025-05-21 PROCEDURE — 93010 ELECTROCARDIOGRAM REPORT: CPT

## 2025-05-21 PROCEDURE — 99233 SBSQ HOSP IP/OBS HIGH 50: CPT

## 2025-05-21 RX ORDER — INSULIN LISPRO 100 U/ML
6 INJECTION, SOLUTION INTRAVENOUS; SUBCUTANEOUS
Refills: 0 | Status: DISCONTINUED | OUTPATIENT
Start: 2025-05-22 | End: 2025-05-23

## 2025-05-21 RX ORDER — INSULIN LISPRO 100 U/ML
8 INJECTION, SOLUTION INTRAVENOUS; SUBCUTANEOUS
Refills: 0 | Status: DISCONTINUED | OUTPATIENT
Start: 2025-05-22 | End: 2025-05-23

## 2025-05-21 RX ORDER — SODIUM BICARBONATE 1 MEQ/ML
650 SYRINGE (ML) INTRAVENOUS THREE TIMES A DAY
Refills: 0 | Status: DISCONTINUED | OUTPATIENT
Start: 2025-05-21 | End: 2025-05-22

## 2025-05-21 RX ORDER — MAGNESIUM SULFATE 500 MG/ML
1 SYRINGE (ML) INJECTION ONCE
Refills: 0 | Status: COMPLETED | OUTPATIENT
Start: 2025-05-21 | End: 2025-05-21

## 2025-05-21 RX ORDER — INSULIN LISPRO 100 U/ML
6 INJECTION, SOLUTION INTRAVENOUS; SUBCUTANEOUS
Refills: 0 | Status: DISCONTINUED | OUTPATIENT
Start: 2025-05-21 | End: 2025-05-21

## 2025-05-21 RX ORDER — INSULIN LISPRO 100 U/ML
INJECTION, SOLUTION INTRAVENOUS; SUBCUTANEOUS
Refills: 0 | Status: DISCONTINUED | OUTPATIENT
Start: 2025-05-22 | End: 2025-05-24

## 2025-05-21 RX ORDER — METOPROLOL SUCCINATE 50 MG/1
12.5 TABLET, EXTENDED RELEASE ORAL EVERY 12 HOURS
Refills: 0 | Status: DISCONTINUED | OUTPATIENT
Start: 2025-05-21 | End: 2025-05-24

## 2025-05-21 RX ORDER — INSULIN LISPRO 100 U/ML
8 INJECTION, SOLUTION INTRAVENOUS; SUBCUTANEOUS
Refills: 0 | Status: DISCONTINUED | OUTPATIENT
Start: 2025-05-22 | End: 2025-05-24

## 2025-05-21 RX ORDER — INSULIN LISPRO 100 U/ML
INJECTION, SOLUTION INTRAVENOUS; SUBCUTANEOUS AT BEDTIME
Refills: 0 | Status: DISCONTINUED | OUTPATIENT
Start: 2025-05-21 | End: 2025-05-23

## 2025-05-21 RX ORDER — BUMETANIDE 1 MG/1
1 TABLET ORAL ONCE
Refills: 0 | Status: COMPLETED | OUTPATIENT
Start: 2025-05-21 | End: 2025-05-21

## 2025-05-21 RX ORDER — BUMETANIDE 1 MG/1
1 TABLET ORAL DAILY
Refills: 0 | Status: DISCONTINUED | OUTPATIENT
Start: 2025-05-21 | End: 2025-05-22

## 2025-05-21 RX ADMIN — Medication 40 MILLIGRAM(S): at 05:33

## 2025-05-21 RX ADMIN — INSULIN LISPRO 6 UNIT(S): 100 INJECTION, SOLUTION INTRAVENOUS; SUBCUTANEOUS at 16:45

## 2025-05-21 RX ADMIN — DRONEDARONE 400 MILLIGRAM(S): 400 TABLET, FILM COATED ORAL at 05:34

## 2025-05-21 RX ADMIN — DRONEDARONE 400 MILLIGRAM(S): 400 TABLET, FILM COATED ORAL at 17:01

## 2025-05-21 RX ADMIN — APIXABAN 2.5 MILLIGRAM(S): 2.5 TABLET, FILM COATED ORAL at 17:02

## 2025-05-21 RX ADMIN — Medication 1300 MILLIGRAM(S): at 13:01

## 2025-05-21 RX ADMIN — POLYETHYLENE GLYCOL 3350 17 GRAM(S): 17 POWDER, FOR SOLUTION ORAL at 17:01

## 2025-05-21 RX ADMIN — Medication 100 GRAM(S): at 17:44

## 2025-05-21 RX ADMIN — IPRATROPIUM BROMIDE AND ALBUTEROL SULFATE 3 MILLILITER(S): .5; 2.5 SOLUTION RESPIRATORY (INHALATION) at 05:33

## 2025-05-21 RX ADMIN — TICAGRELOR 90 MILLIGRAM(S): 90 TABLET ORAL at 08:03

## 2025-05-21 RX ADMIN — Medication 1300 MILLIGRAM(S): at 05:33

## 2025-05-21 RX ADMIN — INSULIN LISPRO 2: 100 INJECTION, SOLUTION INTRAVENOUS; SUBCUTANEOUS at 16:45

## 2025-05-21 RX ADMIN — TICAGRELOR 90 MILLIGRAM(S): 90 TABLET ORAL at 21:25

## 2025-05-21 RX ADMIN — BUMETANIDE 1 MILLIGRAM(S): 1 TABLET ORAL at 19:17

## 2025-05-21 RX ADMIN — METOPROLOL SUCCINATE 12.5 MILLIGRAM(S): 50 TABLET, EXTENDED RELEASE ORAL at 12:53

## 2025-05-21 RX ADMIN — INSULIN LISPRO 6 UNIT(S): 100 INJECTION, SOLUTION INTRAVENOUS; SUBCUTANEOUS at 12:45

## 2025-05-21 RX ADMIN — IPRATROPIUM BROMIDE AND ALBUTEROL SULFATE 3 MILLILITER(S): .5; 2.5 SOLUTION RESPIRATORY (INHALATION) at 12:45

## 2025-05-21 RX ADMIN — Medication 1 LOZENGE: at 17:01

## 2025-05-21 RX ADMIN — IPRATROPIUM BROMIDE AND ALBUTEROL SULFATE 3 MILLILITER(S): .5; 2.5 SOLUTION RESPIRATORY (INHALATION) at 17:05

## 2025-05-21 RX ADMIN — Medication 650 MILLIGRAM(S): at 21:25

## 2025-05-21 RX ADMIN — Medication 81 MILLIGRAM(S): at 12:45

## 2025-05-21 RX ADMIN — Medication 1 DOSE(S): at 17:05

## 2025-05-21 RX ADMIN — APIXABAN 2.5 MILLIGRAM(S): 2.5 TABLET, FILM COATED ORAL at 05:33

## 2025-05-21 RX ADMIN — AMLODIPINE BESYLATE 5 MILLIGRAM(S): 10 TABLET ORAL at 05:33

## 2025-05-21 RX ADMIN — Medication 40 MILLIGRAM(S): at 17:00

## 2025-05-21 RX ADMIN — INSULIN LISPRO 8 UNIT(S): 100 INJECTION, SOLUTION INTRAVENOUS; SUBCUTANEOUS at 08:03

## 2025-05-21 RX ADMIN — Medication 1 DOSE(S): at 05:33

## 2025-05-21 RX ADMIN — IPRATROPIUM BROMIDE AND ALBUTEROL SULFATE 3 MILLILITER(S): .5; 2.5 SOLUTION RESPIRATORY (INHALATION) at 23:26

## 2025-05-21 RX ADMIN — METOPROLOL SUCCINATE 12.5 MILLIGRAM(S): 50 TABLET, EXTENDED RELEASE ORAL at 17:01

## 2025-05-21 RX ADMIN — ROSUVASTATIN CALCIUM 40 MILLIGRAM(S): 20 TABLET, FILM COATED ORAL at 21:25

## 2025-05-21 RX ADMIN — Medication 325 MILLIGRAM(S): at 12:45

## 2025-05-21 RX ADMIN — Medication 1 LOZENGE: at 12:52

## 2025-05-22 ENCOUNTER — TRANSCRIPTION ENCOUNTER (OUTPATIENT)
Age: 85
End: 2025-05-22

## 2025-05-22 DIAGNOSIS — B37.0 CANDIDAL STOMATITIS: ICD-10-CM

## 2025-05-22 DIAGNOSIS — R13.10 DYSPHAGIA, UNSPECIFIED: ICD-10-CM

## 2025-05-22 DIAGNOSIS — B37.89 OTHER SITES OF CANDIDIASIS: ICD-10-CM

## 2025-05-22 LAB
ADD ON TEST-SPECIMEN IN LAB: SIGNIFICANT CHANGE UP
ANION GAP SERPL CALC-SCNC: 17 MMOL/L — SIGNIFICANT CHANGE UP (ref 5–17)
APPEARANCE UR: CLEAR — SIGNIFICANT CHANGE UP
BACTERIA # UR AUTO: ABNORMAL /HPF
BILIRUB UR-MCNC: NEGATIVE — SIGNIFICANT CHANGE UP
BUN SERPL-MCNC: 37 MG/DL — HIGH (ref 7–23)
CALCIUM SERPL-MCNC: 9.1 MG/DL — SIGNIFICANT CHANGE UP (ref 8.4–10.5)
CAST: 2 /LPF — SIGNIFICANT CHANGE UP (ref 0–4)
CHLORIDE SERPL-SCNC: 92 MMOL/L — LOW (ref 96–108)
CO2 SERPL-SCNC: 23 MMOL/L — SIGNIFICANT CHANGE UP (ref 22–31)
COLOR SPEC: YELLOW — SIGNIFICANT CHANGE UP
CREAT SERPL-MCNC: 1.99 MG/DL — HIGH (ref 0.5–1.3)
DIFF PNL FLD: NEGATIVE — SIGNIFICANT CHANGE UP
EGFR: 33 ML/MIN/1.73M2 — LOW
EGFR: 33 ML/MIN/1.73M2 — LOW
GLUCOSE BLDC GLUCOMTR-MCNC: 120 MG/DL — HIGH (ref 70–99)
GLUCOSE BLDC GLUCOMTR-MCNC: 128 MG/DL — HIGH (ref 70–99)
GLUCOSE BLDC GLUCOMTR-MCNC: 221 MG/DL — HIGH (ref 70–99)
GLUCOSE BLDC GLUCOMTR-MCNC: 236 MG/DL — HIGH (ref 70–99)
GLUCOSE SERPL-MCNC: 231 MG/DL — HIGH (ref 70–99)
GLUCOSE UR QL: NEGATIVE MG/DL — SIGNIFICANT CHANGE UP
KETONES UR QL: NEGATIVE MG/DL — SIGNIFICANT CHANGE UP
LEUKOCYTE ESTERASE UR-ACNC: ABNORMAL
MAGNESIUM SERPL-MCNC: 2.2 MG/DL — SIGNIFICANT CHANGE UP (ref 1.6–2.6)
NITRITE UR-MCNC: NEGATIVE — SIGNIFICANT CHANGE UP
NT-PROBNP SERPL-SCNC: 3513 PG/ML — HIGH (ref 0–300)
PH UR: 7 — SIGNIFICANT CHANGE UP (ref 5–8)
PHOSPHATE SERPL-MCNC: 3.5 MG/DL — SIGNIFICANT CHANGE UP (ref 2.5–4.5)
POTASSIUM SERPL-MCNC: 4.7 MMOL/L — SIGNIFICANT CHANGE UP (ref 3.5–5.3)
POTASSIUM SERPL-SCNC: 4.7 MMOL/L — SIGNIFICANT CHANGE UP (ref 3.5–5.3)
PROT UR-MCNC: 30 MG/DL
RAPID RVP RESULT: SIGNIFICANT CHANGE UP
RBC CASTS # UR COMP ASSIST: 2 /HPF — SIGNIFICANT CHANGE UP (ref 0–4)
SARS-COV-2 RNA SPEC QL NAA+PROBE: SIGNIFICANT CHANGE UP
SODIUM SERPL-SCNC: 132 MMOL/L — LOW (ref 135–145)
SP GR SPEC: 1.01 — SIGNIFICANT CHANGE UP (ref 1–1.03)
SQUAMOUS # UR AUTO: 0 /HPF — SIGNIFICANT CHANGE UP (ref 0–5)
UROBILINOGEN FLD QL: 1 MG/DL — SIGNIFICANT CHANGE UP (ref 0.2–1)
WBC UR QL: 3 /HPF — SIGNIFICANT CHANGE UP (ref 0–5)

## 2025-05-22 PROCEDURE — 93970 EXTREMITY STUDY: CPT | Mod: 26

## 2025-05-22 PROCEDURE — 99232 SBSQ HOSP IP/OBS MODERATE 35: CPT

## 2025-05-22 PROCEDURE — 99254 IP/OBS CNSLTJ NEW/EST MOD 60: CPT | Mod: 25

## 2025-05-22 PROCEDURE — 99233 SBSQ HOSP IP/OBS HIGH 50: CPT

## 2025-05-22 PROCEDURE — 71045 X-RAY EXAM CHEST 1 VIEW: CPT | Mod: 26

## 2025-05-22 PROCEDURE — 31575 DIAGNOSTIC LARYNGOSCOPY: CPT

## 2025-05-22 RX ORDER — CASPOFUNGIN ACETATE 5 MG/ML
INJECTION, POWDER, LYOPHILIZED, FOR SOLUTION INTRAVENOUS
Refills: 0 | Status: DISCONTINUED | OUTPATIENT
Start: 2025-05-22 | End: 2025-05-23

## 2025-05-22 RX ORDER — CASPOFUNGIN ACETATE 5 MG/ML
50 INJECTION, POWDER, LYOPHILIZED, FOR SOLUTION INTRAVENOUS EVERY 24 HOURS
Refills: 0 | Status: DISCONTINUED | OUTPATIENT
Start: 2025-05-23 | End: 2025-05-23

## 2025-05-22 RX ORDER — CASPOFUNGIN ACETATE 5 MG/ML
70 INJECTION, POWDER, LYOPHILIZED, FOR SOLUTION INTRAVENOUS ONCE
Refills: 0 | Status: DISCONTINUED | OUTPATIENT
Start: 2025-05-22 | End: 2025-05-23

## 2025-05-22 RX ORDER — ACETAMINOPHEN 500 MG/5ML
650 LIQUID (ML) ORAL EVERY 6 HOURS
Refills: 0 | Status: DISCONTINUED | OUTPATIENT
Start: 2025-05-22 | End: 2025-05-24

## 2025-05-22 RX ORDER — INSULIN GLARGINE-YFGN 100 [IU]/ML
9 INJECTION, SOLUTION SUBCUTANEOUS AT BEDTIME
Refills: 0 | Status: DISCONTINUED | OUTPATIENT
Start: 2025-05-22 | End: 2025-05-23

## 2025-05-22 RX ORDER — BUMETANIDE 1 MG/1
1 TABLET ORAL DAILY
Refills: 0 | Status: DISCONTINUED | OUTPATIENT
Start: 2025-05-23 | End: 2025-05-24

## 2025-05-22 RX ADMIN — METOPROLOL SUCCINATE 12.5 MILLIGRAM(S): 50 TABLET, EXTENDED RELEASE ORAL at 18:01

## 2025-05-22 RX ADMIN — AMLODIPINE BESYLATE 5 MILLIGRAM(S): 10 TABLET ORAL at 05:45

## 2025-05-22 RX ADMIN — Medication 40 MILLIGRAM(S): at 18:00

## 2025-05-22 RX ADMIN — APIXABAN 2.5 MILLIGRAM(S): 2.5 TABLET, FILM COATED ORAL at 18:02

## 2025-05-22 RX ADMIN — DRONEDARONE 400 MILLIGRAM(S): 400 TABLET, FILM COATED ORAL at 18:01

## 2025-05-22 RX ADMIN — APIXABAN 2.5 MILLIGRAM(S): 2.5 TABLET, FILM COATED ORAL at 05:46

## 2025-05-22 RX ADMIN — IPRATROPIUM BROMIDE AND ALBUTEROL SULFATE 3 MILLILITER(S): .5; 2.5 SOLUTION RESPIRATORY (INHALATION) at 18:01

## 2025-05-22 RX ADMIN — Medication 40 MILLIGRAM(S): at 05:45

## 2025-05-22 RX ADMIN — INSULIN LISPRO 2: 100 INJECTION, SOLUTION INTRAVENOUS; SUBCUTANEOUS at 07:52

## 2025-05-22 RX ADMIN — METOPROLOL SUCCINATE 12.5 MILLIGRAM(S): 50 TABLET, EXTENDED RELEASE ORAL at 05:45

## 2025-05-22 RX ADMIN — DRONEDARONE 400 MILLIGRAM(S): 400 TABLET, FILM COATED ORAL at 05:46

## 2025-05-22 RX ADMIN — Medication 650 MILLIGRAM(S): at 12:05

## 2025-05-22 RX ADMIN — TICAGRELOR 90 MILLIGRAM(S): 90 TABLET ORAL at 21:32

## 2025-05-22 RX ADMIN — INSULIN LISPRO 8 UNIT(S): 100 INJECTION, SOLUTION INTRAVENOUS; SUBCUTANEOUS at 07:51

## 2025-05-22 RX ADMIN — TICAGRELOR 90 MILLIGRAM(S): 90 TABLET ORAL at 08:55

## 2025-05-22 RX ADMIN — IPRATROPIUM BROMIDE AND ALBUTEROL SULFATE 3 MILLILITER(S): .5; 2.5 SOLUTION RESPIRATORY (INHALATION) at 11:55

## 2025-05-22 RX ADMIN — Medication 1 LOZENGE: at 05:45

## 2025-05-22 RX ADMIN — Medication 325 MILLIGRAM(S): at 11:56

## 2025-05-22 RX ADMIN — Medication 1 DOSE(S): at 05:46

## 2025-05-22 RX ADMIN — INSULIN LISPRO 2: 100 INJECTION, SOLUTION INTRAVENOUS; SUBCUTANEOUS at 11:35

## 2025-05-22 RX ADMIN — INSULIN LISPRO 8 UNIT(S): 100 INJECTION, SOLUTION INTRAVENOUS; SUBCUTANEOUS at 11:35

## 2025-05-22 RX ADMIN — INSULIN GLARGINE-YFGN 9 UNIT(S): 100 INJECTION, SOLUTION SUBCUTANEOUS at 21:31

## 2025-05-22 RX ADMIN — ROSUVASTATIN CALCIUM 40 MILLIGRAM(S): 20 TABLET, FILM COATED ORAL at 21:32

## 2025-05-22 RX ADMIN — Medication 650 MILLIGRAM(S): at 05:45

## 2025-05-22 RX ADMIN — Medication 1 DOSE(S): at 18:00

## 2025-05-22 RX ADMIN — POLYETHYLENE GLYCOL 3350 17 GRAM(S): 17 POWDER, FOR SOLUTION ORAL at 05:46

## 2025-05-22 RX ADMIN — INSULIN LISPRO 6 UNIT(S): 100 INJECTION, SOLUTION INTRAVENOUS; SUBCUTANEOUS at 16:56

## 2025-05-22 RX ADMIN — BUMETANIDE 1 MILLIGRAM(S): 1 TABLET ORAL at 05:45

## 2025-05-22 RX ADMIN — IPRATROPIUM BROMIDE AND ALBUTEROL SULFATE 3 MILLILITER(S): .5; 2.5 SOLUTION RESPIRATORY (INHALATION) at 05:44

## 2025-05-22 RX ADMIN — Medication 81 MILLIGRAM(S): at 11:56

## 2025-05-23 DIAGNOSIS — I82.409 ACUTE EMBOLISM AND THROMBOSIS OF UNSPECIFIED DEEP VEINS OF UNSPECIFIED LOWER EXTREMITY: ICD-10-CM

## 2025-05-23 LAB
ANION GAP SERPL CALC-SCNC: 19 MMOL/L — HIGH (ref 5–17)
BUN SERPL-MCNC: 44 MG/DL — HIGH (ref 7–23)
CALCIUM SERPL-MCNC: 8.8 MG/DL — SIGNIFICANT CHANGE UP (ref 8.4–10.5)
CHLORIDE SERPL-SCNC: 93 MMOL/L — LOW (ref 96–108)
CO2 SERPL-SCNC: 23 MMOL/L — SIGNIFICANT CHANGE UP (ref 22–31)
CREAT SERPL-MCNC: 2.01 MG/DL — HIGH (ref 0.5–1.3)
EGFR: 32 ML/MIN/1.73M2 — LOW
EGFR: 32 ML/MIN/1.73M2 — LOW
GLUCOSE BLDC GLUCOMTR-MCNC: 140 MG/DL — HIGH (ref 70–99)
GLUCOSE BLDC GLUCOMTR-MCNC: 141 MG/DL — HIGH (ref 70–99)
GLUCOSE BLDC GLUCOMTR-MCNC: 252 MG/DL — HIGH (ref 70–99)
GLUCOSE BLDC GLUCOMTR-MCNC: 301 MG/DL — HIGH (ref 70–99)
GLUCOSE BLDC GLUCOMTR-MCNC: 335 MG/DL — HIGH (ref 70–99)
GLUCOSE SERPL-MCNC: 322 MG/DL — HIGH (ref 70–99)
HCT VFR BLD CALC: 32 % — LOW (ref 39–50)
HGB BLD-MCNC: 9.7 G/DL — LOW (ref 13–17)
MAGNESIUM SERPL-MCNC: 2 MG/DL — SIGNIFICANT CHANGE UP (ref 1.6–2.6)
MCHC RBC-ENTMCNC: 25.2 PG — LOW (ref 27–34)
MCHC RBC-ENTMCNC: 30.3 G/DL — LOW (ref 32–36)
MCV RBC AUTO: 83.1 FL — SIGNIFICANT CHANGE UP (ref 80–100)
NRBC BLD AUTO-RTO: 0 /100 WBCS — SIGNIFICANT CHANGE UP (ref 0–0)
PHOSPHATE SERPL-MCNC: 2.9 MG/DL — SIGNIFICANT CHANGE UP (ref 2.5–4.5)
PLATELET # BLD AUTO: 211 K/UL — SIGNIFICANT CHANGE UP (ref 150–400)
POTASSIUM SERPL-MCNC: 4.1 MMOL/L — SIGNIFICANT CHANGE UP (ref 3.5–5.3)
POTASSIUM SERPL-SCNC: 4.1 MMOL/L — SIGNIFICANT CHANGE UP (ref 3.5–5.3)
RBC # BLD: 3.85 M/UL — LOW (ref 4.2–5.8)
RBC # FLD: 21 % — HIGH (ref 10.3–14.5)
SODIUM SERPL-SCNC: 135 MMOL/L — SIGNIFICANT CHANGE UP (ref 135–145)
WBC # BLD: 10.16 K/UL — SIGNIFICANT CHANGE UP (ref 3.8–10.5)
WBC # FLD AUTO: 10.16 K/UL — SIGNIFICANT CHANGE UP (ref 3.8–10.5)

## 2025-05-23 PROCEDURE — 99232 SBSQ HOSP IP/OBS MODERATE 35: CPT

## 2025-05-23 PROCEDURE — 99233 SBSQ HOSP IP/OBS HIGH 50: CPT

## 2025-05-23 RX ORDER — CASPOFUNGIN ACETATE 5 MG/ML
50 INJECTION, POWDER, LYOPHILIZED, FOR SOLUTION INTRAVENOUS EVERY 24 HOURS
Refills: 0 | Status: DISCONTINUED | OUTPATIENT
Start: 2025-05-24 | End: 2025-05-24

## 2025-05-23 RX ORDER — CASPOFUNGIN ACETATE 5 MG/ML
70 INJECTION, POWDER, LYOPHILIZED, FOR SOLUTION INTRAVENOUS ONCE
Refills: 0 | Status: COMPLETED | OUTPATIENT
Start: 2025-05-23 | End: 2025-05-23

## 2025-05-23 RX ORDER — TIOTROPIUM BROMIDE AND OLODATEROL 3.124; 2.736 UG/1; UG/1
2 SPRAY, METERED RESPIRATORY (INHALATION) DAILY
Refills: 0 | Status: DISCONTINUED | OUTPATIENT
Start: 2025-05-23 | End: 2025-05-24

## 2025-05-23 RX ORDER — INSULIN LISPRO 100 U/ML
10 INJECTION, SOLUTION INTRAVENOUS; SUBCUTANEOUS
Refills: 0 | Status: DISCONTINUED | OUTPATIENT
Start: 2025-05-24 | End: 2025-05-24

## 2025-05-23 RX ORDER — CASPOFUNGIN ACETATE 5 MG/ML
INJECTION, POWDER, LYOPHILIZED, FOR SOLUTION INTRAVENOUS
Refills: 0
Start: 2025-05-23

## 2025-05-23 RX ORDER — INSULIN GLARGINE-YFGN 100 [IU]/ML
12 INJECTION, SOLUTION SUBCUTANEOUS AT BEDTIME
Refills: 0 | Status: DISCONTINUED | OUTPATIENT
Start: 2025-05-23 | End: 2025-05-24

## 2025-05-23 RX ORDER — INSULIN LISPRO 100 U/ML
7 INJECTION, SOLUTION INTRAVENOUS; SUBCUTANEOUS
Refills: 0 | Status: DISCONTINUED | OUTPATIENT
Start: 2025-05-23 | End: 2025-05-24

## 2025-05-23 RX ORDER — CASPOFUNGIN ACETATE 5 MG/ML
INJECTION, POWDER, LYOPHILIZED, FOR SOLUTION INTRAVENOUS
Refills: 0 | Status: DISCONTINUED | OUTPATIENT
Start: 2025-05-23 | End: 2025-05-24

## 2025-05-23 RX ORDER — INSULIN LISPRO 100 U/ML
INJECTION, SOLUTION INTRAVENOUS; SUBCUTANEOUS
Refills: 0 | Status: DISCONTINUED | OUTPATIENT
Start: 2025-05-23 | End: 2025-05-24

## 2025-05-23 RX ADMIN — IPRATROPIUM BROMIDE AND ALBUTEROL SULFATE 3 MILLILITER(S): .5; 2.5 SOLUTION RESPIRATORY (INHALATION) at 17:36

## 2025-05-23 RX ADMIN — INSULIN GLARGINE-YFGN 12 UNIT(S): 100 INJECTION, SOLUTION SUBCUTANEOUS at 21:30

## 2025-05-23 RX ADMIN — Medication 81 MILLIGRAM(S): at 11:51

## 2025-05-23 RX ADMIN — TIOTROPIUM BROMIDE AND OLODATEROL 2 PUFF(S): 3.124; 2.736 SPRAY, METERED RESPIRATORY (INHALATION) at 13:15

## 2025-05-23 RX ADMIN — IPRATROPIUM BROMIDE AND ALBUTEROL SULFATE 3 MILLILITER(S): .5; 2.5 SOLUTION RESPIRATORY (INHALATION) at 23:21

## 2025-05-23 RX ADMIN — APIXABAN 2.5 MILLIGRAM(S): 2.5 TABLET, FILM COATED ORAL at 17:35

## 2025-05-23 RX ADMIN — Medication 1 DOSE(S): at 05:32

## 2025-05-23 RX ADMIN — INSULIN LISPRO 3: 100 INJECTION, SOLUTION INTRAVENOUS; SUBCUTANEOUS at 08:23

## 2025-05-23 RX ADMIN — Medication 500000 UNIT(S): at 23:20

## 2025-05-23 RX ADMIN — Medication 500000 UNIT(S): at 17:35

## 2025-05-23 RX ADMIN — IPRATROPIUM BROMIDE AND ALBUTEROL SULFATE 3 MILLILITER(S): .5; 2.5 SOLUTION RESPIRATORY (INHALATION) at 11:51

## 2025-05-23 RX ADMIN — BUMETANIDE 1 MILLIGRAM(S): 1 TABLET ORAL at 05:33

## 2025-05-23 RX ADMIN — APIXABAN 2.5 MILLIGRAM(S): 2.5 TABLET, FILM COATED ORAL at 05:33

## 2025-05-23 RX ADMIN — INSULIN LISPRO 8 UNIT(S): 100 INJECTION, SOLUTION INTRAVENOUS; SUBCUTANEOUS at 08:22

## 2025-05-23 RX ADMIN — TICAGRELOR 90 MILLIGRAM(S): 90 TABLET ORAL at 08:23

## 2025-05-23 RX ADMIN — INSULIN LISPRO 7 UNIT(S): 100 INJECTION, SOLUTION INTRAVENOUS; SUBCUTANEOUS at 18:20

## 2025-05-23 RX ADMIN — DRONEDARONE 400 MILLIGRAM(S): 400 TABLET, FILM COATED ORAL at 05:33

## 2025-05-23 RX ADMIN — METOPROLOL SUCCINATE 12.5 MILLIGRAM(S): 50 TABLET, EXTENDED RELEASE ORAL at 05:33

## 2025-05-23 RX ADMIN — ROSUVASTATIN CALCIUM 40 MILLIGRAM(S): 20 TABLET, FILM COATED ORAL at 21:31

## 2025-05-23 RX ADMIN — METOPROLOL SUCCINATE 12.5 MILLIGRAM(S): 50 TABLET, EXTENDED RELEASE ORAL at 17:35

## 2025-05-23 RX ADMIN — Medication 325 MILLIGRAM(S): at 11:51

## 2025-05-23 RX ADMIN — DRONEDARONE 400 MILLIGRAM(S): 400 TABLET, FILM COATED ORAL at 17:36

## 2025-05-23 RX ADMIN — POLYETHYLENE GLYCOL 3350 17 GRAM(S): 17 POWDER, FOR SOLUTION ORAL at 17:36

## 2025-05-23 RX ADMIN — TICAGRELOR 90 MILLIGRAM(S): 90 TABLET ORAL at 21:31

## 2025-05-23 RX ADMIN — CASPOFUNGIN ACETATE 260 MILLIGRAM(S): 5 INJECTION, POWDER, LYOPHILIZED, FOR SOLUTION INTRAVENOUS at 11:14

## 2025-05-23 RX ADMIN — IPRATROPIUM BROMIDE AND ALBUTEROL SULFATE 3 MILLILITER(S): .5; 2.5 SOLUTION RESPIRATORY (INHALATION) at 00:35

## 2025-05-23 RX ADMIN — INSULIN LISPRO 8 UNIT(S): 100 INJECTION, SOLUTION INTRAVENOUS; SUBCUTANEOUS at 11:52

## 2025-05-23 RX ADMIN — IPRATROPIUM BROMIDE AND ALBUTEROL SULFATE 3 MILLILITER(S): .5; 2.5 SOLUTION RESPIRATORY (INHALATION) at 05:32

## 2025-05-23 RX ADMIN — AMLODIPINE BESYLATE 5 MILLIGRAM(S): 10 TABLET ORAL at 05:33

## 2025-05-23 RX ADMIN — Medication 40 MILLIGRAM(S): at 17:36

## 2025-05-23 RX ADMIN — INSULIN LISPRO 4: 100 INJECTION, SOLUTION INTRAVENOUS; SUBCUTANEOUS at 18:20

## 2025-05-23 RX ADMIN — Medication 40 MILLIGRAM(S): at 05:31

## 2025-05-24 ENCOUNTER — TRANSCRIPTION ENCOUNTER (OUTPATIENT)
Age: 85
End: 2025-05-24

## 2025-05-24 VITALS
OXYGEN SATURATION: 99 % | DIASTOLIC BLOOD PRESSURE: 58 MMHG | SYSTOLIC BLOOD PRESSURE: 135 MMHG | TEMPERATURE: 97 F | RESPIRATION RATE: 18 BRPM | HEART RATE: 68 BPM

## 2025-05-24 LAB
GLUCOSE BLDC GLUCOMTR-MCNC: 117 MG/DL — HIGH (ref 70–99)
GLUCOSE BLDC GLUCOMTR-MCNC: 136 MG/DL — HIGH (ref 70–99)
GLUCOSE BLDC GLUCOMTR-MCNC: 142 MG/DL — HIGH (ref 70–99)

## 2025-05-24 PROCEDURE — 70450 CT HEAD/BRAIN W/O DYE: CPT

## 2025-05-24 PROCEDURE — 0225U NFCT DS DNA&RNA 21 SARSCOV2: CPT

## 2025-05-24 PROCEDURE — 76937 US GUIDE VASCULAR ACCESS: CPT

## 2025-05-24 PROCEDURE — 70498 CT ANGIOGRAPHY NECK: CPT

## 2025-05-24 PROCEDURE — 82607 VITAMIN B-12: CPT

## 2025-05-24 PROCEDURE — C9399: CPT

## 2025-05-24 PROCEDURE — 84484 ASSAY OF TROPONIN QUANT: CPT

## 2025-05-24 PROCEDURE — 84132 ASSAY OF SERUM POTASSIUM: CPT

## 2025-05-24 PROCEDURE — 92610 EVALUATE SWALLOWING FUNCTION: CPT

## 2025-05-24 PROCEDURE — 84100 ASSAY OF PHOSPHORUS: CPT

## 2025-05-24 PROCEDURE — 81003 URINALYSIS AUTO W/O SCOPE: CPT

## 2025-05-24 PROCEDURE — 70551 MRI BRAIN STEM W/O DYE: CPT

## 2025-05-24 PROCEDURE — 71045 X-RAY EXAM CHEST 1 VIEW: CPT

## 2025-05-24 PROCEDURE — 85730 THROMBOPLASTIN TIME PARTIAL: CPT

## 2025-05-24 PROCEDURE — 85018 HEMOGLOBIN: CPT

## 2025-05-24 PROCEDURE — 83550 IRON BINDING TEST: CPT

## 2025-05-24 PROCEDURE — 97116 GAIT TRAINING THERAPY: CPT

## 2025-05-24 PROCEDURE — 70547 MR ANGIOGRAPHY NECK W/O DYE: CPT

## 2025-05-24 PROCEDURE — C1894: CPT

## 2025-05-24 PROCEDURE — 36569 INSJ PICC 5 YR+ W/O IMAGING: CPT

## 2025-05-24 PROCEDURE — 82435 ASSAY OF BLOOD CHLORIDE: CPT

## 2025-05-24 PROCEDURE — 82553 CREATINE MB FRACTION: CPT

## 2025-05-24 PROCEDURE — 99232 SBSQ HOSP IP/OBS MODERATE 35: CPT

## 2025-05-24 PROCEDURE — C1751: CPT

## 2025-05-24 PROCEDURE — P9040: CPT

## 2025-05-24 PROCEDURE — 82330 ASSAY OF CALCIUM: CPT

## 2025-05-24 PROCEDURE — 83735 ASSAY OF MAGNESIUM: CPT

## 2025-05-24 PROCEDURE — 82570 ASSAY OF URINE CREATININE: CPT

## 2025-05-24 PROCEDURE — 97530 THERAPEUTIC ACTIVITIES: CPT

## 2025-05-24 PROCEDURE — 85045 AUTOMATED RETICULOCYTE COUNT: CPT

## 2025-05-24 PROCEDURE — 93306 TTE W/DOPPLER COMPLETE: CPT

## 2025-05-24 PROCEDURE — 87637 SARSCOV2&INF A&B&RSV AMP PRB: CPT

## 2025-05-24 PROCEDURE — 86923 COMPATIBILITY TEST ELECTRIC: CPT

## 2025-05-24 PROCEDURE — 71045 X-RAY EXAM CHEST 1 VIEW: CPT | Mod: 26

## 2025-05-24 PROCEDURE — 82746 ASSAY OF FOLIC ACID SERUM: CPT

## 2025-05-24 PROCEDURE — 74018 RADEX ABDOMEN 1 VIEW: CPT

## 2025-05-24 PROCEDURE — 93005 ELECTROCARDIOGRAM TRACING: CPT

## 2025-05-24 PROCEDURE — 80061 LIPID PANEL: CPT

## 2025-05-24 PROCEDURE — C1887: CPT

## 2025-05-24 PROCEDURE — 81001 URINALYSIS AUTO W/SCOPE: CPT

## 2025-05-24 PROCEDURE — 83036 HEMOGLOBIN GLYCOSYLATED A1C: CPT

## 2025-05-24 PROCEDURE — 97161 PT EVAL LOW COMPLEX 20 MIN: CPT

## 2025-05-24 PROCEDURE — 71250 CT THORAX DX C-: CPT

## 2025-05-24 PROCEDURE — 83520 IMMUNOASSAY QUANT NOS NONAB: CPT

## 2025-05-24 PROCEDURE — 96372 THER/PROPH/DIAG INJ SC/IM: CPT

## 2025-05-24 PROCEDURE — A9585: CPT

## 2025-05-24 PROCEDURE — 83010 ASSAY OF HAPTOGLOBIN QUANT: CPT

## 2025-05-24 PROCEDURE — 93970 EXTREMITY STUDY: CPT

## 2025-05-24 PROCEDURE — C1760: CPT

## 2025-05-24 PROCEDURE — 36430 TRANSFUSION BLD/BLD COMPNT: CPT

## 2025-05-24 PROCEDURE — 80048 BASIC METABOLIC PNL TOTAL CA: CPT

## 2025-05-24 PROCEDURE — C2628: CPT

## 2025-05-24 PROCEDURE — 86850 RBC ANTIBODY SCREEN: CPT

## 2025-05-24 PROCEDURE — 93356 MYOCRD STRAIN IMG SPCKL TRCK: CPT

## 2025-05-24 PROCEDURE — 83615 LACTATE (LD) (LDH) ENZYME: CPT

## 2025-05-24 PROCEDURE — 82962 GLUCOSE BLOOD TEST: CPT

## 2025-05-24 PROCEDURE — 86901 BLOOD TYPING SEROLOGIC RH(D): CPT

## 2025-05-24 PROCEDURE — 36415 COLL VENOUS BLD VENIPUNCTURE: CPT

## 2025-05-24 PROCEDURE — 82947 ASSAY GLUCOSE BLOOD QUANT: CPT

## 2025-05-24 PROCEDURE — 94640 AIRWAY INHALATION TREATMENT: CPT

## 2025-05-24 PROCEDURE — 82550 ASSAY OF CK (CPK): CPT

## 2025-05-24 PROCEDURE — 83880 ASSAY OF NATRIURETIC PEPTIDE: CPT

## 2025-05-24 PROCEDURE — 85025 COMPLETE CBC W/AUTO DIFF WBC: CPT

## 2025-05-24 PROCEDURE — 61635 INTRACRAN ANGIOPLSTY W/STENT: CPT

## 2025-05-24 PROCEDURE — 97165 OT EVAL LOW COMPLEX 30 MIN: CPT

## 2025-05-24 PROCEDURE — 85576 BLOOD PLATELET AGGREGATION: CPT

## 2025-05-24 PROCEDURE — 84295 ASSAY OF SERUM SODIUM: CPT

## 2025-05-24 PROCEDURE — 83605 ASSAY OF LACTIC ACID: CPT

## 2025-05-24 PROCEDURE — 85014 HEMATOCRIT: CPT

## 2025-05-24 PROCEDURE — 82010 KETONE BODYS QUAN: CPT

## 2025-05-24 PROCEDURE — 82803 BLOOD GASES ANY COMBINATION: CPT

## 2025-05-24 PROCEDURE — 70496 CT ANGIOGRAPHY HEAD: CPT

## 2025-05-24 PROCEDURE — C1874: CPT

## 2025-05-24 PROCEDURE — 87640 STAPH A DNA AMP PROBE: CPT

## 2025-05-24 PROCEDURE — 99285 EMERGENCY DEPT VISIT HI MDM: CPT | Mod: 25

## 2025-05-24 PROCEDURE — 70544 MR ANGIOGRAPHY HEAD W/O DYE: CPT

## 2025-05-24 PROCEDURE — 84156 ASSAY OF PROTEIN URINE: CPT

## 2025-05-24 PROCEDURE — 97535 SELF CARE MNGMENT TRAINING: CPT

## 2025-05-24 PROCEDURE — 87641 MR-STAPH DNA AMP PROBE: CPT

## 2025-05-24 PROCEDURE — 82728 ASSAY OF FERRITIN: CPT

## 2025-05-24 PROCEDURE — 83540 ASSAY OF IRON: CPT

## 2025-05-24 PROCEDURE — C1769: CPT

## 2025-05-24 PROCEDURE — 97168 OT RE-EVAL EST PLAN CARE: CPT

## 2025-05-24 PROCEDURE — 85610 PROTHROMBIN TIME: CPT

## 2025-05-24 PROCEDURE — 85027 COMPLETE CBC AUTOMATED: CPT

## 2025-05-24 PROCEDURE — 86900 BLOOD TYPING SEROLOGIC ABO: CPT

## 2025-05-24 PROCEDURE — 80053 COMPREHEN METABOLIC PANEL: CPT

## 2025-05-24 PROCEDURE — C9460: CPT

## 2025-05-24 PROCEDURE — 70553 MRI BRAIN STEM W/O & W/DYE: CPT

## 2025-05-24 RX ORDER — DEXTROMETHORPHAN HBR, GUAIFENESIN 200 MG/10ML
5 LIQUID ORAL
Qty: 0 | Refills: 0 | DISCHARGE
Start: 2025-05-24

## 2025-05-24 RX ORDER — AMLODIPINE BESYLATE 10 MG/1
1 TABLET ORAL
Qty: 30 | Refills: 0
Start: 2025-05-24 | End: 2025-06-22

## 2025-05-24 RX ORDER — FUROSEMIDE 10 MG/ML
40 INJECTION INTRAMUSCULAR; INTRAVENOUS DAILY
Refills: 0 | Status: DISCONTINUED | OUTPATIENT
Start: 2025-05-25 | End: 2025-05-24

## 2025-05-24 RX ORDER — TIOTROPIUM BROMIDE AND OLODATEROL 3.124; 2.736 UG/1; UG/1
2 SPRAY, METERED RESPIRATORY (INHALATION)
Qty: 4 | Refills: 0
Start: 2025-05-24 | End: 2025-06-22

## 2025-05-24 RX ORDER — FUROSEMIDE 10 MG/ML
1 INJECTION INTRAMUSCULAR; INTRAVENOUS
Qty: 30 | Refills: 0
Start: 2025-05-24 | End: 2025-06-22

## 2025-05-24 RX ORDER — AMLODIPINE BESYLATE 10 MG/1
1 TABLET ORAL
Qty: 0 | Refills: 0 | DISCHARGE
Start: 2025-05-24

## 2025-05-24 RX ORDER — METOPROLOL SUCCINATE 50 MG/1
1 TABLET, EXTENDED RELEASE ORAL
Qty: 30 | Refills: 0
Start: 2025-05-24 | End: 2025-06-22

## 2025-05-24 RX ORDER — DRONEDARONE 400 MG/1
1 TABLET, FILM COATED ORAL
Refills: 0 | DISCHARGE

## 2025-05-24 RX ORDER — FUROSEMIDE 10 MG/ML
1 INJECTION INTRAMUSCULAR; INTRAVENOUS
Qty: 30 | Refills: 0 | DISCHARGE
Start: 2025-05-24 | End: 2025-06-22

## 2025-05-24 RX ORDER — METOPROLOL SUCCINATE 50 MG/1
1 TABLET, EXTENDED RELEASE ORAL
Refills: 0 | DISCHARGE

## 2025-05-24 RX ORDER — ASPIRIN 325 MG
1 TABLET ORAL
Qty: 0 | Refills: 0 | DISCHARGE
Start: 2025-05-24

## 2025-05-24 RX ORDER — ACETAMINOPHEN 500 MG/5ML
2 LIQUID (ML) ORAL
Qty: 0 | Refills: 0 | DISCHARGE
Start: 2025-05-24

## 2025-05-24 RX ORDER — TICAGRELOR 90 MG/1
1 TABLET ORAL
Qty: 60 | Refills: 0
Start: 2025-05-24 | End: 2025-06-22

## 2025-05-24 RX ORDER — GLUCAGON 3 MG/1
1 POWDER NASAL
Qty: 1 | Refills: 0
Start: 2025-05-24

## 2025-05-24 RX ORDER — POLYETHYLENE GLYCOL 3350 17 G/17G
17 POWDER, FOR SOLUTION ORAL
Refills: 0 | DISCHARGE
Start: 2025-05-24

## 2025-05-24 RX ORDER — SENNA 187 MG
2 TABLET ORAL
Qty: 0 | Refills: 0 | DISCHARGE
Start: 2025-05-24

## 2025-05-24 RX ORDER — DRONEDARONE 400 MG/1
1 TABLET, FILM COATED ORAL
Qty: 60 | Refills: 0
Start: 2025-05-24 | End: 2025-06-22

## 2025-05-24 RX ORDER — INSULIN GLARGINE-YFGN 100 [IU]/ML
20 INJECTION, SOLUTION SUBCUTANEOUS
Refills: 0 | DISCHARGE
Start: 2025-05-24

## 2025-05-24 RX ORDER — FERROUS SULFATE 137(45) MG
1 TABLET, EXTENDED RELEASE ORAL
Qty: 0 | Refills: 0 | DISCHARGE
Start: 2025-05-24

## 2025-05-24 RX ORDER — LISINOPRIL 5 MG/1
1 TABLET ORAL
Refills: 0 | DISCHARGE

## 2025-05-24 RX ORDER — SIMETHICONE 80 MG
1 TABLET,CHEWABLE ORAL
Qty: 0 | Refills: 0 | DISCHARGE
Start: 2025-05-24

## 2025-05-24 RX ORDER — CASPOFUNGIN ACETATE 5 MG/ML
50 INJECTION, POWDER, LYOPHILIZED, FOR SOLUTION INTRAVENOUS
Qty: 0 | Refills: 0 | DISCHARGE
Start: 2025-05-24

## 2025-05-24 RX ADMIN — INSULIN LISPRO 8 UNIT(S): 100 INJECTION, SOLUTION INTRAVENOUS; SUBCUTANEOUS at 07:55

## 2025-05-24 RX ADMIN — AMLODIPINE BESYLATE 5 MILLIGRAM(S): 10 TABLET ORAL at 05:58

## 2025-05-24 RX ADMIN — INSULIN LISPRO 10 UNIT(S): 100 INJECTION, SOLUTION INTRAVENOUS; SUBCUTANEOUS at 11:45

## 2025-05-24 RX ADMIN — TICAGRELOR 90 MILLIGRAM(S): 90 TABLET ORAL at 07:55

## 2025-05-24 RX ADMIN — Medication 500000 UNIT(S): at 05:56

## 2025-05-24 RX ADMIN — CASPOFUNGIN ACETATE 250 MILLIGRAM(S): 5 INJECTION, POWDER, LYOPHILIZED, FOR SOLUTION INTRAVENOUS at 10:04

## 2025-05-24 RX ADMIN — Medication 81 MILLIGRAM(S): at 12:43

## 2025-05-24 RX ADMIN — BUMETANIDE 1 MILLIGRAM(S): 1 TABLET ORAL at 05:56

## 2025-05-24 RX ADMIN — Medication 40 MILLIGRAM(S): at 05:56

## 2025-05-24 RX ADMIN — Medication 500000 UNIT(S): at 12:43

## 2025-05-24 RX ADMIN — POLYETHYLENE GLYCOL 3350 17 GRAM(S): 17 POWDER, FOR SOLUTION ORAL at 05:56

## 2025-05-24 RX ADMIN — IPRATROPIUM BROMIDE AND ALBUTEROL SULFATE 3 MILLILITER(S): .5; 2.5 SOLUTION RESPIRATORY (INHALATION) at 06:30

## 2025-05-24 RX ADMIN — TIOTROPIUM BROMIDE AND OLODATEROL 2 PUFF(S): 3.124; 2.736 SPRAY, METERED RESPIRATORY (INHALATION) at 12:43

## 2025-05-24 RX ADMIN — DRONEDARONE 400 MILLIGRAM(S): 400 TABLET, FILM COATED ORAL at 05:57

## 2025-05-24 RX ADMIN — METOPROLOL SUCCINATE 12.5 MILLIGRAM(S): 50 TABLET, EXTENDED RELEASE ORAL at 05:57

## 2025-05-24 RX ADMIN — Medication 325 MILLIGRAM(S): at 12:43

## 2025-05-24 RX ADMIN — APIXABAN 2.5 MILLIGRAM(S): 2.5 TABLET, FILM COATED ORAL at 05:58

## 2025-05-24 RX ADMIN — IPRATROPIUM BROMIDE AND ALBUTEROL SULFATE 3 MILLILITER(S): .5; 2.5 SOLUTION RESPIRATORY (INHALATION) at 12:43

## 2025-05-27 DIAGNOSIS — I82.90 ACUTE EMBOLISM AND THROMBOSIS OF UNSPECIFIED VEIN: ICD-10-CM

## 2025-06-03 ENCOUNTER — APPOINTMENT (OUTPATIENT)
Dept: ENDOCRINOLOGY | Facility: CLINIC | Age: 85
End: 2025-06-03

## 2025-06-12 ENCOUNTER — INPATIENT (INPATIENT)
Facility: HOSPITAL | Age: 85
LOS: 6 days | Discharge: HOME | DRG: 149 | End: 2025-06-19
Attending: STUDENT IN AN ORGANIZED HEALTH CARE EDUCATION/TRAINING PROGRAM | Admitting: HOSPITALIST
Payer: MEDICARE

## 2025-06-12 VITALS
OXYGEN SATURATION: 99 % | SYSTOLIC BLOOD PRESSURE: 121 MMHG | RESPIRATION RATE: 18 BRPM | WEIGHT: 123.9 LBS | TEMPERATURE: 98 F | HEART RATE: 70 BPM | DIASTOLIC BLOOD PRESSURE: 72 MMHG | HEIGHT: 60 IN

## 2025-06-12 LAB
ALBUMIN SERPL ELPH-MCNC: 3.8 G/DL — SIGNIFICANT CHANGE UP (ref 3.3–5)
ALP SERPL-CCNC: 65 U/L — SIGNIFICANT CHANGE UP (ref 40–120)
ALT FLD-CCNC: 31 U/L — SIGNIFICANT CHANGE UP (ref 10–45)
ANION GAP SERPL CALC-SCNC: 16 MMOL/L — SIGNIFICANT CHANGE UP (ref 5–17)
ANISOCYTOSIS BLD QL: SLIGHT — SIGNIFICANT CHANGE UP
APTT BLD: 33.7 SEC — SIGNIFICANT CHANGE UP (ref 26.1–36.8)
AST SERPL-CCNC: 30 U/L — SIGNIFICANT CHANGE UP (ref 10–40)
BASOPHILS # BLD AUTO: 0 K/UL — SIGNIFICANT CHANGE UP (ref 0–0.2)
BASOPHILS NFR BLD AUTO: 0 % — SIGNIFICANT CHANGE UP (ref 0–2)
BILIRUB SERPL-MCNC: 0.9 MG/DL — SIGNIFICANT CHANGE UP (ref 0.2–1.2)
BUN SERPL-MCNC: 37 MG/DL — HIGH (ref 7–23)
CALCIUM SERPL-MCNC: 10.3 MG/DL — SIGNIFICANT CHANGE UP (ref 8.4–10.5)
CHLORIDE SERPL-SCNC: 97 MMOL/L — SIGNIFICANT CHANGE UP (ref 96–108)
CO2 SERPL-SCNC: 22 MMOL/L — SIGNIFICANT CHANGE UP (ref 22–31)
CREAT SERPL-MCNC: 2.29 MG/DL — HIGH (ref 0.5–1.3)
EGFR: 28 ML/MIN/1.73M2 — LOW
EGFR: 28 ML/MIN/1.73M2 — LOW
ELLIPTOCYTES BLD QL SMEAR: SLIGHT — SIGNIFICANT CHANGE UP
EOSINOPHIL # BLD AUTO: 0 K/UL — SIGNIFICANT CHANGE UP (ref 0–0.5)
EOSINOPHIL NFR BLD AUTO: 0 % — SIGNIFICANT CHANGE UP (ref 0–6)
GLUCOSE SERPL-MCNC: 295 MG/DL — HIGH (ref 70–99)
HCT VFR BLD CALC: 38 % — LOW (ref 39–50)
HGB BLD-MCNC: 11.6 G/DL — LOW (ref 13–17)
INR BLD: 1.48 RATIO — HIGH (ref 0.85–1.16)
LYMPHOCYTES # BLD AUTO: 1.99 K/UL — SIGNIFICANT CHANGE UP (ref 1–3.3)
LYMPHOCYTES # BLD AUTO: 20 % — SIGNIFICANT CHANGE UP (ref 13–44)
MACROCYTES BLD QL: SLIGHT — SIGNIFICANT CHANGE UP
MAGNESIUM SERPL-MCNC: 2.4 MG/DL — SIGNIFICANT CHANGE UP (ref 1.6–2.6)
MANUAL SMEAR VERIFICATION: SIGNIFICANT CHANGE UP
MCHC RBC-ENTMCNC: 26.2 PG — LOW (ref 27–34)
MCHC RBC-ENTMCNC: 30.5 G/DL — LOW (ref 32–36)
MCV RBC AUTO: 85.8 FL — SIGNIFICANT CHANGE UP (ref 80–100)
MONOCYTES # BLD AUTO: 0.61 K/UL — SIGNIFICANT CHANGE UP (ref 0–0.9)
MONOCYTES NFR BLD AUTO: 6.1 % — SIGNIFICANT CHANGE UP (ref 2–14)
MYELOCYTES NFR BLD: 0.9 % — HIGH (ref 0–0)
NEUTROPHILS # BLD AUTO: 7.28 K/UL — SIGNIFICANT CHANGE UP (ref 1.8–7.4)
NEUTROPHILS NFR BLD AUTO: 73 % — SIGNIFICANT CHANGE UP (ref 43–77)
NRBC # BLD: 2 /100 WBCS — HIGH (ref 0–0)
NRBC BLD-RTO: 2 /100 WBCS — HIGH (ref 0–0)
OVALOCYTES BLD QL SMEAR: SLIGHT — SIGNIFICANT CHANGE UP
PHOSPHATE SERPL-MCNC: 4 MG/DL — SIGNIFICANT CHANGE UP (ref 2.5–4.5)
PLAT MORPH BLD: NORMAL — SIGNIFICANT CHANGE UP
PLATELET # BLD AUTO: 317 K/UL — SIGNIFICANT CHANGE UP (ref 150–400)
POIKILOCYTOSIS BLD QL AUTO: SLIGHT — SIGNIFICANT CHANGE UP
POLYCHROMASIA BLD QL SMEAR: SLIGHT — SIGNIFICANT CHANGE UP
POTASSIUM SERPL-MCNC: 3.8 MMOL/L — SIGNIFICANT CHANGE UP (ref 3.5–5.3)
POTASSIUM SERPL-SCNC: 3.8 MMOL/L — SIGNIFICANT CHANGE UP (ref 3.5–5.3)
PROT SERPL-MCNC: 7.7 G/DL — SIGNIFICANT CHANGE UP (ref 6–8.3)
PROTHROM AB SERPL-ACNC: 16.8 SEC — HIGH (ref 9.9–13.4)
RBC # BLD: 4.43 M/UL — SIGNIFICANT CHANGE UP (ref 4.2–5.8)
RBC # FLD: 20.2 % — HIGH (ref 10.3–14.5)
RBC BLD AUTO: ABNORMAL
SCHISTOCYTES BLD QL AUTO: SLIGHT — SIGNIFICANT CHANGE UP
SODIUM SERPL-SCNC: 135 MMOL/L — SIGNIFICANT CHANGE UP (ref 135–145)
TROPONIN T, HIGH SENSITIVITY RESULT: 55 NG/L — HIGH (ref 0–51)
WBC # BLD: 9.97 K/UL — SIGNIFICANT CHANGE UP (ref 3.8–10.5)
WBC # FLD AUTO: 9.97 K/UL — SIGNIFICANT CHANGE UP (ref 3.8–10.5)

## 2025-06-12 PROCEDURE — 70496 CT ANGIOGRAPHY HEAD: CPT | Mod: 26

## 2025-06-12 PROCEDURE — 99285 EMERGENCY DEPT VISIT HI MDM: CPT

## 2025-06-12 PROCEDURE — 70450 CT HEAD/BRAIN W/O DYE: CPT | Mod: 26,XU

## 2025-06-12 PROCEDURE — 93010 ELECTROCARDIOGRAM REPORT: CPT

## 2025-06-12 PROCEDURE — 70498 CT ANGIOGRAPHY NECK: CPT | Mod: 26

## 2025-06-12 PROCEDURE — 71045 X-RAY EXAM CHEST 1 VIEW: CPT | Mod: 26

## 2025-06-12 NOTE — ED PROVIDER NOTE - ATTENDING CONTRIBUTION TO CARE
------------ATTENDING NOTE------------  pt w/ wife c/o increasing vertigo/disequilibrium, difficulty walking, complicated as recent basilar stent, no additional numbness / weakness / loss of function, also concerns for syncope as pt also feels lightheaded and wife heard pt fall and found pt w/ brief LOC on ground, no trauma complaints, awaiting labs / imaging / close reassessments / coordinate w/ NSGY / Neuro -->  - Claudio Graham MD   --------------------------------------------------------

## 2025-06-12 NOTE — ED PROVIDER NOTE - PHYSICAL EXAMINATION
General: NAD. Nontoxic, well appearing. Speaking in full sentences with appropriate phonation.  Head: NC, small palpable hematoma to R posterior scalp, no bogginess  Eyes: PERRL, EOMI. Conjunctiva/sclera clear  Neck: Supple, FROM, c-spine cleared clinically, no midline c/t/l ttp  Cardiac: RRR. No MGR appreciated. No chest wall TTP  Pulmonary: CTAB. No increased WOB  Abdominal: Soft, nontender, nondistended, no peritoneal signs.  Neurologic: CN II through XII intact however slow to respond to neuroexam instructions. Moves all extremities. Strength 5/5 in all extremities, sensation intact  Musculoskeletal: Strength appropriate in all 4 extremities for age with no limited ROM. No visible deformities or extremity swelling. No TTP to extremities or pain with ROM.   Skin: Color appropriate for race. Intact, warm, and well-perfused. No visible lesions or bruising.

## 2025-06-12 NOTE — ED PROVIDER NOTE - PROGRESS NOTE DETAILS
EM PGY-2/Dragan, DO: Discussed with neurosurgery, recommending CT head and CTA in ED, with respect to patient's CKD and EGFR of 28, benefits outweigh risks as recommended by neurosurgery which we are in agreement with, discussed with CT tech, will proceed with imaging and give IVF after. Awaiting neurosurgery eval and recs, anticipate admission Attending Zen Bonilla:  Patient signed out to me. Hemodynamically stable here for likely central etiology of dizziness found to have severe right vertebral stenosis possible vertebrobasilar syndrome resulting in syncope, neurosurg consulted pending their recommendations.    I have fully participated in the care of this patient from the time of signout. I have made amendments to the documentation that were entered from the time of signout where appropriate and have supervised the ongoing plan of care enacted by the Fellow/Resident/ACP/Student. Attending Zen Bonilla:  called to bedside as patient with altered mental status weakness in all 4 extremities.  Eyes were open.  Patient able to verbalize name but not able to localize to touch on either side.  Presuming syncope as when patient was helped back into bed, return to neurologic baseline, conversant able to move all 4 extremities without restriction. residual symptom of dizziness. Patient does not remember the events that just occurred.  Secondary to known vascular stents and severe vertebral artery stenosis on the right side possibility of vertebrobasilar insufficiency resulting in syncope or basilar mediated stroke.  Called as a code stroke.

## 2025-06-12 NOTE — ED ADULT NURSE NOTE - NSFALLHARMRISKINTERV_ED_ALL_ED

## 2025-06-12 NOTE — ED ADULT NURSE NOTE - CHIEF COMPLAINT QUOTE
soft dizziness and falling down; dx with basilar artery stenosis; stent placed x 4 weeks; no improvement in dizziness and falling

## 2025-06-12 NOTE — ED ADULT TRIAGE NOTE - CHIEF COMPLAINT QUOTE
dizziness and falling down; dx with basilar artery stenosis; stent placed x 4 weeks; no improvement in dizziness and falling

## 2025-06-12 NOTE — ED PROVIDER NOTE - CLINICAL SUMMARY MEDICAL DECISION MAKING FREE TEXT BOX
EM PGY-2/Dragan, DO: 85-year-old male PMHx HTN, HLD, DM2, CAD s/p CABG 20 years ago, stents 2022, A-fib on Eliquis, SVT, HFpEF, CKD stage III baseline 1.2-1.6, prostate CA s/p chemo/RT, recent admission 5/11 through 5/24/2025 for central vertigo s/p basilar stent to PCA 5/13 (Dr. Jonny Caballero, [place]) on aspirin and Brilinta; presents to ED with wife at bedside due to worsening vertigo with approximately 5 falls as of yesterday that were witnessed by wife who picked him up subsequently, however endorsed 1 head strike last night with no LOC.  Per wife patient has been acting his normal self other than issues with balance.  Patient denies any injuries or sources of pain related to the fall other than a slight hematoma on the right posterior aspect of his scalp.  Patient denies any recent chest pains, SOB, lightheadedness that preceded his falls.  Denies any joint or extremity injuries, numbness, tingling, incontinence, fevers, chills, N/V/D, abdominal pain or any other acute symptoms at this time.    TTE 5/16 EF 65-70%    MDM: 85-year-old male with above-mentioned history presenting for multiple falls associated with central vertigo that was addressed during recent hospitalization detailed above s/p PCA stent placed with follow-up MRI 5/15/2025 showing stent patency.  On exam patient is in NAD, speaks in full sentences, answers questions appropriately, moves all extremities, has nonpainful range to all joints and no TTP to extremities, chest wall or abdomen.  Has small right posterior scalp hematoma secondary to fall.  CN II through XII intact however slow to respond to neuroexam instructions. DDx includes worsening posterior circulation, likely not acute CVA, issue with stent, possibly syncopal, less concerned with MSK related injuries from fall, less concern for rhabdo as patient was helped immediately after falls, will assess for ICH given fall on Eliquis with head strike.  Consult neurosurgery.  Anticipate admission.

## 2025-06-12 NOTE — ED PROVIDER NOTE - CARE PLAN
1 Principal Discharge DX:	Peripheral vertigo  Secondary Diagnosis:	Syncope   Principal Discharge DX:	Vertigo  Secondary Diagnosis:	Syncope

## 2025-06-12 NOTE — ED PROVIDER NOTE - COVID-19 RESULT
TREATMENT PLAN REVIEW - [address] 41 y o  1972 male MRN: [id number]    [address] [address] Room / Bed: [facility] 000-04 Encounter: [id number]          Admit Date/Time:  8/27/2020  7:51 PM    Treatment Team: Attending Provider: Janes Gill MD; Consulting Physician: Mikey Haskins DO; Patient Care Assistant: Park Oates; Care Manager: Coco Capellan; Patient Care Assistant: Tommie Parnell; Patient Care Assistant: Akil Ballard; Licensed Practical Nurse: Shayy Joyce LPN; : LEXI Mckinney; Registered Nurse: Sheila Doe RN; Patient Care Assistant: Lisa Madrid    Diagnosis: Principal Problem:    Bipolar I disorder, most recent episode depressed, severe without psychotic features ([facility] [address] )  Active Problems:    Polysubstance abuse [facility])      Patient Strengths/Assets: cooperative, communication skills, compliant with medication    Patient Barriers/Limitations: substance abuse    Short Term Goals: decrease in depressive symptoms, decrease in suicidal thoughts    Long Term Goals: stabilization of mood, free of suicidal thoughts    Progress Towards Goals: starting psychiatric medications as prescribed    Recommended Treatment: medication management, patient medication education, group therapy, milieu therapy, continued Behavioral Health psychiatric evaluation/assessment process    Treatment Frequency: daily medication monitoring, group and milieu therapy daily, monitoring through interdisciplinary rounds, monitoring through weekly patient care conferences    Expected Discharge Date:  14 days    Discharge Plan: return to previous living arrangement    Treatment Plan Created/Updated By: Janes Gill MD NEGATIVE

## 2025-06-12 NOTE — ED ADULT TRIAGE NOTE - BEFAST ARM NUMBNESS
At [facility], one important tool we use to improve our patient services is our Patient Survey.  Following your visit you may receive our survey in the mail.    Please take the time to complete the survey.    If your visit with us was great, we want to hear about it.    If we can improve, please let us know how.          No

## 2025-06-12 NOTE — ED ADULT NURSE NOTE - OBJECTIVE STATEMENT
85 y.o male c/o lightheadedness and difficulty with balance x few weeks. Endorses recent basilar artery stent placed x 4 weeks ago (on Eliquis and Brilinta). Denies CP, SOB, NV, fevers, cough, visual changes, syncope, changes in speech, HA   PMH HLD, DMT2, HTN

## 2025-06-13 DIAGNOSIS — E78.5 HYPERLIPIDEMIA, UNSPECIFIED: ICD-10-CM

## 2025-06-13 DIAGNOSIS — I25.10 ATHEROSCLEROTIC HEART DISEASE OF NATIVE CORONARY ARTERY WITHOUT ANGINA PECTORIS: ICD-10-CM

## 2025-06-13 DIAGNOSIS — R53.1 WEAKNESS: ICD-10-CM

## 2025-06-13 DIAGNOSIS — Z29.9 ENCOUNTER FOR PROPHYLACTIC MEASURES, UNSPECIFIED: ICD-10-CM

## 2025-06-13 DIAGNOSIS — N18.30 CHRONIC KIDNEY DISEASE, STAGE 3 UNSPECIFIED: ICD-10-CM

## 2025-06-13 DIAGNOSIS — C61 MALIGNANT NEOPLASM OF PROSTATE: ICD-10-CM

## 2025-06-13 DIAGNOSIS — H81.399 OTHER PERIPHERAL VERTIGO, UNSPECIFIED EAR: ICD-10-CM

## 2025-06-13 DIAGNOSIS — I10 ESSENTIAL (PRIMARY) HYPERTENSION: ICD-10-CM

## 2025-06-13 DIAGNOSIS — I48.0 PAROXYSMAL ATRIAL FIBRILLATION: ICD-10-CM

## 2025-06-13 DIAGNOSIS — I50.32 CHRONIC DIASTOLIC (CONGESTIVE) HEART FAILURE: ICD-10-CM

## 2025-06-13 LAB
GLUCOSE BLDC GLUCOMTR-MCNC: 201 MG/DL — HIGH (ref 70–99)
GLUCOSE BLDC GLUCOMTR-MCNC: 239 MG/DL — HIGH (ref 70–99)
GLUCOSE BLDC GLUCOMTR-MCNC: 299 MG/DL — HIGH (ref 70–99)
TROPONIN T, HIGH SENSITIVITY RESULT: 47 NG/L — SIGNIFICANT CHANGE UP (ref 0–51)

## 2025-06-13 PROCEDURE — 70547 MR ANGIOGRAPHY NECK W/O DYE: CPT | Mod: 26

## 2025-06-13 PROCEDURE — 99222 1ST HOSP IP/OBS MODERATE 55: CPT | Mod: GC

## 2025-06-13 PROCEDURE — 70551 MRI BRAIN STEM W/O DYE: CPT | Mod: 26

## 2025-06-13 PROCEDURE — 70544 MR ANGIOGRAPHY HEAD W/O DYE: CPT | Mod: 26,XU

## 2025-06-13 PROCEDURE — 99223 1ST HOSP IP/OBS HIGH 75: CPT

## 2025-06-13 PROCEDURE — 70450 CT HEAD/BRAIN W/O DYE: CPT | Mod: 26

## 2025-06-13 RX ORDER — B1/B2/B3/B5/B6/B12/VIT C/FOLIC 500-0.5 MG
1 TABLET ORAL DAILY
Refills: 0 | Status: DISCONTINUED | OUTPATIENT
Start: 2025-06-13 | End: 2025-06-19

## 2025-06-13 RX ORDER — POLYETHYLENE GLYCOL 3350 17 G/17G
17 POWDER, FOR SOLUTION ORAL DAILY
Refills: 0 | Status: DISCONTINUED | OUTPATIENT
Start: 2025-06-13 | End: 2025-06-19

## 2025-06-13 RX ORDER — AMLODIPINE BESYLATE 10 MG/1
5 TABLET ORAL DAILY
Refills: 0 | Status: DISCONTINUED | OUTPATIENT
Start: 2025-06-13 | End: 2025-06-14

## 2025-06-13 RX ORDER — INSULIN GLARGINE-YFGN 100 [IU]/ML
12 INJECTION, SOLUTION SUBCUTANEOUS AT BEDTIME
Refills: 0 | Status: DISCONTINUED | OUTPATIENT
Start: 2025-06-13 | End: 2025-06-19

## 2025-06-13 RX ORDER — DEXTROSE 50 % IN WATER 50 %
25 SYRINGE (ML) INTRAVENOUS ONCE
Refills: 0 | Status: DISCONTINUED | OUTPATIENT
Start: 2025-06-13 | End: 2025-06-19

## 2025-06-13 RX ORDER — INSULIN LISPRO 100 U/ML
INJECTION, SOLUTION INTRAVENOUS; SUBCUTANEOUS
Refills: 0 | Status: DISCONTINUED | OUTPATIENT
Start: 2025-06-13 | End: 2025-06-19

## 2025-06-13 RX ORDER — DRONEDARONE 400 MG/1
400 TABLET, FILM COATED ORAL
Refills: 0 | Status: DISCONTINUED | OUTPATIENT
Start: 2025-06-13 | End: 2025-06-19

## 2025-06-13 RX ORDER — SODIUM CHLORIDE 9 G/1000ML
1000 INJECTION, SOLUTION INTRAVENOUS
Refills: 0 | Status: DISCONTINUED | OUTPATIENT
Start: 2025-06-13 | End: 2025-06-19

## 2025-06-13 RX ORDER — EMPAGLIFLOZIN 25 MG/1
1 TABLET, FILM COATED ORAL
Refills: 0 | DISCHARGE

## 2025-06-13 RX ORDER — ACETAMINOPHEN 500 MG/5ML
650 LIQUID (ML) ORAL EVERY 6 HOURS
Refills: 0 | Status: DISCONTINUED | OUTPATIENT
Start: 2025-06-13 | End: 2025-06-19

## 2025-06-13 RX ORDER — INSULIN LISPRO 100 U/ML
INJECTION, SOLUTION INTRAVENOUS; SUBCUTANEOUS AT BEDTIME
Refills: 0 | Status: DISCONTINUED | OUTPATIENT
Start: 2025-06-13 | End: 2025-06-19

## 2025-06-13 RX ORDER — ASPIRIN 325 MG
81 TABLET ORAL DAILY
Refills: 0 | Status: DISCONTINUED | OUTPATIENT
Start: 2025-06-13 | End: 2025-06-19

## 2025-06-13 RX ORDER — ROSUVASTATIN CALCIUM 20 MG/1
40 TABLET, FILM COATED ORAL AT BEDTIME
Refills: 0 | Status: DISCONTINUED | OUTPATIENT
Start: 2025-06-13 | End: 2025-06-19

## 2025-06-13 RX ORDER — METOPROLOL SUCCINATE 50 MG/1
1 TABLET, EXTENDED RELEASE ORAL
Refills: 0 | DISCHARGE

## 2025-06-13 RX ORDER — METOPROLOL SUCCINATE 50 MG/1
25 TABLET, EXTENDED RELEASE ORAL DAILY
Refills: 0 | Status: DISCONTINUED | OUTPATIENT
Start: 2025-06-13 | End: 2025-06-19

## 2025-06-13 RX ORDER — INSULIN LISPRO 100 U/ML
7 INJECTION, SOLUTION INTRAVENOUS; SUBCUTANEOUS
Refills: 0 | DISCHARGE

## 2025-06-13 RX ORDER — APIXABAN 2.5 MG/1
2.5 TABLET, FILM COATED ORAL EVERY 12 HOURS
Refills: 0 | Status: DISCONTINUED | OUTPATIENT
Start: 2025-06-13 | End: 2025-06-19

## 2025-06-13 RX ORDER — DEXTROSE 50 % IN WATER 50 %
12.5 SYRINGE (ML) INTRAVENOUS ONCE
Refills: 0 | Status: DISCONTINUED | OUTPATIENT
Start: 2025-06-13 | End: 2025-06-19

## 2025-06-13 RX ORDER — TICAGRELOR 90 MG/1
1 TABLET ORAL
Refills: 0 | DISCHARGE

## 2025-06-13 RX ORDER — INSULIN LISPRO 100 U/ML
7 INJECTION, SOLUTION INTRAVENOUS; SUBCUTANEOUS
Refills: 0 | Status: DISCONTINUED | OUTPATIENT
Start: 2025-06-13 | End: 2025-06-19

## 2025-06-13 RX ORDER — SENNA 187 MG
2 TABLET ORAL AT BEDTIME
Refills: 0 | Status: DISCONTINUED | OUTPATIENT
Start: 2025-06-13 | End: 2025-06-19

## 2025-06-13 RX ORDER — ROSUVASTATIN CALCIUM 20 MG/1
1 TABLET, FILM COATED ORAL
Refills: 0 | DISCHARGE

## 2025-06-13 RX ORDER — TICAGRELOR 90 MG/1
90 TABLET ORAL EVERY 12 HOURS
Refills: 0 | Status: DISCONTINUED | OUTPATIENT
Start: 2025-06-13 | End: 2025-06-19

## 2025-06-13 RX ORDER — DEXTROSE 50 % IN WATER 50 %
15 SYRINGE (ML) INTRAVENOUS ONCE
Refills: 0 | Status: DISCONTINUED | OUTPATIENT
Start: 2025-06-13 | End: 2025-06-19

## 2025-06-13 RX ORDER — GLUCAGON 3 MG/1
1 POWDER NASAL ONCE
Refills: 0 | Status: DISCONTINUED | OUTPATIENT
Start: 2025-06-13 | End: 2025-06-19

## 2025-06-13 RX ADMIN — Medication 650 MILLIGRAM(S): at 18:06

## 2025-06-13 RX ADMIN — DRONEDARONE 400 MILLIGRAM(S): 400 TABLET, FILM COATED ORAL at 21:14

## 2025-06-13 RX ADMIN — TICAGRELOR 90 MILLIGRAM(S): 90 TABLET ORAL at 18:06

## 2025-06-13 RX ADMIN — INSULIN LISPRO 3: 100 INJECTION, SOLUTION INTRAVENOUS; SUBCUTANEOUS at 18:06

## 2025-06-13 RX ADMIN — INSULIN GLARGINE-YFGN 12 UNIT(S): 100 INJECTION, SOLUTION SUBCUTANEOUS at 21:13

## 2025-06-13 RX ADMIN — ROSUVASTATIN CALCIUM 40 MILLIGRAM(S): 20 TABLET, FILM COATED ORAL at 21:15

## 2025-06-13 RX ADMIN — INSULIN LISPRO 7 UNIT(S): 100 INJECTION, SOLUTION INTRAVENOUS; SUBCUTANEOUS at 18:06

## 2025-06-13 RX ADMIN — Medication 125 MILLILITER(S): at 13:09

## 2025-06-13 RX ADMIN — APIXABAN 2.5 MILLIGRAM(S): 2.5 TABLET, FILM COATED ORAL at 18:06

## 2025-06-13 RX ADMIN — Medication 2 TABLET(S): at 21:14

## 2025-06-13 NOTE — OCCUPATIONAL THERAPY INITIAL EVALUATION ADULT - STRENGTHENING, PT EVAL
pt will increase strength throughout bilateral upper extremities by 1 grade to assist with ADLs and transfers, in 2 weeks

## 2025-06-13 NOTE — PATIENT PROFILE ADULT - FALL HARM RISK - HARM RISK INTERVENTIONS
Assistance with ambulation/Assistance OOB with selected safe patient handling equipment/Communicate Risk of Fall with Harm to all staff/Discuss with provider need for PT consult/Monitor gait and stability/Provide patient with walking aids - walker, cane, crutches/Reinforce activity limits and safety measures with patient and family/Sit up slowly, dangle for a short time, stand at bedside before walking/Tailored Fall Risk Interventions/Visual Cue: Yellow wristband and red socks/Bed in lowest position, wheels locked, appropriate side rails in place/Call bell, personal items and telephone in reach/Instruct patient to call for assistance before getting out of bed or chair/Non-slip footwear when patient is out of bed/[place] to call system/Physically safe environment - no spills, clutter or unnecessary equipment/Purposeful Proactive Rounding/Room/bathroom lighting operational, light cord in reach

## 2025-06-13 NOTE — PATIENT PROFILE ADULT - FALL HARM RISK - FACTORS
DISPLAY PLAN FREE TEXT
Dizziness/Impaired gait/Other medical problems/Poor balance/Weakness

## 2025-06-13 NOTE — H&P ADULT - PROBLEM SELECTOR PLAN 6
HFpEF - continue Lasix 40mg PO daily, BP & HR control, MRA, & SGLT2i    not in exacerbation  rest of meds as above

## 2025-06-13 NOTE — OCCUPATIONAL THERAPY INITIAL EVALUATION ADULT - TRANSFER TRAINING, PT EVAL
pt will perform functional sit to/from stand transfers, from all functional surfaces, with (independence  ) assist,  in preparation for functional tasks and transfers, 2 weeks

## 2025-06-13 NOTE — OCCUPATIONAL THERAPY INITIAL EVALUATION ADULT - PERTINENT HX OF CURRENT PROBLEM, REHAB EVAL
85M on ASA/Brillinta/Eliquis Hx DM2, CAD s/p CABG, AFib, CHF, CKD stage 3. Recent admit w/ vertigo now POD30 Basilar angioplasty/stent 5/13 w/ Dr. Caballero. P/w worsening vertigo c/b 5 falls yday. He only remembers one of the events, said he completely blacks out and was told by his wife that it happened five times. He is able to talk and has not had any issues with weakness. His speech is fluent and otherwise no deficits. He had a stent placed recently and was told he has weak circulation in his posterior arteries. Code stroke called at 0633, patient had a transient episode of generalized weakness of 4 extremities, able to speak, not recognize either side, back to baseline within 1-2 minutes. CTH stable.     . P/w worsening vertigo c/b 5 falls yday. dizziness and falling down; dx with basilar artery stenosis; stent placed x 4 weeks; no improvement in dizziness and falling. CT Brain 6/13: No mass effect or midline shift.  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 Basilar stent in place.

## 2025-06-13 NOTE — H&P ADULT - PROBLEM SELECTOR PLAN 7
on Aspirin/Brilinta and Eliquis  Brilinta may have been for stenting, will discuss with neuro duration of therapy    d/w Cardiology - since Brilinta is for the basilar artery stent, decision is up to Neuro  Cardiology consulted - follow up recommendations

## 2025-06-13 NOTE — H&P ADULT - PROBLEM SELECTOR PLAN 1
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

## 2025-06-13 NOTE — CONSULT NOTE ADULT - ASSESSMENT
85M on ASA/Brillinta/Eliquis Hx DM2, CAD s/p CABG, AFib, CHF, CKD stage 3. Recent admit w/ vertigo now POD30 Basilar angioplasty/stent 5/13 w/ Dr. Caballero. P/w worsening vertigo c/b 5 falls yday. PLTs wnl. INR 1.48, Cr 2.29. CTH wnl, CTA stable from prior, patent stent.   Code stroke called at 0633, patient had a transient episode of generalized weakness of 4 extremities, able to speak, not recognize either side, back to baseline within 1-2 minutes. CTH stable.     Impression: Transient episode of generalized weakness and confusion, likely related to basilar artery insufficiency, unclear what blood pressure at that time,     Recommendations  - Defer management of stent to NSGY  - Will f/u MRI/MRA NOVA  - Avoid hypotension  - Rest of care per primary team and NSGY    Case/imaging discussed with neurovascular fellow Javad Lugo under supervision of attending, Cynthia James   85M on ASA/Brillinta/Eliquis Hx DM2, CAD s/p CABG, AFib, CHF, CKD stage 3. Recent admit w/ vertigo now POD30 Basilar angioplasty/stent 5/13 w/ Dr. Caballero. P/w worsening vertigo c/b 5 falls yday. PLTs wnl. INR 1.48, Cr 2.29. CTH wnl, CTA stable from prior, patent stent.   Code stroke called at 0633, patient had a transient episode of generalized weakness of 4 extremities, able to speak, not recognize either side, back to baseline within 1-2 minutes. CTH stable.     Impression: Transient episode of generalized weakness and confusion, likely related to basilar artery insufficiency, unclear what blood pressure at that time,     Recommendations  - Defer management of stent to NSGY  - Will f/u MRI/MRA NOVA  - Please do a full cardiac syncopal work-up  - Long term cardiac monitoring  - PT/OT as able  - Please check vitamin B12, folate, TSH levels  - Avoid hypotension  - Rest of care per primary team and NSGY    Case/imaging discussed with neurovascular fellow Javad Lugo under supervision of attending, Cynthia James

## 2025-06-13 NOTE — OCCUPATIONAL THERAPY INITIAL EVALUATION ADULT - RANGE OF MOTION, PT EVAL
pt will increase ROM throughout bilateral upper extremities by 10 degrees, in preparation for  functional tasks and transfers, in 2 weeks

## 2025-06-13 NOTE — OCCUPATIONAL THERAPY INITIAL EVALUATION ADULT - ADL RETRAINING, OT EVAL
pt will perform upper body dressing requiring ( independence) assist, use of proper compensatory dressing technique, in 2 weeks  pt will perform lower body dressing requiring (independence ) assist, use of proper AE PRN, in 2 weeks

## 2025-06-13 NOTE — H&P ADULT - PROBLEM SELECTOR PLAN 3
continue Eliquis 2.5mg BID  continue dronedarone 400mg PO BID  continue metoprolol 100mg po daily continue Eliquis 2.5mg BID  continue dronedarone 400mg PO BID  continue metoprolol 25mg po daily

## 2025-06-13 NOTE — H&P ADULT - PROBLEM/PLAN-3
AV's were provided and reviewed with pt and SO. Both verbalized understanding of discharge instructions with no questions/concerns. Encouraged to call with any questions. Pt discharge with infant to SO, escorted out via wheelchair by [facility] [place] [employer].   DISPLAY PLAN FREE TEXT

## 2025-06-13 NOTE — OCCUPATIONAL THERAPY INITIAL EVALUATION ADULT - MANUAL MUSCLE TESTING RESULTS, REHAB EVAL
left upper extremity 3/5 during testing,  3+/5 during functional mobility holding onto walker/grossly assessed due to

## 2025-06-13 NOTE — PHYSICAL THERAPY INITIAL EVALUATION ADULT - ADDITIONAL COMMENTS
Pt lives in a house with his wife, 6 steps to enter, one level. Pt uses RW for functional mobility and ADLs. Pt was able to care for himself prior to dizzy episodes.

## 2025-06-13 NOTE — H&P ADULT - PROBLEM SELECTOR PLAN 2
Baseline Cr 1.6 but per chart review appears new basline is 2  currently within 2.29 so range is appropriate  not consistent with CHRISSY

## 2025-06-13 NOTE — OCCUPATIONAL THERAPY INITIAL EVALUATION ADULT - BED MOBILITY TRAINING, PT EVAL
pt will perform bed mobility requiring ( independence ) assist, in preparation for functional tasks, in 2 weeks.

## 2025-06-13 NOTE — CONSULT NOTE ADULT - SUBJECTIVE AND OBJECTIVE BOX
DATE OF SERVICE: 06-13-25 @ 17:49    CHIEF COMPLAINT:Patient is a 85y old  Male who presents with a chief complaint of falls    HISTORY OF PRESENT ILLNESS:HPI:  85M on ASA/Brillinta/Eliquis Hx DM2, CAD s/p CABG, AFib, CHF, CKD stage 3. Recent admit w/ vertigo now POD30 Basilar angioplasty/stent 5/13 w/ Dr. Caballero. P/w worsening vertigo c/b 5 falls yday. He only remembers one of the events, said he completely blacks out and was told by his wife that it happened five times. He is able to talk and has not had any issues with weakness. His speech is fluent and otherwise no deficits. He had a stent placed recently and was told he has weak circulation in his posterior arteries. Code stroke called at 0633, patient had a transient episode of generalized weakness of 4 extremities, able to speak, not recognize either side, back to baseline within 1-2 minutes. CTH stable.  (13 Jun 2025 09:50)      PAST MEDICAL & SURGICAL HISTORY:  Atherosclerosis of coronary artery  Coronary artery disease      Type 2 diabetes mellitus  Diabetes      Essential hypertension  Hypertension      Hyperlipidemia  Dyslipidemia      Malignant neoplasm of prostate      Other postprocedural status  secondary to prostate cancer      Status post aorto-coronary artery bypass graft  SVG-->LAD, SVG-->OMB, SVG-->PLS/PDA      Other postprocedural status  S/P colonoscopy              MEDICATIONS:  amLODIPine   Tablet 5 milliGRAM(s) Oral daily  apixaban 2.5 milliGRAM(s) Oral every 12 hours  aspirin  chewable 81 milliGRAM(s) Oral daily  dronedarone 400 milliGRAM(s) Oral two times a day  metoprolol succinate ER 25 milliGRAM(s) Oral daily  ticagrelor 90 milliGRAM(s) Oral every 12 hours        acetaminophen     Tablet .. 650 milliGRAM(s) Oral every 6 hours    pantoprazole    Tablet 40 milliGRAM(s) Oral before breakfast  polyethylene glycol 3350 17 Gram(s) Oral daily PRN  senna 2 Tablet(s) Oral at bedtime    dextrose 50% Injectable 25 Gram(s) IV Push once  dextrose 50% Injectable 12.5 Gram(s) IV Push once  dextrose 50% Injectable 25 Gram(s) IV Push once  dextrose Oral Gel 15 Gram(s) Oral once PRN  glucagon  Injectable 1 milliGRAM(s) IntraMuscular once  insulin glargine Injectable (LANTUS) 12 Unit(s) SubCutaneous at bedtime  insulin lispro (ADMELOG) corrective regimen sliding scale   SubCutaneous three times a day before meals  insulin lispro (ADMELOG) corrective regimen sliding scale   SubCutaneous at bedtime  insulin lispro Injectable (ADMELOG) 7 Unit(s) SubCutaneous three times a day before meals  rosuvastatin 40 milliGRAM(s) Oral at bedtime    dextrose 5%. 1000 milliLiter(s) IV Continuous <Continuous>  dextrose 5%. 1000 milliLiter(s) IV Continuous <Continuous>  multivitamin 1 Tablet(s) Oral daily      FAMILY HISTORY:  Family history of coronary artery disease (Father)        Non-contributory    SOCIAL HISTORY:    Tobacco - not smoker    Allergies    No Known Allergies    Intolerances    	    REVIEW OF SYSTEMS:  CONSTITUTIONAL: No fever  EYES: No eye pain, visual disturbances, or discharge  ENMT:  No difficulty hearing, tinnitus  NECK: No pain or stiffness  RESPIRATORY: No cough, wheezing,  CARDIOVASCULAR: No chest pain, palpitations, passing out, dizziness, or leg swelling  GASTROINTESTINAL:  No nausea, vomiting, diarrhea or constipation. No melena.  GENITOURINARY: No dysuria, hematuria  NEUROLOGICAL: No stroke like symptoms  SKIN: No burning or lesions   ENDOCRINE: No heat or cold intolerance  MUSCULOSKELETAL: No joint pain or swelling  PSYCHIATRIC: No  anxiety, mood swings  HEME/LYMPH: No bleeding gums  ALLERGY AND IMMUNOLOGIC: No hives or eczema	    All other ROS negative    PHYSICAL EXAM:  T(C): 36.9 (06-13-25 @ 12:57), Max: 36.9 (06-13-25 @ 12:57)  HR: 113 (06-13-25 @ 13:04) (67 - 113)  BP: 124/75 (06-13-25 @ 13:04) (121/72 - 153/64)  RR: 18 (06-13-25 @ 12:57) (17 - 19)  SpO2: 100% (06-13-25 @ 13:04) (97% - 100%)  Wt(kg): --  I&O's Summary      Appearance: Normal	  HEENT:   Normal oral mucosa, EOMI	  Cardiovascular:  S1 S2, No JVD,    Respiratory: Lungs clear to auscultation	  Psychiatry: Alert  Gastrointestinal:  Soft, Non-tender, + BS	  Skin: No rashes   Neurologic: Non-focal  Extremities:  No edema  Vascular: Peripheral pulses palpable    	    	  	  CARDIAC MARKERS:  Labs personally reviewed by me                                  11.6   9.97  )-----------( 317      ( 12 Jun 2025 20:31 )             38.0     06-12    135  |  97  |  37[H]  ----------------------------<  295[H]  3.8   |  22  |  2.29[H]    Ca    10.3      12 Jun 2025 20:31  Phos  4.0     06-12  Mg     2.4     06-12    TPro  7.7  /  Alb  3.8  /  TBili  0.9  /  DBili  x   /  AST  30  /  ALT  31  /  AlkPhos  65  06-12          EKG: Personally reviewed by me - NSR 70 bpm  Radiology: Personally reviewed by me -   < from: CT Brain Stroke Protocol (06.13.25 @ 07:12) >    MISCELLANEOUS: Basilar stent noted.      IMPRESSION:  No mass effect or midline shift.  No gross evidence of acute intracranial hemorrhage with the limitation of   recent contrast administration    < end of copied text >  < from: CT Angio Head w/ IV Cont (06.12.25 @ 23:44) >  IMPRESSION:    CT HEAD:  No acute intracranial hemorrhage, mass effect, or midline shift.    CTA NECK:  Stable right vertebralartery with severe stenosis.  Mild stenosis in the right common carotid and moderate to severe right   internal carotid artery stenosis due to calcified atherosclerotic plaque,   stable.    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  Basilar stent in place.      < from: Xray Chest 1 View- PORTABLE-Urgent (Xray Chest 1 View- PORTABLE-Urgent .) (06.12.25 @ 21:07) >  IMPRESSION:  Clear lungs.  No acute displaced fracture or pneumothorax.      < from: TTE W or WO Ultrasound Enhancing Agent (05.16.25 @ 16:53) >  CONCLUSIONS:      1. Left ventricular cavity is normal in size. Left ventricular systolic function is normal with an ejection fraction visually estimated at 65 to 70 %. There are no regional wall motion abnormalities seen.   2. Normal right ventricular cavity size and normal right ventricular systolic function.   3. No significant valvular disease.   4. No pericardial effusion seen.   5. Compared to the transthoracic echocardiogram performed on 7/16/2024, there have been no significant interval changes.      < from: MR Head No Cont (06.13.25 @ 13:56) >  IMPRESSION:    1.  RIGHT CAROTID NECK CIRCULATION:   Atherosclerotic plaque carotid bulb   causes narrowing approximately 50-60%.    2.  LEFT CAROTID NECK CIRCULATION:    Atherosclerotic plaque carotid bulb   without hemodynamically significant stenosis.    3.  VERTEBRAL NECK CIRCULATION:   Right vertebral arterial occlusion.   Left vertebral artery intact    4.  ANTERIOR INTRACRANIAL CIRCULATION:     Intracranial atherosclerosis   cavernous and clinoid segments of the internal carotid arteries,   moderate. 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    5.  POSTERIOR INTRACRANIAL CIRCULATION:   Right vertebral arterial   occlusion. Left vertebral artery intact. Proximal basilar stent without   identification of aneurysm. Posterior cerebral arteries patent    6.  BRAIN:    No evidence of acute infarction.   Small remote deep   infarctions within the basal ganglia and cerebellum. Ischemic white   matter disease and atrophy typical for age    < from: Cardiac Catheterization (09.30.24 @ 15:23) >    Diagnostic Findings:     Coronary Angiography   The coronary circulation is right dominant.      LM   Distal left main: There is a 90 % stenosis.      LAD   Proximal left anterior descending: Angiography shows severe  atherosclerosis. Mid left anterior descending: There is a 100 %  stenosis.      CX   First obtuse marginal: There is a 100 % stenosis. Circumflex:  Angiography shows severe atherosclerosis.      Patent LIMA to LAD and SVG to OM with patent native RCA stents.  No  obvious culprit for ongoing symptoms.  Medical therapy      Assessment /Plan:   85M on ASA/Brillinta/Eliquis Hx DM2, CAD s/p CABG, AFib, CHF, CKD stage 3. Recent admit w/ vertigo now POD30 Basilar angioplasty/stent 5/13 w/ Dr. Caballero. P/w worsening vertigo c/b 5 falls yday. He only remembers one of the events, said he completely blacks out and was told by his wife that it happened five times. He is able to talk and has not had any issues with weakness. His speech is fluent and otherwise no deficits. He had a stent placed recently and was told he has weak circulation in his posterior arteries. Code stroke called at 0633, patient had a transient episode of generalized weakness of 4 extremities, able to speak, not recognize either side, back to baseline within 1-2 minutes. CTH stable.     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   ·  C/w home meds - norvasc 5mg & metoprolol 25mg QD    Problem 4:  CAD  - [facility] 9/2024 - Patent LIMA to LAD and SVG to OM with patent native RCA stents  - c/w Brilinta, statin    Differential diagnosis and plan of care discussed with patient after the evaluation. Counseling on diet, nutritional counseling, weight management, exercise and medication compliance was done.   Advanced care planning/advanced directives discussed with patient/family. DNR status including forceful chest compressions to attempt to restart the heart, ventilator support/artificial breathing, electric shock, artificial nutrition, health care proxy, Molst form all discussed with pt. Pt wishes to consider. Sixteen minutes spent on discussing advanced directives.       ALBANIA Zapata DO [facility]  Cardiovascular Medicine  [address] [place] [address] Dr, [address]  Available for call or text via [employer]  Office [phone]     DATE OF SERVICE: 06-13-25 @ 17:49    CHIEF COMPLAINT:Patient is a 85y old  Male who presents with a chief complaint of falls    HISTORY OF PRESENT ILLNESS:HPI:  85M on ASA/Brillinta/Eliquis Hx DM2, CAD s/p CABG, AFib, CHF, CKD stage 3. Recent admit w/ vertigo now POD30 Basilar angioplasty/stent 5/13 w/ Dr. Caballero. P/w worsening vertigo c/b 5 falls yday. He only remembers one of the events, said he completely blacks out and was told by his wife that it happened five times. He is able to talk and has not had any issues with weakness. His speech is fluent and otherwise no deficits. He had a stent placed recently and was told he has weak circulation in his posterior arteries. Code stroke called at 0633, patient had a transient episode of generalized weakness of 4 extremities, able to speak, not recognize either side, back to baseline within 1-2 minutes. CTH stable.  (13 Jun 2025 09:50)      PAST MEDICAL & SURGICAL HISTORY:  Atherosclerosis of coronary artery  Coronary artery disease      Type 2 diabetes mellitus  Diabetes      Essential hypertension  Hypertension      Hyperlipidemia  Dyslipidemia      Malignant neoplasm of prostate      Other postprocedural status  secondary to prostate cancer      Status post aorto-coronary artery bypass graft  SVG-->LAD, SVG-->OMB, SVG-->PLS/PDA      Other postprocedural status  S/P colonoscopy              MEDICATIONS:  amLODIPine   Tablet 5 milliGRAM(s) Oral daily  apixaban 2.5 milliGRAM(s) Oral every 12 hours  aspirin  chewable 81 milliGRAM(s) Oral daily  dronedarone 400 milliGRAM(s) Oral two times a day  metoprolol succinate ER 25 milliGRAM(s) Oral daily  ticagrelor 90 milliGRAM(s) Oral every 12 hours        acetaminophen     Tablet .. 650 milliGRAM(s) Oral every 6 hours    pantoprazole    Tablet 40 milliGRAM(s) Oral before breakfast  polyethylene glycol 3350 17 Gram(s) Oral daily PRN  senna 2 Tablet(s) Oral at bedtime    dextrose 50% Injectable 25 Gram(s) IV Push once  dextrose 50% Injectable 12.5 Gram(s) IV Push once  dextrose 50% Injectable 25 Gram(s) IV Push once  dextrose Oral Gel 15 Gram(s) Oral once PRN  glucagon  Injectable 1 milliGRAM(s) IntraMuscular once  insulin glargine Injectable (LANTUS) 12 Unit(s) SubCutaneous at bedtime  insulin lispro (ADMELOG) corrective regimen sliding scale   SubCutaneous three times a day before meals  insulin lispro (ADMELOG) corrective regimen sliding scale   SubCutaneous at bedtime  insulin lispro Injectable (ADMELOG) 7 Unit(s) SubCutaneous three times a day before meals  rosuvastatin 40 milliGRAM(s) Oral at bedtime    dextrose 5%. 1000 milliLiter(s) IV Continuous <Continuous>  dextrose 5%. 1000 milliLiter(s) IV Continuous <Continuous>  multivitamin 1 Tablet(s) Oral daily      FAMILY HISTORY:  Family history of coronary artery disease (Father)        Non-contributory    SOCIAL HISTORY:    Tobacco - not smoker    Allergies    No Known Allergies    Intolerances    	    REVIEW OF SYSTEMS:  CONSTITUTIONAL: No fever  EYES: No eye pain, visual disturbances, or discharge  ENMT:  No difficulty hearing, tinnitus  NECK: No pain or stiffness  RESPIRATORY: No cough, wheezing,  CARDIOVASCULAR: No chest pain, palpitations, passing out, dizziness, or leg swelling  GASTROINTESTINAL:  No nausea, vomiting, diarrhea or constipation. No melena.  GENITOURINARY: No dysuria, hematuria  NEUROLOGICAL: No stroke like symptoms  SKIN: No burning or lesions   ENDOCRINE: No heat or cold intolerance  MUSCULOSKELETAL: No joint pain or swelling  PSYCHIATRIC: No  anxiety, mood swings  HEME/LYMPH: No bleeding gums  ALLERGY AND IMMUNOLOGIC: No hives or eczema	    All other ROS negative    PHYSICAL EXAM:  T(C): 36.9 (06-13-25 @ 12:57), Max: 36.9 (06-13-25 @ 12:57)  HR: 113 (06-13-25 @ 13:04) (67 - 113)  BP: 124/75 (06-13-25 @ 13:04) (121/72 - 153/64)  RR: 18 (06-13-25 @ 12:57) (17 - 19)  SpO2: 100% (06-13-25 @ 13:04) (97% - 100%)  Wt(kg): --  I&O's Summary      Appearance: Normal	  HEENT:   Normal oral mucosa, EOMI	  Cardiovascular:  S1 S2, No JVD,    Respiratory: Lungs clear to auscultation	  Psychiatry: Alert  Gastrointestinal:  Soft, Non-tender, + BS	  Skin: No rashes   Neurologic: Non-focal  Extremities:  No edema  Vascular: Peripheral pulses palpable    	    	  	  CARDIAC MARKERS:  Labs personally reviewed by me                                  11.6   9.97  )-----------( 317      ( 12 Jun 2025 20:31 )             38.0     06-12    135  |  97  |  37[H]  ----------------------------<  295[H]  3.8   |  22  |  2.29[H]    Ca    10.3      12 Jun 2025 20:31  Phos  4.0     06-12  Mg     2.4     06-12    TPro  7.7  /  Alb  3.8  /  TBili  0.9  /  DBili  x   /  AST  30  /  ALT  31  /  AlkPhos  65  06-12          EKG: Personally reviewed by me - NSR 70 bpm  Radiology: Personally reviewed by me -   < from: CT Brain Stroke Protocol (06.13.25 @ 07:12) >    MISCELLANEOUS: Basilar stent noted.      IMPRESSION:  No mass effect or midline shift.  No gross evidence of acute intracranial hemorrhage with the limitation of   recent contrast administration    < end of copied text >  < from: CT Angio Head w/ IV Cont (06.12.25 @ 23:44) >  IMPRESSION:    CT HEAD:  No acute intracranial hemorrhage, mass effect, or midline shift.    CTA NECK:  Stable right vertebralartery with severe stenosis.  Mild stenosis in the right common carotid and moderate to severe right   internal carotid artery stenosis due to calcified atherosclerotic plaque,   stable.    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  Basilar stent in place.      < from: Xray Chest 1 View- PORTABLE-Urgent (Xray Chest 1 View- PORTABLE-Urgent .) (06.12.25 @ 21:07) >  IMPRESSION:  Clear lungs.  No acute displaced fracture or pneumothorax.      < from: TTE W or WO Ultrasound Enhancing Agent (05.16.25 @ 16:53) >  CONCLUSIONS:      1. Left ventricular cavity is normal in size. Left ventricular systolic function is normal with an ejection fraction visually estimated at 65 to 70 %. There are no regional wall motion abnormalities seen.   2. Normal right ventricular cavity size and normal right ventricular systolic function.   3. No significant valvular disease.   4. No pericardial effusion seen.   5. Compared to the transthoracic echocardiogram performed on 7/16/2024, there have been no significant interval changes.      < from: MR Head No Cont (06.13.25 @ 13:56) >  IMPRESSION:    1.  RIGHT CAROTID NECK CIRCULATION:   Atherosclerotic plaque carotid bulb   causes narrowing approximately 50-60%.    2.  LEFT CAROTID NECK CIRCULATION:    Atherosclerotic plaque carotid bulb   without hemodynamically significant stenosis.    3.  VERTEBRAL NECK CIRCULATION:   Right vertebral arterial occlusion.   Left vertebral artery intact    4.  ANTERIOR INTRACRANIAL CIRCULATION:     Intracranial atherosclerosis   cavernous and clinoid segments of the internal carotid arteries,   moderate. 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    5.  POSTERIOR INTRACRANIAL CIRCULATION:   Right vertebral arterial   occlusion. Left vertebral artery intact. Proximal basilar stent without   identification of aneurysm. Posterior cerebral arteries patent    6.  BRAIN:    No evidence of acute infarction.   Small remote deep   infarctions within the basal ganglia and cerebellum. Ischemic white   matter disease and atrophy typical for age    < from: Cardiac Catheterization (09.30.24 @ 15:23) >    Diagnostic Findings:     Coronary Angiography   The coronary circulation is right dominant.      LM   Distal left main: There is a 90 % stenosis.      LAD   Proximal left anterior descending: Angiography shows severe  atherosclerosis. Mid left anterior descending: There is a 100 %  stenosis.      CX   First obtuse marginal: There is a 100 % stenosis. Circumflex:  Angiography shows severe atherosclerosis.      Patent LIMA to LAD and SVG to OM with patent native RCA stents.  No  obvious culprit for ongoing symptoms.  Medical therapy      Assessment /Plan:   85M on ASA/Brillinta/Eliquis Hx DM2, CAD s/p CABG, AFib, CHF, CKD stage 3. Recent admit w/ vertigo now POD30 Basilar angioplasty/stent 5/13 w/ Dr. Caballero. P/w worsening vertigo c/b 5 falls yday. He only remembers one of the events, said he completely blacks out and was told by his wife that it happened five times. He is able to talk and has not had any issues with weakness. His speech is fluent and otherwise no deficits. He had a stent placed recently and was told he has weak circulation in his posterior arteries. Code stroke called at 0633, patient had a transient episode of generalized weakness of 4 extremities, able to speak, not recognize either side, back to baseline within 1-2 minutes. CTH stable.     Problem 1: Syncope  - Describes getting up to walk to the bathroom, felt dizzy, legs gave out and fell/syncope. Same episode repeated 3 more times  --- ? 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   ·  C/w home meds - norvasc 5mg & metoprolol 25mg QD    Problem 4:  CAD  - [facility] 9/2024 - Patent LIMA to LAD and SVG to OM with patent native RCA stents  - c/w Brilinta, statin        Differential diagnosis and plan of care discussed with patient after the evaluation. Counseling on diet, nutritional counseling, weight management, exercise and medication compliance was done.   Advanced care planning/advanced directives discussed with patient/family. DNR status including forceful chest compressions to attempt to restart the heart, ventilator support/artificial breathing, electric shock, artificial nutrition, health care proxy, Molst form all discussed with pt. Pt wishes to consider. Sixteen minutes spent on discussing advanced directives.       ALBANIA Zapata DO [facility]  Cardiovascular Medicine  [address] [place] [address] Dr, [address]  Available for call or text via [employer]  Office [phone]

## 2025-06-13 NOTE — CONSULT NOTE ADULT - ATTENDING COMMENTS
I reviewed available diagnostic studies, and reviewed images personally. I agree with resident's history, exam, orders placed, and plan of care. Total care time spent, 75 min. This excludes any time spent on separate procedures or teaching. Medical issues needing to be addressed include: Evaluation for cerebral ischemia, ASA/Brillinta/Eliquis Hx DM2, CAD s/p CABG, AFib, CHF, CKD stage 3. Recent admit w/ vertigo now POD30 Basilar angioplasty/stent 5/13 w/ Dr. Caballero, vertigo and pre-syncope. MRI obtained, (-) for acute stroke. 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 Cardiac work up for dizziness/lightheadedness/syncope per primary, check B12/folate/thyroid panel, nsgy eval of stent patency. Would also recommend following up outpatient with general neurology clinic (@[id number]) or neurootology clinic in the future if no clear cause of intermittent dizziness/pre-syncope determined. Check orthostatics also. Service provided on 6/13/2025.
85M on ASA/Brillinta/Eliquis Hx DM2, CAD s/p CABG, AFib, CHF, CKD stage 3. Recent admit w/ vertigo now POD30 Basilar angioplasty/stent 5/13 w/ Dr. Caballero. P/w worsening vertigo c/b 5 falls yday. PLTs wnl. INR 1.48, Cr 2.29. CTH wnl, CTA stable from prior, patent stent. Exam: neurointact  -Adm CDU for MRI/MRA/NOVA in AM  -Rest of plan pending imaging    I personally reviewed the CTA which appears to show a patent stent with contrast just proximal and distal to the device. Otherwise know multifocal ICAD. Plan for MRI to assess for new stroke and flow through the stent.

## 2025-06-13 NOTE — CONSULT NOTE ADULT - SUBJECTIVE AND OBJECTIVE BOX
p ([id number])     HPI:  85M on ASA/Brillinta/Eliquis Hx DM2, CAD s/p CABG, AFib, CHF, CKD stage 3. Recent admit w/ vertigo now POD30 Basilar angioplasty/stent 5/13 w/ Dr. Caballero. P/w worsening vertigo c/b 5 falls yday. PLTs wnl. INR 1.48, Cr 2.29. CTH wnl, CTA stable from prior, patent stent. Exam: neurointact    =====================  PAST MEDICAL HISTORY   Atherosclerosis of coronary artery    Type 2 diabetes mellitus    Essential hypertension    Hyperlipidemia    Malignant neoplasm of prostate      PAST SURGICAL HISTORY   Other postprocedural status    Status post aorto-coronary artery bypass graft    Other postprocedural status      No Known Allergies      MEDICATIONS:  Antibiotics:    Neuro:    Other:      SOCIAL HISTORY:   Occupation:   Marital Status:     FAMILY HISTORY:  Family history of cardiovascular disease    Family history of coronary artery disease (Father)        ROS: Negative except per HPI    LABS:  PT/INR - ( 12 Jun 2025 20:31 )   PT: 16.8 sec;   INR: 1.48 ratio         PTT - ( 12 Jun 2025 20:31 )  PTT:33.7 sec                        11.6   9.97  )-----------( 317      ( 12 Jun 2025 20:31 )             38.0     06-12    135  |  97  |  37[H]  ----------------------------<  295[H]  3.8   |  22  |  2.29[H]    Ca    10.3      12 Jun 2025 20:31  Phos  4.0     06-12  Mg     2.4     06-12    TPro  7.7  /  Alb  3.8  /  TBili  0.9  /  DBili  x   /  AST  30  /  ALT  31  /  AlkPhos  65  06-12

## 2025-06-13 NOTE — H&P ADULT - PROBLEM SELECTOR PLAN 9
DVT ppx: on triple therapy  Diet: NPO pending bedside swallow    PT/OT expedited    d/w pt in detail

## 2025-06-13 NOTE — H&P ADULT - PROBLEM SELECTOR PLAN 4
continue norvasc 5mg with hold parameters  Lisinopril held on last discharge, will verify if resumed

## 2025-06-13 NOTE — OCCUPATIONAL THERAPY INITIAL EVALUATION ADULT - BALANCE TRAINING, PT EVAL
pt will perform grooming task, standing at sink  for 5 minutes, requiring ( independence) assist,  2 weeks

## 2025-06-13 NOTE — OCCUPATIONAL THERAPY INITIAL EVALUATION ADULT - LEVEL OF INDEPENDENCE: SUPINE/SIT, REHAB EVAL
assist for proper foot and hand placement,  assist to assume upright positioning/minimum assist (75% patients effort)

## 2025-06-13 NOTE — PHYSICAL THERAPY INITIAL EVALUATION ADULT - PERTINENT HX OF CURRENT PROBLEM, REHAB EVAL
85M on ASA/Brillinta/Eliquis Hx DM2, CAD s/p CABG, AFib, CHF, CKD stage 3. Recent admit w/ vertigo now POD30 Basilar angioplasty/stent 5/13 w/ Dr. Caballero. P/w worsening vertigo c/b 5 falls yday. He only remembers one of the events, said he completely blacks out and was told by his wife that it happened five times. He is able to talk and has not had any issues with weakness. His speech is fluent and otherwise no deficits. He had a stent placed recently and was told he has weak circulation in his posterior arteries. Code stroke called at 0633, patient had a transient episode of generalized weakness of 4 extremities, able to speak, not recognize either side, back to baseline within 1-2 minutes. CTH stable.   MRhead: see rad report / MRA neck and head: see rad report   CT brain stroke protocol: No mass effect or midline shift.  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 Basilar stent in place.  CT head: CT HEAD:  No acute intracranial hemorrhage, mass effect, or midline shift.

## 2025-06-13 NOTE — ED ADULT NURSE REASSESSMENT NOTE - NS ED NURSE REASSESS COMMENT FT1
MD Zaldivar called to bedside as pt found to have weakness x 4 extremities, and unable to localize touch on either side. Pt verbally responding. Pt returned to baseline after lying down. Code stroke activated

## 2025-06-13 NOTE — H&P ADULT - HISTORY OF PRESENT ILLNESS
85M on ASA/Brillinta/Eliquis Hx DM2, CAD s/p CABG, AFib, CHF, CKD stage 3. Recent admit w/ vertigo now POD30 Basilar angioplasty/stent 5/13 w/ Dr. Caballero. P/w worsening vertigo c/b 5 falls yday. He only remembers one of the events, said he completely blacks out and was told by his wife that it happened five times. He is able to talk and has not had any issues with weakness. His speech is fluent and otherwise no deficits. He had a stent placed recently and was told he has weak circulation in his posterior arteries. Code stroke called at 0633, patient had a transient episode of generalized weakness of 4 extremities, able to speak, not recognize either side, back to baseline within 1-2 minutes. CTH stable.

## 2025-06-13 NOTE — OCCUPATIONAL THERAPY INITIAL EVALUATION ADULT - ADDITIONAL COMMENTS
Pt lives in a house with his wife, 6 steps to enter, bedroom/bathroom on main level, use of RW at home.  independent with ADL's and transfers

## 2025-06-13 NOTE — H&P ADULT - ASSESSMENT
84 yo male with PMH of HTN, HLD, CAD s/p CABG 20 years ago and stents x 5 2022, pAFib, HFpEF, CKD3 (baseline Cr 1.2-1.6), prostate Ca s/p chemo/RT recent Basilar artery stent placed for R intracranial vertebral artery presents with Transient Weakness

## 2025-06-13 NOTE — CONSULT NOTE ADULT - SUBJECTIVE AND OBJECTIVE BOX
**STROKE CONSULT NOTE**    Last known well time/Time of onset of symptoms: 6/13 0600    HPI: 85M on ASA/Brillinta/Eliquis Hx DM2, CAD s/p CABG, AFib, CHF, CKD stage 3. Recent admit w/ vertigo now POD30 Basilar angioplasty/stent 5/13 w/ Dr. Caballero. P/w worsening vertigo c/b 5 falls yday. PLTs wnl. INR 1.48, Cr 2.29. CTH wnl, CTA stable from prior, patent stent.   Code stroke called at 0633, patient had a transient episode of generalized weakness of 4 extremities, able to speak, not recognize either side, back to baseline within 1-2 minutes. CTH stable.     SOCIAL HISTORY:    PAST MEDICAL & SURGICAL HISTORY:  Atherosclerosis of coronary artery  Coronary artery disease      Type 2 diabetes mellitus  Diabetes      Essential hypertension  Hypertension      Hyperlipidemia  Dyslipidemia      Malignant neoplasm of prostate      Other postprocedural status  secondary to prostate cancer      Status post aorto-coronary artery bypass graft  SVG-->LAD, SVG-->OMB, SVG-->PLS/PDA      Other postprocedural status  S/P colonoscopy          FAMILY HISTORY:  Family history of coronary artery disease (Father)        ROS:  Constitutional: No fever, chills  Eyes: No visual disturbances  ENMT:  +vertigo  Neck: No pain or stiffness  Respiratory: No cough, wheezing  Cardiovascular: No chest pain, palpitations, shortness of breath  Gastrointestinal: No abdominal pain. No nausea, vomiting  Genitourinary: No dysuria, frequency  Neurological: As per HPI    MEDICATIONS  (STANDING):    MEDICATIONS  (PRN):      Allergies    No Known Allergies    Intolerances        Vital Signs Last 24 Hrs  T(C): 36.6 (13 Jun 2025 06:50), Max: 36.8 (12 Jun 2025 21:00)  T(F): 97.8 (13 Jun 2025 06:50), Max: 98.2 (12 Jun 2025 21:00)  HR: 93 (13 Jun 2025 06:50) (67 - 93)  BP: 153/64 (13 Jun 2025 06:50) (121/72 - 153/64)  BP(mean): --  RR: 18 (13 Jun 2025 06:50) (17 - 19)  SpO2: 98% (13 Jun 2025 06:50) (97% - 99%)    Parameters below as of 13 Jun 2025 06:50  Patient On (Oxygen Delivery Method): room air        PHYSICAL EXAM:  Constitutional: WDWN; NAD  Respiratory: Normal respiratory effort,   Neurologic:  Mental status: Awake, alert and oriented to name, location, month and age.  Recent and remote memory intact.  Naming, repetition and comprehension intact. No dysarthria.     Cranial nerves: Pupils equally round and reactive to light, visual fields full, no nystagmus, extraocular muscles intact, V1 through V3 intact bilaterally and symmetric, face symmetric, palate elevation symmetric, tongue was midline, sternocleidomastoid/shoulder shrug strength bilaterally 5/5.    Motor:  Normal bulk and tone, strength 5/5 in bilateral upper and lower extremities.   strength 5/5.    Sensation: Intact to light touch.  No neglect.   Coordination: No dysmetria on finger-to-nose  Reflexes: Deferred in focused exam  Gait: Deferred in focused    NIHSS: 0  mRS: 2    Fingerstick Blood Glucose: CAPILLARY BLOOD GLUCOSE           LABS:                        11.6   9.97  )-----------( 317      ( 12 Jun 2025 20:31 )             38.0     06-12    135  |  97  |  37[H]  ----------------------------<  295[H]  3.8   |  22  |  2.29[H]    Ca    10.3      12 Jun 2025 20:31  Phos  4.0     06-12  Mg     2.4     06-12    TPro  7.7  /  Alb  3.8  /  TBili  0.9  /  DBili  x   /  AST  30  /  ALT  31  /  AlkPhos  65  06-12    PT/INR - ( 12 Jun 2025 20:31 )   PT: 16.8 sec;   INR: 1.48 ratio         PTT - ( 12 Jun 2025 20:31 )  PTT:33.7 sec      Urinalysis Basic - ( 12 Jun 2025 20:31 )    Color: x / Appearance: x / SG: x / pH: x  Gluc: 295 mg/dL / Ketone: x  / Bili: x / Urobili: x   Blood: x / Protein: x / Nitrite: x   Leuk Esterase: x / RBC: x / WBC x   Sq Epi: x / Non Sq Epi: x / Bacteria: x        RADIOLOGY & ADDITIONAL STUDIES:  [facility] 6/13/25 645  IMPRESSION:  No mass effect or midline shift.  No gross evidence of acute intracranial hemorrhage with the limitation of recent contrast administration    CTH/CTA 6/13/25 0130  IMPRESSION:  CT HEAD:  No acute intracranial hemorrhage, mass effect, or midline shift.    CTA NECK:  Stable right vertebral artery with severe stenosis.  Mild stenosis in the right common carotid and moderate to severe right internal carotid artery stenosis due to calcified atherosclerotic plaque, stable.    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  Basilar stent in place.

## 2025-06-13 NOTE — PHYSICAL THERAPY INITIAL EVALUATION ADULT - GAIT TRAINING, PT EVAL
GOAL: Patient will ambulate 50 feet independently with RW in 2 weeks in order to return home safely.

## 2025-06-13 NOTE — CONSULT NOTE ADULT - ASSESSMENT
85M on ASA/Brillinta/Eliquis Hx DM2, CAD s/p CABG, AFib, CHF, CKD stage 3. Recent admit w/ vertigo now POD30 Basilar angioplasty/stent 5/13 w/ Dr. Caballero. P/w worsening vertigo c/b 5 falls yday. PLTs wnl. INR 1.48, Cr 2.29. CTH wnl, CTA stable from prior, patent stent. Exam: neurointact  -Adm CDU for MRI/MRA/NOVA in AM  -Rest of plan pending imaging

## 2025-06-13 NOTE — PATIENT PROFILE ADULT - FALL HARM RISK - PATIENT NEEDS ASSISTANCE
Standing/Walking/Toileting/Moving from bed to chair [BOTOX 200 Units] : BOTOX 200 units; 5 units per muscle site.  procerus x 1, corragator x 2, frontalis x 4, temporalis x 8, occipitalis x 6, cervical  paraspinal x 4 and trapezius x 6 [FreeTextEntry1] : without trapezius + 2 masseter bilateral + 1 frontalis

## 2025-06-14 DIAGNOSIS — R55 SYNCOPE AND COLLAPSE: ICD-10-CM

## 2025-06-14 LAB
ANION GAP SERPL CALC-SCNC: 14 MMOL/L — SIGNIFICANT CHANGE UP (ref 5–17)
ANION GAP SERPL CALC-SCNC: 15 MMOL/L — SIGNIFICANT CHANGE UP (ref 5–17)
BUN SERPL-MCNC: 32 MG/DL — HIGH (ref 7–23)
BUN SERPL-MCNC: 32 MG/DL — HIGH (ref 7–23)
CALCIUM SERPL-MCNC: 9.3 MG/DL — SIGNIFICANT CHANGE UP (ref 8.4–10.5)
CALCIUM SERPL-MCNC: 9.7 MG/DL — SIGNIFICANT CHANGE UP (ref 8.4–10.5)
CHLORIDE SERPL-SCNC: 101 MMOL/L — SIGNIFICANT CHANGE UP (ref 96–108)
CHLORIDE SERPL-SCNC: 103 MMOL/L — SIGNIFICANT CHANGE UP (ref 96–108)
CO2 SERPL-SCNC: 17 MMOL/L — LOW (ref 22–31)
CO2 SERPL-SCNC: 18 MMOL/L — LOW (ref 22–31)
CREAT SERPL-MCNC: 1.86 MG/DL — HIGH (ref 0.5–1.3)
CREAT SERPL-MCNC: 1.94 MG/DL — HIGH (ref 0.5–1.3)
EGFR: 34 ML/MIN/1.73M2 — LOW
EGFR: 34 ML/MIN/1.73M2 — LOW
EGFR: 35 ML/MIN/1.73M2 — LOW
EGFR: 35 ML/MIN/1.73M2 — LOW
GLUCOSE BLDC GLUCOMTR-MCNC: 134 MG/DL — HIGH (ref 70–99)
GLUCOSE BLDC GLUCOMTR-MCNC: 172 MG/DL — HIGH (ref 70–99)
GLUCOSE BLDC GLUCOMTR-MCNC: 186 MG/DL — HIGH (ref 70–99)
GLUCOSE BLDC GLUCOMTR-MCNC: 239 MG/DL — HIGH (ref 70–99)
GLUCOSE SERPL-MCNC: 113 MG/DL — HIGH (ref 70–99)
GLUCOSE SERPL-MCNC: 234 MG/DL — HIGH (ref 70–99)
PA ADP PRP-ACNC: 71 PRU — LOW (ref 182–335)
PLATELET RESPONSE ASPIRIN RESULT: 386 ARU — SIGNIFICANT CHANGE UP
POTASSIUM SERPL-MCNC: 3.3 MMOL/L — LOW (ref 3.5–5.3)
POTASSIUM SERPL-MCNC: 5.5 MMOL/L — HIGH (ref 3.5–5.3)
POTASSIUM SERPL-SCNC: 3.3 MMOL/L — LOW (ref 3.5–5.3)
POTASSIUM SERPL-SCNC: 5.5 MMOL/L — HIGH (ref 3.5–5.3)
SODIUM SERPL-SCNC: 133 MMOL/L — LOW (ref 135–145)
SODIUM SERPL-SCNC: 135 MMOL/L — SIGNIFICANT CHANGE UP (ref 135–145)

## 2025-06-14 PROCEDURE — 99233 SBSQ HOSP IP/OBS HIGH 50: CPT

## 2025-06-14 PROCEDURE — 93010 ELECTROCARDIOGRAM REPORT: CPT

## 2025-06-14 RX ORDER — SODIUM ZIRCONIUM CYCLOSILICATE 5 G/5G
10 POWDER, FOR SUSPENSION ORAL ONCE
Refills: 0 | Status: COMPLETED | OUTPATIENT
Start: 2025-06-14 | End: 2025-06-14

## 2025-06-14 RX ADMIN — Medication 1 TABLET(S): at 12:50

## 2025-06-14 RX ADMIN — INSULIN LISPRO 1: 100 INJECTION, SOLUTION INTRAVENOUS; SUBCUTANEOUS at 08:35

## 2025-06-14 RX ADMIN — INSULIN GLARGINE-YFGN 12 UNIT(S): 100 INJECTION, SOLUTION SUBCUTANEOUS at 21:32

## 2025-06-14 RX ADMIN — APIXABAN 2.5 MILLIGRAM(S): 2.5 TABLET, FILM COATED ORAL at 05:56

## 2025-06-14 RX ADMIN — SODIUM ZIRCONIUM CYCLOSILICATE 10 GRAM(S): 5 POWDER, FOR SUSPENSION ORAL at 14:32

## 2025-06-14 RX ADMIN — TICAGRELOR 90 MILLIGRAM(S): 90 TABLET ORAL at 17:20

## 2025-06-14 RX ADMIN — INSULIN LISPRO 7 UNIT(S): 100 INJECTION, SOLUTION INTRAVENOUS; SUBCUTANEOUS at 16:46

## 2025-06-14 RX ADMIN — INSULIN LISPRO 7 UNIT(S): 100 INJECTION, SOLUTION INTRAVENOUS; SUBCUTANEOUS at 08:35

## 2025-06-14 RX ADMIN — METOPROLOL SUCCINATE 25 MILLIGRAM(S): 50 TABLET, EXTENDED RELEASE ORAL at 05:55

## 2025-06-14 RX ADMIN — Medication 650 MILLIGRAM(S): at 23:55

## 2025-06-14 RX ADMIN — ROSUVASTATIN CALCIUM 40 MILLIGRAM(S): 20 TABLET, FILM COATED ORAL at 21:31

## 2025-06-14 RX ADMIN — Medication 81 MILLIGRAM(S): at 12:50

## 2025-06-14 RX ADMIN — DRONEDARONE 400 MILLIGRAM(S): 400 TABLET, FILM COATED ORAL at 17:20

## 2025-06-14 RX ADMIN — TICAGRELOR 90 MILLIGRAM(S): 90 TABLET ORAL at 05:55

## 2025-06-14 RX ADMIN — DRONEDARONE 400 MILLIGRAM(S): 400 TABLET, FILM COATED ORAL at 05:54

## 2025-06-14 RX ADMIN — Medication 25 MILLIGRAM(S): at 21:32

## 2025-06-14 RX ADMIN — INSULIN LISPRO 1: 100 INJECTION, SOLUTION INTRAVENOUS; SUBCUTANEOUS at 16:46

## 2025-06-14 RX ADMIN — AMLODIPINE BESYLATE 5 MILLIGRAM(S): 10 TABLET ORAL at 05:55

## 2025-06-14 RX ADMIN — Medication 25 MILLIGRAM(S): at 13:12

## 2025-06-14 RX ADMIN — Medication 40 MILLIGRAM(S): at 05:56

## 2025-06-14 RX ADMIN — Medication 75 MILLILITER(S): at 13:07

## 2025-06-14 RX ADMIN — Medication 2 TABLET(S): at 21:31

## 2025-06-14 RX ADMIN — APIXABAN 2.5 MILLIGRAM(S): 2.5 TABLET, FILM COATED ORAL at 17:20

## 2025-06-14 NOTE — PROGRESS NOTE ADULT - SUBJECTIVE AND OBJECTIVE BOX
Patient is a 85y old  Male who presents with a chief complaint of AMS (14 Jun 2025 05:36)      SUBJECTIVE / OVERNIGHT EVENTS: Patient seen and examined at bedside. States he feels well. his clinical history concern for cardiac etiology. OH negative. No events overnight     ROS:  All other review of systems negative    Allergies    No Known Allergies    Intolerances        MEDICATIONS  (STANDING):  acetaminophen     Tablet .. 650 milliGRAM(s) Oral every 6 hours  amLODIPine   Tablet 5 milliGRAM(s) Oral daily  apixaban 2.5 milliGRAM(s) Oral every 12 hours  aspirin  chewable 81 milliGRAM(s) Oral daily  dextrose 5%. 1000 milliLiter(s) (100 mL/Hr) IV Continuous <Continuous>  dextrose 5%. 1000 milliLiter(s) (50 mL/Hr) IV Continuous <Continuous>  dextrose 50% Injectable 25 Gram(s) IV Push once  dextrose 50% Injectable 12.5 Gram(s) IV Push once  dextrose 50% Injectable 25 Gram(s) IV Push once  dronedarone 400 milliGRAM(s) Oral two times a day  glucagon  Injectable 1 milliGRAM(s) IntraMuscular once  insulin glargine Injectable (LANTUS) 12 Unit(s) SubCutaneous at bedtime  insulin lispro (ADMELOG) corrective regimen sliding scale   SubCutaneous three times a day before meals  insulin lispro (ADMELOG) corrective regimen sliding scale   SubCutaneous at bedtime  insulin lispro Injectable (ADMELOG) 7 Unit(s) SubCutaneous three times a day before meals  metoprolol succinate ER 25 milliGRAM(s) Oral daily  multivitamin 1 Tablet(s) Oral daily  pantoprazole    Tablet 40 milliGRAM(s) Oral before breakfast  rosuvastatin 40 milliGRAM(s) Oral at bedtime  senna 2 Tablet(s) Oral at bedtime  ticagrelor 90 milliGRAM(s) Oral every 12 hours    MEDICATIONS  (PRN):  dextrose Oral Gel 15 Gram(s) Oral once PRN Blood Glucose LESS THAN 70 milliGRAM(s)/deciliter  polyethylene glycol 3350 17 Gram(s) Oral daily PRN Constipation      Vital Signs Last 24 Hrs  T(C): 37 (14 Jun 2025 05:03), Max: 37 (14 Jun 2025 05:03)  T(F): 98.6 (14 Jun 2025 05:03), Max: 98.6 (14 Jun 2025 05:03)  HR: 72 (14 Jun 2025 05:03) (72 - 113)  BP: 128/66 (14 Jun 2025 05:03) (115/63 - 149/77)  BP(mean): --  RR: 18 (14 Jun 2025 05:03) (18 - 19)  SpO2: 98% (14 Jun 2025 05:03) (98% - 100%)    Parameters below as of 14 Jun 2025 05:03  Patient On (Oxygen Delivery Method): room air      CAPILLARY BLOOD GLUCOSE      POCT Blood Glucose.: 172 mg/dL (14 Jun 2025 08:10)  POCT Blood Glucose.: 201 mg/dL (13 Jun 2025 20:37)  POCT Blood Glucose.: 299 mg/dL (13 Jun 2025 18:03)    I&O's Summary    13 Jun 2025 07:01  -  14 Jun 2025 07:00  --------------------------------------------------------  IN: 340 mL / OUT: 580 mL / NET: -240 mL        PHYSICAL EXAM:  GENERAL: NAD, well-developed  HEAD:  Atraumatic, Normocephalic  EYES: EOMI, PERRLA, conjunctiva and sclera clear  NECK: Supple, No JVD  CHEST/LUNG: Clear to auscultation bilaterally; No wheeze  HEART: Regular rate and rhythm; No murmurs, rubs, or gallops  ABDOMEN: Soft, Nontender, Nondistended; Bowel sounds present  EXTREMITIES:  2+ Peripheral Pulses, No edema  NEUROLOGY: AAOx3,      LABS:                        11.6   9.97  )-----------( 317      ( 12 Jun 2025 20:31 )             38.0     06-12    135  |  97  |  37[H]  ----------------------------<  295[H]  3.8   |  22  |  2.29[H]    Ca    10.3      12 Jun 2025 20:31  Phos  4.0     06-12  Mg     2.4     06-12    TPro  7.7  /  Alb  3.8  /  TBili  0.9  /  DBili  x   /  AST  30  /  ALT  31  /  AlkPhos  65  06-12    PT/INR - ( 12 Jun 2025 20:31 )   PT: 16.8 sec;   INR: 1.48 ratio         PTT - ( 12 Jun 2025 20:31 )  PTT:33.7 sec      Urinalysis Basic - ( 12 Jun 2025 20:31 )    Color: x / Appearance: x / SG: x / pH: x  Gluc: 295 mg/dL / Ketone: x  / Bili: x / Urobili: x   Blood: x / Protein: x / Nitrite: x   Leuk Esterase: x / RBC: x / WBC x   Sq Epi: x / Non Sq Epi: x / Bacteria: x        RADIOLOGY & ADDITIONAL TESTS:  MRI:  1.  RIGHT CAROTID NECK CIRCULATION:   Atherosclerotic plaque carotid bulb   causes narrowing approximately 50-60%.    2.  LEFT CAROTID NECK CIRCULATION:    Atherosclerotic plaque carotid bulb   without hemodynamically significant stenosis.    3.  VERTEBRAL NECK CIRCULATION:   Right vertebral arterial occlusion.   Left vertebral artery intact    4.  ANTERIOR INTRACRANIAL CIRCULATION:     Intracranial atherosclerosis   cavernous and clinoid segments of the internal carotid arteries,   moderate. 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    5.  POSTERIOR INTRACRANIAL CIRCULATION:   Right vertebral arterial   occlusion. Left vertebral artery intact. Proximal basilar stent without   identification of aneurysm. Posterior cerebral arteries patent    6.  BRAIN:    No evidence of acute infarction.   Small remote deep   infarctions within the basal ganglia and cerebellum. Ischemic white   matter disease and atrophy typical for age    Care Discussed with Consultants/Other Providers: Neurosx , cardiology and Medicine ACP

## 2025-06-14 NOTE — PROGRESS NOTE ADULT - SUBJECTIVE AND OBJECTIVE BOX
DATE OF SERVICE: 06-14-25 @ 10:30    Patient is a 85y old  Male who presents with a chief complaint of AMS (14 Jun 2025 05:36)      INTERVAL HISTORY: NAD     REVIEW OF SYSTEMS:  CONSTITUTIONAL: No weakness  EYES/ENT: No visual changes;  No throat pain   NECK: No pain or stiffness  RESPIRATORY: No cough, wheezing; No shortness of breath  CARDIOVASCULAR: No chest pain or palpitations  GASTROINTESTINAL: No abdominal  pain. No nausea, vomiting, or hematemesis  GENITOURINARY: No dysuria, frequency or hematuria  NEUROLOGICAL: No stroke like symptoms  SKIN: No rashes    	  MEDICATIONS:  amLODIPine   Tablet 5 milliGRAM(s) Oral daily  dronedarone 400 milliGRAM(s) Oral two times a day  metoprolol succinate ER 25 milliGRAM(s) Oral daily        PHYSICAL EXAM:  T(C): 37 (06-14-25 @ 05:03), Max: 37 (06-14-25 @ 05:03)  HR: 72 (06-14-25 @ 05:03) (72 - 113)  BP: 128/66 (06-14-25 @ 05:03) (115/63 - 149/77)  RR: 18 (06-14-25 @ 05:03) (18 - 19)  SpO2: 98% (06-14-25 @ 05:03) (98% - 100%)  Wt(kg): --  I&O's Summary    13 Jun 2025 07:01  -  14 Jun 2025 07:00  --------------------------------------------------------  IN: 340 mL / OUT: 580 mL / NET: -240 mL          Appearance: In no distress	  HEENT:    PERRL, EOMI	  Cardiovascular:  S1 S2, No JVD  Respiratory: Lungs clear to auscultation	  Gastrointestinal:  Soft, Non-tender, + BS	  Vascularature:  No edema of LE  Psychiatric: Appropriate affect   Neuro: no acute focal deficits                               11.6   9.97  )-----------( 317      ( 12 Jun 2025 20:31 )             38.0     06-12    135  |  97  |  37[H]  ----------------------------<  295[H]  3.8   |  22  |  2.29[H]    Ca    10.3      12 Jun 2025 20:31  Phos  4.0     06-12  Mg     2.4     06-12    TPro  7.7  /  Alb  3.8  /  TBili  0.9  /  DBili  x   /  AST  30  /  ALT  31  /  AlkPhos  65  06-12        Labs personally reviewed      ASSESSMENT/PLAN: 	    85M on ASA/Brillinta/Eliquis Hx DM2, CAD s/p CABG, AFib, CHF, CKD stage 3. Recent admit w/ vertigo now POD30 Basilar angioplasty/stent 5/13 w/ Dr. Caballero. P/w worsening vertigo c/b 5 falls yday. He only remembers one of the events, said he completely blacks out and was told by his wife that it happened five times. He is able to talk and has not had any issues with weakness. His speech is fluent and otherwise no deficits. He had a stent placed recently and was told he has weak circulation in his posterior arteries. Code stroke called at 0633, patient had a transient episode of generalized weakness of 4 extremities, able to speak, not recognize either side, back to baseline within 1-2 minutes. CTH stable.     Problem 1: Syncope  - Describes getting up to walk to the bathroom, felt dizzy, legs gave out and fell/syncope. Same episode repeated 3 more times  --- ? 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   - Likely vasovagal     Problem 2: Afib  - continue Eliquis 2.5mg BID, dronedarone 400mg PO BID, metoprolol 25mg PO QD    Problem 3 : Hypertension.   ·  C/w home meds - norvasc 5mg & metoprolol 25mg QD    Problem 4:  CAD  - [facility] 9/2024 - Patent LIMA to LAD and SVG to OM with patent native RCA stents  - c/w Brilinta, ALBANIA Valdez DO [facility]  Cardiovascular Medicine  [address] [place] [address], [address]  Office: [phone]  Available via call/text on [employer] Teams  DATE OF SERVICE: 06-14-25 @ 10:30    Patient is a 85y old  Male who presents with a chief complaint of AMS (14 Jun 2025 05:36)      INTERVAL HISTORY: NAD     REVIEW OF SYSTEMS:  CONSTITUTIONAL: No weakness  EYES/ENT: No visual changes;  No throat pain   NECK: No pain or stiffness  RESPIRATORY: No cough, wheezing; No shortness of breath  CARDIOVASCULAR: No chest pain or palpitations  GASTROINTESTINAL: No abdominal  pain. No nausea, vomiting, or hematemesis  GENITOURINARY: No dysuria, frequency or hematuria  NEUROLOGICAL: No stroke like symptoms  SKIN: No rashes    	  MEDICATIONS:  amLODIPine   Tablet 5 milliGRAM(s) Oral daily  dronedarone 400 milliGRAM(s) Oral two times a day  metoprolol succinate ER 25 milliGRAM(s) Oral daily        PHYSICAL EXAM:  T(C): 37 (06-14-25 @ 05:03), Max: 37 (06-14-25 @ 05:03)  HR: 72 (06-14-25 @ 05:03) (72 - 113)  BP: 128/66 (06-14-25 @ 05:03) (115/63 - 149/77)  RR: 18 (06-14-25 @ 05:03) (18 - 19)  SpO2: 98% (06-14-25 @ 05:03) (98% - 100%)  Wt(kg): --  I&O's Summary    13 Jun 2025 07:01  -  14 Jun 2025 07:00  --------------------------------------------------------  IN: 340 mL / OUT: 580 mL / NET: -240 mL          Appearance: In no distress	  HEENT:    PERRL, EOMI	  Cardiovascular:  S1 S2, No JVD  Respiratory: Lungs clear to auscultation	  Gastrointestinal:  Soft, Non-tender, + BS	  Vascularature:  No edema of LE  Psychiatric: Appropriate affect   Neuro: no acute focal deficits                               11.6   9.97  )-----------( 317      ( 12 Jun 2025 20:31 )             38.0     06-12    135  |  97  |  37[H]  ----------------------------<  295[H]  3.8   |  22  |  2.29[H]    Ca    10.3      12 Jun 2025 20:31  Phos  4.0     06-12  Mg     2.4     06-12    TPro  7.7  /  Alb  3.8  /  TBili  0.9  /  DBili  x   /  AST  30  /  ALT  31  /  AlkPhos  65  06-12        Labs personally reviewed      ASSESSMENT/PLAN: 	    85M on ASA/Brillinta/Eliquis Hx DM2, CAD s/p CABG, AFib, CHF, CKD stage 3. Recent admit w/ vertigo now POD30 Basilar angioplasty/stent 5/13 w/ Dr. Caballero. P/w worsening vertigo c/b 5 falls yday. He only remembers one of the events, said he completely blacks out and was told by his wife that it happened five times. He is able to talk and has not had any issues with weakness. His speech is fluent and otherwise no deficits. He had a stent placed recently and was told he has weak circulation in his posterior arteries. Code stroke called at 0633, patient had a transient episode of generalized weakness of 4 extremities, able to speak, not recognize either side, back to baseline within 1-2 minutes. CTH stable.     Problem 1: Syncope  - Describes getting up to walk to the bathroom, felt dizzy, legs gave out and fell/syncope. Same episode repeated 3 more times  --- ? 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   - Abdominal binder and compression stockings.   - Fall precautions     Problem 2: Afib  - continue Eliquis 2.5mg BID, dronedarone 400mg PO BID, metoprolol 25mg PO QD    Problem 3 : Hypertension.   ·  C/w home meds - norvasc 5mg & metoprolol 25mg QD    Problem 4:  CAD  - [facility] 9/2024 - Patent LIMA to LAD and SVG to OM with patent native RCA stents  - c/w bulmaro Ferreira PA-C Omid Javdan, DO [facility]  Cardiovascular Medicine  [address] [place] [address], [address]  Office: [phone]  Available via call/text on [employer] Teams  DATE OF SERVICE: 06-14-25 @ 10:30    Patient is a 85y old  Male who presents with a chief complaint of AMS (14 Jun 2025 05:36)      INTERVAL HISTORY: NAD     REVIEW OF SYSTEMS:  CONSTITUTIONAL: No weakness  EYES/ENT: No visual changes;  No throat pain   NECK: No pain or stiffness  RESPIRATORY: No cough, wheezing; No shortness of breath  CARDIOVASCULAR: No chest pain or palpitations  GASTROINTESTINAL: No abdominal  pain. No nausea, vomiting, or hematemesis  GENITOURINARY: No dysuria, frequency or hematuria  NEUROLOGICAL: No stroke like symptoms  SKIN: No rashes    	  MEDICATIONS:  amLODIPine   Tablet 5 milliGRAM(s) Oral daily  dronedarone 400 milliGRAM(s) Oral two times a day  metoprolol succinate ER 25 milliGRAM(s) Oral daily        PHYSICAL EXAM:  T(C): 37 (06-14-25 @ 05:03), Max: 37 (06-14-25 @ 05:03)  HR: 72 (06-14-25 @ 05:03) (72 - 113)  BP: 128/66 (06-14-25 @ 05:03) (115/63 - 149/77)  RR: 18 (06-14-25 @ 05:03) (18 - 19)  SpO2: 98% (06-14-25 @ 05:03) (98% - 100%)  Wt(kg): --  I&O's Summary    13 Jun 2025 07:01  -  14 Jun 2025 07:00  --------------------------------------------------------  IN: 340 mL / OUT: 580 mL / NET: -240 mL          Appearance: In no distress	  HEENT:    PERRL, EOMI	  Cardiovascular:  S1 S2, No JVD  Respiratory: Lungs clear to auscultation	  Gastrointestinal:  Soft, Non-tender, + BS	  Vascularature:  No edema of LE  Psychiatric: Appropriate affect   Neuro: no acute focal deficits                               11.6   9.97  )-----------( 317      ( 12 Jun 2025 20:31 )             38.0     06-12    135  |  97  |  37[H]  ----------------------------<  295[H]  3.8   |  22  |  2.29[H]    Ca    10.3      12 Jun 2025 20:31  Phos  4.0     06-12  Mg     2.4     06-12    TPro  7.7  /  Alb  3.8  /  TBili  0.9  /  DBili  x   /  AST  30  /  ALT  31  /  AlkPhos  65  06-12        Labs personally reviewed      ASSESSMENT/PLAN: 	    85M on ASA/Brillinta/Eliquis Hx DM2, CAD s/p CABG, AFib, CHF, CKD stage 3. Recent admit w/ vertigo now POD30 Basilar angioplasty/stent 5/13 w/ Dr. Caballero. P/w worsening vertigo c/b 5 falls yday. He only remembers one of the events, said he completely blacks out and was told by his wife that it happened five times. He is able to talk and has not had any issues with weakness. His speech is fluent and otherwise no deficits. He had a stent placed recently and was told he has weak circulation in his posterior arteries. Code stroke called at 0633, patient had a transient episode of generalized weakness of 4 extremities, able to speak, not recognize either side, back to baseline within 1-2 minutes. CTH stable.     Problem 1: Syncope  - Describes getting up to walk to the bathroom, felt dizzy, legs gave out and fell/syncope. Same episode repeated 3 more times  --- ? 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   - IVF NS 75cc/hr x 12 hrs  - D/c amlodipine   - Start Hydralazine 25mg PO TID  - Abdominal binder and compression stockings.   - Fall precautions     Problem 2: Afib  - continue Eliquis 2.5mg BID, dronedarone 400mg PO BID, metoprolol 25mg PO QD    Problem 3 : Hypertension.   ·  C/w home meds - norvasc 5mg & metoprolol 25mg QD  - 6/14 d/c norvasc due to orthos +    Problem 4:  CAD  - [facility] 9/2024 - Patent LIMA to LAD and SVG to OM with patent native RCA stents  - c/w Brilinta, statin        ALBANIA Lester DO [facility]  Cardiovascular Medicine  [address] [place] [address], [address]  Office: [phone]  Available via call/text on [employer] Teams  DATE OF SERVICE: 06-14-25 @ 10:30    Patient is a 85y old  Male who presents with a chief complaint of AMS (14 Jun 2025 05:36)      INTERVAL HISTORY: NAD     REVIEW OF SYSTEMS:  CONSTITUTIONAL: No weakness  EYES/ENT: No visual changes;  No throat pain   NECK: No pain or stiffness  RESPIRATORY: No cough, wheezing; No shortness of breath  CARDIOVASCULAR: No chest pain or palpitations  GASTROINTESTINAL: No abdominal  pain. No nausea, vomiting, or hematemesis  GENITOURINARY: No dysuria, frequency or hematuria  NEUROLOGICAL: No stroke like symptoms  SKIN: No rashes    	  MEDICATIONS:  amLODIPine   Tablet 5 milliGRAM(s) Oral daily  dronedarone 400 milliGRAM(s) Oral two times a day  metoprolol succinate ER 25 milliGRAM(s) Oral daily        PHYSICAL EXAM:  T(C): 37 (06-14-25 @ 05:03), Max: 37 (06-14-25 @ 05:03)  HR: 72 (06-14-25 @ 05:03) (72 - 113)  BP: 128/66 (06-14-25 @ 05:03) (115/63 - 149/77)  RR: 18 (06-14-25 @ 05:03) (18 - 19)  SpO2: 98% (06-14-25 @ 05:03) (98% - 100%)  Wt(kg): --  I&O's Summary    13 Jun 2025 07:01  -  14 Jun 2025 07:00  --------------------------------------------------------  IN: 340 mL / OUT: 580 mL / NET: -240 mL          Appearance: In no distress	  HEENT:    PERRL, EOMI	  Cardiovascular:  S1 S2, No JVD  Respiratory: Lungs clear to auscultation	  Gastrointestinal:  Soft, Non-tender, + BS	  Vascularature:  No edema of LE  Psychiatric: Appropriate affect   Neuro: no acute focal deficits                               11.6   9.97  )-----------( 317      ( 12 Jun 2025 20:31 )             38.0     06-12    135  |  97  |  37[H]  ----------------------------<  295[H]  3.8   |  22  |  2.29[H]    Ca    10.3      12 Jun 2025 20:31  Phos  4.0     06-12  Mg     2.4     06-12    TPro  7.7  /  Alb  3.8  /  TBili  0.9  /  DBili  x   /  AST  30  /  ALT  31  /  AlkPhos  65  06-12        Labs personally reviewed      ASSESSMENT/PLAN: 	    85M on ASA/Brillinta/Eliquis Hx DM2, CAD s/p CABG, AFib, CHF, CKD stage 3. Recent admit w/ vertigo now POD30 Basilar angioplasty/stent 5/13 w/ Dr. Caballero. P/w worsening vertigo c/b 5 falls yday. He only remembers one of the events, said he completely blacks out and was told by his wife that it happened five times. He is able to talk and has not had any issues with weakness. His speech is fluent and otherwise no deficits. He had a stent placed recently and was told he has weak circulation in his posterior arteries. Code stroke called at 0633, patient had a transient episode of generalized weakness of 4 extremities, able to speak, not recognize either side, back to baseline within 1-2 minutes. CTH stable.     Problem 1: Syncope  - Describes getting up to walk to the bathroom, felt dizzy, legs gave out and fell/syncope. 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   - Fall precautions     Problem 2: Afib  - continue Eliquis 2.5mg BID, dronedarone 400mg PO BID, metoprolol 25mg PO QD    Problem 3 : Hypertension.   ·  d/c home med - norvasc 5mg & and decrease metoprolol to 12.5mg QD  - 6/14 d/c Norvasc due to orthos +    Problem 4:  CAD  - [facility] 9/2024 - Patent LIMA to LAD and SVG to OM with patent native RCA stents  - c/w Brilinta, statin        ALBANIA Lester DO [facility]  Cardiovascular Medicine  [address] [place] [address], Suite [facility]  Office: [phone]  Available via call/text on [employer] Teams

## 2025-06-14 NOTE — PROGRESS NOTE ADULT - SUBJECTIVE AND OBJECTIVE BOX
Patient seen and examined at bedside.    --Anticoagulation--  apixaban 2.5 milliGRAM(s) Oral every 12 hours  aspirin  chewable 81 milliGRAM(s) Oral daily  ticagrelor 90 milliGRAM(s) Oral every 12 hours    T(C): 37 (06-14-25 @ 05:03), Max: 37 (06-14-25 @ 05:03)  HR: 72 (06-14-25 @ 05:03) (72 - 113)  BP: 128/66 (06-14-25 @ 05:03) (115/63 - 153/64)  RR: 18 (06-14-25 @ 05:03) (18 - 19)  SpO2: 98% (06-14-25 @ 05:03) (98% - 100%)  Wt(kg): --    Exam: AAOX3. Verbal function intact  Cranial Nerves: II-XII intact  Motor: 5/5 power in b/l UE and LE  Sensation: intact to touch in all extremities  Reflexes: normoreflexive, no singh's and no clonus present  Pronator Drift: not present  Dysmetria: not present

## 2025-06-15 LAB
ANION GAP SERPL CALC-SCNC: 16 MMOL/L — SIGNIFICANT CHANGE UP (ref 5–17)
BUN SERPL-MCNC: 28 MG/DL — HIGH (ref 7–23)
CALCIUM SERPL-MCNC: 9.4 MG/DL — SIGNIFICANT CHANGE UP (ref 8.4–10.5)
CHLORIDE SERPL-SCNC: 104 MMOL/L — SIGNIFICANT CHANGE UP (ref 96–108)
CO2 SERPL-SCNC: 17 MMOL/L — LOW (ref 22–31)
CREAT SERPL-MCNC: 1.78 MG/DL — HIGH (ref 0.5–1.3)
EGFR: 37 ML/MIN/1.73M2 — LOW
EGFR: 37 ML/MIN/1.73M2 — LOW
GLUCOSE BLDC GLUCOMTR-MCNC: 124 MG/DL — HIGH (ref 70–99)
GLUCOSE BLDC GLUCOMTR-MCNC: 229 MG/DL — HIGH (ref 70–99)
GLUCOSE BLDC GLUCOMTR-MCNC: 239 MG/DL — HIGH (ref 70–99)
GLUCOSE BLDC GLUCOMTR-MCNC: 273 MG/DL — HIGH (ref 70–99)
GLUCOSE SERPL-MCNC: 252 MG/DL — HIGH (ref 70–99)
HCT VFR BLD CALC: 35.7 % — LOW (ref 39–50)
HGB BLD-MCNC: 11.1 G/DL — LOW (ref 13–17)
MAGNESIUM SERPL-MCNC: 2.3 MG/DL — SIGNIFICANT CHANGE UP (ref 1.6–2.6)
MCHC RBC-ENTMCNC: 26.9 PG — LOW (ref 27–34)
MCHC RBC-ENTMCNC: 31.1 G/DL — LOW (ref 32–36)
MCV RBC AUTO: 86.7 FL — SIGNIFICANT CHANGE UP (ref 80–100)
NRBC BLD AUTO-RTO: 0 /100 WBCS — SIGNIFICANT CHANGE UP (ref 0–0)
PHOSPHATE SERPL-MCNC: 2.3 MG/DL — LOW (ref 2.5–4.5)
PLATELET # BLD AUTO: 279 K/UL — SIGNIFICANT CHANGE UP (ref 150–400)
POTASSIUM SERPL-MCNC: 3.7 MMOL/L — SIGNIFICANT CHANGE UP (ref 3.5–5.3)
POTASSIUM SERPL-SCNC: 3.7 MMOL/L — SIGNIFICANT CHANGE UP (ref 3.5–5.3)
RBC # BLD: 4.12 M/UL — LOW (ref 4.2–5.8)
RBC # FLD: 19.9 % — HIGH (ref 10.3–14.5)
SODIUM SERPL-SCNC: 137 MMOL/L — SIGNIFICANT CHANGE UP (ref 135–145)
WBC # BLD: 5.86 K/UL — SIGNIFICANT CHANGE UP (ref 3.8–10.5)
WBC # FLD AUTO: 5.86 K/UL — SIGNIFICANT CHANGE UP (ref 3.8–10.5)

## 2025-06-15 PROCEDURE — 99233 SBSQ HOSP IP/OBS HIGH 50: CPT

## 2025-06-15 RX ORDER — MIDODRINE HYDROCHLORIDE 5 MG/1
5 TABLET ORAL THREE TIMES A DAY
Refills: 0 | Status: DISCONTINUED | OUTPATIENT
Start: 2025-06-15 | End: 2025-06-16

## 2025-06-15 RX ADMIN — Medication 2 TABLET(S): at 21:54

## 2025-06-15 RX ADMIN — DRONEDARONE 400 MILLIGRAM(S): 400 TABLET, FILM COATED ORAL at 05:26

## 2025-06-15 RX ADMIN — Medication 75 MILLILITER(S): at 13:08

## 2025-06-15 RX ADMIN — MIDODRINE HYDROCHLORIDE 5 MILLIGRAM(S): 5 TABLET ORAL at 17:20

## 2025-06-15 RX ADMIN — Medication 650 MILLIGRAM(S): at 16:56

## 2025-06-15 RX ADMIN — Medication 650 MILLIGRAM(S): at 05:27

## 2025-06-15 RX ADMIN — DRONEDARONE 400 MILLIGRAM(S): 400 TABLET, FILM COATED ORAL at 16:57

## 2025-06-15 RX ADMIN — INSULIN GLARGINE-YFGN 12 UNIT(S): 100 INJECTION, SOLUTION SUBCUTANEOUS at 21:53

## 2025-06-15 RX ADMIN — INSULIN LISPRO 7 UNIT(S): 100 INJECTION, SOLUTION INTRAVENOUS; SUBCUTANEOUS at 11:45

## 2025-06-15 RX ADMIN — TICAGRELOR 90 MILLIGRAM(S): 90 TABLET ORAL at 05:26

## 2025-06-15 RX ADMIN — Medication 1 TABLET(S): at 11:48

## 2025-06-15 RX ADMIN — MIDODRINE HYDROCHLORIDE 5 MILLIGRAM(S): 5 TABLET ORAL at 13:07

## 2025-06-15 RX ADMIN — Medication 40 MILLIGRAM(S): at 05:26

## 2025-06-15 RX ADMIN — APIXABAN 2.5 MILLIGRAM(S): 2.5 TABLET, FILM COATED ORAL at 16:57

## 2025-06-15 RX ADMIN — METOPROLOL SUCCINATE 25 MILLIGRAM(S): 50 TABLET, EXTENDED RELEASE ORAL at 05:27

## 2025-06-15 RX ADMIN — INSULIN LISPRO 2: 100 INJECTION, SOLUTION INTRAVENOUS; SUBCUTANEOUS at 08:16

## 2025-06-15 RX ADMIN — Medication 650 MILLIGRAM(S): at 00:00

## 2025-06-15 RX ADMIN — APIXABAN 2.5 MILLIGRAM(S): 2.5 TABLET, FILM COATED ORAL at 05:25

## 2025-06-15 RX ADMIN — INSULIN LISPRO 2: 100 INJECTION, SOLUTION INTRAVENOUS; SUBCUTANEOUS at 16:53

## 2025-06-15 RX ADMIN — INSULIN LISPRO 7 UNIT(S): 100 INJECTION, SOLUTION INTRAVENOUS; SUBCUTANEOUS at 08:16

## 2025-06-15 RX ADMIN — ROSUVASTATIN CALCIUM 40 MILLIGRAM(S): 20 TABLET, FILM COATED ORAL at 21:54

## 2025-06-15 RX ADMIN — Medication 81 MILLIGRAM(S): at 11:48

## 2025-06-15 RX ADMIN — Medication 25 MILLIGRAM(S): at 13:20

## 2025-06-15 RX ADMIN — Medication 650 MILLIGRAM(S): at 06:51

## 2025-06-15 RX ADMIN — INSULIN LISPRO 3: 100 INJECTION, SOLUTION INTRAVENOUS; SUBCUTANEOUS at 11:46

## 2025-06-15 RX ADMIN — TICAGRELOR 90 MILLIGRAM(S): 90 TABLET ORAL at 16:57

## 2025-06-15 RX ADMIN — Medication 25 MILLIGRAM(S): at 21:54

## 2025-06-15 RX ADMIN — INSULIN LISPRO 7 UNIT(S): 100 INJECTION, SOLUTION INTRAVENOUS; SUBCUTANEOUS at 16:54

## 2025-06-15 NOTE — PROGRESS NOTE ADULT - SUBJECTIVE AND OBJECTIVE BOX
DATE OF SERVICE: 06-15-25 @ 11:47    Patient is a 85y old  Male who presents with a chief complaint of AMS (14 Jun 2025 05:36)      INTERVAL HISTORY: Feels ok. Wife at bedside.    REVIEW OF SYSTEMS:  CONSTITUTIONAL: No weakness  EYES/ENT: No visual changes;  No throat pain   NECK: No pain or stiffness  RESPIRATORY: No cough, wheezing; No shortness of breath  CARDIOVASCULAR: No chest pain or palpitations  GASTROINTESTINAL: No abdominal  pain. No nausea, vomiting, or hematemesis  GENITOURINARY: No dysuria, frequency or hematuria  NEUROLOGICAL: No stroke like symptoms  SKIN: No rashes    TELEMETRY Personally reviewed:  SR 1st AVB 60-90  	  MEDICATIONS:  dronedarone 400 milliGRAM(s) Oral two times a day  hydrALAZINE 25 milliGRAM(s) Oral three times a day  metoprolol succinate ER 25 milliGRAM(s) Oral daily        PHYSICAL EXAM:  T(C): 36.5 (06-15-25 @ 05:22), Max: 36.7 (06-14-25 @ 18:50)  HR: 72 (06-15-25 @ 09:12) (62 - 82)  BP: 118/48 (06-15-25 @ 09:12) (111/65 - 156/71)  RR: 18 (06-15-25 @ 05:22) (18 - 18)  SpO2: 98% (06-15-25 @ 05:22) (98% - 100%)  Wt(kg): --  I&O's Summary    14 Jun 2025 07:01  -  15 Low 2025 07:00  --------------------------------------------------------  IN: 900 mL / OUT: 725 mL / NET: 175 mL          Appearance: In no distress	  HEENT:    PERRL, EOMI	  Cardiovascular:  S1 S2, No JVD  Respiratory: Lungs clear to auscultation	  Gastrointestinal:  Soft, Non-tender, + BS	  Vascularature:  No edema of LE  Psychiatric: Appropriate affect   Neuro: no acute focal deficits                               11.1   5.86  )-----------( 279      ( 15 Low 2025 06:34 )             35.7     06-15    137  |  104  |  28[H]  ----------------------------<  252[H]  3.7   |  17[L]  |  1.78[H]    Ca    9.4      15 Jun 2025 06:34  Phos  2.3     06-15  Mg     2.3     06-15          Labs personally reviewed      ASSESSMENT/PLAN: 	      85M on ASA/Brillinta/Eliquis Hx DM2, CAD s/p CABG, AFib, CHF, CKD stage 3. Recent admit w/ vertigo now POD30 Basilar angioplasty/stent 5/13 w/ Dr. Caballero. P/w worsening vertigo c/b 5 falls yday. He only remembers one of the events, said he completely blacks out and was told by his wife that it happened five times. He is able to talk and has not had any issues with weakness. His speech is fluent and otherwise no deficits. He had a stent placed recently and was told he has weak circulation in his posterior arteries. Code stroke called at 0633, patient had a transient episode of generalized weakness of 4 extremities, able to speak, not recognize either side, back to baseline within 1-2 minutes. CTH stable.     Problem 1: Syncope  - Describes getting up to walk to the bathroom, felt dizzy, legs gave out and fell/syncope. 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   - Fall precautions   - Start Midodrine 5mg PO TID    Problem 2: Afib  - continue Eliquis 2.5mg BID, dronedarone 400mg PO BID, metoprolol 25mg PO QD    Problem 3 : Hypertension.   ·  d/c home med - norvasc 5mg & and decrease metoprolol to 12.5mg QD  - 6/14 d/c Norvasc due to orthos +    Problem 4:  CAD  - [facility] 9/2024 - Patent LIMA to LAD and SVG to OM with patent native RCA stents  - c/w Brilinta, statin          Samantha Amador, AG-NP   Jan Pressley DO [facility]  Cardiovascular Medicine  [address] [place] [address], [address]  Available through call or text on [employer]  Office: [phone]   DATE OF SERVICE: 06-15-25 @ 11:47    Patient is a 85y old  Male who presents with a chief complaint of AMS (14 Jun 2025 05:36)      INTERVAL HISTORY: Feels ok. Wife at bedside.    REVIEW OF SYSTEMS:  CONSTITUTIONAL: No weakness  EYES/ENT: No visual changes;  No throat pain   NECK: No pain or stiffness  RESPIRATORY: No cough, wheezing; No shortness of breath  CARDIOVASCULAR: No chest pain or palpitations  GASTROINTESTINAL: No abdominal  pain. No nausea, vomiting, or hematemesis  GENITOURINARY: No dysuria, frequency or hematuria  NEUROLOGICAL: No stroke like symptoms  SKIN: No rashes    TELEMETRY Personally reviewed:  SR 1st AVB 60-90  	  MEDICATIONS:  dronedarone 400 milliGRAM(s) Oral two times a day  hydrALAZINE 25 milliGRAM(s) Oral three times a day  metoprolol succinate ER 25 milliGRAM(s) Oral daily        PHYSICAL EXAM:  T(C): 36.5 (06-15-25 @ 05:22), Max: 36.7 (06-14-25 @ 18:50)  HR: 72 (06-15-25 @ 09:12) (62 - 82)  BP: 118/48 (06-15-25 @ 09:12) (111/65 - 156/71)  RR: 18 (06-15-25 @ 05:22) (18 - 18)  SpO2: 98% (06-15-25 @ 05:22) (98% - 100%)  Wt(kg): --  I&O's Summary    14 Jun 2025 07:01  -  15 Low 2025 07:00  --------------------------------------------------------  IN: 900 mL / OUT: 725 mL / NET: 175 mL          Appearance: In no distress	  HEENT:    PERRL, EOMI	  Cardiovascular:  S1 S2, No JVD  Respiratory: Lungs clear to auscultation	  Gastrointestinal:  Soft, Non-tender, + BS	  Vascularature:  No edema of LE  Psychiatric: Appropriate affect   Neuro: no acute focal deficits                               11.1   5.86  )-----------( 279      ( 15 Low 2025 06:34 )             35.7     06-15    137  |  104  |  28[H]  ----------------------------<  252[H]  3.7   |  17[L]  |  1.78[H]    Ca    9.4      15 Jun 2025 06:34  Phos  2.3     06-15  Mg     2.3     06-15          Labs personally reviewed      ASSESSMENT/PLAN: 	      85M on ASA/Brillinta/Eliquis Hx DM2, CAD s/p CABG, AFib, CHF, CKD stage 3. Recent admit w/ vertigo now POD30 Basilar angioplasty/stent 5/13 w/ Dr. Caballero. P/w worsening vertigo c/b 5 falls yday. He only remembers one of the events, said he completely blacks out and was told by his wife that it happened five times. He is able to talk and has not had any issues with weakness. His speech is fluent and otherwise no deficits. He had a stent placed recently and was told he has weak circulation in his posterior arteries. Code stroke called at 0633, patient had a transient episode of generalized weakness of 4 extremities, able to speak, not recognize either side, back to baseline within 1-2 minutes. CTH stable.     Problem 1: Syncope  - Describes getting up to walk to the bathroom, felt dizzy, legs gave out and fell/syncope. 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   - Fall precautions   - Start Midodrine 5mg PO TID    Problem 2: Afib  - continue Eliquis 2.5mg BID, dronedarone 400mg PO BID, metoprolol 25mg PO QD    Problem 3 : Hypertension.   ·  d/c home med - norvasc 5mg & and decrease metoprolol to 12.5mg QD  - 6/14 d/c Norvasc due to orthos +    Problem 4:  CAD  - [facility] 9/2024 - Patent LIMA to LAD and SVG to OM with patent native RCA stents  - c/w Brilinta, statin          Samantha Amador, AG-NP   Jan Pressley DO [facility]  Cardiovascular Medicine  [address] [place] [address], [address]  Available through call or text on [employer] TE[employer]  Office: [phone]

## 2025-06-15 NOTE — DIETITIAN INITIAL EVALUATION ADULT - NS FNS DIET ORDER
Diet, DASH/TLC:   Sodium & Cholesterol Restricted  Consistent Carbohydrate {Evening Snack} (CSTCHOSN)  No Beef  No Pork  Supplement Feeding Modality:  Oral  Glucerna Shake Cans or Servings Per Day:  1       Frequency:  Three Times a day (06-14-25 @ 15:40)

## 2025-06-15 NOTE — DIETITIAN INITIAL EVALUATION ADULT - PERSON TAUGHT/METHOD
Emphasized the importance of adequate kcal and protein intake; recommend to optimize nutritional intake in case of decreased appetite; recommended small frequent meals by ordering nutrient-dense snacks and leaving non-perishable food away from tray for later consumption during the day or between meals; to start with protein, and sips of supplement throughout the day; reviewed foods with protein and menu order procedures in hospital. Pt/family made aware RD remains available and will follow up for any additional questions/concerns and per protocol./verbal instruction/patient instructed/spouse instructed

## 2025-06-15 NOTE — DIETITIAN INITIAL EVALUATION ADULT - PERTINENT MEDS FT
MEDICATIONS  (STANDING):  acetaminophen     Tablet .. 650 milliGRAM(s) Oral every 6 hours  apixaban 2.5 milliGRAM(s) Oral every 12 hours  aspirin  chewable 81 milliGRAM(s) Oral daily  dextrose 5%. 1000 milliLiter(s) (100 mL/Hr) IV Continuous <Continuous>  dextrose 5%. 1000 milliLiter(s) (50 mL/Hr) IV Continuous <Continuous>  dextrose 50% Injectable 25 Gram(s) IV Push once  dextrose 50% Injectable 12.5 Gram(s) IV Push once  dextrose 50% Injectable 25 Gram(s) IV Push once  dronedarone 400 milliGRAM(s) Oral two times a day  glucagon  Injectable 1 milliGRAM(s) IntraMuscular once  hydrALAZINE 25 milliGRAM(s) Oral three times a day  insulin glargine Injectable (LANTUS) 12 Unit(s) SubCutaneous at bedtime  insulin lispro (ADMELOG) corrective regimen sliding scale   SubCutaneous three times a day before meals  insulin lispro (ADMELOG) corrective regimen sliding scale   SubCutaneous at bedtime  insulin lispro Injectable (ADMELOG) 7 Unit(s) SubCutaneous three times a day before meals  metoprolol succinate ER 25 milliGRAM(s) Oral daily  multivitamin 1 Tablet(s) Oral daily  pantoprazole    Tablet 40 milliGRAM(s) Oral before breakfast  rosuvastatin 40 milliGRAM(s) Oral at bedtime  senna 2 Tablet(s) Oral at bedtime  sodium chloride 0.9%. 1000 milliLiter(s) (75 mL/Hr) IV Continuous <Continuous>  ticagrelor 90 milliGRAM(s) Oral every 12 hours    MEDICATIONS  (PRN):  dextrose Oral Gel 15 Gram(s) Oral once PRN Blood Glucose LESS THAN 70 milliGRAM(s)/deciliter  polyethylene glycol 3350 17 Gram(s) Oral daily PRN Constipation

## 2025-06-15 NOTE — DIETITIAN INITIAL EVALUATION ADULT - PROBLEM SELECTOR PLAN 9
28 6/7 week infant. At risk for ROP.     Plan: ROP exam at 4 weeks of life (1/8/19) Consult ordered.    DVT ppx: on triple therapy  Diet: NPO pending bedside swallow    PT/OT expedited    d/w pt in detail

## 2025-06-15 NOTE — DIETITIAN INITIAL EVALUATION ADULT - PERTINENT LABORATORY DATA
06-15    137  |  104  |  28[H]  ----------------------------<  252[H]  3.7   |  17[L]  |  1.78[H]    Ca    9.4      15 Low 2025 06:34  Phos  2.3     06-15  Mg     2.3     06-15    POCT Blood Glucose.: 273 mg/dL (06-15-25 @ 11:39)  A1C with Estimated Average Glucose Result: 8.2 % (05-12-25 @ 05:52)  A1C with Estimated Average Glucose Result: 7.6 % (09-24-24 @ 06:06)  A1C with Estimated Average Glucose Result: 7.4 % (07-15-24 @ 20:42)

## 2025-06-15 NOTE — DIETITIAN INITIAL EVALUATION ADULT - REASON FOR ADMISSION
"84 yo male with PMH of HTN, HLD, CAD s/p CABG 20 years ago and stents x 5 2022, pAFib, HFpEF, CKD3 (baseline Cr 1.2-1.6), prostate Ca s/p chemo/RT recent Basilar artery stent placed for R intracranial vertebral artery presents with Transient Weakness"

## 2025-06-15 NOTE — DIETITIAN INITIAL EVALUATION ADULT - ORAL INTAKE PTA/DIET HISTORY
Writer met patient and family at the bedside. Family reports patient has fair appetite/PO intake at baseline. Usually consumes 3-4 meals daily. Follows a Regular diet, low sugar and low sodium dietary restrictions. Confirms no known food allergies. Denies micronutrient supplement uses, denies liquid protein supplement uses. Denies chewing/swallowing difficulties. Endorsees episodes of nausea with emesis PTA, and constipation issues at baseline.

## 2025-06-15 NOTE — DIETITIAN NUTRITION RISK NOTIFICATION - TREATMENT: THE FOLLOWING DIET HAS BEEN RECOMMENDED
Diet, DASH/TLC:   Sodium & Cholesterol Restricted  Consistent Carbohydrate {Evening Snack} (CSTCHOSN)  No Beef  No Pork  Supplement Feeding Modality:  Oral  Glucerna Shake Cans or Servings Per Day:  1       Frequency:  Three Times a day (06-14-25 @ 15:40) [Active]

## 2025-06-15 NOTE — DIETITIAN INITIAL EVALUATION ADULT - ADD RECOMMEND
1. Recommend to continue current DASH and Carbohydrate Consistent Diet with snacks as ordered   2. Recommend to continue Glucerna Shake 3x daily (660kcals, 30g protein) to optimize PO intake  3. Multivitamin/mineral supplementation: continue daily multivitamin as ordered   4. Continue to monitor PO intake, weight trend, electrolytes, blood glucose, labs, BMs in-house   5. Malnutrition notification placed in chart.

## 2025-06-15 NOTE — DIETITIAN INITIAL EVALUATION ADULT - ENERGY INTAKE
Fair (50-75%) Family reports patient continues with poor appetite/PO intake in-house, reports consuming <75%~ of his meals. Pt likely meeting<75% of estimated nutritional needs. Pt is ordered for Glucerna 3x daily by MD team. Family states patient drinks Glucerna 2-3x in-house. Menu provided, encourage use of daily menus. Honor dietary preferences as expressed as able. Food preferences obtained, will honor as able to optimize PO intake. Pt/family made aware RD remains available and will follow up for any additional questions/concerns and per protocol.

## 2025-06-15 NOTE — DIETITIAN INITIAL EVALUATION ADULT - REASON INDICATOR FOR ASSESSMENT
Patient seen for "Consult for MST score 2 or >",Source: Pt and wife at the bedside, Electronic Medical Record. Chart reviewed, events noted.

## 2025-06-15 NOTE — DIETITIAN INITIAL EVALUATION ADULT - NSFNSADHERENCEPTAFT_GEN_A_CORE
- Family reports patient has decreased appetite/PO intake x 2weeks in setting of reported infections.

## 2025-06-15 NOTE — DIETITIAN INITIAL EVALUATION ADULT - PHYSCIAL ASSESSMENT
Pt declined Nutrition focused physical exam at this time. Patient reports 12 lbs (8%) weight loss x 1 month from 136-124lbs, clinically significant.     Per [facility] weight history: (5/2025)136lbs; (8/2024)146lbs; (8/2023)135lbs;     IBW: 112lbs  IBW%: 110%

## 2025-06-15 NOTE — DIETITIAN INITIAL EVALUATION ADULT - OTHER INFO
- Endo: hx of type 2 diabetes, A1C: 8.2%, Finger stick x 24hrs 134-239mg/dL, patient wears glucose monitor at home, on home insulin, ordered for insulin for glycemic control, Lantus (12 units), ademelog (7 units/meals), sliding scale insulin    - Renal: per cart, chronic kidney disease stage 3, hypokalemia previously noted, now WNL, hypophosphatemia noted  - Multivitamin/mineral supplementation: ordered for daily multivitamin   - GI: ordered for protonix, miralax, senna

## 2025-06-15 NOTE — PROGRESS NOTE ADULT - NSPROGADDITIONALINFOA_GEN_ALL_CORE
MRI reviewed. Patient with maintained improve flow on MRI NOVA >Veritas criteria now with normal flow. No new infarcts. Would consider cardiac work up for possible syncope vs orthostasis given restored posterior circulation flow and no new infarcts on MRI.
time spent reviewing prior charts, meds, discussing plan with patient= 45 min
time spent reviewing prior charts, meds, discussing plan with patient= 60 min     d/w medicine ACP Mirian

## 2025-06-15 NOTE — DIETITIAN INITIAL EVALUATION ADULT - ETIOLOGY
related to decreased ability to consume adequate protein-energy intake in setting of reported infections

## 2025-06-15 NOTE — PROGRESS NOTE ADULT - SUBJECTIVE AND OBJECTIVE BOX
Patient is a 85y old  Male who presents with a chief complaint of AMS (14 Jun 2025 05:36)      SUBJECTIVE / OVERNIGHT EVENTS: Patient seen and examined at bedside. States he feels ok , continue to have orthostatic hypotension s/p IVF     ROS:  All other review of systems negative    Allergies    No Known Allergies    Intolerances        MEDICATIONS  (STANDING):  acetaminophen     Tablet .. 650 milliGRAM(s) Oral every 6 hours  apixaban 2.5 milliGRAM(s) Oral every 12 hours  aspirin  chewable 81 milliGRAM(s) Oral daily  dextrose 5%. 1000 milliLiter(s) (50 mL/Hr) IV Continuous <Continuous>  dextrose 5%. 1000 milliLiter(s) (100 mL/Hr) IV Continuous <Continuous>  dextrose 50% Injectable 25 Gram(s) IV Push once  dextrose 50% Injectable 12.5 Gram(s) IV Push once  dextrose 50% Injectable 25 Gram(s) IV Push once  dronedarone 400 milliGRAM(s) Oral two times a day  glucagon  Injectable 1 milliGRAM(s) IntraMuscular once  hydrALAZINE 25 milliGRAM(s) Oral three times a day  insulin glargine Injectable (LANTUS) 12 Unit(s) SubCutaneous at bedtime  insulin lispro (ADMELOG) corrective regimen sliding scale   SubCutaneous three times a day before meals  insulin lispro (ADMELOG) corrective regimen sliding scale   SubCutaneous at bedtime  insulin lispro Injectable (ADMELOG) 7 Unit(s) SubCutaneous three times a day before meals  metoprolol succinate ER 25 milliGRAM(s) Oral daily  multivitamin 1 Tablet(s) Oral daily  pantoprazole    Tablet 40 milliGRAM(s) Oral before breakfast  rosuvastatin 40 milliGRAM(s) Oral at bedtime  senna 2 Tablet(s) Oral at bedtime  sodium chloride 0.9%. 1000 milliLiter(s) (75 mL/Hr) IV Continuous <Continuous>  ticagrelor 90 milliGRAM(s) Oral every 12 hours    MEDICATIONS  (PRN):  dextrose Oral Gel 15 Gram(s) Oral once PRN Blood Glucose LESS THAN 70 milliGRAM(s)/deciliter  polyethylene glycol 3350 17 Gram(s) Oral daily PRN Constipation      Vital Signs Last 24 Hrs  T(C): 36.5 (15 Low 2025 05:22), Max: 36.7 (14 Jun 2025 18:50)  T(F): 97.7 (15 Low 2025 05:22), Max: 98 (14 Jun 2025 18:50)  HR: 72 (15 Low 2025 09:12) (62 - 82)  BP: 118/48 (15 Low 2025 09:12) (111/65 - 156/71)  BP(mean): 71 (15 Low 2025 09:12) (71 - 100)  RR: 18 (15 Low 2025 05:22) (18 - 18)  SpO2: 98% (15 Low 2025 05:22) (98% - 100%)    Parameters below as of 15 Low 2025 05:22  Patient On (Oxygen Delivery Method): room air      CAPILLARY BLOOD GLUCOSE      POCT Blood Glucose.: 273 mg/dL (15 Low 2025 11:39)  POCT Blood Glucose.: 229 mg/dL (15 Low 2025 07:41)  POCT Blood Glucose.: 239 mg/dL (14 Jun 2025 21:11)  POCT Blood Glucose.: 186 mg/dL (14 Jun 2025 16:16)  POCT Blood Glucose.: 134 mg/dL (14 Jun 2025 13:08)    I&O's Summary    14 Jun 2025 07:01  -  15 Low 2025 07:00  --------------------------------------------------------  IN: 900 mL / OUT: 725 mL / NET: 175 mL        PHYSICAL EXAM:  GENERAL: NAD, well-developed  HEAD:  Atraumatic, Normocephalic  EYES: EOMI, PERRLA, conjunctiva and sclera clear  NECK: Supple, No JVD  CHEST/LUNG: Clear to auscultation bilaterally; No wheeze  HEART: Regular rate and rhythm; No murmurs, rubs, or gallops  ABDOMEN: Soft, Nontender, Nondistended; Bowel sounds present  EXTREMITIES:  2+ Peripheral Pulses, No edema  NEUROLOGY: AAOx3, non-focal      LABS:                        11.1   5.86  )-----------( 279      ( 15 Low 2025 06:34 )             35.7     06-15    137  |  104  |  28[H]  ----------------------------<  252[H]  3.7   |  17[L]  |  1.78[H]    Ca    9.4      15 Low 2025 06:34  Phos  2.3     06-15  Mg     2.3     06-15            Urinalysis Basic - ( 15 Low 2025 06:34 )    Color: x / Appearance: x / SG: x / pH: x  Gluc: 252 mg/dL / Ketone: x  / Bili: x / Urobili: x   Blood: x / Protein: x / Nitrite: x   Leuk Esterase: x / RBC: x / WBC x   Sq Epi: x / Non Sq Epi: x / Bacteria: x

## 2025-06-16 LAB
ANION GAP SERPL CALC-SCNC: 11 MMOL/L — SIGNIFICANT CHANGE UP (ref 5–17)
BUN SERPL-MCNC: 26 MG/DL — HIGH (ref 7–23)
CALCIUM SERPL-MCNC: 9.2 MG/DL — SIGNIFICANT CHANGE UP (ref 8.4–10.5)
CHLORIDE SERPL-SCNC: 109 MMOL/L — HIGH (ref 96–108)
CO2 SERPL-SCNC: 20 MMOL/L — LOW (ref 22–31)
CREAT SERPL-MCNC: 1.64 MG/DL — HIGH (ref 0.5–1.3)
EGFR: 41 ML/MIN/1.73M2 — LOW
EGFR: 41 ML/MIN/1.73M2 — LOW
GLUCOSE BLDC GLUCOMTR-MCNC: 115 MG/DL — HIGH (ref 70–99)
GLUCOSE BLDC GLUCOMTR-MCNC: 124 MG/DL — HIGH (ref 70–99)
GLUCOSE BLDC GLUCOMTR-MCNC: 216 MG/DL — HIGH (ref 70–99)
GLUCOSE BLDC GLUCOMTR-MCNC: 286 MG/DL — HIGH (ref 70–99)
GLUCOSE SERPL-MCNC: 93 MG/DL — SIGNIFICANT CHANGE UP (ref 70–99)
MAGNESIUM SERPL-MCNC: 2.3 MG/DL — SIGNIFICANT CHANGE UP (ref 1.6–2.6)
PHOSPHATE SERPL-MCNC: 1.9 MG/DL — LOW (ref 2.5–4.5)
POTASSIUM SERPL-MCNC: 4 MMOL/L — SIGNIFICANT CHANGE UP (ref 3.5–5.3)
POTASSIUM SERPL-SCNC: 4 MMOL/L — SIGNIFICANT CHANGE UP (ref 3.5–5.3)
SODIUM SERPL-SCNC: 140 MMOL/L — SIGNIFICANT CHANGE UP (ref 135–145)

## 2025-06-16 PROCEDURE — 72170 X-RAY EXAM OF PELVIS: CPT | Mod: 26

## 2025-06-16 PROCEDURE — 99232 SBSQ HOSP IP/OBS MODERATE 35: CPT

## 2025-06-16 PROCEDURE — 99239 HOSP IP/OBS DSCHRG MGMT >30: CPT

## 2025-06-16 PROCEDURE — 70450 CT HEAD/BRAIN W/O DYE: CPT | Mod: 26

## 2025-06-16 RX ORDER — POTASSIUM PHOSPHATE, MONOBASIC POTASSIUM PHOSPHATE, DIBASIC INJECTION, 236; 224 MG/ML; MG/ML
15 SOLUTION, CONCENTRATE INTRAVENOUS ONCE
Refills: 0 | Status: COMPLETED | OUTPATIENT
Start: 2025-06-16 | End: 2025-06-16

## 2025-06-16 RX ORDER — MIDODRINE HYDROCHLORIDE 5 MG/1
10 TABLET ORAL THREE TIMES A DAY
Refills: 0 | Status: DISCONTINUED | OUTPATIENT
Start: 2025-06-16 | End: 2025-06-19

## 2025-06-16 RX ADMIN — Medication 81 MILLIGRAM(S): at 11:44

## 2025-06-16 RX ADMIN — METOPROLOL SUCCINATE 25 MILLIGRAM(S): 50 TABLET, EXTENDED RELEASE ORAL at 06:46

## 2025-06-16 RX ADMIN — Medication 650 MILLIGRAM(S): at 06:53

## 2025-06-16 RX ADMIN — INSULIN LISPRO 1: 100 INJECTION, SOLUTION INTRAVENOUS; SUBCUTANEOUS at 21:39

## 2025-06-16 RX ADMIN — Medication 1 TABLET(S): at 11:43

## 2025-06-16 RX ADMIN — MIDODRINE HYDROCHLORIDE 5 MILLIGRAM(S): 5 TABLET ORAL at 06:47

## 2025-06-16 RX ADMIN — TICAGRELOR 90 MILLIGRAM(S): 90 TABLET ORAL at 17:37

## 2025-06-16 RX ADMIN — INSULIN LISPRO 2: 100 INJECTION, SOLUTION INTRAVENOUS; SUBCUTANEOUS at 17:01

## 2025-06-16 RX ADMIN — INSULIN LISPRO 7 UNIT(S): 100 INJECTION, SOLUTION INTRAVENOUS; SUBCUTANEOUS at 11:37

## 2025-06-16 RX ADMIN — Medication 100 MILLILITER(S): at 14:40

## 2025-06-16 RX ADMIN — APIXABAN 2.5 MILLIGRAM(S): 2.5 TABLET, FILM COATED ORAL at 17:38

## 2025-06-16 RX ADMIN — Medication 25 MILLIGRAM(S): at 06:47

## 2025-06-16 RX ADMIN — Medication 650 MILLIGRAM(S): at 06:46

## 2025-06-16 RX ADMIN — DRONEDARONE 400 MILLIGRAM(S): 400 TABLET, FILM COATED ORAL at 17:38

## 2025-06-16 RX ADMIN — APIXABAN 2.5 MILLIGRAM(S): 2.5 TABLET, FILM COATED ORAL at 06:47

## 2025-06-16 RX ADMIN — MIDODRINE HYDROCHLORIDE 5 MILLIGRAM(S): 5 TABLET ORAL at 11:43

## 2025-06-16 RX ADMIN — INSULIN LISPRO 7 UNIT(S): 100 INJECTION, SOLUTION INTRAVENOUS; SUBCUTANEOUS at 07:45

## 2025-06-16 RX ADMIN — INSULIN LISPRO 7 UNIT(S): 100 INJECTION, SOLUTION INTRAVENOUS; SUBCUTANEOUS at 17:01

## 2025-06-16 RX ADMIN — MIDODRINE HYDROCHLORIDE 10 MILLIGRAM(S): 5 TABLET ORAL at 17:33

## 2025-06-16 RX ADMIN — ROSUVASTATIN CALCIUM 40 MILLIGRAM(S): 20 TABLET, FILM COATED ORAL at 21:38

## 2025-06-16 RX ADMIN — Medication 25 MILLIGRAM(S): at 13:35

## 2025-06-16 RX ADMIN — Medication 40 MILLIGRAM(S): at 06:47

## 2025-06-16 RX ADMIN — POTASSIUM PHOSPHATE, MONOBASIC POTASSIUM PHOSPHATE, DIBASIC INJECTION, 62.5 MILLIMOLE(S): 236; 224 SOLUTION, CONCENTRATE INTRAVENOUS at 11:38

## 2025-06-16 RX ADMIN — TICAGRELOR 90 MILLIGRAM(S): 90 TABLET ORAL at 06:47

## 2025-06-16 RX ADMIN — INSULIN GLARGINE-YFGN 12 UNIT(S): 100 INJECTION, SOLUTION SUBCUTANEOUS at 21:39

## 2025-06-16 RX ADMIN — DRONEDARONE 400 MILLIGRAM(S): 400 TABLET, FILM COATED ORAL at 06:46

## 2025-06-16 NOTE — PROVIDER CONTACT NOTE (OTHER) - ACTION/TREATMENT ORDERED:
IV infiltrate - ice and elevation. Midline ordered. As per NP Antonio Clark. Determined as IV infiltrate by NP Antonio Clark. - ice and elevation. Midline ordered. As per FRANKIE Clark.

## 2025-06-16 NOTE — CHART NOTE - NSCHARTNOTEFT_GEN_A_CORE
Discussed with cardiology Dr. Pressley, EKG with 1st degree AV block and orthostatics positive. Repeat vitals improved. Patient with dizziness otherwise mentating well. Prior echo May 2025 with EF 65-70% normal.     Plan:  #Orthostatic  - IVF NS 75cc/hr x 12 hrs  - DC'ed amlodipine   - Started Hydralazine 25mg PO TID  - Monitor on Telemetry  - Fall precautions   -Attending Dr. Francis made aware and in agreement.     Mirian Hewitt PA-C  Department of Medicine
Patient observed with syncopal episode while walking with PT. Patient transferred back to bed for safety. Patient examined at bedside, no LOC. Patient admitted with transient Vasovagal syncope is fainting caused by a complex nerve and blood vessel reaction in the body. It’s the most common cause of fainting. Unlike other causes of fainting, it’s not a sign of a problem with the heart or brain.   Many nerves connect with your heart and blood vessels. These nerves help control the speed and force of your heartbeat. They also regulate blood pressure. They control whether your blood vessels should be more open or more closed.  Usually these nerves work together so you always get enough blood to your brain. In certain cases, these nerves may give a wrong signal. This may cause your blood vessels to open wide. At the same time, your heartbeat slows down. Blood can start to pool in your legs, and not enough of it may reach the brain. If that happens, you may briefly lose consciousness. When you lie down or fall down, blood flow to the brain resumes.  What causes vasovagal syncope?  Many triggers can cause vasovagal syncope, such as:  > Standing for long periods  >Too much heat  > Intense emotion, such as fear  > Intense pain  > The sight of blood or a needle  > Exercising for a long time  >Older adults may have additional triggers, such as:  >Urinating, Swallowing, Coughing, Having a bowel movement  Symptoms of vasovagal syncope  Fainting is the main symptom of vasovagal syncope. You may have symptoms before fainting such as:  Nausea,Warm, flushed feeling, Face that turns pale, Sweaty palms, Feeling dizzy, Blurred vision.      Vital Signs Last 24 Hrs  T(C): 36.4 (13 Jun 2025 18:43), Max: 36.9 (13 Jun 2025 12:57)  T(F): 97.5 (13 Jun 2025 18:43), Max: 98.4 (13 Jun 2025 12:57)  HR: 98 (13 Jun 2025 18:43) (67 - 113)  BP: 136/76 (13 Jun 2025 18:43) (120/68 - 153/64)  BP(mean): 103 (13 Jun 2025 09:50) (84 - 103)  RR: 19 (13 Jun 2025 18:43) (17 - 19)  SpO2: 99% (13 Jun 2025 18:43) (97% - 100%)    Parameters below as of 13 Jun 2025 18:43  Patient On (Oxygen Delivery Method): room air          Labs:                          11.6   9.97  )-----------( 317      ( 12 Jun 2025 20:31 )             38.0     06-12    135  |  97  |  37[H]  ----------------------------<  295[H]  3.8   |  22  |  2.29[H]    Ca    10.3      12 Jun 2025 20:31  Phos  4.0     06-12  Mg     2.4     06-12    TPro  7.7  /  Alb  3.8  /  TBili  0.9  /  DBili  x   /  AST  30  /  ALT  31  /  AlkPhos  65  06-12                Physical Exam:  General: WN/WD NAD  Neurology: A&Ox3, nonfocal, WHELAN x 4  Head:  Normocephalic, atraumatic  Respiratory: CTA B/L  CV: RRR, S1S2, no murmur  Abdominal: Soft, NT, ND no palpable mass  MSK: No edema, + peripheral pulses, FROM all 4 extremity    Assessment & Plan:  DX Syncope    > Ns 250ml bolus x 1  > Continue bedrest  > VS monitoring  > Defer PT  > F/u MRI Brain  D/W Dr Kohli
Late entry      Notified by RN of patient had a fall in the bathroom at 9am. Pt seen and examined. Pt found resting in the chair. Pt was noted to be AAOX3, with no distress. Denied any symptoms not limiting to CP, SOB, headache, lightheadedness, and dizziness. Pt states when he was in the bathroom, he felt dizzy in which he screamed for the nurse and lower himself to the ground. He states he hit his head on the tiolet bowl as he laid his head back. Patient denies any pain. Pt made aware of the plan of doing CT head and Xray of pelvis, despite having no pain or any symptoms. Pt agrees with the plan. Dr. Rushing made aware of the above and agrees with the plan. Will continue to monitor.      Antonio Clark NP  Teams

## 2025-06-16 NOTE — PROGRESS NOTE ADULT - SUBJECTIVE AND OBJECTIVE BOX
[facility] Division of Hospital Medicine  Ada Rushing MD  Available via MS Teams    SUBJECTIVE / OVERNIGHT EVENTS:  had fall this AM - states he was feeling nauseous earlier than had to use the bathroom; felt very dizzy/lightheaded while having BM, and was found on the floor   currently endorsing feeling tired and fatigued - no current dizziness, no CP or SOB, no abd pain    ADDITIONAL REVIEW OF SYSTEMS:  14 point ROS negative except as noted above     MEDICATIONS  (STANDING):  acetaminophen     Tablet .. 650 milliGRAM(s) Oral every 6 hours  apixaban 2.5 milliGRAM(s) Oral every 12 hours  aspirin  chewable 81 milliGRAM(s) Oral daily  dextrose 5%. 1000 milliLiter(s) (50 mL/Hr) IV Continuous <Continuous>  dextrose 5%. 1000 milliLiter(s) (100 mL/Hr) IV Continuous <Continuous>  dextrose 50% Injectable 25 Gram(s) IV Push once  dextrose 50% Injectable 12.5 Gram(s) IV Push once  dextrose 50% Injectable 25 Gram(s) IV Push once  dronedarone 400 milliGRAM(s) Oral two times a day  glucagon  Injectable 1 milliGRAM(s) IntraMuscular once  hydrALAZINE 25 milliGRAM(s) Oral three times a day  insulin glargine Injectable (LANTUS) 12 Unit(s) SubCutaneous at bedtime  insulin lispro (ADMELOG) corrective regimen sliding scale   SubCutaneous three times a day before meals  insulin lispro (ADMELOG) corrective regimen sliding scale   SubCutaneous at bedtime  insulin lispro Injectable (ADMELOG) 7 Unit(s) SubCutaneous three times a day before meals  metoprolol succinate ER 25 milliGRAM(s) Oral daily  midodrine. 5 milliGRAM(s) Oral three times a day  multivitamin 1 Tablet(s) Oral daily  pantoprazole    Tablet 40 milliGRAM(s) Oral before breakfast  rosuvastatin 40 milliGRAM(s) Oral at bedtime  senna 2 Tablet(s) Oral at bedtime  ticagrelor 90 milliGRAM(s) Oral every 12 hours    MEDICATIONS  (PRN):  dextrose Oral Gel 15 Gram(s) Oral once PRN Blood Glucose LESS THAN 70 milliGRAM(s)/deciliter  polyethylene glycol 3350 17 Gram(s) Oral daily PRN Constipation      I&O's Summary    15 Low 2025 07:01  -  16 Jun 2025 07:00  --------------------------------------------------------  IN: 1535 mL / OUT: 650 mL / NET: 885 mL    16 Jun 2025 07:01  -  16 Jun 2025 12:38  --------------------------------------------------------  IN: 0 mL / OUT: 500 mL / NET: -500 mL        PHYSICAL EXAM:  Vital Signs Last 24 Hrs  T(C): 36.3 (16 Jun 2025 11:06), Max: 36.4 (16 Jun 2025 05:04)  T(F): 97.4 (16 Jun 2025 11:06), Max: 97.5 (16 Jun 2025 05:04)  HR: 67 (16 Jun 2025 08:55) (65 - 80)  BP: 105/65 (16 Jun 2025 08:55) (105/65 - 154/62)  BP(mean): 78 (16 Jun 2025 08:55) (78 - 93)  RR: 18 (16 Jun 2025 11:06) (18 - 18)  SpO2: 98% (16 Jun 2025 11:06) (96% - 98%)    Parameters below as of 16 Jun 2025 11:06  Patient On (Oxygen Delivery Method): room air      PHYSICAL EXAM:  GENERAL: NAD, ill appearing   HEAD:  Atraumatic, normocephalic  EYES: EOMI, conjunctiva and sclera clear  NECK: Supple, no JVD  CHEST/LUNG: Clear to auscultation bilaterally; no wheezing or rales  HEART: Regular rate and rhythm; no murmurs, no LE edema   ABDOMEN: Soft, nontender, nondistended; bowel sounds present  EXTREMITIES:  2+ Peripheral Pulses, no clubbing, cyanosis  PSYCH: AAOx3, calm affect, not anxious  SKIN: No rashes or lesions      LABS:                        11.1   5.86  )-----------( 279      ( 15 Low 2025 06:34 )             35.7     06-16    140  |  109[H]  |  26[H]  ----------------------------<  93  4.0   |  20[L]  |  1.64[H]    Ca    9.2      16 Jun 2025 06:24  Phos  1.9     06-16  Mg     2.3     06-16            Urinalysis Basic - ( 16 Jun 2025 06:24 )    Color: x / Appearance: x / SG: x / pH: x  Gluc: 93 mg/dL / Ketone: x  / Bili: x / Urobili: x   Blood: x / Protein: x / Nitrite: x   Leuk Esterase: x / RBC: x / WBC x   Sq Epi: x / Non Sq Epi: x / Bacteria: x        SARS-CoV-2: NotDetec (21 May 2025 17:44)      RADIOLOGY & ADDITIONAL TESTS:  New Imaging Personally Reviewed Today:  New Electrocardiogram Personally Reviewed Today:  Other Results Reviewed Today:   Prior or Outpatient Records Reviewed Today with Summary:    COORDINATION OF CARE:  Consultant Communication and Details of Discussion (where applicable):

## 2025-06-16 NOTE — PROGRESS NOTE ADULT - SUBJECTIVE AND OBJECTIVE BOX
DATE OF SERVICE: 06-16-25 @ 15:44    Patient is a 85y old  Male who presents with a chief complaint of syncope (16 Jun 2025 12:37)      INTERVAL HISTORY: feels ok, fell in bathroom this am    REVIEW OF SYSTEMS:  CONSTITUTIONAL: No weakness  EYES/ENT: No visual changes;  No throat pain   NECK: No pain or stiffness  RESPIRATORY: No cough, wheezing; No shortness of breath  CARDIOVASCULAR: No chest pain or palpitations  GASTROINTESTINAL: No abdominal  pain. No nausea, vomiting, or hematemesis  GENITOURINARY: No dysuria, frequency or hematuria  NEUROLOGICAL: No stroke like symptoms  SKIN: No rashes    TELEMETRY Personally reviewed: no events  	  MEDICATIONS:  dronedarone 400 milliGRAM(s) Oral two times a day  metoprolol succinate ER 25 milliGRAM(s) Oral daily  midodrine. 10 milliGRAM(s) Oral three times a day        PHYSICAL EXAM:  T(C): 36.3 (06-16-25 @ 11:06), Max: 36.4 (06-16-25 @ 05:04)  HR: 73 (06-16-25 @ 13:12) (65 - 80)  BP: 131/62 (06-16-25 @ 13:12) (105/65 - 154/62)  RR: 18 (06-16-25 @ 11:06) (18 - 18)  SpO2: 99% (06-16-25 @ 13:02) (96% - 99%)  Wt(kg): --  I&O's Summary    15 Low 2025 07:01  -  16 Jun 2025 07:00  --------------------------------------------------------  IN: 1535 mL / OUT: 650 mL / NET: 885 mL    16 Jun 2025 07:01  -  16 Jun 2025 15:44  --------------------------------------------------------  IN: 900 mL / OUT: 800 mL / NET: 100 mL          Appearance: In no distress	  HEENT:    PERRL, EOMI	  Cardiovascular:  S1 S2, No JVD  Respiratory: Lungs clear to auscultation	  Gastrointestinal:  Soft, Non-tender, + BS	  Vascularature:  No edema of LE  Neuro: no acute focal deficits                               11.1   5.86  )-----------( 279      ( 15 Low 2025 06:34 )             35.7     06-16    140  |  109[H]  |  26[H]  ----------------------------<  93  4.0   |  20[L]  |  1.64[H]    Ca    9.2      16 Jun 2025 06:24  Phos  1.9     06-16  Mg     2.3     06-16          Labs personally reviewed      ASSESSMENT/PLAN: 	  ASSESSMENT/PLAN: 	      85M on ASA/Brillinta/Eliquis Hx DM2, CAD s/p CABG, AFib, CHF, CKD stage 3. Recent admit w/ vertigo now POD30 Basilar angioplasty/stent 5/13 w/ Dr. Caballero. P/w worsening vertigo c/b 5 falls yday. He only remembers one of the events, said he completely blacks out and was told by his wife that it happened five times. He is able to talk and has not had any issues with weakness. His speech is fluent and otherwise no deficits. He had a stent placed recently and was told he has weak circulation in his posterior arteries. Code stroke called at 0633, patient had a transient episode of generalized weakness of 4 extremities, able to speak, not recognize either side, back to baseline within 1-2 minutes. CTH stable.     Problem 1: Syncope  - Describes getting up to walk to the bathroom, felt dizzy, legs gave out and fell/syncope. Same episode repeated 3 more times  --- + orthostatic hypotension   - Appreciate neuro & neurosurg recs  - TTE 5/16/25 - Preserved EF, no WMA, no significant valve disease  - Likely orthostasis as cause of repeated falls/syncope  - D/c amlodipine   - Start Hydralazine 25mg PO TID, with hold parameters  - Abdominal binder and compression stockings.   - Fall precautions   - Started Midodrine 5mg PO TID and uptitrate to 10mg PO TID 6/16   --- Fell in bathroom 6/16     Problem 2: Afib  - continue Eliquis 2.5mg BID, dronedarone 400mg PO BID, metoprolol 25mg PO QD    Problem 3 : Hypertension.   ·  d/c home med - norvasc 5mg & and decrease metoprolol to 12.5mg QD  - 6/14 d/c Norvasc due to orthos +    Problem 4:  CAD  - [facility] 9/2024 - Patent LIMA to LAD and SVG to OM with patent native RCA stents  - c/w Brilinta, statin              Samantha Amador, AG-NP   Jan Pressley,  [facility]  Cardiovascular Medicine  [address] [place] [address], [address]  Office: [phone]  Available via call/text on Microsoft Teams

## 2025-06-16 NOTE — ADVANCED PRACTICE NURSE CONSULT - ASSESSMENT
Midline Catheter Insertion Note    Catheter type: 4F  : Bard  Power injectable: Yes  Lot# [id number]                                                                                                                                                                                                         Procedure assisted by: MIKAELA Jaffe RN  Time out was preformed, confirming the patient's first and last name, date of birth, procedure, and correct site prior to state of procedure.    Patient was placed with HOB 30 degrees. Patient placement site was prepped with chlorhexidine solution, then draped using maximum sterile barrier protection. The area was injected with 2cc of 1% Lidocaine. Using the Bard Site Rite 8, the catheter was placed using the Modified Seldinger Technique. Strict adherence to outline aseptic technique including handwashing, glove and gown, utilizing mask and cap, plus draping the patient with a sterile drape was observed. Upon completion of line placement, the insertion site was covered with a sterile occlusive CHG dressing. Pt tolerated procedure well.     All materials used for catheter insertion, including the intact guide wires, were accounted for at the end of the procedure.  Number of attempts: 1  Complications/Comments: None    Emergency Placement: No  Site: New  Anatomical Site of insertion: Right Brachial  Catheter size/length: 4F, 20cm  US guided Bard single lumen power midline placed

## 2025-06-16 NOTE — PROVIDER CONTACT NOTE (FALL NOTIFICATION) - ACTION/TREATMENT ORDERED:
Provider notified and at bedside. CT head, x-ray pelvis ordered. Bed/chair alarm on, call bell within reach, safety reinforced.

## 2025-06-17 LAB
ANION GAP SERPL CALC-SCNC: 14 MMOL/L — SIGNIFICANT CHANGE UP (ref 5–17)
BUN SERPL-MCNC: 20 MG/DL — SIGNIFICANT CHANGE UP (ref 7–23)
CALCIUM SERPL-MCNC: 9.1 MG/DL — SIGNIFICANT CHANGE UP (ref 8.4–10.5)
CHLORIDE SERPL-SCNC: 109 MMOL/L — HIGH (ref 96–108)
CO2 SERPL-SCNC: 19 MMOL/L — LOW (ref 22–31)
CREAT SERPL-MCNC: 1.46 MG/DL — HIGH (ref 0.5–1.3)
EGFR: 47 ML/MIN/1.73M2 — LOW
EGFR: 47 ML/MIN/1.73M2 — LOW
GLUCOSE BLDC GLUCOMTR-MCNC: 106 MG/DL — HIGH (ref 70–99)
GLUCOSE BLDC GLUCOMTR-MCNC: 159 MG/DL — HIGH (ref 70–99)
GLUCOSE BLDC GLUCOMTR-MCNC: 207 MG/DL — HIGH (ref 70–99)
GLUCOSE BLDC GLUCOMTR-MCNC: 214 MG/DL — HIGH (ref 70–99)
GLUCOSE SERPL-MCNC: 192 MG/DL — HIGH (ref 70–99)
HCT VFR BLD CALC: 34.7 % — LOW (ref 39–50)
HGB BLD-MCNC: 10.8 G/DL — LOW (ref 13–17)
MAGNESIUM SERPL-MCNC: 2.2 MG/DL — SIGNIFICANT CHANGE UP (ref 1.6–2.6)
MCHC RBC-ENTMCNC: 27.3 PG — SIGNIFICANT CHANGE UP (ref 27–34)
MCHC RBC-ENTMCNC: 31.1 G/DL — LOW (ref 32–36)
MCV RBC AUTO: 87.6 FL — SIGNIFICANT CHANGE UP (ref 80–100)
NRBC BLD AUTO-RTO: 0 /100 WBCS — SIGNIFICANT CHANGE UP (ref 0–0)
PHOSPHATE SERPL-MCNC: 1.9 MG/DL — LOW (ref 2.5–4.5)
PLATELET # BLD AUTO: 255 K/UL — SIGNIFICANT CHANGE UP (ref 150–400)
POTASSIUM SERPL-MCNC: 3.4 MMOL/L — LOW (ref 3.5–5.3)
POTASSIUM SERPL-SCNC: 3.4 MMOL/L — LOW (ref 3.5–5.3)
RBC # BLD: 3.96 M/UL — LOW (ref 4.2–5.8)
RBC # FLD: 20.2 % — HIGH (ref 10.3–14.5)
SODIUM SERPL-SCNC: 142 MMOL/L — SIGNIFICANT CHANGE UP (ref 135–145)
WBC # BLD: 7.17 K/UL — SIGNIFICANT CHANGE UP (ref 3.8–10.5)
WBC # FLD AUTO: 7.17 K/UL — SIGNIFICANT CHANGE UP (ref 3.8–10.5)

## 2025-06-17 PROCEDURE — 99233 SBSQ HOSP IP/OBS HIGH 50: CPT

## 2025-06-17 RX ORDER — SODIUM PHOSPHATE,DIBASIC DIHYD
30 POWDER (GRAM) MISCELLANEOUS ONCE
Refills: 0 | Status: COMPLETED | OUTPATIENT
Start: 2025-06-17 | End: 2025-06-17

## 2025-06-17 RX ADMIN — INSULIN LISPRO 2: 100 INJECTION, SOLUTION INTRAVENOUS; SUBCUTANEOUS at 08:09

## 2025-06-17 RX ADMIN — Medication 1 TABLET(S): at 12:39

## 2025-06-17 RX ADMIN — MIDODRINE HYDROCHLORIDE 10 MILLIGRAM(S): 5 TABLET ORAL at 17:34

## 2025-06-17 RX ADMIN — DRONEDARONE 400 MILLIGRAM(S): 400 TABLET, FILM COATED ORAL at 06:00

## 2025-06-17 RX ADMIN — Medication 40 MILLIEQUIVALENT(S): at 17:35

## 2025-06-17 RX ADMIN — Medication 40 MILLIEQUIVALENT(S): at 13:55

## 2025-06-17 RX ADMIN — TICAGRELOR 90 MILLIGRAM(S): 90 TABLET ORAL at 17:35

## 2025-06-17 RX ADMIN — INSULIN LISPRO 7 UNIT(S): 100 INJECTION, SOLUTION INTRAVENOUS; SUBCUTANEOUS at 12:40

## 2025-06-17 RX ADMIN — INSULIN LISPRO 7 UNIT(S): 100 INJECTION, SOLUTION INTRAVENOUS; SUBCUTANEOUS at 17:33

## 2025-06-17 RX ADMIN — APIXABAN 2.5 MILLIGRAM(S): 2.5 TABLET, FILM COATED ORAL at 05:59

## 2025-06-17 RX ADMIN — INSULIN GLARGINE-YFGN 12 UNIT(S): 100 INJECTION, SOLUTION SUBCUTANEOUS at 22:11

## 2025-06-17 RX ADMIN — MIDODRINE HYDROCHLORIDE 10 MILLIGRAM(S): 5 TABLET ORAL at 05:58

## 2025-06-17 RX ADMIN — MIDODRINE HYDROCHLORIDE 10 MILLIGRAM(S): 5 TABLET ORAL at 12:39

## 2025-06-17 RX ADMIN — TICAGRELOR 90 MILLIGRAM(S): 90 TABLET ORAL at 05:56

## 2025-06-17 RX ADMIN — INSULIN LISPRO 1: 100 INJECTION, SOLUTION INTRAVENOUS; SUBCUTANEOUS at 12:40

## 2025-06-17 RX ADMIN — APIXABAN 2.5 MILLIGRAM(S): 2.5 TABLET, FILM COATED ORAL at 17:35

## 2025-06-17 RX ADMIN — INSULIN LISPRO 2: 100 INJECTION, SOLUTION INTRAVENOUS; SUBCUTANEOUS at 17:34

## 2025-06-17 RX ADMIN — INSULIN LISPRO 7 UNIT(S): 100 INJECTION, SOLUTION INTRAVENOUS; SUBCUTANEOUS at 08:09

## 2025-06-17 RX ADMIN — DRONEDARONE 400 MILLIGRAM(S): 400 TABLET, FILM COATED ORAL at 17:34

## 2025-06-17 RX ADMIN — Medication 81 MILLIGRAM(S): at 12:39

## 2025-06-17 RX ADMIN — Medication 40 MILLIGRAM(S): at 05:56

## 2025-06-17 RX ADMIN — Medication 85 MILLIMOLE(S): at 13:55

## 2025-06-17 RX ADMIN — METOPROLOL SUCCINATE 25 MILLIGRAM(S): 50 TABLET, EXTENDED RELEASE ORAL at 05:59

## 2025-06-17 RX ADMIN — ROSUVASTATIN CALCIUM 40 MILLIGRAM(S): 20 TABLET, FILM COATED ORAL at 22:11

## 2025-06-17 NOTE — PROGRESS NOTE ADULT - TIME BILLING
chart reviewing, history taking, physical exam, assessment and documentation, including speaking to specialist and CM regarding the management.
chart reviewing, history taking, physical exam, assessment and documentation, including speaking to specialist and family regarding the management.

## 2025-06-17 NOTE — PROGRESS NOTE ADULT - SUBJECTIVE AND OBJECTIVE BOX
[facility] Division of Hospital Medicine  Ada Rushing MD  Available via MS Teams    SUBJECTIVE / OVERNIGHT EVENTS:  no acute events   feeling better today - ambulated in the room and to the bathroom a few times, denies any dizziness or lightheadedness   no CP, no abd pain, no n/v    ADDITIONAL REVIEW OF SYSTEMS:  14 point ROS negative except as noted above     MEDICATIONS  (STANDING):  acetaminophen     Tablet .. 650 milliGRAM(s) Oral every 6 hours  apixaban 2.5 milliGRAM(s) Oral every 12 hours  aspirin  chewable 81 milliGRAM(s) Oral daily  dextrose 5%. 1000 milliLiter(s) (50 mL/Hr) IV Continuous <Continuous>  dextrose 5%. 1000 milliLiter(s) (100 mL/Hr) IV Continuous <Continuous>  dextrose 50% Injectable 25 Gram(s) IV Push once  dextrose 50% Injectable 12.5 Gram(s) IV Push once  dextrose 50% Injectable 25 Gram(s) IV Push once  dronedarone 400 milliGRAM(s) Oral two times a day  glucagon  Injectable 1 milliGRAM(s) IntraMuscular once  insulin glargine Injectable (LANTUS) 12 Unit(s) SubCutaneous at bedtime  insulin lispro (ADMELOG) corrective regimen sliding scale   SubCutaneous three times a day before meals  insulin lispro (ADMELOG) corrective regimen sliding scale   SubCutaneous at bedtime  insulin lispro Injectable (ADMELOG) 7 Unit(s) SubCutaneous three times a day before meals  metoprolol succinate ER 25 milliGRAM(s) Oral daily  midodrine. 10 milliGRAM(s) Oral three times a day  multivitamin 1 Tablet(s) Oral daily  pantoprazole    Tablet 40 milliGRAM(s) Oral before breakfast  potassium chloride    Tablet ER 40 milliEquivalent(s) Oral every 4 hours  rosuvastatin 40 milliGRAM(s) Oral at bedtime  senna 2 Tablet(s) Oral at bedtime  sodium chloride 0.9%. 1000 milliLiter(s) (100 mL/Hr) IV Continuous <Continuous>  sodium phosphate 30 milliMole(s)/500 mL IVPB 30 milliMole(s) IV Intermittent once  ticagrelor 90 milliGRAM(s) Oral every 12 hours    MEDICATIONS  (PRN):  dextrose Oral Gel 15 Gram(s) Oral once PRN Blood Glucose LESS THAN 70 milliGRAM(s)/deciliter  polyethylene glycol 3350 17 Gram(s) Oral daily PRN Constipation      I&O's Summary    16 Jun 2025 07:01  -  17 Jun 2025 07:00  --------------------------------------------------------  IN: 1860 mL / OUT: 1900 mL / NET: -40 mL        PHYSICAL EXAM:  Vital Signs Last 24 Hrs  T(C): 36.7 (17 Jun 2025 06:19), Max: 36.7 (16 Jun 2025 19:18)  T(F): 98.1 (17 Jun 2025 06:19), Max: 98.1 (16 Jun 2025 19:18)  HR: 85 (17 Jun 2025 06:19) (66 - 85)  BP: 138/66 (17 Jun 2025 06:19) (121/69 - 146/71)  BP(mean): 90 (17 Jun 2025 06:19) (86 - 96)  RR: 18 (17 Jun 2025 06:19) (18 - 18)  SpO2: 98% (17 Jun 2025 06:19) (98% - 100%)    Parameters below as of 17 Jun 2025 06:19  Patient On (Oxygen Delivery Method): room air      PHYSICAL EXAM:  GENERAL: NAD, elderly   HEAD:  Atraumatic, normocephalic  EYES: EOMI, conjunctiva and sclera clear  NECK: Supple, no JVD  CHEST/LUNG: Clear to auscultation bilaterally; no wheezing or rales  HEART: Regular rate and rhythm; no murmurs, no LE edema   ABDOMEN: Soft, nontender, nondistended; bowel sounds present  EXTREMITIES:  2+ Peripheral Pulses, no clubbing, cyanosis  PSYCH: AAOx3, calm affect, not anxious  SKIN: No rashes or lesions      LABS:                        10.8   7.17  )-----------( 255      ( 17 Jun 2025 10:01 )             34.7     06-17    142  |  109[H]  |  20  ----------------------------<  192[H]  3.4[L]   |  19[L]  |  1.46[H]    Ca    9.1      17 Jun 2025 10:01  Phos  1.9     06-17  Mg     2.2     06-17            Urinalysis Basic - ( 17 Jun 2025 10:01 )    Color: x / Appearance: x / SG: x / pH: x  Gluc: 192 mg/dL / Ketone: x  / Bili: x / Urobili: x   Blood: x / Protein: x / Nitrite: x   Leuk Esterase: x / RBC: x / WBC x   Sq Epi: x / Non Sq Epi: x / Bacteria: x        SARS-CoV-2: NotDetec (21 May 2025 17:44)      RADIOLOGY & ADDITIONAL TESTS:  New Imaging Personally Reviewed Today:  New Electrocardiogram Personally Reviewed Today:  Other Results Reviewed Today:   Prior or Outpatient Records Reviewed Today with Summary:    COORDINATION OF CARE:  Consultant Communication and Details of Discussion (where applicable):

## 2025-06-17 NOTE — PROGRESS NOTE ADULT - SUBJECTIVE AND OBJECTIVE BOX
DATE OF SERVICE: 06-17-25 @ 13:47    Patient is a 85y old  Male who presents with a chief complaint of syncope (17 Jun 2025 12:34)      INTERVAL HISTORY: feels ok    TELEMETRY Personally reviewed: no events, Phillip 1  	  MEDICATIONS:  dronedarone 400 milliGRAM(s) Oral two times a day  metoprolol succinate ER 25 milliGRAM(s) Oral daily  midodrine. 10 milliGRAM(s) Oral three times a day        PHYSICAL EXAM:  T(C): 36 (06-17-25 @ 13:38), Max: 36.7 (06-16-25 @ 19:18)  HR: 65 (06-17-25 @ 13:38) (65 - 85)  BP: 144/57 (06-17-25 @ 13:38) (131/66 - 146/71)  RR: 18 (06-17-25 @ 13:38) (18 - 18)  SpO2: 100% (06-17-25 @ 13:38) (98% - 100%)  Wt(kg): --  I&O's Summary    16 Jun 2025 07:01  -  17 Jun 2025 07:00  --------------------------------------------------------  IN: 1860 mL / OUT: 1900 mL / NET: -40 mL    17 Jun 2025 07:01  -  17 Jun 2025 13:47  --------------------------------------------------------  IN: 420 mL / OUT: 0 mL / NET: 420 mL          Appearance: In no distress	  HEENT:    PERRL, EOMI	  Cardiovascular:  S1 S2, No JVD  Respiratory: Lungs clear to auscultation	  Gastrointestinal:  Soft, Non-tender, + BS	  Vascularature:  No edema of LE  Psychiatric: Appropriate affect   Neuro: no acute focal deficits                               10.8   7.17  )-----------( 255      ( 17 Jun 2025 10:01 )             34.7     06-17    142  |  109[H]  |  20  ----------------------------<  192[H]  3.4[L]   |  19[L]  |  1.46[H]    Ca    9.1      17 Jun 2025 10:01  Phos  1.9     06-17  Mg     2.2     06-17          Labs personally reviewed      ASSESSMENT/PLAN: 	  ASSESSMENT/PLAN: 	      85M on ASA/Brillinta/Eliquis Hx DM2, CAD s/p CABG, AFib, CHF, CKD stage 3. Recent admit w/ vertigo now POD30 Basilar angioplasty/stent 5/13 w/ Dr. Caballero. P/w worsening vertigo c/b 5 falls yday. He only remembers one of the events, said he completely blacks out and was told by his wife that it happened five times. He is able to talk and has not had any issues with weakness. His speech is fluent and otherwise no deficits. He had a stent placed recently and was told he has weak circulation in his posterior arteries. Code stroke called at 0633, patient had a transient episode of generalized weakness of 4 extremities, able to speak, not recognize either side, back to baseline within 1-2 minutes. CTH stable.     Problem 1: Syncope  - Describes getting up to walk to the bathroom, felt dizzy, legs gave out and fell/syncope. Same episode repeated 3 more times  --- + orthostatic hypotension   - Appreciate neuro & neurosurg recs  - TTE 5/16/25 - Preserved EF, no WMA, no significant valve disease  - Likely orthostasis as cause of repeated falls/syncope  - D/c amlodipine   - Start Hydralazine 25mg PO TID, with hold parameters  - Abdominal binder and compression stockings.   - Fall precautions   - Started Midodrine 5mg PO TID and uptitrated to 10mg PO TID 6/16   --- Fell in bathroom 6/16 2/2 orthostasis     Problem 2: Afib  - continue Eliquis 2.5mg BID, dronedarone 400mg PO BID, metoprolol 25mg PO QD    Problem 3 : Hypertension.   ·  d/c home med - norvasc 5mg & and decrease metoprolol to 12.5mg QD  - 6/14 d/c Norvasc due to orthos +    Problem 4:  CAD  - [facility] 9/2024 - Patent LIMA to LAD and SVG to OM with patent native RCA stents  - c/w Brilinta, statin              Jan Pressley DO [facility]  Cardiovascular Medicine  [address] [place] [address], [address]  Office: [phone]  Available via Text/call on [employer] Teams

## 2025-06-18 ENCOUNTER — TRANSCRIPTION ENCOUNTER (OUTPATIENT)
Age: 85
End: 2025-06-18

## 2025-06-18 ENCOUNTER — APPOINTMENT (OUTPATIENT)
Dept: NEUROSURGERY | Facility: CLINIC | Age: 85
End: 2025-06-18

## 2025-06-18 LAB
ANION GAP SERPL CALC-SCNC: 12 MMOL/L — SIGNIFICANT CHANGE UP (ref 5–17)
BUN SERPL-MCNC: 17 MG/DL — SIGNIFICANT CHANGE UP (ref 7–23)
CALCIUM SERPL-MCNC: 9.1 MG/DL — SIGNIFICANT CHANGE UP (ref 8.4–10.5)
CHLORIDE SERPL-SCNC: 106 MMOL/L — SIGNIFICANT CHANGE UP (ref 96–108)
CO2 SERPL-SCNC: 17 MMOL/L — LOW (ref 22–31)
CREAT SERPL-MCNC: 1.34 MG/DL — HIGH (ref 0.5–1.3)
EGFR: 53 ML/MIN/1.73M2 — LOW
EGFR: 53 ML/MIN/1.73M2 — LOW
GLUCOSE BLDC GLUCOMTR-MCNC: 118 MG/DL — HIGH (ref 70–99)
GLUCOSE BLDC GLUCOMTR-MCNC: 195 MG/DL — HIGH (ref 70–99)
GLUCOSE BLDC GLUCOMTR-MCNC: 217 MG/DL — HIGH (ref 70–99)
GLUCOSE BLDC GLUCOMTR-MCNC: 320 MG/DL — HIGH (ref 70–99)
GLUCOSE SERPL-MCNC: 137 MG/DL — HIGH (ref 70–99)
MAGNESIUM SERPL-MCNC: 2.1 MG/DL — SIGNIFICANT CHANGE UP (ref 1.6–2.6)
PHOSPHATE SERPL-MCNC: 2.7 MG/DL — SIGNIFICANT CHANGE UP (ref 2.5–4.5)
POTASSIUM SERPL-MCNC: 4.4 MMOL/L — SIGNIFICANT CHANGE UP (ref 3.5–5.3)
POTASSIUM SERPL-SCNC: 4.4 MMOL/L — SIGNIFICANT CHANGE UP (ref 3.5–5.3)
SODIUM SERPL-SCNC: 135 MMOL/L — SIGNIFICANT CHANGE UP (ref 135–145)

## 2025-06-18 PROCEDURE — 99232 SBSQ HOSP IP/OBS MODERATE 35: CPT

## 2025-06-18 RX ORDER — DROXIDOPA 300 MG/1
1 CAPSULE ORAL
Qty: 90 | Refills: 0
Start: 2025-06-18 | End: 2025-07-17

## 2025-06-18 RX ORDER — DROXIDOPA 300 MG/1
200 CAPSULE ORAL THREE TIMES A DAY
Refills: 0 | Status: DISCONTINUED | OUTPATIENT
Start: 2025-06-18 | End: 2025-06-19

## 2025-06-18 RX ADMIN — INSULIN LISPRO 7 UNIT(S): 100 INJECTION, SOLUTION INTRAVENOUS; SUBCUTANEOUS at 11:37

## 2025-06-18 RX ADMIN — TICAGRELOR 90 MILLIGRAM(S): 90 TABLET ORAL at 18:12

## 2025-06-18 RX ADMIN — Medication 1 TABLET(S): at 11:39

## 2025-06-18 RX ADMIN — INSULIN LISPRO 2: 100 INJECTION, SOLUTION INTRAVENOUS; SUBCUTANEOUS at 21:54

## 2025-06-18 RX ADMIN — MIDODRINE HYDROCHLORIDE 10 MILLIGRAM(S): 5 TABLET ORAL at 11:39

## 2025-06-18 RX ADMIN — DRONEDARONE 400 MILLIGRAM(S): 400 TABLET, FILM COATED ORAL at 05:26

## 2025-06-18 RX ADMIN — INSULIN LISPRO 1: 100 INJECTION, SOLUTION INTRAVENOUS; SUBCUTANEOUS at 17:12

## 2025-06-18 RX ADMIN — INSULIN LISPRO 7 UNIT(S): 100 INJECTION, SOLUTION INTRAVENOUS; SUBCUTANEOUS at 17:12

## 2025-06-18 RX ADMIN — TICAGRELOR 90 MILLIGRAM(S): 90 TABLET ORAL at 05:25

## 2025-06-18 RX ADMIN — APIXABAN 2.5 MILLIGRAM(S): 2.5 TABLET, FILM COATED ORAL at 05:26

## 2025-06-18 RX ADMIN — Medication 40 MILLIGRAM(S): at 05:27

## 2025-06-18 RX ADMIN — INSULIN GLARGINE-YFGN 12 UNIT(S): 100 INJECTION, SOLUTION SUBCUTANEOUS at 21:54

## 2025-06-18 RX ADMIN — DROXIDOPA 200 MILLIGRAM(S): 300 CAPSULE ORAL at 17:11

## 2025-06-18 RX ADMIN — APIXABAN 2.5 MILLIGRAM(S): 2.5 TABLET, FILM COATED ORAL at 17:12

## 2025-06-18 RX ADMIN — Medication 1 APPLICATION(S): at 11:41

## 2025-06-18 RX ADMIN — MIDODRINE HYDROCHLORIDE 10 MILLIGRAM(S): 5 TABLET ORAL at 05:25

## 2025-06-18 RX ADMIN — INSULIN LISPRO 7 UNIT(S): 100 INJECTION, SOLUTION INTRAVENOUS; SUBCUTANEOUS at 08:29

## 2025-06-18 RX ADMIN — ROSUVASTATIN CALCIUM 40 MILLIGRAM(S): 20 TABLET, FILM COATED ORAL at 21:53

## 2025-06-18 RX ADMIN — METOPROLOL SUCCINATE 25 MILLIGRAM(S): 50 TABLET, EXTENDED RELEASE ORAL at 05:27

## 2025-06-18 RX ADMIN — Medication 81 MILLIGRAM(S): at 11:39

## 2025-06-18 RX ADMIN — INSULIN LISPRO 2: 100 INJECTION, SOLUTION INTRAVENOUS; SUBCUTANEOUS at 11:38

## 2025-06-18 RX ADMIN — DRONEDARONE 400 MILLIGRAM(S): 400 TABLET, FILM COATED ORAL at 17:11

## 2025-06-18 RX ADMIN — Medication 2 TABLET(S): at 21:53

## 2025-06-18 RX ADMIN — MIDODRINE HYDROCHLORIDE 10 MILLIGRAM(S): 5 TABLET ORAL at 17:11

## 2025-06-18 NOTE — PROGRESS NOTE ADULT - PROVIDER SPECIALTY LIST ADULT
Cardiology
Hospitalist
Neurosurgery
Hospitalist

## 2025-06-18 NOTE — DISCHARGE NOTE PROVIDER - HOSPITAL COURSE
"2021  Birth 02/24/23 0900)  Pulse: (!) 180 (02/24/23 0900)  Resp: 42 (02/24/23 0900)  BP: (!) 75/44 (02/24/23 0900)  SpO2: (!) 100 % (02/24/23 0900)   Vital Signs (24h Range):  Temp:  [97.9 °F (36.6 °C)-98.5 °F (36.9 °C)] 98.1 °F (36.7 °C)  Pulse:  [148-180] 180  Resp:  [33-65] 42  SpO2:  [93 %-100 %] 100 " %  BP: (75-99)/(44-51) 75/44     Scheduled Meds:   pediatric multivitamin with iron  0.5 mL Per NG tube Daily        HPI:  85M on ASA/Brillinta/Eliquis Hx DM2, CAD s/p CABG, AFib, CHF, CKD stage 3. Recent admit w/ vertigo now POD30 Basilar angioplasty/stent 5/13 w/ Dr. Caballero. P/w worsening vertigo c/b 5 falls yday. He only remembers one of the events, said he completely blacks out and was told by his wife that it happened five times. He is able to talk and has not had any issues with weakness. His speech is fluent and otherwise no deficits. He had a stent placed recently and was told he has weak circulation in his posterior arteries. Code stroke called at 0633, patient had a transient episode of generalized weakness of 4 extremities, able to speak, not recognize either side, back to baseline within 1-2 minutes. [facility] stable.  (13 Jun 2025 09:50)    Hospital Course: Mr. Avendano, an 85-year-old male with a history of hypertension, hyperlipidemia, coronary artery disease (status post coronary artery bypass grafting 20 years prior and 5 stents placed in 2022), paroxysmal atrial fibrillation, heart failure with preserved ejection fraction, chronic kidney disease stage 3 (baseline creatinine 1.2-1.6), and prostate cancer (status post chemotherapy and radiation therapy), recently underwent basilar artery stenting for a right intracranial vertebral artery stenosis. He presented with transient weakness and near-syncopal episodes. His hospital course was complicated by recurrent syncope, likely due to orthostatic hypotension, exacerbated by his recent basilar artery stent placement. He experienced multiple falls and was treated with compression stockings, an abdominal binder, and midodrine titrated to 10mg TID. Amlodipine was discontinued due to orthostatic hypotension, and the addition of northera was considered. A transthoracic echocardiogram showed preserved ejection fraction without wall motion abnormalities or significant valvular disease. His creatinine, initially elevated on admission, returned to baseline. His atrial fibrillation was managed with Eliquis, dronedarone, and metoprolol. He will continue dual antiplatelet therapy (aspirin and Brilinta) along with Eliquis, as per neurosurgery recommendations, due to the recent stent placement. He was also continued on a statin for hyperlipidemia. Neurology and neurosurgery evaluated the patient, with outpatient follow up planned. Physical and occupational therapy were pending at the time of discharge. The patient will require close monitoring for orthostatic hypotension and syncope, as well as ongoing management of his complex medical history.          Important Medication Changes and Reason:    Active or Pending Issues Requiring Follow-up:    Advanced Directives:   [ ] Full code  [ ] DNR  [ ] Hospice    Discharge Diagnoses:         HPI:  85M on ASA/Brillinta/Eliquis Hx DM2, CAD s/p CABG, AFib, CHF, CKD stage 3. Recent admit w/ vertigo now POD30 Basilar angioplasty/stent 5/13 w/ Dr. Caballero. P/w worsening vertigo c/b 5 falls yday. He only remembers one of the events, said he completely blacks out and was told by his wife that it happened five times. He is able to talk and has not had any issues with weakness. His speech is fluent and otherwise no deficits. He had a stent placed recently and was told he has weak circulation in his posterior arteries. Code stroke called at 0633, patient had a transient episode of generalized weakness of 4 extremities, able to speak, not recognize either side, back to baseline within 1-2 minutes. [facility] stable.  (13 Jun 2025 09:50)    Hospital Course: 85-year-old male with a history of hypertension, hyperlipidemia, coronary artery disease (status post coronary artery bypass grafting 20 years prior and 5 stents placed in 2022), paroxysmal atrial fibrillation, heart failure with preserved ejection fraction, chronic kidney disease stage 3 (baseline creatinine 1.2-1.6), and prostate cancer (status post chemotherapy and radiation therapy), recently underwent basilar artery stenting for a right intracranial vertebral artery stenosis. He presented with transient weakness and near-syncopal episodes. His hospital course was complicated by recurrent syncope, likely due to orthostatic hypotension, exacerbated by his recent basilar artery stent placement. He experienced multiple falls and was treated with compression stockings, an abdominal binder, and midodrine titrated to 10mg TID. Amlodipine was discontinued due to orthostatic hypotension, and northera was added. A transthoracic echocardiogram showed preserved ejection fraction without wall motion abnormalities or significant valvular disease. His creatinine, initially elevated on admission, returned to baseline. His atrial fibrillation was managed with Eliquis, dronedarone, and metoprolol. He will continue dual antiplatelet therapy (aspirin and Brilinta) along with Eliquis, as per neurosurgery recommendations, due to the recent stent placement. He was also continued on a statin for hyperlipidemia. Neurology and neurosurgery evaluated the patient, with outpatient follow up planned. Physical and occupational therapy were pending at the time of discharge. The patient will require close monitoring for orthostatic hypotension and syncope, as well as ongoing management of his complex medical history.          Important Medication Changes and Reason:    Active or Pending Issues Requiring Follow-up:    Advanced Directives:   [ ] Full code  [ ] DNR  [ ] Hospice    Discharge Diagnoses:

## 2025-06-18 NOTE — PROGRESS NOTE ADULT - PROBLEM SELECTOR PLAN 1
poss vasovagal? r/o arrhythmia   - d/w cardiology transfer to tele floor  - ECHO reviewed from 5/16/25 - Preserved EF, no WMA, no significant valve disease  - Orthostatic wnl  - MRI reviewed, d/w neurosx stent patent and patient with better flow now. no acute neurosx intervention at this time  - Neurology rec noted f/u outpt on d/c  - PT/OT   - c/w DASH diet
likely orthostatics and vasovagal in nature; had fall with near syncope 6/16 AM while having BM  - on tele: sinus with 1st degree AVB, some occ 2nd degree type 1 block   - ECHO reviewed from 5/16/25 - Preserved EF, no WMA, no significant valve disease  - Orthostatics positive - better 6/17 after IVF, c/w compression stockings/abd binder, c/w midodrine 10mg TID   - cardio following - c/w midodrine, will consider adding northera if needed   - MRI reviewed, d/w neurosx stent patent and patient with better flow now. no acute neurosx intervention at this time  - Neurology rec noted f/u outpt on d/c  - PT/OT pending
poss vasovagal? r/o arrhythmia   - no events on tele thus far> d/c tele tomorrow If without any tele events   - ECHO reviewed from 5/16/25 - Preserved EF, no WMA, no significant valve disease  - Orthostatic + s/p IVF, started midodrine   - MRI reviewed, d/w neurosx stent patent and patient with better flow now. no acute neurosx intervention at this time  - Neurology rec noted f/u outpt on d/c  - PT/OT   - c/w DASH diet
likely orthostatics and vasovagal in nature; had fall with near syncope 6/16 AM while having BM  - on tele: sinus with 1st degree AVB, some occ 2nd degree type 1 block   - ECHO reviewed from 5/16/25 - Preserved EF, no WMA, no significant valve disease  - Orthostatics positive - improving slowly, c/w compression stockings/abd binder, c/w midodrine 10mg TID   - cardio following - c/w midodrine, will consider adding northera if needed   - MRI reviewed, d/w neurosx stent patent and patient with better flow now. no acute neurosx intervention at this time  - Neurology rec noted f/u outpt on d/c  - PT/OT pending
likely orthostatics and vasovagal in nature; had fall with near syncope 6/16 AM while having BM  - on tele: sinus with 1st degree AVB, PVCs, couplets   - ECHO reviewed from 5/16/25 - Preserved EF, no WMA, no significant valve disease  - Orthostatics positive - will give fluids, compression stocking, abdominal binder   - cardio following - c/w midodrine; also on hydral and BB; f/u further recs   - MRI reviewed, d/w neurosx stent patent and patient with better flow now. no acute neurosx intervention at this time  - Neurology rec noted f/u outpt on d/c  - PT/OT pending   - c/w DASH diet

## 2025-06-18 NOTE — DISCHARGE NOTE PROVIDER - NSDCFUADDINST_GEN_ALL_CORE_FT
Diet, Regular:   Consistent Carbohydrate {No Snacks} (CSTCHO)  No Beef  No Pork  Supplement Feeding Modality:  Oral  Glucerna Shake Cans or Servings Per Day:  1       Frequency:  Three Times a day (06-16-25 @ 12:46) [Active]

## 2025-06-18 NOTE — PROGRESS NOTE ADULT - PROBLEM SELECTOR PLAN 2
Baseline Cr 1.6  Cr as high as 2.3 on admission - now returned to baseline  c/t monitor
Baseline Cr 1.6  Cr as high as 2.3 on admission - now returned to baseline  c/t monitor
Baseline Cr 1.6 but per chart review appears new basline is 2  currently within 2.29 so range is appropriate  not consistent with CHRISSY  - BMP 6/14 pending
Baseline Cr 1.6  Cr as high as 2.3 on admission - now returned to baseline  c/t monitor
Baseline Cr 1.6, scr now at baseline  currently within 2.29 so range is appropriate  not consistent with CHRISSY
0 (no pain/absence of nonverbal indicators of pain)

## 2025-06-18 NOTE — PROGRESS NOTE ADULT - SUBJECTIVE AND OBJECTIVE BOX
[facility] Division of Hospital Medicine  Ada Rushing MD  Available via MS Teams    SUBJECTIVE / OVERNIGHT EVENTS:  no acute events   pt felt dizzy this AM when first checked; symptoms improved on repeat later in the morning and was able to ambulate safely to and from the bathrooom  seen sitting in chair, denies any dizziness or lightheadedness, no CP or SOB     ADDITIONAL REVIEW OF SYSTEMS:  14 point ROS negative except as noted above     MEDICATIONS  (STANDING):  acetaminophen     Tablet .. 650 milliGRAM(s) Oral every 6 hours  apixaban 2.5 milliGRAM(s) Oral every 12 hours  aspirin  chewable 81 milliGRAM(s) Oral daily  chlorhexidine 2% Cloths 1 Application(s) Topical <User Schedule>  dextrose 5%. 1000 milliLiter(s) (50 mL/Hr) IV Continuous <Continuous>  dextrose 5%. 1000 milliLiter(s) (100 mL/Hr) IV Continuous <Continuous>  dextrose 50% Injectable 25 Gram(s) IV Push once  dextrose 50% Injectable 12.5 Gram(s) IV Push once  dextrose 50% Injectable 25 Gram(s) IV Push once  dronedarone 400 milliGRAM(s) Oral two times a day  glucagon  Injectable 1 milliGRAM(s) IntraMuscular once  insulin glargine Injectable (LANTUS) 12 Unit(s) SubCutaneous at bedtime  insulin lispro (ADMELOG) corrective regimen sliding scale   SubCutaneous three times a day before meals  insulin lispro (ADMELOG) corrective regimen sliding scale   SubCutaneous at bedtime  insulin lispro Injectable (ADMELOG) 7 Unit(s) SubCutaneous three times a day before meals  metoprolol succinate ER 25 milliGRAM(s) Oral daily  midodrine. 10 milliGRAM(s) Oral three times a day  multivitamin 1 Tablet(s) Oral daily  pantoprazole    Tablet 40 milliGRAM(s) Oral before breakfast  rosuvastatin 40 milliGRAM(s) Oral at bedtime  senna 2 Tablet(s) Oral at bedtime  sodium chloride 0.9%. 1000 milliLiter(s) (100 mL/Hr) IV Continuous <Continuous>  ticagrelor 90 milliGRAM(s) Oral every 12 hours    MEDICATIONS  (PRN):  dextrose Oral Gel 15 Gram(s) Oral once PRN Blood Glucose LESS THAN 70 milliGRAM(s)/deciliter  polyethylene glycol 3350 17 Gram(s) Oral daily PRN Constipation      I&O's Summary    17 Jun 2025 07:01  -  18 Jun 2025 07:00  --------------------------------------------------------  IN: 660 mL / OUT: 300 mL / NET: 360 mL    18 Jun 2025 07:01  -  18 Jun 2025 12:16  --------------------------------------------------------  IN: 240 mL / OUT: 0 mL / NET: 240 mL        PHYSICAL EXAM:  Vital Signs Last 24 Hrs  T(C): 36.3 (18 Jun 2025 10:58), Max: 36.5 (18 Jun 2025 05:20)  T(F): 97.4 (18 Jun 2025 10:58), Max: 97.7 (18 Jun 2025 05:20)  HR: 59 (18 Jun 2025 10:58) (59 - 66)  BP: 136/62 (18 Jun 2025 10:58) (136/62 - 168/72)  BP(mean): 87 (18 Jun 2025 10:58) (87 - 104)  RR: 18 (18 Jun 2025 10:58) (18 - 18)  SpO2: 100% (18 Jun 2025 10:58) (99% - 100%)    Parameters below as of 18 Jun 2025 10:58  Patient On (Oxygen Delivery Method): room air      PHYSICAL EXAM:  GENERAL: NAD, elderly   HEAD:  Atraumatic, normocephalic  EYES: EOMI, conjunctiva and sclera clear  NECK: Supple, no JVD  CHEST/LUNG: Clear to auscultation bilaterally; no wheezing or rales  HEART: Regular rate and rhythm; no murmurs, no LE edema   ABDOMEN: Soft, nontender, nondistended; bowel sounds present  EXTREMITIES:  2+ Peripheral Pulses, no clubbing, cyanosis  PSYCH: AAOx3, calm affect, not anxious  SKIN: No rashes or lesions    LABS:                        10.8   7.17  )-----------( 255      ( 17 Jun 2025 10:01 )             34.7     06-18    135  |  106  |  17  ----------------------------<  137[H]  4.4   |  17[L]  |  1.34[H]    Ca    9.1      18 Jun 2025 08:55  Phos  2.7     06-18  Mg     2.1     06-18            Urinalysis Basic - ( 18 Jun 2025 08:55 )    Color: x / Appearance: x / SG: x / pH: x  Gluc: 137 mg/dL / Ketone: x  / Bili: x / Urobili: x   Blood: x / Protein: x / Nitrite: x   Leuk Esterase: x / RBC: x / WBC x   Sq Epi: x / Non Sq Epi: x / Bacteria: x        SARS-CoV-2: NotDetec (21 May 2025 17:44)      RADIOLOGY & ADDITIONAL TESTS:  New Imaging Personally Reviewed Today:  New Electrocardiogram Personally Reviewed Today:  Other Results Reviewed Today:   Prior or Outpatient Records Reviewed Today with Summary:    COORDINATION OF CARE:  Consultant Communication and Details of Discussion (where applicable):

## 2025-06-18 NOTE — DISCHARGE NOTE NURSING/CASE MANAGEMENT/SOCIAL WORK - PATIENT PORTAL LINK FT
You can access the FollowMyHealth Patient Portal offered by [facility] by registering at the following website: [email or web address]. By joining [employer]’s FollowMyHealth portal, you will also be able to view your health information using other applications (apps) compatible with our system.

## 2025-06-18 NOTE — DISCHARGE NOTE PROVIDER - ATTENDING DISCHARGE PHYSICAL EXAMINATION:
pt seen and examined, feeling better, ambulating well in the room, no side effects from northera     PHYSICAL EXAM:  GENERAL: NAD, elderly   HEAD:  Atraumatic, normocephalic  EYES: EOMI, conjunctiva and sclera clear  NECK: Supple, no JVD  CHEST/LUNG: Clear to auscultation bilaterally; no wheezing or rales  HEART: Regular rate and rhythm; no murmurs, no LE edema   ABDOMEN: Soft, nontender, nondistended; bowel sounds present  EXTREMITIES:  2+ Peripheral Pulses, no clubbing, cyanosis  PSYCH: AAOx3, calm affect, not anxious  SKIN: No rashes or lesions    orthostatics better than prior, minimal to no symptoms per pt report  tolerating northera and midodrine well   d/c planning today on above meds

## 2025-06-18 NOTE — DISCHARGE NOTE NURSING/CASE MANAGEMENT/SOCIAL WORK - FINANCIAL ASSISTANCE
[facility] provides services at a reduced cost to those who are determined to be eligible through [facility]’s financial assistance program. Information regarding [facility]’s financial assistance program can be found by going to [email or web address] or by calling [phone].

## 2025-06-18 NOTE — PROGRESS NOTE ADULT - PROBLEM SELECTOR PLAN 4
discontinued norvac for +OH  c/w hydralazine (w/ hold parameters)  started midodrine   Lisinopril held since last d/c
continue norvasc 5mg with hold parameters  Lisinopril held since last d/c
d/c home amlodipine; started on hydralazine inpt, will d/c given significant orthostatics   c/w midodrine   Lisinopril held since last d/c
d/c'ed boyd for +OH  c/w hydralazine however still +OH (w/ hold parameters)  - started midodrine   Lisinopril held since last d/c
d/c home amlodipine; started on hydralazine inpt, will d/c given significant orthostatics   c/w midodrine   Lisinopril held since last d/c

## 2025-06-18 NOTE — PROGRESS NOTE ADULT - PROBLEM SELECTOR PLAN 9
DVT ppx: on triple therapy    PT/OT pending   d/w pt and wife in detail at bedside  d/w cardiology
DVT ppx: on triple therapy    PT/OT expedited    d/w pt in detail
DVT ppx: on triple therapy    PT/OT pending   d/w pt and wife in detail at bedside  d/w cardiology
DVT ppx: on triple therapy    PT/OT expedited    d/w pt in detail
DVT ppx: on triple therapy    PT/OT pending   d/w pt and wife in detail at bedside

## 2025-06-18 NOTE — PROGRESS NOTE ADULT - PROBLEM SELECTOR PLAN 6
HFpEF   not in exacerbation - some home meds held/stopped due to OH   meds as above
HFpEF - continue Lasix 40mg PO daily, BP & HR control, MRA, & SGLT2i    not in exacerbation  rest of meds as above
HFpEF - continue Lasix 40mg PO daily, BP & HR control, MRA, & SGLT2i    not in exacerbation  rest of meds as above
HFpEF   not in exacerbation - some home meds held/stopped due to OH   meds as above
HFpEF   not in exacerbation - some home meds held/stopped due to OH   meds as above

## 2025-06-18 NOTE — PROGRESS NOTE ADULT - ASSESSMENT
-ARU/PRU pending, if stable will plan on routine follow up with Dr. Caballero  -No acute neurosurgery intervention  
86 yo male with PMH of HTN, HLD, CAD s/p CABG 20 years ago and stents x 5 2022, pAFib, HFpEF, CKD3 (baseline Cr 1.2-1.6), prostate Ca s/p chemo/RT recent Basilar artery stent placed for R intracranial vertebral artery presents with Transient Weakness
86 yo male with PMH of HTN, HLD, CAD s/p CABG 20 years ago and stents x 5 2022, pAFib, HFpEF, CKD3 (baseline Cr 1.2-1.6), prostate Ca s/p chemo/RT recent Basilar artery stent placed for R intracranial vertebral artery presents with Transient Weakness
84 yo male with PMH of HTN, HLD, CAD s/p CABG 20 years ago and stents x 5 2022, pAFib, HFpEF, CKD3 (baseline Cr 1.2-1.6), prostate Ca s/p chemo/RT recent Basilar artery stent placed for R intracranial vertebral artery presents with Transient Weakness
84 yo male with PMH of HTN, HLD, CAD s/p CABG 20 years ago and stents x 5 2022, pAFib, HFpEF, CKD3 (baseline Cr 1.2-1.6), prostate Ca s/p chemo/RT recent Basilar artery stent placed for R intracranial vertebral artery presents with Transient Weakness
86 yo male with PMH of HTN, HLD, CAD s/p CABG 20 years ago and stents x 5 2022, pAFib, HFpEF, CKD3 (baseline Cr 1.2-1.6), prostate Ca s/p chemo/RT recent Basilar artery stent placed for R intracranial vertebral artery presents with Transient Weakness

## 2025-06-18 NOTE — DISCHARGE NOTE PROVIDER - NSDCCPCAREPLAN_GEN_ALL_CORE_FT
PRINCIPAL DISCHARGE DIAGNOSIS  Diagnosis: Vertigo  Assessment and Plan of Treatment: Now on Northera, s/p mri      SECONDARY DISCHARGE DIAGNOSES  Diagnosis: Hypertension  Assessment and Plan of Treatment: Low salt diet  Activity as tolerated.  Take all medication as prescribed.  Follow up with your medical doctor for routine blood pressure monitoring at your next visit.  Notify your doctor if you have any of the following symptoms:   Dizziness, Lightheadedness, Blurry vision, Headache, Chest pain, Shortness of breath    Diagnosis: Stage 3 chronic kidney disease  Assessment and Plan of Treatment: Follow up with Renal in 1-2 weeks    Diagnosis: Chronic heart failure with preserved ejection fraction (HFpEF)  Assessment and Plan of Treatment: Weigh yourself daily.  If you gain 3lbs in 3 days, or 5lbs in a week call your Health Care Provider.  Do not eat or drink foods containing more than 2000mg of salt (sodium) in your diet every day.  Call your Health Care Provider if you have any swelling or increased swelling in your feet, ankles, and/or stomach.  Take all of your medication as directed.  If you become dizzy call your Health Care Provider.    Diagnosis: Chronic atrial fibrillation  Assessment and Plan of Treatment: Atrial fibrillation is the most common heart rhythm problem.  The condition puts you at risk for has stroke and heart attack  It helps if you control your blood pressure, not drink more than 1-2 alcohol drinks per day, cut down on caffeine, getting treatment for over active thyroid gland, and get regular exercise  Call your doctor if you feel your heart racing or beating unusually, chest tightness or pain, lightheaded, faint, shortness of breath especially with exercise  It is important to take your heart medication as prescribed  You may be on anticoagulation which is very important to take as directed - you may need blood work to monitor drug levels     PRINCIPAL DISCHARGE DIAGNOSIS  Diagnosis: Vertigo  Assessment and Plan of Treatment: Due to orthostatic hypotension -  now on Northera and midodrine , with improvement.   Please stop amlodipine and lisinopril   f/u with PMD in 1-2 weeks      SECONDARY DISCHARGE DIAGNOSES  Diagnosis: Stage 3 chronic kidney disease  Assessment and Plan of Treatment: Follow up with Renal in 1-2 weeks    Diagnosis: Hypertension  Assessment and Plan of Treatment: Low salt diet  Activity as tolerated.  Take all medication as prescribed.  Follow up with your medical doctor for routine blood pressure monitoring at your next visit.  Notify your doctor if you have any of the following symptoms:   Dizziness, Lightheadedness, Blurry vision, Headache, Chest pain, Shortness of breath    Diagnosis: Chronic atrial fibrillation  Assessment and Plan of Treatment: Atrial fibrillation is the most common heart rhythm problem.  The condition puts you at risk for has stroke and heart attack  It helps if you control your blood pressure, not drink more than 1-2 alcohol drinks per day, cut down on caffeine, getting treatment for over active thyroid gland, and get regular exercise  Call your doctor if you feel your heart racing or beating unusually, chest tightness or pain, lightheaded, faint, shortness of breath especially with exercise  It is important to take your heart medication as prescribed  You may be on anticoagulation which is very important to take as directed - you may need blood work to monitor drug levels    Diagnosis: Chronic heart failure with preserved ejection fraction (HFpEF)  Assessment and Plan of Treatment: Weigh yourself daily.  If you gain 3lbs in 3 days, or 5lbs in a week call your Health Care Provider.  Do not eat or drink foods containing more than 2000mg of salt (sodium) in your diet every day.  Call your Health Care Provider if you have any swelling or increased swelling in your feet, ankles, and/or stomach.  Take all of your medication as directed.  If you become dizzy call your Health Care Provider.

## 2025-06-18 NOTE — DISCHARGE NOTE PROVIDER - NSDCMRMEDTOKEN_GEN_ALL_CORE_FT
amLODIPine 5 mg oral tablet: 1 tab(s) orally once a day  aspirin 81 mg oral tablet, chewable: 1 tab(s) orally once a day  Brilinta (ticagrelor) 90 mg oral tablet: 1 tab(s) orally 2 times a day  clopidogrel 75 mg oral tablet: 1 tab(s) orally once a day (in the morning)  dronedarone 400 mg oral tablet: 1 tab(s) orally 2 times a day  Eliquis 2.5 mg oral tablet: 1 tab(s) orally 2 times a day  ferrous sulfate 325 mg (65 mg elemental iron) oral tablet: 1 tab(s) orally once a day  furosemide 40 mg oral tablet: 1 tab(s) orally once a day  HumaLOG 100 units/mL injectable solution: 7 unit(s) subcutaneous 3 times a day (before meals) hold if not eating  insulin glargine 100 units/mL subcutaneous solution: 20 unit(s) subcutaneous once a day (at bedtime)  Jardiance 10 mg oral tablet: 1 tab(s) orally once a day  metoprolol succinate 25 mg oral tablet, extended release: 1 tab(s) orally once a day (in the morning)  Northera 200 mg oral capsule: 1 cap(s) orally 3 times a day  polyethylene glycol 3350 oral powder for reconstitution: 17 gram(s) orally every 12 hours as needed for  constipation  Protonix 40 mg oral delayed release tablet: 1 tab(s) orally 2 times a day  rosuvastatin 40 mg oral tablet: 1 tab(s) orally once a day (in the evening)  Systane Eye Drops: 1 drop into each eye once a day as needed  Tussin (diabetic): As needed for cough   acetaminophen 325 mg oral tablet: 2 tab(s) orally every 6 hours  aspirin 81 mg oral tablet, chewable: 1 tab(s) orally once a day  Brilinta (ticagrelor) 90 mg oral tablet: 1 tab(s) orally 2 times a day  dronedarone 400 mg oral tablet: 1 tab(s) orally 2 times a day  Eliquis 2.5 mg oral tablet: 1 tab(s) orally 2 times a day  ferrous sulfate 325 mg (65 mg elemental iron) oral tablet: 1 tab(s) orally once a day  HumaLOG 100 units/mL injectable solution: 7 unit(s) subcutaneous 3 times a day (before meals) hold if not eating  insulin glargine 100 units/mL subcutaneous solution: 20 unit(s) subcutaneous once a day (at bedtime)  Jardiance 10 mg oral tablet: 1 tab(s) orally once a day  metoprolol succinate 25 mg oral tablet, extended release: 1 tab(s) orally once a day (in the morning)  midodrine 10 mg oral tablet: 1 tab(s) orally 3 times a day  Multiple Vitamins oral tablet: 1 tab(s) orally once a day  Northera 200 mg oral capsule: 1 cap(s) orally 3 times a day  polyethylene glycol 3350 oral powder for reconstitution: 17 gram(s) orally every 12 hours as needed for  constipation  Protonix 40 mg oral delayed release tablet: 1 tab(s) orally 2 times a day  rosuvastatin 40 mg oral tablet: 1 tab(s) orally once a day (in the evening)  senna leaf extract oral tablet: 2 tab(s) orally once a day (at bedtime)  Systane Eye Drops: 1 drop into each eye once a day as needed  Tussin (diabetic): As needed for cough

## 2025-06-18 NOTE — PROGRESS NOTE ADULT - PROBLEM SELECTOR PLAN 3
continue Eliquis 2.5mg BID  continue dronedarone 400mg PO BID  continue metoprolol 25mg po daily

## 2025-06-18 NOTE — PROGRESS NOTE ADULT - PROBLEM SELECTOR PROBLEM 6
Chronic heart failure with preserved ejection fraction (HFpEF)

## 2025-06-18 NOTE — PROGRESS NOTE ADULT - SUBJECTIVE AND OBJECTIVE BOX
DATE OF SERVICE: 06-18-25 @ 12:59    Patient is a 85y old  Male who presents with a chief complaint of syncope (18 Jun 2025 12:16)      INTERVAL HISTORY: Feels ok.    REVIEW OF SYSTEMS:  CONSTITUTIONAL: No weakness  EYES/ENT: No visual changes;  No throat pain   NECK: No pain or stiffness  RESPIRATORY: No cough, wheezing; No shortness of breath  CARDIOVASCULAR: No chest pain or palpitations  GASTROINTESTINAL: No abdominal  pain. No nausea, vomiting, or hematemesis  GENITOURINARY: No dysuria, frequency or hematuria  NEUROLOGICAL: No stroke like symptoms  SKIN: No rashes    TELEMETRY Personally reviewed: SR 60-80s  	  MEDICATIONS:  dronedarone 400 milliGRAM(s) Oral two times a day  metoprolol succinate ER 25 milliGRAM(s) Oral daily  midodrine. 10 milliGRAM(s) Oral three times a day        PHYSICAL EXAM:  T(C): 36.3 (06-18-25 @ 10:58), Max: 36.5 (06-18-25 @ 05:20)  HR: 59 (06-18-25 @ 10:58) (59 - 66)  BP: 136/62 (06-18-25 @ 10:58) (136/62 - 168/72)  RR: 18 (06-18-25 @ 10:58) (18 - 18)  SpO2: 100% (06-18-25 @ 10:58) (99% - 100%)  Wt(kg): --  I&O's Summary    17 Jun 2025 07:01  -  18 Jun 2025 07:00  --------------------------------------------------------  IN: 660 mL / OUT: 300 mL / NET: 360 mL    18 Jun 2025 07:01  -  18 Jun 2025 12:59  --------------------------------------------------------  IN: 240 mL / OUT: 0 mL / NET: 240 mL          Appearance: In no distress	  HEENT:    PERRL, EOMI	  Cardiovascular:  S1 S2, No JVD  Respiratory: Lungs clear to auscultation	  Gastrointestinal:  Soft, Non-tender, + BS	  Vascularature:  No edema of LE  Psychiatric: Appropriate affect   Neuro: no acute focal deficits                               10.8   7.17  )-----------( 255      ( 17 Jun 2025 10:01 )             34.7     06-18    135  |  106  |  17  ----------------------------<  137[H]  4.4   |  17[L]  |  1.34[H]    Ca    9.1      18 Jun 2025 08:55  Phos  2.7     06-18  Mg     2.1     06-18          Labs personally reviewed      ASSESSMENT/PLAN: 	    85M on ASA/Brillinta/Eliquis Hx DM2, CAD s/p CABG, AFib, CHF, CKD stage 3. Recent admit w/ vertigo now POD30 Basilar angioplasty/stent 5/13 w/ Dr. Caballero. P/w worsening vertigo c/b 5 falls yday. He only remembers one of the events, said he completely blacks out and was told by his wife that it happened five times. He is able to talk and has not had any issues with weakness. His speech is fluent and otherwise no deficits. He had a stent placed recently and was told he has weak circulation in his posterior arteries. Code stroke called at 0633, patient had a transient episode of generalized weakness of 4 extremities, able to speak, not recognize either side, back to baseline within 1-2 minutes. CTH stable.     Problem 1: Syncope  - Describes getting up to walk to the bathroom, felt dizzy, legs gave out and fell/syncope. Same episode repeated 3 more times  --- + orthostatic hypotension   - Appreciate neuro & neurosurg recs  - TTE 5/16/25 - Preserved EF, no WMA, no significant valve disease  - Likely orthostasis as cause of repeated falls/syncope  - D/c amlodipine   - Start Hydralazine 25mg PO TID, with hold parameters  - Abdominal binder and compression stockings.   - Fall precautions   - Started Midodrine 5mg PO TID and uptitrated to 10mg PO TID 6/16   --- Fell in bathroom 6/16 2/2 orthostasis   - Will consider Northera 200mg PO TID given still with marked orthostasis    Problem 2: Afib  - continue Eliquis 2.5mg BID, dronedarone 400mg PO BID, metoprolol 25mg PO QD    Problem 3 : Hypertension.   ·  d/c home med - norvasc 5mg & and decrease metoprolol to 12.5mg QD  - 6/14 d/c Norvasc due to orthos +    Problem 4:  CAD  - [facility] 9/2024 - Patent LIMA to LAD and SVG to OM with patent native RCA stents  - c/w Brilinta, statin          Samantha Amador, AG-NP   Jan Pressley,  [facility]  Cardiovascular Medicine  [address] [place] [address], [address]  Available through call or text on [employer] TE[employer]  Office: [phone]   DATE OF SERVICE: 06-18-25 @ 12:59    Patient is a 85y old  Male who presents with a chief complaint of syncope (18 Jun 2025 12:16)      INTERVAL HISTORY: Feels ok.    REVIEW OF SYSTEMS:  CONSTITUTIONAL: No weakness  EYES/ENT: No visual changes;  No throat pain   NECK: No pain or stiffness  RESPIRATORY: No cough, wheezing; No shortness of breath  CARDIOVASCULAR: No chest pain or palpitations  GASTROINTESTINAL: No abdominal  pain. No nausea, vomiting, or hematemesis  GENITOURINARY: No dysuria, frequency or hematuria  NEUROLOGICAL: No stroke like symptoms  SKIN: No rashes    TELEMETRY Personally reviewed: SR 60-80s  	  MEDICATIONS:  dronedarone 400 milliGRAM(s) Oral two times a day  metoprolol succinate ER 25 milliGRAM(s) Oral daily  midodrine. 10 milliGRAM(s) Oral three times a day        PHYSICAL EXAM:  T(C): 36.3 (06-18-25 @ 10:58), Max: 36.5 (06-18-25 @ 05:20)  HR: 59 (06-18-25 @ 10:58) (59 - 66)  BP: 136/62 (06-18-25 @ 10:58) (136/62 - 168/72)  RR: 18 (06-18-25 @ 10:58) (18 - 18)  SpO2: 100% (06-18-25 @ 10:58) (99% - 100%)  Wt(kg): --  I&O's Summary    17 Jun 2025 07:01  -  18 Jun 2025 07:00  --------------------------------------------------------  IN: 660 mL / OUT: 300 mL / NET: 360 mL    18 Jun 2025 07:01  -  18 Jun 2025 12:59  --------------------------------------------------------  IN: 240 mL / OUT: 0 mL / NET: 240 mL          Appearance: In no distress	  HEENT:    PERRL, EOMI	  Cardiovascular:  S1 S2, No JVD  Respiratory: Lungs clear to auscultation	  Gastrointestinal:  Soft, Non-tender, + BS	  Vascularature:  No edema of LE  Psychiatric: Appropriate affect   Neuro: no acute focal deficits                               10.8   7.17  )-----------( 255      ( 17 Jun 2025 10:01 )             34.7     06-18    135  |  106  |  17  ----------------------------<  137[H]  4.4   |  17[L]  |  1.34[H]    Ca    9.1      18 Jun 2025 08:55  Phos  2.7     06-18  Mg     2.1     06-18          Labs personally reviewed      ASSESSMENT/PLAN: 	    85M on ASA/Brillinta/Eliquis Hx DM2, CAD s/p CABG, AFib, CHF, CKD stage 3. Recent admit w/ vertigo now POD30 Basilar angioplasty/stent 5/13 w/ Dr. Caballero. P/w worsening vertigo c/b 5 falls yday. He only remembers one of the events, said he completely blacks out and was told by his wife that it happened five times. He is able to talk and has not had any issues with weakness. His speech is fluent and otherwise no deficits. He had a stent placed recently and was told he has weak circulation in his posterior arteries. Code stroke called at 0633, patient had a transient episode of generalized weakness of 4 extremities, able to speak, not recognize either side, back to baseline within 1-2 minutes. CTH stable.     Problem 1: Syncope  - Describes getting up to walk to the bathroom, felt dizzy, legs gave out and fell/syncope. Same episode repeated 3 more times  --- + orthostatic hypotension   - Appreciate neuro & neurosurg recs  - TTE 5/16/25 - Preserved EF, no WMA, no significant valve disease  - Likely orthostasis as cause of repeated falls/syncope  - D/c amlodipine   - Start Hydralazine 25mg PO TID, with hold parameters  - Abdominal binder and compression stockings.   - Fall precautions   - Started Midodrine 5mg PO TID and uptitrated to 10mg PO TID 6/16   --- Fell in bathroom 6/16 2/2 orthostasis   - Started Northera 200mg PO TID given with marked orthostasis    Problem 2: Afib  - continue Eliquis 2.5mg BID, dronedarone 400mg PO BID, metoprolol 25mg PO QD    Problem 3 : Hypertension.   ·  d/c home med - norvasc 5mg & and decrease metoprolol to 12.5mg QD  - 6/14 d/c Norvasc due to orthos +    Problem 4:  CAD  - [facility] 9/2024 - Patent LIMA to LAD and SVG to OM with patent native RCA stents  - c/w Brilinta, statin          Samantha Amador, AG-NP   Jan Pressley DO [facility]  Cardiovascular Medicine  [address] [place] [address], [address]  Available through call or text on [employer] TEAM[employer]  Office: [phone]

## 2025-06-18 NOTE — PROGRESS NOTE ADULT - NUTRITIONAL ASSESSMENT
This patient has been assessed with a concern for Malnutrition and has been determined to have a diagnosis/diagnoses of Moderate protein-calorie malnutrition.    This patient is being managed with:   Diet DASH/TLC-  Sodium & Cholesterol Restricted  Consistent Carbohydrate {Evening Snack} (CSTCHOSN)  No Beef  No Pork  Supplement Feeding Modality:  Oral  Glucerna Shake Cans or Servings Per Day:  1       Frequency:  Three Times a day  Entered: Jun 14 2025  3:39PM  
This patient has been assessed with a concern for Malnutrition and has been determined to have a diagnosis/diagnoses of Moderate protein-calorie malnutrition.    This patient is being managed with:   Diet Regular-  Consistent Carbohydrate {No Snacks} (CSTCHO)  No Beef  No Pork  Supplement Feeding Modality:  Oral  Glucerna Shake Cans or Servings Per Day:  1       Frequency:  Three Times a day  Entered: Jun 16 2025 12:45PM  
This patient has been assessed with a concern for Malnutrition and has been determined to have a diagnosis/diagnoses of Moderate protein-calorie malnutrition.    This patient is being managed with:   Diet Regular-  Consistent Carbohydrate {No Snacks} (CSTCHO)  No Beef  No Pork  Supplement Feeding Modality:  Oral  Glucerna Shake Cans or Servings Per Day:  1       Frequency:  Three Times a day  Entered: Jun 16 2025 12:45PM

## 2025-06-18 NOTE — DISCHARGE NOTE PROVIDER - NSDCFUSCHEDAPPT_GEN_ALL_CORE_FT
[facility]  UROLOGY [address]  Scheduled Appointment: 08/20/2025    [facility]  UROLOGY [address] [place] [address]  Scheduled Appointment: 08/20/2025

## 2025-06-18 NOTE — PROGRESS NOTE ADULT - PROBLEM SELECTOR PLAN 7
on Aspirin/Brilinta and Eliquis  d/w Neurosx 6/14, need to continue with DAPT (ASA & Brilinta) regardless of ELIQUIS from their stand point due to the resent stent

## 2025-06-19 VITALS
OXYGEN SATURATION: 99 % | HEART RATE: 60 BPM | RESPIRATION RATE: 18 BRPM | SYSTOLIC BLOOD PRESSURE: 124 MMHG | TEMPERATURE: 98 F | DIASTOLIC BLOOD PRESSURE: 51 MMHG

## 2025-06-19 LAB
ANION GAP SERPL CALC-SCNC: 13 MMOL/L — SIGNIFICANT CHANGE UP (ref 5–17)
BUN SERPL-MCNC: 21 MG/DL — SIGNIFICANT CHANGE UP (ref 7–23)
CALCIUM SERPL-MCNC: 9.2 MG/DL — SIGNIFICANT CHANGE UP (ref 8.4–10.5)
CHLORIDE SERPL-SCNC: 103 MMOL/L — SIGNIFICANT CHANGE UP (ref 96–108)
CO2 SERPL-SCNC: 19 MMOL/L — LOW (ref 22–31)
CREAT SERPL-MCNC: 1.34 MG/DL — HIGH (ref 0.5–1.3)
EGFR: 53 ML/MIN/1.73M2 — LOW
EGFR: 53 ML/MIN/1.73M2 — LOW
GLUCOSE BLDC GLUCOMTR-MCNC: 166 MG/DL — HIGH (ref 70–99)
GLUCOSE BLDC GLUCOMTR-MCNC: 200 MG/DL — HIGH (ref 70–99)
GLUCOSE SERPL-MCNC: 162 MG/DL — HIGH (ref 70–99)
POTASSIUM SERPL-MCNC: 4.3 MMOL/L — SIGNIFICANT CHANGE UP (ref 3.5–5.3)
POTASSIUM SERPL-SCNC: 4.3 MMOL/L — SIGNIFICANT CHANGE UP (ref 3.5–5.3)
SODIUM SERPL-SCNC: 135 MMOL/L — SIGNIFICANT CHANGE UP (ref 135–145)

## 2025-06-19 PROCEDURE — 99285 EMERGENCY DEPT VISIT HI MDM: CPT | Mod: 25

## 2025-06-19 PROCEDURE — 83735 ASSAY OF MAGNESIUM: CPT

## 2025-06-19 PROCEDURE — 85025 COMPLETE CBC W/AUTO DIFF WBC: CPT

## 2025-06-19 PROCEDURE — 84484 ASSAY OF TROPONIN QUANT: CPT

## 2025-06-19 PROCEDURE — 97116 GAIT TRAINING THERAPY: CPT

## 2025-06-19 PROCEDURE — 36415 COLL VENOUS BLD VENIPUNCTURE: CPT

## 2025-06-19 PROCEDURE — 70450 CT HEAD/BRAIN W/O DYE: CPT

## 2025-06-19 PROCEDURE — 85576 BLOOD PLATELET AGGREGATION: CPT

## 2025-06-19 PROCEDURE — 97530 THERAPEUTIC ACTIVITIES: CPT

## 2025-06-19 PROCEDURE — 72170 X-RAY EXAM OF PELVIS: CPT

## 2025-06-19 PROCEDURE — 70551 MRI BRAIN STEM W/O DYE: CPT

## 2025-06-19 PROCEDURE — 80053 COMPREHEN METABOLIC PANEL: CPT

## 2025-06-19 PROCEDURE — 97110 THERAPEUTIC EXERCISES: CPT

## 2025-06-19 PROCEDURE — 97165 OT EVAL LOW COMPLEX 30 MIN: CPT

## 2025-06-19 PROCEDURE — 70547 MR ANGIOGRAPHY NECK W/O DYE: CPT

## 2025-06-19 PROCEDURE — 80048 BASIC METABOLIC PNL TOTAL CA: CPT

## 2025-06-19 PROCEDURE — 70496 CT ANGIOGRAPHY HEAD: CPT

## 2025-06-19 PROCEDURE — 70544 MR ANGIOGRAPHY HEAD W/O DYE: CPT

## 2025-06-19 PROCEDURE — 85610 PROTHROMBIN TIME: CPT

## 2025-06-19 PROCEDURE — 97161 PT EVAL LOW COMPLEX 20 MIN: CPT

## 2025-06-19 PROCEDURE — 84100 ASSAY OF PHOSPHORUS: CPT

## 2025-06-19 PROCEDURE — 70498 CT ANGIOGRAPHY NECK: CPT

## 2025-06-19 PROCEDURE — 96372 THER/PROPH/DIAG INJ SC/IM: CPT

## 2025-06-19 PROCEDURE — 85027 COMPLETE CBC AUTOMATED: CPT

## 2025-06-19 PROCEDURE — 36569 INSJ PICC 5 YR+ W/O IMAGING: CPT

## 2025-06-19 PROCEDURE — C1751: CPT

## 2025-06-19 PROCEDURE — 93005 ELECTROCARDIOGRAM TRACING: CPT

## 2025-06-19 PROCEDURE — 85730 THROMBOPLASTIN TIME PARTIAL: CPT

## 2025-06-19 PROCEDURE — 71045 X-RAY EXAM CHEST 1 VIEW: CPT

## 2025-06-19 PROCEDURE — 99239 HOSP IP/OBS DSCHRG MGMT >30: CPT

## 2025-06-19 PROCEDURE — 82962 GLUCOSE BLOOD TEST: CPT

## 2025-06-19 RX ORDER — MIDODRINE HYDROCHLORIDE 5 MG/1
1 TABLET ORAL
Qty: 90 | Refills: 0
Start: 2025-06-19 | End: 2025-07-18

## 2025-06-19 RX ORDER — ACETAMINOPHEN 500 MG/5ML
2 LIQUID (ML) ORAL
Qty: 0 | Refills: 0 | DISCHARGE
Start: 2025-06-19

## 2025-06-19 RX ORDER — SENNA 187 MG
2 TABLET ORAL
Qty: 60 | Refills: 0
Start: 2025-06-19 | End: 2025-07-18

## 2025-06-19 RX ORDER — B1/B2/B3/B5/B6/B12/VIT C/FOLIC 500-0.5 MG
1 TABLET ORAL
Qty: 30 | Refills: 0
Start: 2025-06-19 | End: 2025-07-18

## 2025-06-19 RX ORDER — CLOPIDOGREL BISULFATE 75 MG/1
1 TABLET, FILM COATED ORAL
Refills: 0 | DISCHARGE

## 2025-06-19 RX ADMIN — INSULIN LISPRO 1: 100 INJECTION, SOLUTION INTRAVENOUS; SUBCUTANEOUS at 12:01

## 2025-06-19 RX ADMIN — DROXIDOPA 200 MILLIGRAM(S): 300 CAPSULE ORAL at 12:04

## 2025-06-19 RX ADMIN — DROXIDOPA 200 MILLIGRAM(S): 300 CAPSULE ORAL at 06:26

## 2025-06-19 RX ADMIN — METOPROLOL SUCCINATE 25 MILLIGRAM(S): 50 TABLET, EXTENDED RELEASE ORAL at 06:26

## 2025-06-19 RX ADMIN — INSULIN LISPRO 7 UNIT(S): 100 INJECTION, SOLUTION INTRAVENOUS; SUBCUTANEOUS at 07:54

## 2025-06-19 RX ADMIN — Medication 650 MILLIGRAM(S): at 12:30

## 2025-06-19 RX ADMIN — Medication 81 MILLIGRAM(S): at 12:04

## 2025-06-19 RX ADMIN — Medication 40 MILLIGRAM(S): at 06:27

## 2025-06-19 RX ADMIN — DRONEDARONE 400 MILLIGRAM(S): 400 TABLET, FILM COATED ORAL at 06:28

## 2025-06-19 RX ADMIN — MIDODRINE HYDROCHLORIDE 10 MILLIGRAM(S): 5 TABLET ORAL at 12:09

## 2025-06-19 RX ADMIN — Medication 650 MILLIGRAM(S): at 12:04

## 2025-06-19 RX ADMIN — TICAGRELOR 90 MILLIGRAM(S): 90 TABLET ORAL at 06:25

## 2025-06-19 RX ADMIN — INSULIN LISPRO 1: 100 INJECTION, SOLUTION INTRAVENOUS; SUBCUTANEOUS at 07:54

## 2025-06-19 RX ADMIN — INSULIN LISPRO 7 UNIT(S): 100 INJECTION, SOLUTION INTRAVENOUS; SUBCUTANEOUS at 12:00

## 2025-06-19 RX ADMIN — APIXABAN 2.5 MILLIGRAM(S): 2.5 TABLET, FILM COATED ORAL at 06:27

## 2025-06-19 RX ADMIN — Medication 1 TABLET(S): at 12:03

## 2025-06-19 RX ADMIN — MIDODRINE HYDROCHLORIDE 10 MILLIGRAM(S): 5 TABLET ORAL at 06:27

## 2025-06-19 RX ADMIN — Medication 1 APPLICATION(S): at 11:40

## 2025-07-02 ENCOUNTER — APPOINTMENT (OUTPATIENT)
Dept: NEUROSURGERY | Facility: CLINIC | Age: 85
End: 2025-07-02

## 2025-07-02 VITALS
OXYGEN SATURATION: 99 % | TEMPERATURE: 97.8 F | DIASTOLIC BLOOD PRESSURE: 67 MMHG | BODY MASS INDEX: 23.69 KG/M2 | HEART RATE: 63 BPM | SYSTOLIC BLOOD PRESSURE: 114 MMHG | WEIGHT: 138 LBS

## 2025-07-02 PROBLEM — G45.0 VERTEBRO BASILAR INSUFFICIENCY: Status: ACTIVE | Noted: 2025-07-02

## 2025-07-07 RX ORDER — TICAGRELOR 90 MG/1
90 TABLET, FILM COATED ORAL
Qty: 180 | Refills: 1 | Status: ACTIVE | COMMUNITY
Start: 2025-07-07 | End: 1900-01-01

## 2025-07-23 ENCOUNTER — NON-APPOINTMENT (OUTPATIENT)
Age: 85
End: 2025-07-23

## 2025-07-31 ENCOUNTER — NON-APPOINTMENT (OUTPATIENT)
Age: 85
End: 2025-07-31

## 2025-08-18 ENCOUNTER — LABORATORY RESULT (OUTPATIENT)
Age: 85
End: 2025-08-18

## 2025-08-18 ENCOUNTER — APPOINTMENT (OUTPATIENT)
Dept: UROLOGY | Facility: CLINIC | Age: 85
End: 2025-08-18
Payer: MEDICARE

## 2025-08-18 VITALS — OXYGEN SATURATION: 99 % | DIASTOLIC BLOOD PRESSURE: 75 MMHG | HEART RATE: 54 BPM | SYSTOLIC BLOOD PRESSURE: 162 MMHG

## 2025-08-18 VITALS
OXYGEN SATURATION: 98 % | DIASTOLIC BLOOD PRESSURE: 78 MMHG | TEMPERATURE: 97.8 F | SYSTOLIC BLOOD PRESSURE: 184 MMHG | HEART RATE: 54 BPM

## 2025-08-18 DIAGNOSIS — Z00.00 ENCOUNTER FOR GENERAL ADULT MEDICAL EXAMINATION W/OUT ABNORMAL FINDINGS: ICD-10-CM

## 2025-08-18 PROCEDURE — 96402 CHEMO HORMON ANTINEOPL SQ/IM: CPT

## 2025-08-18 RX ORDER — LEUPROLIDE ACETATE 45 MG
45 KIT INTRAMUSCULAR
Qty: 1 | Refills: 0 | Status: COMPLETED | OUTPATIENT
Start: 2025-08-18

## 2025-08-18 RX ADMIN — LEUPROLIDE ACETATE 0 MG: KIT at 00:00

## 2025-08-20 ENCOUNTER — APPOINTMENT (OUTPATIENT)
Dept: NEUROSURGERY | Facility: CLINIC | Age: 85
End: 2025-08-20
Payer: MEDICARE

## 2025-08-20 ENCOUNTER — NON-APPOINTMENT (OUTPATIENT)
Age: 85
End: 2025-08-20

## 2025-08-20 VITALS
TEMPERATURE: 97.8 F | BODY MASS INDEX: 24.55 KG/M2 | DIASTOLIC BLOOD PRESSURE: 69 MMHG | SYSTOLIC BLOOD PRESSURE: 146 MMHG | OXYGEN SATURATION: 98 % | WEIGHT: 143 LBS | HEART RATE: 59 BPM

## 2025-08-20 DIAGNOSIS — I65.1 OCCLUSION AND STENOSIS OF BASILAR ARTERY: ICD-10-CM

## 2025-08-20 PROCEDURE — 99214 OFFICE O/P EST MOD 30 MIN: CPT

## 2025-08-29 ENCOUNTER — APPOINTMENT (OUTPATIENT)
Dept: UROLOGY | Facility: CLINIC | Age: 85
End: 2025-08-29
Payer: MEDICARE

## 2025-08-29 DIAGNOSIS — C61 MALIGNANT NEOPLASM OF PROSTATE: ICD-10-CM

## 2025-08-29 DIAGNOSIS — R31.0 GROSS HEMATURIA: ICD-10-CM

## 2025-08-29 DIAGNOSIS — R42 DIZZINESS AND GIDDINESS: ICD-10-CM

## 2025-08-29 DIAGNOSIS — E87.5 HYPERKALEMIA: ICD-10-CM

## 2025-08-29 DIAGNOSIS — R74.02 ELEVATION OF LEVELS OF LACTIC ACID DEHYDROGENASE [LDH]: ICD-10-CM

## 2025-08-29 DIAGNOSIS — N28.9 DISORDER OF KIDNEY AND URETER, UNSPECIFIED: ICD-10-CM

## 2025-08-29 DIAGNOSIS — I82.90 ACUTE EMBOLISM AND THROMBOSIS OF UNSPECIFIED VEIN: ICD-10-CM

## 2025-08-29 DIAGNOSIS — R53.83 OTHER MALAISE: ICD-10-CM

## 2025-08-29 DIAGNOSIS — N40.1 BENIGN PROSTATIC HYPERPLASIA WITH LOWER URINARY TRACT SYMPMS: ICD-10-CM

## 2025-08-29 DIAGNOSIS — R79.89 OTHER SPECIFIED ABNORMAL FINDINGS OF BLOOD CHEMISTRY: ICD-10-CM

## 2025-08-29 DIAGNOSIS — R53.81 OTHER MALAISE: ICD-10-CM

## 2025-08-29 DIAGNOSIS — E11.9 TYPE 2 DIABETES MELLITUS W/OUT COMPLICATIONS: ICD-10-CM

## 2025-08-29 DIAGNOSIS — E88.09 OTHER DISORDERS OF PLASMA-PROTEIN METABOLISM, NOT ELSEWHERE CLASSIFIED: ICD-10-CM

## 2025-08-29 DIAGNOSIS — R31.29 OTHER MICROSCOPIC HEMATURIA: ICD-10-CM

## 2025-08-29 DIAGNOSIS — R82.89 OTHER ABNRM FNDNGS ON CYTOLOGICAL: ICD-10-CM

## 2025-08-29 DIAGNOSIS — R35.1 NOCTURIA: ICD-10-CM

## 2025-08-29 DIAGNOSIS — N13.8 BENIGN PROSTATIC HYPERPLASIA WITH LOWER URINARY TRACT SYMPMS: ICD-10-CM

## 2025-08-29 LAB
24R-OH-CALCIDIOL SERPL-MCNC: 66.4 PG/ML
ALBUMIN SERPL ELPH-MCNC: 4.2 G/DL
ALP BLD-CCNC: 72 U/L
ALT SERPL-CCNC: 27 U/L
ANION GAP SERPL CALC-SCNC: 13 MMOL/L
APPEARANCE: CLEAR
AST SERPL-CCNC: 37 U/L
BACTERIA UR CULT: NORMAL
BACTERIA: NEGATIVE /HPF
BILIRUB SERPL-MCNC: 0.4 MG/DL
BILIRUBIN URINE: NEGATIVE
BLOOD URINE: NEGATIVE
BUN SERPL-MCNC: 30 MG/DL
CALCIUM SERPL-MCNC: 10.3 MG/DL
CAST: 0 /LPF
CHLORIDE SERPL-SCNC: 105 MMOL/L
CO2 SERPL-SCNC: 22 MMOL/L
COLOR: YELLOW
CREAT SERPL-MCNC: 1.26 MG/DL
CYSTATIN C SERPL-MCNC: 1.92 MG/L
EGFRCR SERPLBLD CKD-EPI 2021: 57 ML/MIN/1.73M2
EPITHELIAL CELLS: 0 /HPF
ESTIMATED AVERAGE GLUCOSE: 166 MG/DL
ESTRADIOL SERPL-MCNC: <5 PG/ML
GFR/BSA.PRED SERPLBLD CYS-BASED-ARV: 30 ML/MIN/1.73M2
GLUCOSE QUALITATIVE U: >=1000 MG/DL
GLUCOSE SERPL-MCNC: 116 MG/DL
HBA1C MFR BLD HPLC: 7.4 %
KETONES URINE: NEGATIVE MG/DL
LEUKOCYTE ESTERASE URINE: NEGATIVE
MICROSCOPIC-UA: NORMAL
NITRITE URINE: NEGATIVE
PH URINE: 5.5
POTASSIUM SERPL-SCNC: 5.5 MMOL/L
PROT SERPL-MCNC: 7.7 G/DL
PROTEIN URINE: 30 MG/DL
PSA SERPL-MCNC: 0.15 NG/ML
RED BLOOD CELLS URINE: 0 /HPF
SHBG SERPL-SCNC: 103 NMOL/L
SODIUM SERPL-SCNC: 140 MMOL/L
SPECIFIC GRAVITY URINE: 1.02
TESTOST FREE SERPL-MCNC: 0.2 PG/ML
TESTOST SERPL-MCNC: <2.5 NG/DL
URINE CYTOLOGY: NORMAL
UROBILINOGEN URINE: 0.2 MG/DL
WHITE BLOOD CELLS URINE: 0 /HPF

## 2025-08-29 PROCEDURE — 99215 OFFICE O/P EST HI 40 MIN: CPT | Mod: 2W

## 2025-08-29 PROCEDURE — G2212 PROLONG OUTPT/OFFICE VIS: CPT | Mod: 2W

## 2025-09-02 ENCOUNTER — NON-APPOINTMENT (OUTPATIENT)
Age: 85
End: 2025-09-02